# Patient Record
Sex: FEMALE | Race: WHITE | NOT HISPANIC OR LATINO | Employment: OTHER | URBAN - METROPOLITAN AREA
[De-identification: names, ages, dates, MRNs, and addresses within clinical notes are randomized per-mention and may not be internally consistent; named-entity substitution may affect disease eponyms.]

---

## 2017-01-05 DIAGNOSIS — S72.92XA FEMUR FRACTURE, LEFT (H): Primary | ICD-10-CM

## 2017-01-06 ENCOUNTER — ANTICOAGULATION THERAPY VISIT (OUTPATIENT)
Dept: ANTICOAGULATION | Facility: CLINIC | Age: 82
End: 2017-01-06
Payer: COMMERCIAL

## 2017-01-06 DIAGNOSIS — I48.91 ATRIAL FIBRILLATION (H): ICD-10-CM

## 2017-01-06 DIAGNOSIS — Z79.01 LONG-TERM (CURRENT) USE OF ANTICOAGULANTS: Primary | ICD-10-CM

## 2017-01-06 LAB — INR POINT OF CARE: 2.4 (ref 0.86–1.14)

## 2017-01-06 PROCEDURE — 99207 ZZC NO CHARGE NURSE ONLY: CPT

## 2017-01-06 PROCEDURE — 36416 COLLJ CAPILLARY BLOOD SPEC: CPT

## 2017-01-06 PROCEDURE — 85610 PROTHROMBIN TIME: CPT | Mod: QW

## 2017-01-06 NOTE — PROGRESS NOTES
ANTICOAGULATION FOLLOW-UP CLINIC VISIT    Patient Name:  Pooja Swartz  Date:  1/6/2017  Contact Type:  Face to Face    SUBJECTIVE:     Patient Findings     Positives No Problem Findings (no changes, concerns or problems reported)           OBJECTIVE    INR PROTIME   Date Value Ref Range Status   01/06/2017 2.4* 0.86 - 1.14 Final       ASSESSMENT / PLAN  INR assessment THER    Recheck INR In: 3 WEEKS    INR Location Clinic      Anticoagulation Summary as of 1/6/2017     INR goal 2.0-3.0   Selected INR 2.4 (1/6/2017)   Maintenance plan 4 mg (2 mg x 2) on Mon, Wed, Fri; 3 mg (2 mg x 1.5) all other days   Full instructions 4 mg on Mon, Wed, Fri; 3 mg all other days   Weekly total 24 mg   No change documented Kaylyn Morris RN   Plan last modified Kaylyn Morris RN (12/13/2016)   Next INR check 1/27/2017   Priority INR   Target end date Indefinite    Indications   Atrial fibrillation (H) [I48.91]  Long-term (current) use of anticoagulants [Z79.01] [Z79.01]         Anticoagulation Episode Summary     INR check location     Preferred lab     Send INR reminders to CL ANTICOAG POOL    Comments * Discharged from home care 10-21-16      Anticoagulation Care Providers     Provider Role Specialty Phone number    LEE Winkler MD [facility] [phone]            See the Encounter Report to view Anticoagulation Flowsheet and Dosing Calendar (Go to Encounters tab in chart review, and find the Anticoagulation Therapy Visit)        Kaylyn Morris RN

## 2017-01-06 NOTE — MR AVS SNAPSHOT
Pooja Swartz   1/6/2017 11:15 AM   Anticoagulation Therapy Visit    Description:  89 year old female   Provider:  [facility]   Department:  [facility]           INR as of 1/6/2017     Selected INR 2.4 (1/6/2017)      Anticoagulation Summary as of 1/6/2017     INR goal 2.0-3.0   Selected INR 2.4 (1/6/2017)   Full instructions 4 mg on Mon, Wed, Fri; 3 mg all other days   Next INR check 1/27/2017    Indications   Atrial fibrillation (H) [I48.91]  Long-term (current) use of anticoagulants [Z79.01] [Z79.01]         Description     Recheck INR 3 weeks, 1/27/17  Continue warfarin 4 mg Mon, Wed, Fri, 4 mg rest of week       Contact Numbers     Please call [phone] to cancel and/or reschedule your appointment.  Please call [phone] with any problems or questions regarding your therapy          January 2017 Details    Sun Mon Tue Wed Thu Fri Sat     1               2               3               4               5               6      4 mg   See details      7      3 mg           8      3 mg         9      4 mg         10      3 mg         11      4 mg         12      3 mg         13      4 mg         14      3 mg           15      3 mg         16      4 mg         17      3 mg         18      4 mg         19      3 mg         20      4 mg         21      3 mg           22      3 mg         23      4 mg         24      3 mg         25      4 mg         26      3 mg         27            28                 29               30               31                    Date Details   01/06 This INR check       Date of next INR:  1/27/2017         How to take your warfarin dose     To take:  3 mg Take 1.5 of the 2 mg tablets.    To take:  4 mg Take 2 of the 2 mg tablets.

## 2017-01-09 ENCOUNTER — OFFICE VISIT (OUTPATIENT)
Dept: ORTHOPEDICS | Facility: CLINIC | Age: 82
End: 2017-01-09

## 2017-01-09 VITALS — BODY MASS INDEX: 27.44 KG/M2 | HEIGHT: 59 IN | WEIGHT: 136.1 LBS

## 2017-01-09 DIAGNOSIS — M97.02XD PERIPROSTHETIC FRACTURE AROUND INTERNAL PROSTHETIC LEFT HIP JOINT, SUBSEQUENT ENCOUNTER: Primary | ICD-10-CM

## 2017-01-09 NOTE — NURSING NOTE
"Reason For Visit:   Chief Complaint   Patient presents with     Surgical Followup     POP F/u Open Reduction Internal Fixation Left Femur Periprosthetic Fracture, Revision Left Total Hip DOS: 8/19/16           Ht 1.51 m (4' 11.45\")  Wt 61.735 kg (136 lb 1.6 oz)  BMI 27.08 kg/m2  LMP 07/07/1960                 Current Outpatient Prescriptions   Medication     warfarin (COUMADIN) 2 MG tablet     allopurinol (ZYLOPRIM) 100 MG tablet     ferrous sulfate (IRON) 325 (65 FE) MG tablet     Calcium-Vitamin D 600-200 MG-UNIT TABS     acetaminophen (TYLENOL) 325 MG tablet     amLODIPine (NORVASC) 2.5 MG tablet     miconazole (MICATIN; MICRO GUARD) 2 % powder     artificial saliva (BIOTENE DRY MOUTHWASH) LIQD solution     ranitidine (ZANTAC) 150 MG tablet     traMADol (ULTRAM) 50 MG tablet     Warfarin Therapy Reminder     senna-docusate (SENOKOT-S;PERICOLACE) 8.6-50 MG per tablet     Fexofenadine HCl (ALLEGRA PO)     Probiotic Product (PROBIOTIC DAILY PO)     brimonidine (ALPHAGAN-P) 0.15 % ophthalmic solution     latanoprost (XALATAN) 0.005 % ophthalmic solution     triamcinolone (KENALOG) 0.1 % ointment     ONE-A-DAY WOMENS OR TABS     COSOPT 2-0.5 % OP SOLN     No current facility-administered medications for this visit.       Allergies   Allergen Reactions     Penicillins Evens Huitron C.M.A.               "

## 2017-01-09 NOTE — Clinical Note
1/9/2017       RE: Pooja Swartz  [address]  [address] [place] [id number]     Dear Colleague,    Thank you for referring your patient, Pooja Swartz, to the [facility] at [facility]. Please see a copy of my visit note below.    Postoperative hip replacement follow-up clinic visit    Pooja Swartz is doing well after complex revision L ROBYN for [place] B2 ppx femur fracture.    Preoperative hip pain is   mostly resolved    Analgesic medication: Ultram    DX:  1. L femoral neck Fx  2. L periprosthetic fx, [place] B2  3. A fib    SURGERIES:  1. 3/2009, R ROBYN  2. 11/2007 L hip hemiarthroplasty for femoral neck fracture on 11/2007 (Dr. Ann)  3. 8/19/2016, Revision Bipolar stem to long stem ROBYN. ORIF prox femur periprosthetic fracture. (Bob) [facility]      EXAM:  Incision healing, clean and dry.  Able to stand today in clinic  Leg/ankle edema: none  Femoral, peroneal and tibial nerve function: normal  Gait: Not yet able to ambulate   Level pelvis when standing.    Intraoperative cultures: Completed Levoquin for gram variable bacillis     IMAGING:  Radiographs demonstrate the hip implant in satisfactory position without evidence of any complication. Fracture fragments with healing changes     IMPRESSION:  Excellent outcome to date after complex revision L ROBYN     PLAN:  1.     Continue range of motion exercises and stretching.  2.     Hip abductor and quadriceps strengthening exercises.  3.     Weight bearing status: 50% body weight for next 4 weeks   4. Discontinue DVT prophylaxis meds (i.e., warfarin or ASA) unless required for another indication.  5. Patient given instructions re: prophylactic antibiotic recommendations during dental work.  6. Next follow-up visit at 4 weeks or sooner as needed.       Kael Rojas M.D.  [employer] Family Professor  Oncology and Adult Reconstructive Surgery  Dept Orthopaedic Surgery, [facility]  [facility]  [phone] office  [email or web address]    Total Time = 10 min, 50% of which was spent in counseling and coordination of care as documented above.

## 2017-01-09 NOTE — PROGRESS NOTES
Postoperative hip replacement follow-up clinic visit    Pooja Swartz is doing well after complex revision L ROBYN for [place] B2 ppx femur fracture.    She ambulates at her pre fracture baseline, ie using 4 pronged cane at home and walker outside of her home.  She drives and lives independently.     Analgesic medication: Ultram    Ref MD:  MD Gabriele Wyatt MD PCP    DX:  1. L femoral neck Fx  2. L periprosthetic fx, [place] B2  3. A fib    SURGERIES:  1. 3/2009, R ROBYN  2. 11/2007 L hip hemiarthroplasty for femoral neck fracture on 11/2007 (Dr. Ann)  3. 8/19/2016, Revision Bipolar stem to long stem ROBYN. ORIF prox femur periprosthetic fracture. (Bob) [facility]      EXAM:  Incision healing, clean and dry.  Able to stand today in clinic  Leg/ankle edema: none  Femoral, peroneal and tibial nerve function: normal  Gait: Not yet able to ambulate   Level pelvis when standing.    Intraoperative cultures: Completed Levoquin for gram variable bacillis     IMAGING:  Radiographs demonstrate the hip implant in satisfactory position without evidence of any complication. Fracture fragments with healing changes     IMPRESSION:  Excellent outcome to date after complex revision L ROBYN     PLAN:  1. Excellent outcome  2. Full wt bearing  3. RTC at 1 year postop  4. Advised aquatic exercises.       Kael Rojas M.D.  [employer]resa Family Professor  Oncology and Adult Reconstructive Surgery  Dept Orthopaedic Surgery, [facility]  [phone] office  [email or web address]    Total Time = 10 min, 50% of which was spent in counseling and coordination of care as documented above.

## 2017-01-27 ENCOUNTER — ANTICOAGULATION THERAPY VISIT (OUTPATIENT)
Dept: ANTICOAGULATION | Facility: CLINIC | Age: 82
End: 2017-01-27
Payer: COMMERCIAL

## 2017-01-27 DIAGNOSIS — I48.91 ATRIAL FIBRILLATION (H): ICD-10-CM

## 2017-01-27 DIAGNOSIS — Z79.01 LONG-TERM (CURRENT) USE OF ANTICOAGULANTS: Primary | ICD-10-CM

## 2017-01-27 LAB — INR POINT OF CARE: 2.5 (ref 0.86–1.14)

## 2017-01-27 PROCEDURE — 99207 ZZC NO CHARGE NURSE ONLY: CPT

## 2017-01-27 PROCEDURE — 85610 PROTHROMBIN TIME: CPT | Mod: QW

## 2017-01-27 PROCEDURE — 36416 COLLJ CAPILLARY BLOOD SPEC: CPT

## 2017-01-27 NOTE — PROGRESS NOTES
ANTICOAGULATION FOLLOW-UP CLINIC VISIT    Patient Name:  Pooja Swartz  Date:  1/27/2017  Contact Type:  Face to Face    SUBJECTIVE:     Patient Findings     Positives Any Bruising (bruise on both hands, she is not sure what she did), No Problem Findings (no changes, concerns or problems reported)           OBJECTIVE    INR PROTIME   Date Value Ref Range Status   01/27/2017 2.5* 0.86 - 1.14 Final       ASSESSMENT / PLAN  INR assessment THER    Recheck INR In: 4 WEEKS    INR Location Clinic      Anticoagulation Summary as of 1/27/2017     INR goal 2.0-3.0   Selected INR 2.5 (1/27/2017)   Maintenance plan 4 mg (2 mg x 2) on Mon, Wed, Fri; 3 mg (2 mg x 1.5) all other days   Full instructions 4 mg on Mon, Wed, Fri; 3 mg all other days   Weekly total 24 mg   No change documented Kaylyn Morris RN   Plan last modified Kaylyn Morris RN (12/13/2016)   Next INR check 2/24/2017   Priority INR   Target end date Indefinite    Indications   Atrial fibrillation (H) [I48.91]  Long-term (current) use of anticoagulants [Z79.01] [Z79.01]         Anticoagulation Episode Summary     INR check location     Preferred lab     Send INR reminders to CL ANTICOAG POOL    Comments * Discharged from home care 10-21-16      Anticoagulation Care Providers     Provider Role Specialty Phone number    Peterson, LEE Fair MD [facility] [phone]            See the Encounter Report to view Anticoagulation Flowsheet and Dosing Calendar (Go to Encounters tab in chart review, and find the Anticoagulation Therapy Visit)        Kaylyn Morris RN

## 2017-01-27 NOTE — MR AVS SNAPSHOT
Pooja HOUSTON Maxime   1/27/2017 11:15 AM   Anticoagulation Therapy Visit    Description:  89 year old female   Provider:  [facility]   Department:  [facility]           INR as of 1/27/2017     Selected INR 2.5 (1/27/2017)      Anticoagulation Summary as of 1/27/2017     INR goal 2.0-3.0   Selected INR 2.5 (1/27/2017)   Full instructions 4 mg on Mon, Wed, Fri; 3 mg all other days   Next INR check 2/24/2017    Indications   Atrial fibrillation (H) [I48.91]  Long-term (current) use of anticoagulants [Z79.01] [Z79.01]         Description     Recheck INR 4 weeks, 2/24/17  continue warfarin 4 mg Mon, Wed, Fri, 3 mg rest of week       Contact Numbers     Please call [phone] to cancel and/or reschedule your appointment.  Please call [phone] with any problems or questions regarding your therapy          January 2017 Details    Sun Mon Tue Wed Thu Fri Sat     1               2               3               4               5               6               7                 8               9               10               11               12               13               14                 15               16               17               18               19               20               21                 22               23               24               25               26               27      4 mg   See details      28      3 mg           29      3 mg         30      4 mg         31      3 mg              Date Details   01/27 This INR check               How to take your warfarin dose     To take:  3 mg Take 1.5 of the 2 mg tablets.    To take:  4 mg Take 2 of the 2 mg tablets.           February 2017 Details    Sun Mon Tue Wed Thu Fri Sat        1      4 mg         2      3 mg         3      4 mg         4      3 mg           5      3 mg         6      4 mg         7      3 mg         8      4 mg         9      3 mg         10      4 mg         11      3 mg           12      3 mg         13      4 mg          14      3 mg         15      4 mg         16      3 mg         17      4 mg         18      3 mg           19      3 mg         20      4 mg         21      3 mg         22      4 mg         23      3 mg         24            25                 26               27               28                    Date Details   No additional details    Date of next INR:  2/24/2017         How to take your warfarin dose     To take:  3 mg Take 1.5 of the 2 mg tablets.    To take:  4 mg Take 2 of the 2 mg tablets.

## 2017-02-08 ENCOUNTER — APPOINTMENT (OUTPATIENT)
Dept: CT IMAGING | Facility: CLINIC | Age: 82
End: 2017-02-08
Attending: FAMILY MEDICINE
Payer: MEDICARE

## 2017-02-08 ENCOUNTER — HOSPITAL ENCOUNTER (EMERGENCY)
Facility: CLINIC | Age: 82
Discharge: HOME | End: 2017-02-08
Attending: FAMILY MEDICINE | Admitting: FAMILY MEDICINE
Payer: MEDICARE

## 2017-02-08 ENCOUNTER — APPOINTMENT (OUTPATIENT)
Dept: GENERAL RADIOLOGY | Facility: CLINIC | Age: 82
End: 2017-02-08
Attending: FAMILY MEDICINE
Payer: MEDICARE

## 2017-02-08 VITALS
DIASTOLIC BLOOD PRESSURE: 98 MMHG | SYSTOLIC BLOOD PRESSURE: 176 MMHG | HEART RATE: 64 BPM | RESPIRATION RATE: 20 BRPM | OXYGEN SATURATION: 98 %

## 2017-02-08 DIAGNOSIS — S09.90XA CLOSED HEAD INJURY, INITIAL ENCOUNTER: ICD-10-CM

## 2017-02-08 DIAGNOSIS — S70.02XA CONTUSION OF LEFT HIP, INITIAL ENCOUNTER: ICD-10-CM

## 2017-02-08 DIAGNOSIS — S51.012A ELBOW LACERATION, LEFT, INITIAL ENCOUNTER: ICD-10-CM

## 2017-02-08 DIAGNOSIS — S00.03XA SCALP HEMATOMA, INITIAL ENCOUNTER: ICD-10-CM
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
## 2017-02-08 LAB
ALBUMIN SERPL-MCNC: 3.7 G/DL (ref 3.4–5)
ALP SERPL-CCNC: 88 U/L (ref 40–150)
ALT SERPL W P-5'-P-CCNC: 22 U/L (ref 0–50)
ANION GAP SERPL CALCULATED.3IONS-SCNC: 8 MMOL/L (ref 3–14)
AST SERPL W P-5'-P-CCNC: 29 U/L (ref 0–45)
BASOPHILS # BLD AUTO: 0.1 10E9/L (ref 0–0.2)
BASOPHILS NFR BLD AUTO: 0.6 %
BILIRUB SERPL-MCNC: 0.7 MG/DL (ref 0.2–1.3)
BUN SERPL-MCNC: 23 MG/DL (ref 7–30)
CALCIUM SERPL-MCNC: 9.2 MG/DL (ref 8.5–10.1)
CHLORIDE SERPL-SCNC: 106 MMOL/L (ref 94–109)
CO2 SERPL-SCNC: 24 MMOL/L (ref 20–32)
CREAT SERPL-MCNC: 0.82 MG/DL (ref 0.52–1.04)
DIFFERENTIAL METHOD BLD: ABNORMAL
EOSINOPHIL # BLD AUTO: 0.1 10E9/L (ref 0–0.7)
EOSINOPHIL NFR BLD AUTO: 1 %
ERYTHROCYTE [DISTWIDTH] IN BLOOD BY AUTOMATED COUNT: 15.3 % (ref 10–15)
GFR SERPL CREATININE-BSD FRML MDRD: 65 ML/MIN/1.7M2
GLUCOSE SERPL-MCNC: 110 MG/DL (ref 70–99)
HCT VFR BLD AUTO: 40.5 % (ref 35–47)
HGB BLD-MCNC: 13.2 G/DL (ref 11.7–15.7)
IMM GRANULOCYTES # BLD: 0 10E9/L (ref 0–0.4)
IMM GRANULOCYTES NFR BLD: 0.5 %
INR PPP: 2.02 (ref 0.86–1.14)
LYMPHOCYTES # BLD AUTO: 1.8 10E9/L (ref 0.8–5.3)
LYMPHOCYTES NFR BLD AUTO: 22.3 %
MCH RBC QN AUTO: 31.9 PG (ref 26.5–33)
MCHC RBC AUTO-ENTMCNC: 32.6 G/DL (ref 31.5–36.5)
MCV RBC AUTO: 98 FL (ref 78–100)
MONOCYTES # BLD AUTO: 0.7 10E9/L (ref 0–1.3)
MONOCYTES NFR BLD AUTO: 8.8 %
NEUTROPHILS # BLD AUTO: 5.4 10E9/L (ref 1.6–8.3)
NEUTROPHILS NFR BLD AUTO: 66.8 %
PLATELET # BLD AUTO: 205 10E9/L (ref 150–450)
POTASSIUM SERPL-SCNC: 4.3 MMOL/L (ref 3.4–5.3)
PROT SERPL-MCNC: 7.9 G/DL (ref 6.8–8.8)
RBC # BLD AUTO: 4.14 10E12/L (ref 3.8–5.2)
SODIUM SERPL-SCNC: 138 MMOL/L (ref 133–144)
WBC # BLD AUTO: 8 10E9/L (ref 4–11)

## 2017-02-08 PROCEDURE — 73080 X-RAY EXAM OF ELBOW: CPT | Mod: LT

## 2017-02-08 PROCEDURE — 85025 COMPLETE CBC W/AUTO DIFF WBC: CPT | Performed by: FAMILY MEDICINE

## 2017-02-08 PROCEDURE — 70450 CT HEAD/BRAIN W/O DYE: CPT

## 2017-02-08 PROCEDURE — 85610 PROTHROMBIN TIME: CPT | Performed by: FAMILY MEDICINE

## 2017-02-08 PROCEDURE — 12002 RPR S/N/AX/GEN/TRNK2.6-7.5CM: CPT | Performed by: FAMILY MEDICINE

## 2017-02-08 PROCEDURE — 99284 EMERGENCY DEPT VISIT MOD MDM: CPT | Mod: 25 | Performed by: FAMILY MEDICINE

## 2017-02-08 PROCEDURE — 80053 COMPREHEN METABOLIC PANEL: CPT | Performed by: FAMILY MEDICINE

## 2017-02-08 PROCEDURE — 73523 X-RAY EXAM HIPS BI 5/> VIEWS: CPT

## 2017-02-08 PROCEDURE — 99285 EMERGENCY DEPT VISIT HI MDM: CPT | Mod: 25 | Performed by: FAMILY MEDICINE

## 2017-02-08 RX ORDER — LIDOCAINE HYDROCHLORIDE AND EPINEPHRINE 10; 10 MG/ML; UG/ML
INJECTION, SOLUTION INFILTRATION; PERINEURAL
Status: DISCONTINUED
Start: 2017-02-08 | End: 2017-02-08 | Stop reason: HOSPADM

## 2017-02-08 ASSESSMENT — ENCOUNTER SYMPTOMS
ENDOCRINE NEGATIVE: 1
GASTROINTESTINAL NEGATIVE: 1
MUSCULOSKELETAL NEGATIVE: 1
EYES NEGATIVE: 1
HEMATOLOGIC/LYMPHATIC NEGATIVE: 1
PSYCHIATRIC NEGATIVE: 1
NEUROLOGICAL NEGATIVE: 1
CARDIOVASCULAR NEGATIVE: 1
RESPIRATORY NEGATIVE: 1
ALLERGIC/IMMUNOLOGIC NEGATIVE: 1
CONSTITUTIONAL NEGATIVE: 1

## 2017-02-08 NOTE — ED NOTES
Patient states she fell at home this morning at 0930, she fell backwards onto head and to the left. She has a bump on back of head (closed) an injury on left elbow that is covered in a pressure dressing. She is on Coumadin for Afib. She denies LOC. She is worried about her hip as she had a hip replacement in August of 2016. She is A/O x4

## 2017-02-08 NOTE — DISCHARGE INSTRUCTIONS
Mechanical Fall  You have had a fall today. It appears that the cause is  mechanical . That means that you slipped, tripped or lost your balance. If your fall had been due to fainting or a seizure, further tests would be required.  Home Care:    Rest today and resume your normal activities when you are feeling back to normal.    If you were injured during the fall, follow the advice from your doctor regarding care of your injury.    You may use acetaminophen (Tylenol) or ibuprofen (Motrin, Advil) to control pain, unless another pain medicine was prescribed. [NOTE: If you have chronic liver or kidney disease or ever had a stomach ulcer or GI bleeding, talk with your doctor before using these medicines.]  Fall Prevention:    Was there anything that caused your fall that can be fixed, removed, or replaced?    Make your home safe by keeping walkways clear of objects you may trip over.    Use non-slip pads under rugs.    Do not walk in poorly lit areas.    Do not stand on chairs or wobbly ladders.    Use caution when reaching overhead or looking upward. This position can cause a loss of balance.    Be sure your shoes fit properly, have non-slip bottoms and are in good condition.    Be cautious when going up and down curbs, and walking on uneven sidewalks.    If your balance is poor, consider using a cane or walker.    Stay as active as you can. Balance, flexibility, strength, and endurance all come from exercise. They all play a role in preventing falls.  Follow Up  with your doctor or as advised by our staff.  Get Prompt Medical Attention  if any of the following occur:    Repeated mechanical falls, or unexplained falls    Dizziness, fainting or seizure    Severe headache    Chest pain or shortness of breath    Palpitations (very rapid or very slow or irregular heartbeat)    Blood in vomit, stools (black or red color)    Weakness of an arm or leg or one side of the face    Difficulty with speech or vision    [id number]  The [employer]. [address] [place] [address], [place] [address]. All rights reserved. This information is not intended as a substitute for professional medical care. Always follow your healthcare professional's instructions.          Bruises (Contusions)    A contusion is a bruise. A bruise happens when a blow to your body doesn't break the skin but does break blood vessels beneath the skin. Blood leaking from the broken vessels causes redness and swelling. As it heals, your bruise is likely to turn colors like purple, green, and yellow. This is normal. The bruise should fade in 2 or 3 weeks.  Factors that make you more likely to bruise  Almost everyone bruises now and then. Certain people do bruise more easily than others. You're more prone to bruising as you get older. That's because blood vessels become more fragile with age. You're also more likely to bruise if you have a clotting disorder such as hemophilia or take medications that reduce clotting, including aspirin.  When to go to the emergency room (ER)  Bruises almost always heal on their own without special treatment. But for some people, a bad bruise can be serious. Seek medical care if you:    Have a clotting disorder such as hemophilia.    Have cirrhosis or other serious liver disease.    Take blood-thinning medications such as warfarin (Coumadin).  What to expect in the ER  A doctor will examine your bruise and ask about any health conditions you have. In some cases, you may have a test to check how well your blood clots. Other treatment will depend on your needs.  Follow-up care  Sometimes a bruise gets worse instead of better. It may become larger and more swollen. This can occur when your body walls off a small pool of blood under the skin (hematoma). In very rare cases, your doctor may need to drain excess blood from the area.  Tip:  Apply an ice pack or bag of frozen peas to a bruise (keep a thin cloth between the cold source and your skin).  This can help reduce redness and swelling.     8086-0147 [employer]. [address] [place] [address], [place] [address]. All rights reserved. This information is not intended as a substitute for professional medical care. Always follow your healthcare professional's instructions.      Ice affected areas for comfort 20 minutes out of each hour while awake over the next 2 days.  Ibuprofen if needed for pain.  Removal of sutures with her primary care provider in 14-21 days.

## 2017-02-08 NOTE — ED NOTES
While walking with walker this am, released walker with only 3 steps to chair, lost balance, and fell. Hit left elbow then head hit floor. No LOC. Takes coumadin. Hematoma to left parietal.  Lac to left elbow with golf ball hematoma. Dressing saturated with blood, reapplied  Gauze and coban applied. Trauma eval. Dr Ramirez notified

## 2017-02-08 NOTE — ED AVS SNAPSHOT
[facility] Emergency Department    [address] [address]    Phone:  [phone]    Fax:  [phone]                                       Pooja Swartz   MRN: [id number]    Department:  [facility] Emergency Department   Date of Visit:  2/8/2017           After Visit Summary Signature Page     I have received my discharge instructions, and my questions have been answered. I have discussed any challenges I see with this plan with the nurse or doctor.    ..........................................................................................................................................  Patient/Patient Representative Signature      ..........................................................................................................................................  Patient Representative Print Name and Relationship to Patient    ..................................................               ................................................  Date                                            Time    ..........................................................................................................................................  Reviewed by Signature/Title    ...................................................              ..............................................  Date                                                            Time

## 2017-02-08 NOTE — ED PROVIDER NOTES
History     Chief Complaint   Patient presents with     Fall     HPI  Pooja Swartz is a 89 year old female, past medical history significant for hypertension, GI bleed, generalized osteoarthritis, stage III kidney disease, gout, atrial fibrillation, long-term use of anticoagulants, presents the emergency department with concerns of a fall at her nursing home just prior to arrival.  History is obtained from the patient and her friend who accompanies her.  She states that she was walking with her walker and briefly took her hands off the support rails and lost her balance falling backwards striking her head in the left parietal occipital area as well as her left elbow and left hip/buttock area.  She required assistance but was able to get back up and has ambulated since the fall.  There was no loss of consciousness.  She has a mild headache in the location of the trauma left parietal occipital area.  No blurred vision or double vision no nausea or vomiting.  She denies any injury to her chest back or abdomen.  There was bleeding from the left elbow.  Pressure was applied she came to the emergency department.  The patient denies any concerning prodromal features such as headache nausea vomiting or lightheadedness chest pain shortness of air etc.     Active Ambulatory Problems     Diagnosis Date Noted     Hypertension goal BP (blood pressure) < 140/90 07/07/2005     Other specified malignant neoplasm of skin of lower limb, including hip 07/07/2005     Hemorrhage of gastrointestinal tract 07/07/2005     Generalized osteoarthrosis, unspecified site 07/07/2005     HEPATITIS B in past 02/06/2007     Impaired fasting glucose 12/05/2007     Disorder of bone and cartilage 01/13/2008     Chronic kidney disease, stage III (moderate) 04/21/2008     Chronic tophaceous gout 02/02/2009     CARDIOVASCULAR SCREENING; LDL GOAL LESS THAN 100 10/31/2010     Sensorineural hearing loss, bilateral 07/26/2012     Advanced directives,  counseling/discussion 11/09/2012     Sinus node dysfunction (H)      Atrial fibrillation (H) 01/15/2015     Chest pain 03/14/2015     Diarrhea 03/15/2015     Health Care Home 12/01/2015     Femur fracture, left (H) 08/17/2016     Acute posthemorrhagic anemia 08/30/2016     Long-term (current) use of anticoagulants [Z79.01] 09/29/2016     Periprosthetic fracture around internal prosthetic left hip joint, subsequent encounter 01/09/2017     Resolved Ambulatory Problems     Diagnosis Date Noted     Hip fracture (H) 04/05/2011     Heart block AV third degree (H) 08/07/2014     Renal insufficiency      Past Medical History   Diagnosis Date     Unspecified essential hypertension      Other specified glaucoma      Hemorrhage of gastrointestinal tract, unspecified      Disorders of bursae and tendons in shoulder region, unspecified      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, including hip      Chronic kidney disease      Past Surgical History   Procedure Laterality Date     Surgical history of -   11/1992     skin cancer, nose     Surgical history of -   1978     right, vein stripping     Surgical history of -   1968     breast biopsy     Surgical history of -   1993     laparoscopic cholecystectomy     Surgical history of -        tonsillectomy and adenoidectomy     Surgical history of -   04/2004     left cataract     Surgical history of -   08/09/2004     right total shoulder arthroplasty     Open reduction internal fixation femur proximal Left 8/19/2016     Procedure: OPEN REDUCTION INTERNAL FIXATION FEMUR PROXIMAL;  Surgeon: Kael Rojas MD;  Location: [facility] OR     Arthroplasty revision hip Left 8/19/2016     Procedure: ARTHROPLASTY REVISION HIP;  Surgeon: Kael Rojas MD;  Location: [facility] OR     C pelvis/hip joint surgery unlisted       C shoulder surg proc unlisted       C stomach surgery procedure unlisted       Hc vascular surgery procedure unlist       Social History     Social History     Marital  Status:      Spouse Name: N/A     Number of Children: N/A     Years of Education: N/A     Occupational History     Not on file.     Social History Main Topics     Smoking status: Never Smoker      Smokeless tobacco: Never Used     Alcohol Use: Yes      Comment: rare     Drug Use: No     Sexual Activity: Not Currently     Birth Control/ Protection: None     Other Topics Concern     Parent/Sibling W/ Cabg, Mi Or Angioplasty Before 65f 55m? Yes     Social History Narrative     Family History   Problem Relation Age of Onset     HEART DISEASE Mother      CHF     HEART DISEASE Son      MI     Hypertension Mother      Hypertension Father      Hypertension Maternal Grandmother      Hypertension Maternal Grandfather      Hypertension Son      Current Facility-Administered Medications   Medication     lidocaine 1% with EPINEPHrine 1:100,000 1 %-1:[id number] injection     Current Outpatient Prescriptions   Medication     AMLODIPINE BESYLATE PO     warfarin (COUMADIN) 2 MG tablet     allopurinol (ZYLOPRIM) 100 MG tablet     Calcium-Vitamin D 600-200 MG-UNIT TABS     senna-docusate (SENOKOT-S;PERICOLACE) 8.6-50 MG per tablet     Fexofenadine HCl (ALLEGRA PO)     Probiotic Product (PROBIOTIC DAILY PO)     brimonidine (ALPHAGAN-P) 0.15 % ophthalmic solution     latanoprost (XALATAN) 0.005 % ophthalmic solution     triamcinolone (KENALOG) 0.1 % ointment     ONE-A-DAY WOMENS OR TABS     COSOPT 2-0.5 % OP SOLN     miconazole (MICATIN; MICRO GUARD) 2 % powder     traMADol (ULTRAM) 50 MG tablet     Warfarin Therapy Reminder        Allergies   Allergen Reactions     Penicillins Hives       I have reviewed the Medications, Allergies, Past Medical and Surgical History, and Social History in the Epic system.    Review of Systems   Constitutional: Negative.    HENT:        Left occipital area headache.   Eyes: Negative.    Respiratory: Negative.    Cardiovascular: Negative.    Gastrointestinal: Negative.    Endocrine: Negative.     Genitourinary: Negative.    Musculoskeletal: Negative.         Left elbow pain, left hip pain.   Allergic/Immunologic: Negative.    Neurological: Negative.    Hematological: Negative.    Psychiatric/Behavioral: Negative.        Physical Exam   BP: 190/88 mmHg  Pulse: 64  Resp: 20  SpO2: 99 %  Physical Exam   Constitutional: She is oriented to person, place, and time. She appears well-developed and well-nourished.   HENT:   Right Ear: External ear normal.   Left Ear: External ear normal.   Nose: Nose normal.   Mouth/Throat: Oropharynx is clear and moist.   Palpable 5 cm diameter hematoma left parietal occipital area.  No crepitance.  No laceration or abrasion.  Tender.   Eyes: Conjunctivae and EOM are normal. Pupils are equal, round, and reactive to light.   Neck: Normal range of motion. Neck supple.   Cardiovascular: Normal rate, regular rhythm, normal heart sounds and intact distal pulses.    Pulmonary/Chest: Effort normal and breath sounds normal.   Abdominal: Soft. Bowel sounds are normal.   Musculoskeletal:   Exam of left elbow reveals a 5 centimeter laceration/skin tear with slow ooze of blood, there is a area of skin avulsion/tear with there is no tissue.  Dressing applied.  No gross deformity to the elbow.  Tenderness to palpation of the left greater trochanter.  No pain with internal and external rotation.  No limb shortening, neurovascular intact.   Neurological: She is alert and oriented to person, place, and time.   Skin: Skin is warm and dry.   Psychiatric: She has a normal mood and affect. Her behavior is normal.   Nursing note and vitals reviewed.      ED Course   Procedures           Results for orders placed or performed during the hospital encounter of 02/08/17   CT Head w/o Contrast    Narrative    CT SCAN OF THE HEAD WITHOUT CONTRAST   2/8/2017 11:27 AM     HISTORY: Fall, head injury    TECHNIQUE:  Axial images of the head and coronal reformations without  IV contrast material. Radiation dose  for this scan was reduced using  automated exposure control, adjustment of the mA and/or kV according  to patient size, or iterative reconstruction technique.    COMPARISON: 8/16/2016    FINDINGS:  There is generalized atrophy of the brain.  There is low  attenuation in the white matter of the cerebral hemispheres consistent  with sequelae of small vessel ischemic disease. There is no evidence  of intracranial hemorrhage, mass, acute infarct or anomaly.     The visualized portions of the sinuses and mastoids appear normal.  There is no evidence of trauma.      Impression    IMPRESSION:   1. No evidence of acute trauma.  2.  Atrophy of the brain.  White matter changes consistent with  sequelae of small vessel ischemic disease.  3. No change.      SARAH HASKINS MD   XR Elbow Left G/E 3 Views    Narrative    XR ELBOW LT G/E 3 VW  2/8/2017 11:44 AM    HISTORY:  Pain    COMPARISON:  None.      Impression    IMPRESSION:  A dressing overlies the posterior elbow. There may be a  soft tissue laceration. Bones appear normal. No acute osseous  abnormality identified.     ANIKA DELUNA MD   XR Pelvis and Hip Bilateral 2 Views    Narrative    PELVIS AND HIP BILATERAL TWO VIEWS 2/8/2017 11:43 AM     HISTORY: Fall, pain.    COMPARISON: 1/19/2017      Impression    IMPRESSION: Osteopenia. 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  Longstem femoral component in the left femur. There is heterotopic  ossification about the proximal left femur. Cortical irregularity in  the left femoral neck is unchanged compared with the prior x-ray. No  definite acute fracture is seen.    AMBER KAISER MD     1:01 PM  Procedure note:  With verbal consent for the patient I injected the 5 cm laceration with some skin avulsion with 8 cc of 1% lidocaine with epinephrine subcutaneously.  I then prepped the wound with Hibiclens and saline.  Simple interrupted closure was performed with a total of 8 3-0 nylon sutures.  Good wound margin  approximation.  Good hemostasis.      Critical Care time:  none               Labs Ordered and Resulted from Time of ED Arrival Up to the Time of Departure from the ED   CBC WITH PLATELETS DIFFERENTIAL - Abnormal; Notable for the following:     RDW 15.3 (*)     All other components within normal limits   COMPREHENSIVE METABOLIC PANEL - Abnormal; Notable for the following:     Glucose 110 (*)     All other components within normal limits   INR - Abnormal; Notable for the following:     INR 2.02 (*)     All other components within normal limits       Assessments & Plan (with Medical Decision Making)   89-year-old female past medical history reviewed above, presentation with a fall after she lost her balance inadvertently while using her walker.  She traumatized her head left elbow and hip areas.  Lacerations left elbow.  Vital stable and comfortable in the emergency department and declined pain medication.  Head CT obtained in light of the fact that the patient is on Coumadin with a moderate-sized scalp hematoma.  Negative head CT.  X-ray left elbow and AP pelvis and bilateral hips is negative for acute pathology.  The patient was comfortable, alert and had no change in mental status during the time in the emergency department.  The left elbow laceration was repaired primarily as described the procedure note above.  All questions were answered.  Plan for disposition to home.  Continue current medications, removal of sutures in 14-21 days.      Disclaimer: This note consists of symbols derived from keyboarding, dictation and/or voice recognition software. As a result, there may be errors in the script that have gone undetected. Please consider this when interpreting information found in this chart.      I have reviewed the nursing notes.    I have reviewed the findings, diagnosis, plan and need for follow up with the patient.    New Prescriptions    No medications on file       Final diagnoses:   Fall, initial  encounter   Closed head injury, initial encounter   Scalp hematoma, initial encounter   Elbow laceration, left, initial encounter   Contusion of left hip, initial encounter       2/8/2017   [facility] EMERGENCY DEPARTMENT      Kehinde Ramirez MD  02/08/17 [id number]

## 2017-02-13 ENCOUNTER — OFFICE VISIT (OUTPATIENT)
Dept: FAMILY MEDICINE | Facility: CLINIC | Age: 82
End: 2017-02-13
Payer: COMMERCIAL

## 2017-02-13 VITALS
BODY MASS INDEX: 27.45 KG/M2 | DIASTOLIC BLOOD PRESSURE: 70 MMHG | HEART RATE: 64 BPM | WEIGHT: 138 LBS | SYSTOLIC BLOOD PRESSURE: 140 MMHG

## 2017-02-13 DIAGNOSIS — T07.XXXA CONTUSION OF MULTIPLE SITES: ICD-10-CM

## 2017-02-13 DIAGNOSIS — S51.012A LACERATION OF ELBOW, LEFT, INITIAL ENCOUNTER: Primary | ICD-10-CM

## 2017-02-13 PROCEDURE — 99213 OFFICE O/P EST LOW 20 MIN: CPT | Performed by: FAMILY MEDICINE

## 2017-02-13 NOTE — MR AVS SNAPSHOT
"              After Visit Summary   2/13/2017    Pooja Swartz    MRN: [id number]           Patient Information     Date Of Birth          9/1/1925        Visit Information        Provider Department      2/13/2017 11:20 AM LEE Winkler MD [facility]        Care Instructions    Come back for an appointment on 2/21 for recheck and suture removal        Follow-ups after your visit        Your next 10 appointments already scheduled     Feb 24, 2017 11:00 AM CST   Anticoagulation Visit with CL ANTI COAG   [facility]    [address]  [facility] [id number] [phone]            Mar 07, 2017  3:00 PM CST   Return Visit with Adan Franks MD   [facility] PHYSICIAN [facility]ART AT [facility] ([facility])    [address] [address] [phone]              Who to contact     If you have questions or need follow up information about today's clinic visit or your schedule please contact [facility] directly at [phone].  Normal or non-critical lab and imaging results will be communicated to you by [employer]art, letter or phone within 4 business days after the clinic has received the results. If you do not hear from us within 7 days, please contact the clinic through Sif[employer]Nethart or phone. If you have a critical or abnormal lab result, we will notify you by phone as soon as possible.  Submit refill requests through [employer] or call your pharmacy and they will forward the refill request to us. Please allow 3 business days for your refill to be completed.          Additional Information About Your Visit        MyChart Information     [employer] lets you send messages to your doctor, view your test results, renew your prescriptions, schedule appointments and more. To sign up, go to [email or web address] . Click on \"Log in\" on the left side of the screen, which will take you to the Welcome page. " "Then click on \"Sign up Now\" on the right side of the page.     You will be asked to enter the access code listed below, as well as some personal information. Please follow the directions to create your username and password.     Your access code is: [email or web address]  Expires: 3/7/2017  6:31 AM     Your access code will  in 90 days. If you need help or a new code, please call your [facility] clinic or [phone].        Care EveryWhere ID     This is your Care EveryWhere ID. This could be used by other organizations to access your [facility] medical records  [id number]        Your Vitals Were     Pulse Last Period BMI (Body Mass Index)             64 1960 27.45 kg/m2          Blood Pressure from Last 3 Encounters:   17 140/70   17 (!) 176/98   16 138/78    Weight from Last 3 Encounters:   17 138 lb (62.6 kg)   17 136 lb 1.6 oz (61.7 kg)   16 135 lb (61.2 kg)              Today, you had the following     No orders found for display         Today's Medication Changes          These changes are accurate as of: 17 11:53 AM.  If you have any questions, ask your nurse or doctor.               These medicines have changed or have updated prescriptions.        Dose/Directions    triamcinolone 0.1 % ointment   Commonly known as:  KENALOG   This may have changed:    - when to take this  - reasons to take this  - additional instructions   Used for:  Nummular eczema        Apply  topically 3 times daily. Apply sparingly to affected area.   Quantity:  30 g   Refills:  0                Primary Care Provider Office Phone # Fax #    R Marv Winkler [phone]       [address] [address]        Thank you!     Thank you for choosing [facility]  for your care. Our goal is always to provide you with excellent care. Hearing back from our patients is one way we can continue to improve our services. Please take a " few minutes to complete the written survey that you may receive in the mail after your visit with us. Thank you!             Your Updated Medication List - Protect others around you: Learn how to safely use, store and throw away your medicines at www.disposemymeds.org.          This list is accurate as of: 2/13/17 11:53 AM.  Always use your most recent med list.                   Brand Name Dispense Instructions for use    ALLEGRA PO      Take 180 mg by mouth daily       allopurinol 100 MG tablet    ZYLOPRIM    90 tablet    Take 1 tablet (100 mg) by mouth daily       AMLODIPINE BESYLATE PO      Take 7.5 mg by mouth daily       brimonidine 0.15 % ophthalmic solution    ALPHAGAN-P     Place 1 drop Into the left eye 2 times daily       Calcium-Vitamin D 600-200 MG-UNIT Tabs      Take 1 tablet by mouth 2 times daily       COSOPT 2-0.5 % ophthalmic solution   Generic drug:  dorzolamide-timolol      1 DROP INTO Left Eye twice daily       latanoprost 0.005 % ophthalmic solution    XALATAN     Place 1 drop into both eyes At Bedtime       miconazole 2 % powder    MICATIN; MICRO GUARD     Apply topically 2 times daily Reported on 2/13/2017       ONE-A-DAY WITHIN Tabs      1 TABLET ORALLY DAILY       PROBIOTIC DAILY PO      Take 1 capsule by mouth daily       senna-docusate 8.6-50 MG per tablet    SENOKOT-S;PERICOLACE    100 tablet    Take 1-2 tablets by mouth 2 times daily       traMADol 50 MG tablet    ULTRAM    50 tablet    Take 1 tablet (50 mg) by mouth every 6 hours as needed       triamcinolone 0.1 % ointment    KENALOG    30 g    Apply  topically 3 times daily. Apply sparingly to affected area.       * Warfarin Therapy Reminder      1 each continuous prn       * warfarin 2 MG tablet    COUMADIN    150 tablet    Take 4 mg on Mon, Wed, Fri; 3 mg all other days or as directed by the Anticoagulation Clinic       * Notice:  This list has 2 medication(s) that are the same as other medications prescribed for you. Read the  directions carefully, and ask your doctor or other care provider to review them with you.

## 2017-02-13 NOTE — PROGRESS NOTES
SUBJECTIVE:                                                    Pooja Swartz is a 89 year old female who presents to clinic today for the following health issues:      ED/UC Followup:    Facility:  [facility]  Date of visit: 2/8/2017  Reason for visit: fall -  injury to left elbow  Current Status: Improved            Problem list and histories reviewed & adjusted, as indicated.  Additional history: She stumbled when she had stepped away from her walker for a short time and fell injuring her left elbow, left hip, and the left side of the head. She was evaluated in emergency room and had x-rays of the elbow and hip were negative for fracture, CT scan of the head which was unremarkable for acute injury. There was a laceration and skin tear of the left elbow which was repaired. The ER doc recommended a follow-up visit today to not remove the sutures for at least 10 days. She states that she does not have any symptoms to suggest a concussion such as confusion or amnesia. There is soreness of her left hip but she is able to ambulate without difficulty. She had developed significant swelling of her left arm distal to the dressing so she rewrapped it with a more loose dressing. It is starting to feel better            OBJECTIVE:                                                    /70 (BP Location: Right arm, Patient Position: Chair, Cuff Size: Adult Regular)  Pulse 64  Wt 138 lb (62.6 kg)  LMP 07/07/1960  BMI 27.45 kg/m2    GENERAL: healthy, alert and no distress  EYES: Eyes grossly normal to inspection, extraocular movements - intact, and PERRL  NECK: no tenderness, no adenopathy, no asymmetry, no masses, no stiffness; thyroid- normal to palpation  MS: extremities- no gross deformities noted, no edema  SKIN: Resolving ecchymosis of the elbow and forearm; the wound is healing nicely there is a 1 x 3 cm area where the skin sloughed off where there was mostly a skin tear; no sign of infection         ASSESSMENT/PLAN:                                                     ASSESSMENT:  1. Laceration of elbow, left, initial encounter    2. Contusion of multiple sites        PLAN:  The wound was cleansed with Hibiclens and then redressed with a sterile dressing. She was advised to change the dressing every 1-2 days as needed reassured that all the other contusions will continue to resolve spontaneously      Patient Instructions   Come back for an appointment on 2/21 for recheck and suture removal      [facility]

## 2017-02-24 ENCOUNTER — ANTICOAGULATION THERAPY VISIT (OUTPATIENT)
Dept: ANTICOAGULATION | Facility: CLINIC | Age: 82
End: 2017-02-24
Payer: COMMERCIAL

## 2017-02-24 ENCOUNTER — OFFICE VISIT (OUTPATIENT)
Dept: FAMILY MEDICINE | Facility: CLINIC | Age: 82
End: 2017-02-24
Payer: COMMERCIAL

## 2017-02-24 VITALS
BODY MASS INDEX: 27.45 KG/M2 | HEART RATE: 60 BPM | WEIGHT: 138 LBS | DIASTOLIC BLOOD PRESSURE: 62 MMHG | SYSTOLIC BLOOD PRESSURE: 136 MMHG

## 2017-02-24 DIAGNOSIS — S51.012D: Primary | ICD-10-CM

## 2017-02-24 DIAGNOSIS — L08.9 WOUND INFECTION: ICD-10-CM

## 2017-02-24 DIAGNOSIS — T14.8XXA WOUND INFECTION: ICD-10-CM

## 2017-02-24 DIAGNOSIS — I48.91 ATRIAL FIBRILLATION (H): ICD-10-CM

## 2017-02-24 DIAGNOSIS — Z79.01 LONG-TERM (CURRENT) USE OF ANTICOAGULANTS: ICD-10-CM

## 2017-02-24 LAB — INR POINT OF CARE: 2.6 (ref 0.86–1.14)

## 2017-02-24 PROCEDURE — 36416 COLLJ CAPILLARY BLOOD SPEC: CPT

## 2017-02-24 PROCEDURE — 99207 ZZC NO CHARGE NURSE ONLY: CPT

## 2017-02-24 PROCEDURE — 85610 PROTHROMBIN TIME: CPT | Mod: QW

## 2017-02-24 PROCEDURE — 99213 OFFICE O/P EST LOW 20 MIN: CPT | Performed by: FAMILY MEDICINE

## 2017-02-24 RX ORDER — CEPHALEXIN 500 MG/1
500 CAPSULE ORAL 2 TIMES DAILY
Qty: 14 CAPSULE | Refills: 0 | Status: SHIPPED | OUTPATIENT
Start: 2017-02-24 | End: 2017-03-07

## 2017-02-24 NOTE — MR AVS SNAPSHOT
Pooja HOUSTON Maxime   2/24/2017 11:00 AM   Anticoagulation Therapy Visit    Description:  89 year old female   Provider:  CL ANTI COAG   Department:  Cl Anticoag           INR as of 2/24/2017     Today's INR 2.6      Anticoagulation Summary as of 2/24/2017     INR goal 2.0-3.0   Today's INR 2.6   Full instructions 4 mg on Mon, Wed, Fri; 3 mg all other days   Next INR check 3/24/2017    Indications   Atrial fibrillation (H) [I48.91]  Long-term (current) use of anticoagulants [Z79.01] [Z79.01]         Description     Recheck INR 4 weeks, 3/24/17  Continue warfarin 4 mg Mon, Wed,Fri, 3 mg rest of week         Your next Anticoagulation Clinic appointment(s)     Feb 24, 2017 11:00 AM CST   Anticoagulation Visit with CL ANTI COAG   [facility] ([facility]    [address] [address]   [phone]            Mar 24, 2017 10:45 AM CDT   Anticoagulation Visit with CL ANTI COAG   [facility] ([facility])    [address] [address]   [phone]              Contact Numbers     Please call [phone] to cancel and/or reschedule your appointment.  Please call [phone] with any problems or questions regarding your therapy          February 2017 Details    Sun Mon Tue Wed Thu Fri Sat        1               2               3               4                 5               6               7               8               9               10               11                 12               13               14               15               16               17               18                 19               20               21               22               23               24      4 mg   See details      25      3 mg           26      3 mg         27      4 mg         28      3 mg              Date Details   02/24 This INR check               How to take your warfarin dose     To take:  3 mg Take 1.5 of the 2  mg tablets.    To take:  4 mg Take 2 of the 2 mg tablets.           March 2017 Details    Sun Mon Tue Wed Thu Fri Sat        1      4 mg         2      3 mg         3      4 mg         4      3 mg           5      3 mg         6      4 mg         7      3 mg         8      4 mg         9      3 mg         10      4 mg         11      3 mg           12      3 mg         13      4 mg         14      3 mg         15      4 mg         16      3 mg         17      4 mg         18      3 mg           19      3 mg         20      4 mg         21      3 mg         22      4 mg         23      3 mg         24            25                 26               27               28               29               30               31                 Date Details   No additional details    Date of next INR:  3/24/2017         How to take your warfarin dose     To take:  3 mg Take 1.5 of the 2 mg tablets.    To take:  4 mg Take 2 of the 2 mg tablets.

## 2017-02-24 NOTE — MR AVS SNAPSHOT
"              After Visit Summary   2/24/2017    Pooja Swartz    MRN: [id number]           Patient Information     Date Of Birth          9/1/1925        Visit Information        Provider Department      2/24/2017 10:00 AM LEE Winkler MD [facility]        Today's Diagnoses     Wound infection    -  1      Care Instructions    Continue to dress the wound until the drainage stops. Take the keflex until gone        Follow-ups after your visit        Your next 10 appointments already scheduled     Feb 24, 2017 11:00 AM CST   Anticoagulation Visit with CL ANTI COAG   [facility] ([facility]    [address]  [facility] [id number] [phone]            Mar 07, 2017  3:00 PM CST   Return Visit with Adan Franks MD   [facility] PHYSICIAN HEART AT [facility] ([facility])    [address] [address] [phone]              Who to contact     If you have questions or need follow up information about today's clinic visit or your schedule please contact [facility] directly at [phone].  Normal or non-critical lab and imaging results will be communicated to you by MyChart, letter or phone within 4 business days after the clinic has received the results. If you do not hear from us within 7 days, please contact the clinic through [employer] or phone. If you have a critical or abnormal lab result, we will notify you by phone as soon as possible.  Submit refill requests through [employer] or call your pharmacy and they will forward the refill request to us. Please allow 3 business days for your refill to be completed.          Additional Information About Your Visit        MyChart Information     [employer] lets you send messages to your doctor, view your test results, renew your prescriptions, schedule appointments and more. To sign up, go to [email or web address] . Click on \"Log in\" on the left " "side of the screen, which will take you to the Welcome page. Then click on \"Sign up Now\" on the right side of the page.     You will be asked to enter the access code listed below, as well as some personal information. Please follow the directions to create your username and password.     Your access code is: [email or web address]  Expires: 3/7/2017  6:31 AM     Your access code will  in 90 days. If you need help or a new code, please call your [place] clinic or [phone].        Care EveryWhere ID     This is your Care EveryWhere ID. This could be used by other organizations to access your [place] medical records  [id number]        Your Vitals Were     Pulse Last Period BMI (Body Mass Index)             60 1960 27.45 kg/m2          Blood Pressure from Last 3 Encounters:   17 136/62   17 140/70   17 (!) 176/98    Weight from Last 3 Encounters:   17 138 lb (62.6 kg)   17 138 lb (62.6 kg)   17 136 lb 1.6 oz (61.7 kg)              Today, you had the following     No orders found for display         Today's Medication Changes          These changes are accurate as of: 17 10:19 AM.  If you have any questions, ask your nurse or doctor.               Start taking these medicines.        Dose/Directions    cephALEXin 500 MG capsule   Commonly known as:  KEFLEX   Used for:  Wound infection   Started by:  LEE Winkler MD        Dose:  500 mg   Take 1 capsule (500 mg) by mouth 2 times daily   Quantity:  14 capsule   Refills:  0         These medicines have changed or have updated prescriptions.        Dose/Directions    triamcinolone 0.1 % ointment   Commonly known as:  KENALOG   This may have changed:    - when to take this  - reasons to take this  - additional instructions   Used for:  Nummular eczema        Apply  topically 3 times daily. Apply sparingly to affected area.   Quantity:  30 g   Refills:  0            Where to get your medicines      These medications were sent " to [facility] - [address]  [address], [place] [address]    Hours:  [facility] [employer] Phone:  [phone]     cephALEXin 500 MG capsule                Primary Care Provider Office Phone # Fax #    R Marv Winkler [phone]       [address] [address]        Thank you!     Thank you for choosing [facility]  for your care. Our goal is always to provide you with excellent care. Hearing back from our patients is one way we can continue to improve our services. Please take a few minutes to complete the written survey that you may receive in the mail after your visit with us. Thank you!             Your Updated Medication List - Protect others around you: Learn how to safely use, store and throw away your medicines at www.disposemymeds.org.          This list is accurate as of: 2/24/17 10:19 AM.  Always use your most recent med list.                   Brand Name Dispense Instructions for use    ALLEGRA PO      Take 180 mg by mouth daily       allopurinol 100 MG tablet    ZYLOPRIM    90 tablet    Take 1 tablet (100 mg) by mouth daily       AMLODIPINE BESYLATE PO      Take 7.5 mg by mouth daily       brimonidine 0.15 % ophthalmic solution    ALPHAGAN-P     Place 1 drop Into the left eye 2 times daily       Calcium-Vitamin D 600-200 MG-UNIT Tabs      Take 1 tablet by mouth 2 times daily       cephALEXin 500 MG capsule    KEFLEX    14 capsule    Take 1 capsule (500 mg) by mouth 2 times daily       COSOPT 2-0.5 % ophthalmic solution   Generic drug:  dorzolamide-timolol      1 DROP INTO Left Eye twice daily       latanoprost 0.005 % ophthalmic solution    XALATAN     Place 1 drop into both eyes At Bedtime       miconazole 2 % powder    MICATIN; MICRO GUARD     Apply topically 2 times daily Reported on 2/13/2017       ONE-A-DAY WITHIN Tabs      1 TABLET ORALLY DAILY       PROBIOTIC DAILY  PO      Take 1 capsule by mouth daily       senna-docusate 8.6-50 MG per tablet    SENOKOT-S;PERICOLACE    100 tablet    Take 1-2 tablets by mouth 2 times daily       traMADol 50 MG tablet    ULTRAM    50 tablet    Take 1 tablet (50 mg) by mouth every 6 hours as needed       triamcinolone 0.1 % ointment    KENALOG    30 g    Apply  topically 3 times daily. Apply sparingly to affected area.       * Warfarin Therapy Reminder      1 each continuous prn       * warfarin 2 MG tablet    COUMADIN    150 tablet    Take 4 mg on Mon, Wed, Fri; 3 mg all other days or as directed by the Anticoagulation Clinic       * Notice:  This list has 2 medication(s) that are the same as other medications prescribed for you. Read the directions carefully, and ask your doctor or other care provider to review them with you.

## 2017-02-24 NOTE — PROGRESS NOTES
Subjective:  Pooja Swartz is a 89 year old female   Chief Complaint   Patient presents with     Suture Removal     follow up and suture removal for left elbow     16 days ago she took a fall and suffered a laceration of her left elbow. She was advised to leave the sutures in for a good 14-18 days, so comes in now for suture removal. She continues to have some drainage from the wound (a portion of the wound was a superficial skin tear which has sloughed off and is the area where it is draining.) There is a moderate amount of redness around the wound also and she is wondering if there could be infection. Otherwise her other injuries have all resolved and she is getting along nicely        Encounter Diagnoses   Name Primary?     Laceration of elbow with complication, left, subsequent encounter Yes     Wound infection            Medical, surgical, social, and family histories, medications and allergies reviewed and updated.    Objective:  Exam:    GENERAL APPEARANCE ADULT: Alert, no acute distress  EYES: PERRL, EOM normal, conjunctiva and lids normal  MS: Left elbow laceration is healing and there is some reaction around the sutures, but also erythema extending in a 4 cm diameter area. In the center of the wound is an area where the skin tear has sloughed and is draining serosanguineous material      ASSESSMENT:  1. Laceration of elbow with complication, left, subsequent encounter    2. Wound infection        PLAN:    The sutures were removed without difficulty. The wound was cleansed with Hibiclens and then covered with antibiotic ointment and a sterile Band-Aid;  We'll put her on antibiotics because her does appear to be a modest wound infection      Orders Placed This Encounter     POST OP F/U N/C ED W/WRVU     cephALEXin (KEFLEX) 500 MG capsule       Patient Instructions   Continue to dress the wound until the drainage stops. Take the keflex until gone

## 2017-02-24 NOTE — PROGRESS NOTES
ANTICOAGULATION FOLLOW-UP CLINIC VISIT    Patient Name:  Pooja Swartz  Date:  2/24/2017  Contact Type:  Face to Face    SUBJECTIVE:     Patient Findings     Positives Antibiotic use or infection (pt will start on Keflex today for a cellulitis in her left elbow where she has a laceration from a recent fall.  PCP removed the sutures today)           OBJECTIVE    INR Protime   Date Value Ref Range Status   02/24/2017 2.6 (A) 0.86 - 1.14 Final       ASSESSMENT / PLAN  INR assessment THER    Recheck INR In: 4 WEEKS    INR Location Clinic      Anticoagulation Summary as of 2/24/2017     INR goal 2.0-3.0   Today's INR 2.6   Maintenance plan 4 mg (2 mg x 2) on Mon, Wed, Fri; 3 mg (2 mg x 1.5) all other days   Full instructions 4 mg on Mon, Wed, Fri; 3 mg all other days   Weekly total 24 mg   No change documented Kaylyn Morris RN   Plan last modified Kaylyn Morris RN (12/13/2016)   Next INR check 3/24/2017   Priority INR   Target end date Indefinite    Indications   Atrial fibrillation (H) [I48.91]  Long-term (current) use of anticoagulants [Z79.01] [Z79.01]         Anticoagulation Episode Summary     INR check location     Preferred lab     Send INR reminders to CL ANTICOAG POOL    Comments * Discharged from home care 10-21-16      Anticoagulation Care Providers     Provider Role Specialty Phone number    Peterson, LEE Fair MD [facility] [phone]            See the Encounter Report to view Anticoagulation Flowsheet and Dosing Calendar (Go to Encounters tab in chart review, and find the Anticoagulation Therapy Visit)        Kaylyn Morris RN

## 2017-03-07 ENCOUNTER — OFFICE VISIT (OUTPATIENT)
Dept: CARDIOLOGY | Facility: CLINIC | Age: 82
End: 2017-03-07
Attending: INTERNAL MEDICINE
Payer: COMMERCIAL

## 2017-03-07 ENCOUNTER — HOSPITAL ENCOUNTER (OUTPATIENT)
Dept: CARDIOLOGY | Facility: CLINIC | Age: 82
Discharge: HOME | End: 2017-03-07
Attending: INTERNAL MEDICINE | Admitting: INTERNAL MEDICINE
Payer: MEDICARE

## 2017-03-07 VITALS
DIASTOLIC BLOOD PRESSURE: 66 MMHG | BODY MASS INDEX: 27.45 KG/M2 | WEIGHT: 138 LBS | SYSTOLIC BLOOD PRESSURE: 148 MMHG | HEART RATE: 60 BPM | OXYGEN SATURATION: 100 %

## 2017-03-07 DIAGNOSIS — I48.0 PAROXYSMAL ATRIAL FIBRILLATION (H): ICD-10-CM

## 2017-03-07 PROCEDURE — 0296T ZIO PATCH HOLTER: CPT

## 2017-03-07 PROCEDURE — 99214 OFFICE O/P EST MOD 30 MIN: CPT | Performed by: INTERNAL MEDICINE

## 2017-03-07 PROCEDURE — 0298T ZZC EXT ECG > 48HR TO 21 DAY REVIEW AND INTERPRETATN: CPT | Performed by: INTERNAL MEDICINE

## 2017-03-07 NOTE — LETTER
"3/7/2017    LEE Winkler MD  [facility]   [address] [facility] [address]    RE: Pooja Swartz       Dear Colleague,    I had the pleasure of seeing Pooja Swartz in the [facility].    Thank you for allowing me to participate in the care of your delightful patient.  As you know, Pooja is a 89-year-old female who has been in an excellent state of health, living independently at home and continues to drive but does have history of paroxysmal atrial fibrillation and at one point known to have severe bradycardia with a junctional escape.  This has resolved after we stopped her high dose of atenolol.  She has been on Eliquis given her CHADS-VASc score of at least 3, namely from being a female, her age and hypertension.  She did well for the most part until this past August when she fell because her left leg gave out.  She did suffer a compound fracture in her hip requiring a redo of her previous hip replacement.  Fortunately, she has recovered from it quite nicely.  She did have a head CT at that time because she did lacerate her forehead, but fortunately she did not have any intracranial bleed.  At that time, Eliquis was switched to warfarin.  A few months later she had the same sensation as she was going to fall again when she was walking.  She denies feeling lightheadedness or dizziness with the sensation.  Her recent EKG demonstrated sinus rhythm with normal conductions.      I am quite a bit concerned about why she has these \"imbalance issues.\"  Given her history of bradycardia in the past, even though at that time it was likely caused by high-dose of beta blocker, I would like to have her wear 2 weeks of Zio Patch monitor to assess for any potential arrhythmias that may be the cause of her symptoms.      The other issue is that she had 2 separate episodes of imbalance issues and having her on anticoagulation will put her at more risk for intracranial bleed " should she fall again.  Because of this possibility, I did discuss the option of a left atrial appendage occluder, Watchman.  The patient is interested and would like to talk to her son-in-law, who is an Internal Medicine doctor, and will get back to us.  Even though despite her age, she is, as mentioned, quietly living independently, continues to drive and overall is quite functional, and I think that she may be a candidate for this device should she desire it.  I do emphasize the fact that she would need to be on warfarin for about 6 weeks after and followup MANDY as well.  She did have an abdominal CT done a few years ago, and there was in hint of left atrial appendage at that time and it appeared suitable for Watchman.      Again, thank you for allowing me to participate in the care of your patient.      Sincerely,    Adan Franks MD     [facility]

## 2017-03-07 NOTE — MR AVS SNAPSHOT
After Visit Summary   3/7/2017    Pooja Swartz    MRN: [id number]           Patient Information     Date Of Birth          9/1/1925        Visit Information        Provider Department      3/7/2017 3:00 PM Adan Mendoza MD [facility] PHYSICIAN HEART AT [facility]        Today's Diagnoses     Paroxysmal atrial fibrillation (H)           Follow-ups after your visit        Additional Services     Follow-Up with Cardiac Advanced Practice Provider                 Your next 10 appointments already scheduled     Mar 07, 2017  3:30 PM CST   ZIOPATCH MONITOR with [place] [facility]   [facility] ([facility])    [address] [address]   [phone]            Mar 24, 2017 10:45 AM CDT   Anticoagulation Visit with CL ANTI COAG   [facility]    [address]  [facility] [address]   [phone]              Future tests that were ordered for you today     Open Future Orders        Priority Expected Expires Ordered    Follow-Up with Cardiac Advanced Practice Provider Routine 3/7/2018 7/20/2018 3/7/2017    Zio Patch Monitor Routine 3/14/2017 3/7/2018 3/7/2017            Who to contact     If you have questions or need follow up information about today's clinic visit or your schedule please contact [facility] PHYSICIAN HEART AT [facility] directly at [phone].  Normal or non-critical lab and imaging results will be communicated to you by MyChart, letter or phone within 4 business days after the clinic has received the results. If you do not hear from us within 7 days, please contact the clinic through MyChart or phone. If you have a critical or abnormal lab result, we will notify you by phone as soon as possible.  Submit refill requests through [employer] or call your pharmacy and they will forward the refill request to us. Please allow 3 business days for your  "refill to be completed.          Additional Information About Your Visit        [employer] Information     [employer] lets you send messages to your doctor, view your test results, renew your prescriptions, schedule appointments and more. To sign up, go to [email or web address] . Click on \"Log in\" on the left side of the screen, which will take you to the Welcome page. Then click on \"Sign up Now\" on the right side of the page.     You will be asked to enter the access code listed below, as well as some personal information. Please follow the directions to create your username and password.     Your access code is: [email or web address]  Expires: 2017  3:16 PM     Your access code will  in 90 days. If you need help or a new code, please call your [place] clinic or [phone].        Care EveryWhere ID     This is your Care EveryWhere ID. This could be used by other organizations to access your [place] medical records  [id number]        Your Vitals Were     Pulse Last Period Pulse Oximetry BMI (Body Mass Index)          60 1960 100% 27.45 kg/m2         Blood Pressure from Last 3 Encounters:   17 148/66   17 136/62   17 140/70    Weight from Last 3 Encounters:   17 62.6 kg (138 lb)   17 62.6 kg (138 lb)   17 62.6 kg (138 lb)              We Performed the Following     Follow-Up with Cardiac Advanced Practice Provider          Today's Medication Changes          These changes are accurate as of: 3/7/17  3:16 PM.  If you have any questions, ask your nurse or doctor.               Stop taking these medicines if you haven't already. Please contact your care team if you have questions.     cephALEXin 500 MG capsule   Commonly known as:  KEFLEX   Stopped by:  Adan Mendoza MD                    Primary Care Provider Office Phone # Fax #    R Marv Winkler [phone]       [place] [address]        Thank you!     " Thank you for choosing [facility] PHYSICIAN HEART AT [facility]  for your care. Our goal is always to provide you with excellent care. Hearing back from our patients is one way we can continue to improve our services. Please take a few minutes to complete the written survey that you may receive in the mail after your visit with us. Thank you!             Your Updated Medication List - Protect others around you: Learn how to safely use, store and throw away your medicines at www.disposemymeds.org.          This list is accurate as of: 3/7/17  3:16 PM.  Always use your most recent med list.                   Brand Name Dispense Instructions for use    ALLEGRA PO      Take 180 mg by mouth daily       allopurinol 100 MG tablet    ZYLOPRIM    90 tablet    Take 1 tablet (100 mg) by mouth daily       AMLODIPINE BESYLATE PO      Take 7.5 mg by mouth daily       brimonidine 0.15 % ophthalmic solution    ALPHAGAN-P     Place 1 drop Into the left eye 2 times daily       Calcium-Vitamin D 600-200 MG-UNIT Tabs      Take 1 tablet by mouth 2 times daily       COSOPT 2-0.5 % ophthalmic solution   Generic drug:  dorzolamide-timolol      1 DROP INTO Left Eye twice daily       latanoprost 0.005 % ophthalmic solution    XALATAN     Place 1 drop into both eyes At Bedtime       miconazole 2 % powder    MICATIN; MICRO GUARD     Apply topically 2 times daily Reported on 2/13/2017       ONE-A-DAY WITHIN Tabs      1 TABLET ORALLY DAILY       PROBIOTIC DAILY PO      Take 1 capsule by mouth daily       senna-docusate 8.6-50 MG per tablet    SENOKOT-S;PERICOLACE    100 tablet    Take 1-2 tablets by mouth 2 times daily       traMADol 50 MG tablet    ULTRAM    50 tablet    Take 1 tablet (50 mg) by mouth every 6 hours as needed       triamcinolone 0.1 % ointment    KENALOG    30 g    Apply  topically 3 times daily. Apply sparingly to affected area.       * Warfarin Therapy Reminder      1 each continuous prn       * warfarin 2 MG  tablet    COUMADIN    150 tablet    Take 4 mg on Mon, Wed, Fri; 3 mg all other days or as directed by the Anticoagulation Clinic       * Notice:  This list has 2 medication(s) that are the same as other medications prescribed for you. Read the directions carefully, and ask your doctor or other care provider to review them with you.

## 2017-03-08 NOTE — PROGRESS NOTES
"HISTORY OF PRESENT ILLNESS:  Thank you for allowing me to participate in the care of your delightful patient.  As you know, Pooja is a 89-year-old female who has been in an excellent state of health, living independently at home and continues to drive but does have history of paroxysmal atrial fibrillation and at one point known to have severe bradycardia with a junctional escape.  This has resolved after we stopped her high dose of atenolol.  She has been on Eliquis given her CHADS-VASc score of at least 3, namely from being a female, her age and hypertension.  She did well for the most part until this past August when she fell because her left leg gave out.  She did suffer a compound fracture in her hip requiring a redo of her previous hip replacement.  Fortunately, she has recovered from it quite nicely.  She did have a head CT at that time because she did lacerate her forehead, but fortunately she did not have any intracranial bleed.  At that time, Eliquis was switched to warfarin.  A few months later she had the same sensation as she was going to fall again when she was walking.  She denies feeling lightheadedness or dizziness with the sensation.  Her recent EKG demonstrated sinus rhythm with normal conductions.      I am quite a bit concerned about why she has these \"imbalance issues.\"  Given her history of bradycardia in the past, even though at that time it was likely caused by high-dose of beta blocker, I would like to have her wear 2 weeks of Zio Patch monitor to assess for any potential arrhythmias that may be the cause of her symptoms.      The other issue is that she had 2 separate episodes of imbalance issues and having her on anticoagulation will put her at more risk for intracranial bleed should she fall again.  Because of this possibility, I did discuss the option of a left atrial appendage occluder, Watchman.  The patient is interested and would like to talk to her son-in-law, who is an Internal " Medicine doctor, and will get back to us.  Even though despite her age, she is, as mentioned, quietly living independently, continues to drive and overall is quite functional, and I think that she may be a candidate for this device should she desire it.  I do emphasize the fact that she would need to be on warfarin for about 6 weeks after and followup MANDY as well.  She did have an abdominal CT done a few years ago, and there was in hint of left atrial appendage at that time and it appeared suitable for Watchman.      cc:   CANDELARIO Winkler MD    [address]  [address]         GYPSY MIRELES MD             D: 2017 15:13   T: 2017 02:49   MT: MIKEL      Name:     ISRAEL RANGEL   MRN:      [id number]        Account:      [id number]   :      1925           Service Date: 2017      Document: [id number]

## 2017-03-24 ENCOUNTER — ANTICOAGULATION THERAPY VISIT (OUTPATIENT)
Dept: ANTICOAGULATION | Facility: CLINIC | Age: 82
End: 2017-03-24
Payer: COMMERCIAL

## 2017-03-24 DIAGNOSIS — I48.91 ATRIAL FIBRILLATION (H): ICD-10-CM

## 2017-03-24 DIAGNOSIS — Z79.01 LONG-TERM (CURRENT) USE OF ANTICOAGULANTS: ICD-10-CM

## 2017-03-24 LAB — INR POINT OF CARE: 2.2 (ref 0.86–1.14)

## 2017-03-24 PROCEDURE — 36416 COLLJ CAPILLARY BLOOD SPEC: CPT

## 2017-03-24 PROCEDURE — 99207 ZZC NO CHARGE NURSE ONLY: CPT

## 2017-03-24 PROCEDURE — 85610 PROTHROMBIN TIME: CPT | Mod: QW

## 2017-03-24 NOTE — PROGRESS NOTES
ANTICOAGULATION FOLLOW-UP CLINIC VISIT    Patient Name:  Pooja Swartz  Date:  3/24/2017  Contact Type:  Face to Face    SUBJECTIVE:     Patient Findings     Positives No Problem Findings (no changes, concerns or problems reported)           OBJECTIVE    INR Protime   Date Value Ref Range Status   03/24/2017 2.2 (A) 0.86 - 1.14 Final       ASSESSMENT / PLAN  INR assessment THER    Recheck INR In: 4 WEEKS    INR Location Clinic      Anticoagulation Summary as of 3/24/2017     INR goal 2.0-3.0   Today's INR 2.2   Maintenance plan 4 mg (2 mg x 2) on Mon, Wed, Fri; 3 mg (2 mg x 1.5) all other days   Full instructions 4 mg on Mon, Wed, Fri; 3 mg all other days   Weekly total 24 mg   No change documented Kaylyn Morris RN   Plan last modified Kaylyn Morris RN (12/13/2016)   Next INR check 4/21/2017   Priority INR   Target end date Indefinite    Indications   Atrial fibrillation (H) [I48.91]  Long-term (current) use of anticoagulants [Z79.01] [Z79.01]         Anticoagulation Episode Summary     INR check location     Preferred lab     Send INR reminders to CL ANTICOAG POOL    Comments * Discharged from home care 10-21-16      Anticoagulation Care Providers     Provider Role Specialty Phone number    Peterson, LEE Fair MD [facility] [phone]            See the Encounter Report to view Anticoagulation Flowsheet and Dosing Calendar (Go to Encounters tab in chart review, and find the Anticoagulation Therapy Visit)        Kaylyn Morris RN

## 2017-03-24 NOTE — MR AVS SNAPSHOT
Pooja HOUSTON Maxime   3/24/2017 10:45 AM   Anticoagulation Therapy Visit    Description:  89 year old female   Provider:  COLLIN ANTI COAG   Department:  Cl Anticoag           INR as of 3/24/2017     Today's INR 2.2      Anticoagulation Summary as of 3/24/2017     INR goal 2.0-3.0   Today's INR 2.2   Full instructions 4 mg on Mon, Wed, Fri; 3 mg all other days   Next INR check 4/21/2017    Indications   Atrial fibrillation (H) [I48.91]  Long-term (current) use of anticoagulants [Z79.01] [Z79.01]         Description     Recheck INR 4 weeks, 4/21/17  Continue warfarin 4 mg Mon, Wed, Fri, 3 mg rest of week       Your next Anticoagulation Clinic appointment(s)     Apr 21, 2017 10:45 AM CDT   [facility] with [facility]   [facility]    [address] [facility] [id number] [phone]              Contact Numbers     Please call [phone] to cancel and/or reschedule your appointment.  Please call [phone] with any problems or questions regarding your therapy          March 2017 Details    Sun Mon Tue Wed Thu Fri Sat        1               2               3               4                 5               6               7               8               9               10               11                 12               13               14               15               16               17               18                 19               20               21               22               23               24      4 mg   See details      25      3 mg           26      3 mg         27      4 mg         28      3 mg         29      4 mg         30      3 mg         31      4 mg           Date Details   03/24 This INR check               How to take your warfarin dose     To take:  3 mg Take 1.5 of the 2 mg tablets.    To take:  4 mg Take 2 of the 2 mg tablets.           April 2017 Details    Sun Mon Tue Wed Thu Fri Sat           1      3 mg            2      3 mg         3      4 mg         4      3 mg         5      4 mg         6      3 mg         7      4 mg         8      3 mg           9      3 mg         10      4 mg         11      3 mg         12      4 mg         13      3 mg         14      4 mg         15      3 mg           16      3 mg         17      4 mg         18      3 mg         19      4 mg         20      3 mg         21            22                 23               24               25               26               27               28               29                 30                      Date Details   No additional details    Date of next INR:  4/21/2017         How to take your warfarin dose     To take:  3 mg Take 1.5 of the 2 mg tablets.    To take:  4 mg Take 2 of the 2 mg tablets.

## 2017-03-28 ENCOUNTER — TELEPHONE (OUTPATIENT)
Dept: CARDIOLOGY | Facility: CLINIC | Age: 82
End: 2017-03-28

## 2017-03-28 NOTE — TELEPHONE ENCOUNTER
Call to pt to discuss Watchman procedure and if she has decided to go forward with procedure. Per Pt who does remember Dr Mendoza discussing the procedure, states that she at this time would not like to proceed. Pt was wondering about the ZioPatch that she wore and discussed that no A fib was noted.  Did explain that this procedure does not do anything for the A fib, but can hopefully get pt off the anticoagulant. Pt states understanding and will decline at this time, but was made aware to call if she would like to change. Yulia

## 2017-04-18 DIAGNOSIS — M1A.9XX1 CHRONIC TOPHACEOUS GOUT: ICD-10-CM

## 2017-04-18 DIAGNOSIS — I48.91 ATRIAL FIBRILLATION (H): ICD-10-CM

## 2017-04-18 DIAGNOSIS — I48.19 PERSISTENT ATRIAL FIBRILLATION (H): ICD-10-CM

## 2017-04-18 DIAGNOSIS — Z79.01 LONG-TERM (CURRENT) USE OF ANTICOAGULANTS: ICD-10-CM

## 2017-04-18 RX ORDER — WARFARIN SODIUM 2 MG/1
TABLET ORAL
Qty: 150 TABLET | Refills: 0 | Status: SHIPPED | OUTPATIENT
Start: 2017-04-18 | End: 2017-07-31

## 2017-04-21 ENCOUNTER — ANTICOAGULATION THERAPY VISIT (OUTPATIENT)
Dept: ANTICOAGULATION | Facility: CLINIC | Age: 82
End: 2017-04-21
Payer: COMMERCIAL

## 2017-04-21 DIAGNOSIS — Z79.01 LONG-TERM (CURRENT) USE OF ANTICOAGULANTS: ICD-10-CM

## 2017-04-21 DIAGNOSIS — I48.91 ATRIAL FIBRILLATION (H): ICD-10-CM

## 2017-04-21 LAB — INR POINT OF CARE: 2.2 (ref 0.86–1.14)

## 2017-04-21 PROCEDURE — 36416 COLLJ CAPILLARY BLOOD SPEC: CPT

## 2017-04-21 PROCEDURE — 99207 ZZC NO CHARGE NURSE ONLY: CPT

## 2017-04-21 PROCEDURE — 85610 PROTHROMBIN TIME: CPT | Mod: QW

## 2017-04-21 NOTE — PROGRESS NOTES
ANTICOAGULATION FOLLOW-UP CLINIC VISIT    Patient Name:  Pooja Swartz  Date:  4/21/2017  Contact Type:  Face to Face    SUBJECTIVE:     Patient Findings     Positives No Problem Findings (no changes, concerns or problems reported)           OBJECTIVE    INR Protime   Date Value Ref Range Status   04/21/2017 2.2 (A) 0.86 - 1.14 Final       ASSESSMENT / PLAN  INR assessment THER    Recheck INR In: 4 WEEKS    INR Location Clinic      Anticoagulation Summary as of 4/21/2017     INR goal 2.0-3.0   Today's INR 2.2   Maintenance plan 4 mg (2 mg x 2) on Mon, Wed, Fri; 3 mg (2 mg x 1.5) all other days   Full instructions 4 mg on Mon, Wed, Fri; 3 mg all other days   Weekly total 24 mg   No change documented Kaylyn Morris RN   Plan last modified Kaylyn Morris RN (12/13/2016)   Next INR check 5/19/2017   Priority INR   Target end date Indefinite    Indications   Atrial fibrillation (H) [I48.91]  Long-term (current) use of anticoagulants [Z79.01] [Z79.01]         Anticoagulation Episode Summary     INR check location     Preferred lab     Send INR reminders to CL ANTICOAG POOL    Comments * Discharged from home care 10-21-16      Anticoagulation Care Providers     Provider Role Specialty Phone number    Post, LEE Fair MD [facility] [phone]            See the Encounter Report to view Anticoagulation Flowsheet and Dosing Calendar (Go to Encounters tab in chart review, and find the Anticoagulation Therapy Visit)        Kaylyn Morris RN

## 2017-04-21 NOTE — MR AVS SNAPSHOT
Pojoa HOUSTON Maxime   4/21/2017 10:45 AM   Anticoagulation Therapy Visit    Description:  89 year old female   Provider:  CL ANTI COAG   Department:  Cl Anticoag           INR as of 4/21/2017     Today's INR 2.2      Anticoagulation Summary as of 4/21/2017     INR goal 2.0-3.0   Today's INR 2.2   Full instructions 4 mg on Mon, Wed, Fri; 3 mg all other days   Next INR check 5/19/2017    Indications   Atrial fibrillation (H) [I48.91]  Long-term (current) use of anticoagulants [Z79.01] [Z79.01]         Description     Recheck INR 4 weeks, 5/19/17  Continue warfarin 4 mg Mon, Wed, Fri, 3 mg rest of week       Your next Anticoagulation Clinic appointment(s)     Apr 21, 2017 10:45 AM CDT   [facility]    [address] [facility] [address]   [phone]            May 19, 2017 10:30 AM CDT   [facility]agulation [facility] CL [facility]AG   [facility]    [address] [facility] [address]   [phone]              Contact Numbers     Please call [phone] to cancel and/or reschedule your appointment.  Please call [phone] with any problems or questions regarding your therapy          April 2017 Details    Sun Mon Tue Wed Thu Fri Sat           1                 2               3               4               5               6               7               8                 9               10               11               12               13               14               15                 16               17               18               19               20               21      4 mg   See details      22      3 mg           23      3 mg         24      4 mg         25      3 mg         26      4 mg         27      3 mg         28      4 mg         29      3 mg           30      3 mg                Date Details   04/21 This INR check               How to  take your warfarin dose     To take:  3 mg Take 1.5 of the 2 mg tablets.    To take:  4 mg Take 2 of the 2 mg tablets.           May 2017 Details    Sun Mon Tue Wed Thu Fri Sat      1      4 mg         2      3 mg         3      4 mg         4      3 mg         5      4 mg         6      3 mg           7      3 mg         8      4 mg         9      3 mg         10      4 mg         11      3 mg         12      4 mg         13      3 mg           14      3 mg         15      4 mg         16      3 mg         17      4 mg         18      3 mg         19            20                 21               22               23               24               25               26               27                 28               29               30               31                   Date Details   No additional details    Date of next INR:  5/19/2017         How to take your warfarin dose     To take:  3 mg Take 1.5 of the 2 mg tablets.    To take:  4 mg Take 2 of the 2 mg tablets.

## 2017-05-19 ENCOUNTER — ANTICOAGULATION THERAPY VISIT (OUTPATIENT)
Dept: ANTICOAGULATION | Facility: CLINIC | Age: 82
End: 2017-05-19
Payer: COMMERCIAL

## 2017-05-19 DIAGNOSIS — Z79.01 LONG-TERM (CURRENT) USE OF ANTICOAGULANTS: ICD-10-CM

## 2017-05-19 DIAGNOSIS — I48.91 ATRIAL FIBRILLATION (H): ICD-10-CM

## 2017-05-19 LAB — INR POINT OF CARE: 2 (ref 0.86–1.14)

## 2017-05-19 PROCEDURE — 36416 COLLJ CAPILLARY BLOOD SPEC: CPT

## 2017-05-19 PROCEDURE — 99207 ZZC NO CHARGE NURSE ONLY: CPT

## 2017-05-19 PROCEDURE — 85610 PROTHROMBIN TIME: CPT | Mod: QW

## 2017-05-19 NOTE — MR AVS SNAPSHOT
Pooja HOUSTON Maxime   5/19/2017 10:30 AM   Anticoagulation Therapy Visit    Description:  89 year old female   Provider:  COLLIN ANTI COAG   Department:  Cl Anticoag           INR as of 5/19/2017     Today's INR 2.0      Anticoagulation Summary as of 5/19/2017     INR goal 2.0-3.0   Today's INR 2.0   Full instructions 4 mg on Mon, Wed, Fri; 3 mg all other days   Next INR check 6/16/2017    Indications   Atrial fibrillation (H) [I48.91]  Long-term (current) use of anticoagulants [Z79.01] [Z79.01]         Description     Warfarin dose: 4mg MWF and 3mg the rest of the days of the week.        Your next Anticoagulation Clinic appointment(s)     Jun 16, 2017 10:45 AM CDT   Anticoagulation Visit with COLLIN ANTI COAG   [facility]    [address] [facility] [address] [phone]              Contact Numbers     Please call [phone] to cancel and/or reschedule your appointment.  Please call [phone] with any problems or questions regarding your therapy          May 2017 Details    Sun Mon Tue Wed Thu Fri Sat      1               2               3               4               5               6                 7               8               9               10               11               12               13                 14               15               16               17               18               19      4 mg   See details      20      3 mg           21      3 mg         22      4 mg         23      3 mg         24      4 mg         25      3 mg         26      4 mg         27      3 mg           28      3 mg         29      4 mg         30      3 mg         31      4 mg             Date Details   05/19 This INR check               How to take your warfarin dose     To take:  3 mg Take 1.5 of the 2 mg tablets.    To take:  4 mg Take 2 of the 2 mg tablets.           June 2017 Details    Sun Mon Tue Wed Thu Fri Sat         1      3 mg         2       4 mg         3      3 mg           4      3 mg         5      4 mg         6      3 mg         7      4 mg         8      3 mg         9      4 mg         10      3 mg           11      3 mg         12      4 mg         13      3 mg         14      4 mg         15      3 mg         16            17                 18               19               20               21               22               23               24                 25               26               27               28               29               30                 Date Details   No additional details    Date of next INR:  6/16/2017         How to take your warfarin dose     To take:  3 mg Take 1.5 of the 2 mg tablets.    To take:  4 mg Take 2 of the 2 mg tablets.

## 2017-05-19 NOTE — PROGRESS NOTES
ANTICOAGULATION FOLLOW-UP CLINIC VISIT    Patient Name:  Pooja Swartz  Date:  5/19/2017  Contact Type:  Face to Face    SUBJECTIVE:     Patient Findings     Comments Pt reports she had been eating more greens this week           OBJECTIVE    INR Protime   Date Value Ref Range Status   05/19/2017 2.0 (A) 0.86 - 1.14 Final       ASSESSMENT / PLAN  INR assessment THER    Recheck INR In: 4 WEEKS    INR Location Clinic      Anticoagulation Summary as of 5/19/2017     INR goal 2.0-3.0   Today's INR 2.0   Maintenance plan 4 mg (2 mg x 2) on Mon, Wed, Fri; 3 mg (2 mg x 1.5) all other days   Full instructions 4 mg on Mon, Wed, Fri; 3 mg all other days   Weekly total 24 mg   Plan last modified Kaylyn Morris RN (12/13/2016)   Next INR check 6/16/2017   Target end date Indefinite    Indications   Atrial fibrillation (H) [I48.91]  Long-term (current) use of anticoagulants [Z79.01] [Z79.01]         Anticoagulation Episode Summary     INR check location     Preferred lab     Send INR reminders to CL ANTICOAG POOL    Comments * Discharged from home care 10-21-16      Anticoagulation Care Providers     Provider Role Specialty Phone number    LEE Winkler MD [facility] [phone]            See the Encounter Report to view Anticoagulation Flowsheet and Dosing Calendar (Go to Encounters tab in chart review, and find the Anticoagulation Therapy Visit)        Trudy Alex RN

## 2017-06-16 ENCOUNTER — ANTICOAGULATION THERAPY VISIT (OUTPATIENT)
Dept: ANTICOAGULATION | Facility: CLINIC | Age: 82
End: 2017-06-16
Payer: COMMERCIAL

## 2017-06-16 DIAGNOSIS — I48.91 ATRIAL FIBRILLATION (H): ICD-10-CM

## 2017-06-16 DIAGNOSIS — Z79.01 LONG-TERM (CURRENT) USE OF ANTICOAGULANTS: ICD-10-CM

## 2017-06-16 LAB — INR POINT OF CARE: 2.3 (ref 0.86–1.14)

## 2017-06-16 PROCEDURE — 99207 ZZC NO CHARGE NURSE ONLY: CPT

## 2017-06-16 PROCEDURE — 85610 PROTHROMBIN TIME: CPT | Mod: QW

## 2017-06-16 PROCEDURE — 36416 COLLJ CAPILLARY BLOOD SPEC: CPT

## 2017-06-16 NOTE — PROGRESS NOTES
ANTICOAGULATION FOLLOW-UP CLINIC VISIT    Patient Name:  Pooja Swartz  Date:  6/16/2017  Contact Type:  Face to Face    SUBJECTIVE:     Patient Findings     Positives No Problem Findings           OBJECTIVE    INR Protime   Date Value Ref Range Status   06/16/2017 2.3 (A) 0.86 - 1.14 Final       ASSESSMENT / PLAN  INR assessment THER    Recheck INR In: 5 WEEKS    INR Location Clinic      Anticoagulation Summary as of 6/16/2017     INR goal 2.0-3.0   Today's INR 2.3   Maintenance plan 4 mg (2 mg x 2) on Mon, Wed, Fri; 3 mg (2 mg x 1.5) all other days   Full instructions 4 mg on Mon, Wed, Fri; 3 mg all other days   Weekly total 24 mg   No change documented Trudy Alex RN   Plan last modified Kaylyn Morris RN (12/13/2016)   Next INR check 7/21/2017   Target end date Indefinite    Indications   Atrial fibrillation (H) [I48.91]  Long-term (current) use of anticoagulants [Z79.01] [Z79.01]         Anticoagulation Episode Summary     INR check location     Preferred lab     Send INR reminders to CL ANTICOAG POOL    Comments * Discharged from home care 10-21-16      Anticoagulation Care Providers     Provider Role Specialty Phone number    Peterson, LEE Fair MD [facility] [phone]            See the Encounter Report to view Anticoagulation Flowsheet and Dosing Calendar (Go to Encounters tab in chart review, and find the Anticoagulation Therapy Visit)        Trudy Alex RN

## 2017-06-16 NOTE — MR AVS SNAPSHOT
Pooja HOUSTON Maxime   6/16/2017 10:45 AM   Anticoagulation Therapy Visit    Description:  89 year old female   Provider:  COLLIN ANTI COAG   Department:  Cl Anticoag           INR as of 6/16/2017     Today's INR 2.3      Anticoagulation Summary as of 6/16/2017     INR goal 2.0-3.0   Today's INR 2.3   Full instructions 4 mg on Mon, Wed, Fri; 3 mg all other days   Next INR check 7/21/2017    Indications   Atrial fibrillation (H) [I48.91]  Long-term (current) use of anticoagulants [Z79.01] [Z79.01]         Description     Warfarin dose: 4mg MWF and 3mg the rest of the days of the week.        Your next Anticoagulation Clinic appointment(s)     Jul 21, 2017 10:30 AM CDT   Anticoagulation Visit with COLLIN ANTI COAG   [facility]    [address] [address] [facility] [address] [phone]              Contact Numbers     Please call [phone] to cancel and/or reschedule your appointment.  Please call [phone] with any problems or questions regarding your therapy          June 2017 Details    Sun Mon Tue Wed Thu Fri Sat         1               2               3                 4               5               6               7               8               9               10                 11               12               13               14               15               16      4 mg   See details      17      3 mg           18      3 mg         19      4 mg         20      3 mg         21      4 mg         22      3 mg         23      4 mg         24      3 mg           25      3 mg         26      4 mg         27      3 mg         28      4 mg         29      3 mg         30      4 mg           Date Details   06/16 This INR check               How to take your warfarin dose     To take:  3 mg Take 1.5 of the 2 mg tablets.    To take:  4 mg Take 2 of the 2 mg tablets.           July 2017 Details    Sun Mon Tue Wed Thu Fri Sat           1      3 mg           2      3  mg         3      4 mg         4      3 mg         5      4 mg         6      3 mg         7      4 mg         8      3 mg           9      3 mg         10      4 mg         11      3 mg         12      4 mg         13      3 mg         14      4 mg         15      3 mg           16      3 mg         17      4 mg         18      3 mg         19      4 mg         20      3 mg         21            22                 23               24               25               26               27               28               29                 30               31                     Date Details   No additional details    Date of next INR:  7/21/2017         How to take your warfarin dose     To take:  3 mg Take 1.5 of the 2 mg tablets.    To take:  4 mg Take 2 of the 2 mg tablets.

## 2017-07-21 ENCOUNTER — ANTICOAGULATION THERAPY VISIT (OUTPATIENT)
Dept: ANTICOAGULATION | Facility: CLINIC | Age: 82
End: 2017-07-21
Payer: COMMERCIAL

## 2017-07-21 DIAGNOSIS — I48.91 ATRIAL FIBRILLATION (H): ICD-10-CM

## 2017-07-21 DIAGNOSIS — Z79.01 LONG-TERM (CURRENT) USE OF ANTICOAGULANTS: ICD-10-CM

## 2017-07-21 LAB — INR POINT OF CARE: 2 (ref 0.86–1.14)

## 2017-07-21 PROCEDURE — 36416 COLLJ CAPILLARY BLOOD SPEC: CPT

## 2017-07-21 PROCEDURE — 99207 ZZC NO CHARGE NURSE ONLY: CPT

## 2017-07-21 PROCEDURE — 85610 PROTHROMBIN TIME: CPT | Mod: QW

## 2017-07-21 NOTE — PROGRESS NOTES
ANTICOAGULATION FOLLOW-UP CLINIC VISIT    Patient Name:  Pooja Swartz  Date:  7/21/2017  Contact Type:  Face to Face    SUBJECTIVE:     Patient Findings     Positives No Problem Findings           OBJECTIVE    INR Protime   Date Value Ref Range Status   07/21/2017 2.0 (A) 0.86 - 1.14 Final       ASSESSMENT / PLAN  No question data found.  Anticoagulation Summary as of 7/21/2017     INR goal 2.0-3.0   Today's INR 2.0   Maintenance plan 4 mg (2 mg x 2) on Mon, Wed, Fri; 3 mg (2 mg x 1.5) all other days   Full instructions 4 mg on Mon, Wed, Fri; 3 mg all other days   Weekly total 24 mg   Plan last modified Kaylyn Morris RN (12/13/2016)   Next INR check 8/25/2017   Target end date Indefinite    Indications   Atrial fibrillation (H) [I48.91]  Long-term (current) use of anticoagulants [Z79.01] [Z79.01]         Anticoagulation Episode Summary     INR check location     Preferred lab     Send INR reminders to CL ANTICOAG POOL    Comments * Discharged from home care 10-21-16      Anticoagulation Care Providers     Provider Role Specialty Phone number    LEE Winkler MD [facility] [phone]            See the Encounter Report to view Anticoagulation Flowsheet and Dosing Calendar (Go to Encounters tab in chart review, and find the Anticoagulation Therapy Visit)        Trudy Alex RN

## 2017-07-21 NOTE — MR AVS SNAPSHOT
Pooja HOUSTON Maxime   7/21/2017 10:30 AM   Anticoagulation Therapy Visit    Description:  89 year old female   Provider:  COLLIN ANTI COAG   Department:  Cl Anticoag           INR as of 7/21/2017     Today's INR 2.0      Anticoagulation Summary as of 7/21/2017     INR goal 2.0-3.0   Today's INR 2.0   Full instructions 4 mg on Mon, Wed, Fri; 3 mg all other days   Next INR check 8/25/2017    Indications   Atrial fibrillation (H) [I48.91]  Long-term (current) use of anticoagulants [Z79.01] [Z79.01]         Description     Warfarin dose 4mg MWF and 3mg the rest of the days of the week.        Your next Anticoagulation Clinic appointment(s)     Aug 18, 2017  9:00 AM CDT   Anticoagulation Visit with COLLIN ANTI COAG   [facility] ([facility]    [address] [address] [facility] [address] [phone]              Contact Numbers     Please call [phone] to cancel and/or reschedule your appointment.  Please call [phone] with any problems or questions regarding your therapy          July 2017 Details    Sun Mon Tue Wed Thu Fri Sat           1                 2               3               4               5               6               7               8                 9               10               11               12               13               14               15                 16               17               18               19               20               21      4 mg   See details      22      3 mg           23      3 mg         24      4 mg         25      3 mg         26      4 mg         27      3 mg         28      4 mg         29      3 mg           30      3 mg         31      4 mg               Date Details   07/21 This INR check               How to take your warfarin dose     To take:  3 mg Take 1.5 of the 2 mg tablets.    To take:  4 mg Take 2 of the 2 mg tablets.           August 2017 Details    Sun Mon Tue Wed Thu Fri Sat       1      3 mg         2       4 mg         3      3 mg         4      4 mg         5      3 mg           6      3 mg         7      4 mg         8      3 mg         9      4 mg         10      3 mg         11      4 mg         12      3 mg           13      3 mg         14      4 mg         15      3 mg         16      4 mg         17      3 mg         18      4 mg         19      3 mg           20      3 mg         21      4 mg         22      3 mg         23      4 mg         24      3 mg         25            26                 27               28               29               30               31                  Date Details   No additional details    Date of next INR:  8/25/2017         How to take your warfarin dose     To take:  3 mg Take 1.5 of the 2 mg tablets.    To take:  4 mg Take 2 of the 2 mg tablets.

## 2017-07-31 DIAGNOSIS — I48.91 ATRIAL FIBRILLATION (H): ICD-10-CM

## 2017-07-31 DIAGNOSIS — I48.19 PERSISTENT ATRIAL FIBRILLATION (H): ICD-10-CM

## 2017-07-31 DIAGNOSIS — Z79.01 LONG-TERM (CURRENT) USE OF ANTICOAGULANTS: ICD-10-CM

## 2017-07-31 DIAGNOSIS — M1A.9XX1 CHRONIC TOPHACEOUS GOUT: ICD-10-CM

## 2017-07-31 NOTE — TELEPHONE ENCOUNTER
Deepak    Last Written Prescription Date: 04/18/2017  Last Fill Qty: 150, # refills: 0  Last Office Visit with G, P or [facility] prescribing provider: 02/24/2017       Date and Result of Last PT/INR:   Lab Results   Component Value Date    INR 2.0 07/21/2017    INR 2.3 06/16/2017    INR 2.02 02/08/2017    INR 1.9 10/21/2016        Dex OWENS (R)

## 2017-08-01 RX ORDER — WARFARIN SODIUM 2 MG/1
TABLET ORAL
Qty: 150 TABLET | Refills: 0 | Status: SHIPPED | OUTPATIENT
Start: 2017-08-01 | End: 2017-10-12

## 2017-08-07 ENCOUNTER — OFFICE VISIT (OUTPATIENT)
Dept: DERMATOLOGY | Facility: CLINIC | Age: 82
End: 2017-08-07
Payer: COMMERCIAL

## 2017-08-07 ENCOUNTER — TELEPHONE (OUTPATIENT)
Dept: DERMATOLOGY | Facility: CLINIC | Age: 82
End: 2017-08-07

## 2017-08-07 VITALS — SYSTOLIC BLOOD PRESSURE: 156 MMHG | HEART RATE: 84 BPM | DIASTOLIC BLOOD PRESSURE: 74 MMHG | OXYGEN SATURATION: 97 %

## 2017-08-07 DIAGNOSIS — C44.311: ICD-10-CM

## 2017-08-07 DIAGNOSIS — L81.4 LENTIGO: ICD-10-CM

## 2017-08-07 DIAGNOSIS — Z85.828 HISTORY OF SKIN CANCER: Primary | ICD-10-CM

## 2017-08-07 DIAGNOSIS — C44.319 BASAL CELL CARCINOMA OF RIGHT CHEEK: ICD-10-CM

## 2017-08-07 DIAGNOSIS — L82.1 SK (SEBORRHEIC KERATOSIS): ICD-10-CM

## 2017-08-07 DIAGNOSIS — C44.319 BASAL CELL CARCINOMA OF RIGHT TEMPLE REGION: ICD-10-CM

## 2017-08-07 PROCEDURE — 99203 OFFICE O/P NEW LOW 30 MIN: CPT | Mod: 25 | Performed by: DERMATOLOGY

## 2017-08-07 PROCEDURE — 17311 MOHS 1 STAGE H/N/HF/G: CPT | Performed by: DERMATOLOGY

## 2017-08-07 PROCEDURE — 11101 CL FROZEN SECTION FIRST SPEC: CPT | Performed by: DERMATOLOGY

## 2017-08-07 PROCEDURE — 11100 HC BIOPSY SKIN/SUBQ/MUC MEM, EACH ADDTL LESION: CPT | Mod: 59 | Performed by: DERMATOLOGY

## 2017-08-07 PROCEDURE — 88331 PATH CONSLTJ SURG 1 BLK 1SPC: CPT | Mod: 59 | Performed by: DERMATOLOGY

## 2017-08-07 NOTE — TELEPHONE ENCOUNTER
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

## 2017-08-07 NOTE — PROGRESS NOTES
Pooja Swartz is a 89 year old year old female patient here today for growth onnose and cheek and temple.  She has a hx of non-melanoma skin cancer.  She notes multiple surgeries on nose.   .  Patient states this has been present for months.  Patient reports the following symptoms:  Not healing.  Patient reports the following previous treatments none.  Patient reports the following modifying factors none.  Associated symptoms: none.  Patient has no other skin complaints today.  Remainder of the HPI, Meds, PMH, Allergies, FH, and SH was reviewed in chart.    Pertinent Hx:   Non-melanoma skin cancer   Past Medical History:   Diagnosis Date     Atrial fibrillation (H)      Chronic kidney disease      Disorders of bursae and tendons in shoulder region, unspecified     right shoulder     Hemorrhage of gastrointestinal tract, unspecified      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, including hip      Other specified glaucoma      Renal insufficiency      Sinus node dysfunction (H)      Unspecified essential hypertension        Past Surgical History:   Procedure Laterality Date     ARTHROPLASTY REVISION HIP Left 8/19/2016    Procedure: ARTHROPLASTY REVISION HIP;  Surgeon: Kael Rojas MD;  Location: [facility]     C PELVIS/HIP JOINT SURGERY UNLISTED       C SHOULDER SURG PROC UNLISTED       C STOMACH SURGERY PROCEDURE UNLISTED       HC VASCULAR SURGERY PROCEDURE UNLIST       OPEN REDUCTION INTERNAL FIXATION FEMUR PROXIMAL Left 8/19/2016    Procedure: OPEN REDUCTION INTERNAL FIXATION FEMUR PROXIMAL;  Surgeon: Kael Rojas MD;  Location: [facility] OR     SURGICAL HISTORY OF -   11/1992    skin cancer, nose     SURGICAL HISTORY OF -   1978    right, vein stripping     SURGICAL HISTORY OF -   1968    breast biopsy     SURGICAL HISTORY OF -   1993    laparoscopic cholecystectomy     SURGICAL HISTORY OF -       tonsillectomy and adenoidectomy     SURGICAL HISTORY OF -   04/2004    left cataract     SURGICAL HISTORY OF -    08/09/2004    right total shoulder arthroplasty        Family History   Problem Relation Age of Onset     HEART DISEASE Mother      CHF     Hypertension Mother      Hypertension Father      Hypertension Maternal Grandmother      Hypertension Maternal Grandfather      Hypertension Son      HEART DISEASE Son      MI       Social History     Social History     Marital status:      Spouse name: N/A     Number of children: N/A     Years of education: N/A     Occupational History     Not on file.     Social History Main Topics     Smoking status: Never Smoker     Smokeless tobacco: Never Used     Alcohol use Yes      Comment: rare     Drug use: No     Sexual activity: Not Currently     Birth control/ protection: None     Other Topics Concern     Parent/Sibling W/ Cabg, Mi Or Angioplasty Before 65f 55m? Yes     Social History Narrative       Outpatient Encounter Prescriptions as of 8/7/2017   Medication Sig Dispense Refill     warfarin (COUMADIN) 2 MG tablet Take 4 mg on Mon, Wed, Fri; 3 mg all other days or as directed by the Anticoagulation Clinic 150 tablet 0     AMLODIPINE BESYLATE PO Take 7.5 mg by mouth daily       allopurinol (ZYLOPRIM) 100 MG tablet Take 1 tablet (100 mg) by mouth daily 90 tablet 3     Calcium-Vitamin D 600-200 MG-UNIT TABS Take 1 tablet by mouth 2 times daily       miconazole (MICATIN; MICRO GUARD) 2 % powder Apply topically 2 times daily Reported on 2/13/2017       Warfarin Therapy Reminder 1 each continuous prn       Fexofenadine HCl (ALLEGRA PO) Take 180 mg by mouth daily        Probiotic Product (PROBIOTIC DAILY PO) Take 1 capsule by mouth daily        brimonidine (ALPHAGAN-P) 0.15 % ophthalmic solution Place 1 drop Into the left eye 2 times daily        latanoprost (XALATAN) 0.005 % ophthalmic solution Place 1 drop into both eyes At Bedtime        ONE-A-DAY WOMENS OR TABS 1 TABLET ORALLY DAILY       COSOPT 2-0.5 % OP SOLN 1 DROP INTO Left Eye twice daily       traMADol (ULTRAM) 50 MG  tablet Take 1 tablet (50 mg) by mouth every 6 hours as needed (Patient not taking: Reported on 3/7/2017) 50 tablet 0     senna-docusate (SENOKOT-S;PERICOLACE) 8.6-50 MG per tablet Take 1-2 tablets by mouth 2 times daily (Patient not taking: Reported on 3/7/2017) 100 tablet      triamcinolone (KENALOG) 0.1 % ointment Apply  topically 3 times daily. Apply sparingly to affected area. (Patient not taking: Reported on 3/7/2017) 30 g 0     No facility-administered encounter medications on file as of 8/7/2017.              Review Of Systems  Skin: As above  Eyes: negative  Ears/Nose/Throat: negative  Respiratory: No shortness of breath, dyspnea on exertion, cough, or hemoptysis  Cardiovascular: negative  Gastrointestinal: negative  Genitourinary: negative  Musculoskeletal: negative  Neurologic: negative  Psychiatric: negative  Hematologic/Lymphatic/Immunologic: negative  Endocrine: negative      O:   NAD, WDWN, Alert & Oriented, Mood & Affect wnl, Vitals stable   Here today alone   /74  Pulse 84  LMP 07/07/1960  SpO2 97%   General appearance normal   Vitals stable   Alert, oriented and in no acute distress      Following lymph nodes palpated: Occipital, Cervical, Supraclavicular no lad   Stuck on papules and brown macules on trunk and ext    R temple 6mm pink pearly papule   R alar crease 2.2cm pink pearly papule   L nasal bridge 7mm pink scaly papule in white scar         The remainder of expanded problem focused exam was unremarkable; the following areas were examined:  scalp/hair, conjunctiva/lids, face, neck, lips, chest, digits/nails, RUE, LUE.      Eyes: Conjunctivae/lids:Normal     ENT: Lips, buccal mucosa, tongue: normal    MSK:Normal    Cardiovascular: peripheral edema none    Pulm: Breathing Normal    Lymph Nodes: No Head and Neck Lymphadenopathy     Neuro/Psych: Orientation:Normal; Mood/Affect:Normal      MICRO:     R temple:Orthokeratosis of epidermis with a proliferation of nests of basaloid cells,  with peripheral palisading and a haphazard arrangement in the center extending into the dermis, forming nodules.  The tumor cells have hyperchromatic nuclei. Poor cytoplasm and intercellular bridging.    R alar crease:Orthokeratosis of epidermis with a proliferation of nests of basaloid cells, with peripheral palisading and a haphazard arrangement in the center extending into the dermis, forming nodules.  The tumor cells have hyperchromatic nuclei. Poor cytoplasm and intercellular bridging.    l nasal bridge :Orthokeratosis of epidermis with a proliferation of nests of basaloid cells, with peripheral palisading and a haphazard arrangement in the center extending into the dermis, forming nodules.  The tumor cells have hyperchromatic nuclei. Poor cytoplasm and intercellular bridging.    A/P:  1. Seborrheic keratosis, lentigo, hx of non-melanoma skin cancer   2. R/o basal cell carcinoma   TANGENTIAL BIOPSY IN HOUSE:  After consent, anesthesia with LEC and prep, tangential excision performed and dx above confirmed with frozen section histology.  No complications and routine wound care.  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  I explained that treatment of these lesions would be purely cosmetic and not medically neccessary.  I discussed with patient different removal options including excision, cautery and /or laser.      Nature and genetics of benign skin lesions dicussed with patient.  Signs and Symptoms of skin cancer discussed with patient.  Patient encouraged to perform monthly skin exams.  UV precautions reviewed with patient.  Skin care regimen reviewed with patient: Eliminate harsh soaps, i.e. Dial, zest, irsih spring; Mild soaps such as Cetaphil or Dove  sensitive skin, avoid hot or cold showers, aggressive use of emollients including vanicream, cetaphil or cerave discussed with patient.    Risks of non-melanoma skin cancer discussed with patient   Return to clinic next appt      PROCEDURE NOTE  R temple basal cell carcinoma   MOHS:   Location    After PGACAC discussed with patient, decision for Mohs surgery was made. Indication for Mohs was Location. Patient confirmed the site with Dr. Sheppard.  After anesthesia with LEC, the tumor was excised using standard Mohs technique in 1 stages(s).  CLEAR MARGINS OBTAINED and Final defect size was 1.1 cm.       REPAIR SECOND INTENT: We discussed the options for wound management in full with the patient including risks/benefits/possible outcomes. Decision made to allow the wound to heal by second intention. EBL minimal; complications none; wound care routine.  The patient was discharged in good condition and will return in one month or prn for wound evaluation.

## 2017-08-07 NOTE — LETTER
[facility]  [address] [facility] [address]  Phone: [phone]    August 8, 2017    Pooja Swartz                                                                                                                       [address]  /  [facility] [address]        Dear     You are scheduled for Mohs Surgery on:    Monday September 11 th at 8:00 am.  Monday September 18 th at 8:00 am.    Please check in at Dermatology Clinic.   (2nd Floor, last  Clinic on right up staircase or elevator -past OB/GYN clinic)    You don't need to arrive more than 5-10 minutes prior to your appointment time.     Be sure to eat a good breakfast and bathe and wash your hair prior to Surgery.    If you are taking any anti-coagulants that are prescribed by your Doctor (such as Coumadin/warfarin, Plavix, Aspirin, Ibuprofen), please continue taking them.     However, If you are taking anti-coagulants over the counter without  a Doctor's order for a Medical condition, please discontinue them 10 days prior to Surgery.      Please wear loose comfortable clothing as it could possibly be 4-6 hours until your surgery is completed depending upon how many layers of tissue need to be removed.     Wi-fi access is available.     Sincerely,     Myron Sheppard MD/Monique Henley RN

## 2017-08-07 NOTE — NURSING NOTE
Chief Complaint   Patient presents with     Derm Problem     spots on face       Vitals:    08/07/17 1306   BP: 156/74   Pulse: 84   SpO2: 97%     Wt Readings from Last 1 Encounters:   03/07/17 62.6 kg (138 lb)       Ema Coughlin LPN.................8/7/2017

## 2017-08-07 NOTE — MR AVS SNAPSHOT
After Visit Summary   8/7/2017    Pooja Swartz    MRN: [id number]           Patient Information     Date Of Birth          9/1/1925        Visit Information        Provider Department      8/7/2017 1:45 PM Myron Sheppard MD [facility]        Care Instructions          Wound Care Instructions     FOR SUPERFICIAL WOUNDS     [facility] [phone]    [facility] [phone]  Right temple, Right side of nose and Left side of nose                       AFTER 24 HOURS YOU SHOULD REMOVE THE BANDAGE AND BEGIN DAILY DRESSING CHANGES AS FOLLOWS:     1) Remove Dressing.     2) Clean and dry the area with tap water using a Q-tip or sterile gauze pad.     3) Apply Vaseline, Aquaphor, Polysporin ointment or Bacitracin ointment over entire wound.  Do NOT use Neosporin ointment.     4) Cover the wound with a band-aid, or a sterile non-stick gauze pad and micropore paper tape      REPEAT THESE INSTRUCTIONS AT LEAST ONCE A DAY UNTIL THE WOUND HAS COMPLETELY HEALED.    It is an old wives tale that a wound heals better when it is exposed to air and allowed to dry out. The wound will heal faster with a better cosmetic result if it is kept moist with ointment and covered with a bandage.    **Do not let the wound dry out.**      Supplies Needed:      *Cotton tipped applicators (Q-tips)    *Polysporin Ointment or Bacitracin Ointment (NOT NEOSPORIN)    *Band-aids or non-stick gauze pads and micropore paper tape.      PATIENT INFORMATION:    During the healing process you will notice a number of changes. All wounds develop a small halo of redness surrounding the wound.  This means healing is occurring. Severe itching with extensive redness usually indicates sensitivity to the ointment or bandage tape used to dress the wound.  You should call our office if this develops.      Swelling  and/or discoloration around your surgical site is common, particularly when performed around  the eye.    All wounds normally drain.  The larger the wound the more drainage there will be.  After 7-10 days, you will notice the wound beginning to shrink and new skin will begin to grow.  The wound is healed when you can see skin has formed over the entire area.  A healed wound has a healthy, shiny look to the surface and is red to dark pink in color to normalize.  Wounds may take approximately 4-6 weeks to heal.  Larger wounds may take 6-8 weeks.  After the wound is healed you may discontinue dressing changes.    You may experience a sensation of tightness as your wound heals. This is normal and will gradually subside.    Your healed wound may be sensitive to temperature changes. This sensitivity improves with time, but if you re having a lot of discomfort, try to avoid temperature extremes.    Patients frequently experience itching after their wound appears to have healed because of the continue healing under the skin.  Plain Vaseline will help relieve the itching.        POSSIBLE COMPLICATIONS    BLEEDIN. Leave the bandage in place.  2. Use tightly rolled up gauze or a cloth to apply direct pressure over the bandage for 30  minutes.  3. Reapply pressure for an additional 30 minutes if necessary  4. Use additional gauze and tape to maintain pressure once the bleeding has stopped.            Follow-ups after your visit        Your next 10 appointments already scheduled     Aug 07, 2017  1:45 PM CDT   New Visit with Myron Sheppard MD   [facility]    [address] [facility] [address]   [phone]            Aug 18, 2017  9:00 AM CDT   Anticoagulation Visit with CL ANTI COAG   [facility]    [address]  [facility] [address] [phone]              Who to contact     If you have questions or need follow up information about today's clinic visit or your schedule please contact  "[facility] directly at [phone].  Normal or non-critical lab and imaging results will be communicated to you by MyChart, letter or phone within 4 business days after the clinic has received the results. If you do not hear from us within 7 days, please contact the clinic through MyChart or phone. If you have a critical or abnormal lab result, we will notify you by phone as soon as possible.  Submit refill requests through [employer] or call your pharmacy and they will forward the refill request to us. Please allow 3 business days for your refill to be completed.          Additional Information About Your Visit        [employer] Information     [employer] lets you send messages to your doctor, view your test results, renew your prescriptions, schedule appointments and more. To sign up, go to [email or web address] . Click on \"Log in\" on the left side of the screen, which will take you to the Welcome page. Then click on \"Sign up Now\" on the right side of the page.     You will be asked to enter the access code listed below, as well as some personal information. Please follow the directions to create your username and password.     Your access code is: [email or web address]  Expires: 2017  1:34 PM     Your access code will  in 90 days. If you need help or a new code, please call your [facility] clinic or [phone].        Care EveryWhere ID     This is your Care EveryWhere ID. This could be used by other organizations to access your [facility] medical records  [id number]        Your Vitals Were     Pulse Last Period Pulse Oximetry             84 1960 97%          Blood Pressure from Last 3 Encounters:   17 156/74   17 148/66   17 136/62    Weight from Last 3 Encounters:   17 62.6 kg (138 lb)   17 62.6 kg (138 lb)   17 62.6 kg (138 lb)              Today, you had the following     No orders found for display       Primary Care Provider Office Phone # Fax #    LEE Fair " MD Peterson [phone]       [facility] [address] [facility] [address]        Equal Access to Services     ZOEY BRAY : Hadii aad ku hadgris Vora, meka venancioalcira, keith kagracyda sarah, moiz barrientosbarrie emily. Sudha [facility] [phone].    ATENCIÓN: Si habla español, tiene a sanchez disposición servicios gratuitos de asistencia lingüística. Llame al [phone].    We comply with applicable federal civil rights laws and [place] laws. We do not discriminate on the basis of race, color, national origin, age, disability sex, sexual orientation or gender identity.            Thank you!     Thank you for choosing [facility]  for your care. Our goal is always to provide you with excellent care. Hearing back from our patients is one way we can continue to improve our services. Please take a few minutes to complete the written survey that you may receive in the mail after your visit with us. Thank you!             Your Updated Medication List - Protect others around you: Learn how to safely use, store and throw away your medicines at www.disposemymeds.org.          This list is accurate as of: 8/7/17  1:34 PM.  Always use your most recent med list.                   Brand Name Dispense Instructions for use Diagnosis    ALLEGRA PO      Take 180 mg by mouth daily        allopurinol 100 MG tablet    ZYLOPRIM    90 tablet    Take 1 tablet (100 mg) by mouth daily    Chronic tophaceous gout       AMLODIPINE BESYLATE PO      Take 7.5 mg by mouth daily        brimonidine 0.15 % ophthalmic solution    ALPHAGAN-P     Place 1 drop Into the left eye 2 times daily        Calcium-Vitamin D 600-200 MG-UNIT Tabs      Take 1 tablet by mouth 2 times daily        COSOPT 2-0.5 % ophthalmic solution   Generic drug:  dorzolamide-timolol      1 DROP INTO Left Eye twice daily        latanoprost 0.005 % ophthalmic solution    XALATAN     Place 1 drop into both eyes At Bedtime         miconazole 2 % powder    MICATIN; MICRO GUARD     Apply topically 2 times daily Reported on 2/13/2017        ONE-A-DAY WITHIN Tabs      1 TABLET ORALLY DAILY        PROBIOTIC DAILY PO      Take 1 capsule by mouth daily        senna-docusate 8.6-50 MG per tablet    SENOKOT-S;PERICOLACE    100 tablet    Take 1-2 tablets by mouth 2 times daily    Closed displaced comminuted fracture of shaft of left femur, initial encounter (H)       traMADol 50 MG tablet    ULTRAM    50 tablet    Take 1 tablet (50 mg) by mouth every 6 hours as needed    Closed displaced comminuted fracture of shaft of left femur, initial encounter (H)       triamcinolone 0.1 % ointment    KENALOG    30 g    Apply  topically 3 times daily. Apply sparingly to affected area.    Nummular eczema       * Warfarin Therapy Reminder      1 each continuous prn    Closed displaced comminuted fracture of shaft of left femur, initial encounter (H)       * warfarin 2 MG tablet    COUMADIN    150 tablet    Take 4 mg on Mon, Wed, Fri; 3 mg all other days or as directed by the Anticoagulation Clinic    Persistent atrial fibrillation (H), Long-term (current) use of anticoagulants, Atrial fibrillation (H), Chronic tophaceous gout       * Notice:  This list has 2 medication(s) that are the same as other medications prescribed for you. Read the directions carefully, and ask your doctor or other care provider to review them with you.

## 2017-08-07 NOTE — PATIENT INSTRUCTIONS
Wound Care Instructions     FOR SUPERFICIAL WOUNDS     [facility] [phone]    [facility] [phone]  Right temple, Right side of nose and Left side of nose                       AFTER 24 HOURS YOU SHOULD REMOVE THE BANDAGE AND BEGIN DAILY DRESSING CHANGES AS FOLLOWS:     1) Remove Dressing.     2) Clean and dry the area with tap water using a Q-tip or sterile gauze pad.     3) Apply Vaseline, Aquaphor, Polysporin ointment or Bacitracin ointment over entire wound.  Do NOT use Neosporin ointment.     4) Cover the wound with a band-aid, or a sterile non-stick gauze pad and micropore paper tape      REPEAT THESE INSTRUCTIONS AT LEAST ONCE A DAY UNTIL THE WOUND HAS COMPLETELY HEALED.    It is an old wives tale that a wound heals better when it is exposed to air and allowed to dry out. The wound will heal faster with a better cosmetic result if it is kept moist with ointment and covered with a bandage.    **Do not let the wound dry out.**      Supplies Needed:      *Cotton tipped applicators (Q-tips)    *Polysporin Ointment or Bacitracin Ointment (NOT NEOSPORIN)    *Band-aids or non-stick gauze pads and micropore paper tape.      PATIENT INFORMATION:    During the healing process you will notice a number of changes. All wounds develop a small halo of redness surrounding the wound.  This means healing is occurring. Severe itching with extensive redness usually indicates sensitivity to the ointment or bandage tape used to dress the wound.  You should call our office if this develops.      Swelling  and/or discoloration around your surgical site is common, particularly when performed around the eye.    All wounds normally drain.  The larger the wound the more drainage there will be.  After 7-10 days, you will notice the wound beginning to shrink and new skin will begin to grow.  The wound is healed when you can see skin has formed over the entire area.  A healed wound has a healthy,  shiny look to the surface and is red to dark pink in color to normalize.  Wounds may take approximately 4-6 weeks to heal.  Larger wounds may take 6-8 weeks.  After the wound is healed you may discontinue dressing changes.    You may experience a sensation of tightness as your wound heals. This is normal and will gradually subside.    Your healed wound may be sensitive to temperature changes. This sensitivity improves with time, but if you re having a lot of discomfort, try to avoid temperature extremes.    Patients frequently experience itching after their wound appears to have healed because of the continue healing under the skin.  Plain Vaseline will help relieve the itching.        POSSIBLE COMPLICATIONS    BLEEDIN. Leave the bandage in place.  2. Use tightly rolled up gauze or a cloth to apply direct pressure over the bandage for 30  minutes.  3. Reapply pressure for an additional 30 minutes if necessary  4. Use additional gauze and tape to maintain pressure once the bleeding has stopped.

## 2017-08-08 NOTE — TELEPHONE ENCOUNTER
Message left to return call. (I also sent Pre op letter/Mohs brochure as she is scheduled for 2 mohs surgeries already) Monique Henley RN

## 2017-08-08 NOTE — TELEPHONE ENCOUNTER
Patient notified. Patient verbalized understanding. She is scheduled for Mohs surgery. Monique Henley RN

## 2017-08-18 ENCOUNTER — ANTICOAGULATION THERAPY VISIT (OUTPATIENT)
Dept: ANTICOAGULATION | Facility: CLINIC | Age: 82
End: 2017-08-18
Payer: COMMERCIAL

## 2017-08-18 DIAGNOSIS — I48.91 ATRIAL FIBRILLATION (H): ICD-10-CM

## 2017-08-18 DIAGNOSIS — Z79.01 LONG-TERM (CURRENT) USE OF ANTICOAGULANTS: ICD-10-CM

## 2017-08-18 LAB — INR POINT OF CARE: 2.4 (ref 0.86–1.14)

## 2017-08-18 PROCEDURE — 36416 COLLJ CAPILLARY BLOOD SPEC: CPT

## 2017-08-18 PROCEDURE — 99207 ZZC NO CHARGE NURSE ONLY: CPT

## 2017-08-18 PROCEDURE — 85610 PROTHROMBIN TIME: CPT | Mod: QW

## 2017-08-18 NOTE — MR AVS SNAPSHOT
Pooja HOUSTON Maxime   8/18/2017 9:00 AM   Anticoagulation Therapy Visit    Description:  89 year old female   Provider:  CL ANTI COAG   Department:  Cl Anticoag           INR as of 8/18/2017     Today's INR 2.4      Anticoagulation Summary as of 8/18/2017     INR goal 2.0-3.0   Today's INR 2.4   Full instructions 4 mg on Mon, Wed, Fri; 3 mg all other days   Next INR check 9/22/2017    Indications   Atrial fibrillation (H) [I48.91]  Long-term (current) use of anticoagulants [Z79.01] [Z79.01]         Description     Recheck INR 5 weeks, 9/22/17  Continue warfarin 4 mg Mon, Wed, Fri, 3 mg rest of week       Your next Anticoagulation Clinic appointment(s)     Aug 18, 2017  9:00 AM CDT   Anticoagulation Visit with [facility]TI [facility]AG   [facility]    [address] [address]   [phone]            Sep 22, 2017  9:00 AM CDT   Anticoagulation Visit with CL ANTI COAG   [facility] ([facility]    [address] [address]   [phone]              Contact Numbers     Please call [phone] to cancel and/or reschedule your appointment.  Please call [phone] with any problems or questions regarding your therapy          August 2017 Details    Sun Mon Tue Wed Thu Fri Sat       1               2               3               4               5                 6               7               8               9               10               11               12                 13               14               15               16               17               18      4 mg   See details      19      3 mg           20      3 mg         21      4 mg         22      3 mg         23      4 mg         24      3 mg         25      4 mg         26      3 mg           27      3 mg         28      4 mg         29      3 mg         30      4 mg         31      3 mg            Date Details   08/18 This INR check                How to take your warfarin dose     To take:  3 mg Take 1.5 of the 2 mg tablets.    To take:  4 mg Take 2 of the 2 mg tablets.           September 2017 Details    Sun Mon Tue Wed Thu Fri Sat          1      4 mg         2      3 mg           3      3 mg         4      4 mg         5      3 mg         6      4 mg         7      3 mg         8      4 mg         9      3 mg           10      3 mg         11      4 mg         12      3 mg         13      4 mg         14      3 mg         15      4 mg         16      3 mg           17      3 mg         18      4 mg         19      3 mg         20      4 mg         21      3 mg         22            23                 24               25               26               27               28               29               30                Date Details   No additional details    Date of next INR:  9/22/2017         How to take your warfarin dose     To take:  3 mg Take 1.5 of the 2 mg tablets.    To take:  4 mg Take 2 of the 2 mg tablets.

## 2017-08-18 NOTE — PROGRESS NOTES
ANTICOAGULATION FOLLOW-UP CLINIC VISIT    Patient Name:  Pooja Swartz  Date:  8/18/2017  Contact Type:  Face to Face    SUBJECTIVE:     Patient Findings     Positives No Problem Findings (no changes, concerns or problems reported)    Comments Pt will have a MOHS procedure on 8/18/17, there will not be any change in her warfarin            OBJECTIVE    INR Protime   Date Value Ref Range Status   08/18/2017 2.4 (A) 0.86 - 1.14 Final       ASSESSMENT / PLAN  INR assessment THER    Recheck INR In: 5 WEEKS    INR Location Clinic      Anticoagulation Summary as of 8/18/2017     INR goal 2.0-3.0   Today's INR 2.4   Maintenance plan 4 mg (2 mg x 2) on Mon, Wed, Fri; 3 mg (2 mg x 1.5) all other days   Full instructions 4 mg on Mon, Wed, Fri; 3 mg all other days   Weekly total 24 mg   No change documented Kaylyn Morris RN   Plan last modified Kaylyn Morris RN (12/13/2016)   Next INR check 9/22/2017   Priority INR   Target end date Indefinite    Indications   Atrial fibrillation (H) [I48.91]  Long-term (current) use of anticoagulants [Z79.01] [Z79.01]         Anticoagulation Episode Summary     INR check location     Preferred lab     Send INR reminders to CL ANTICOAG POOL    Comments * Discharged from home care 10-21-16      Anticoagulation Care Providers     Provider Role Specialty Phone number    Post, LEE Fair MD [facility] [phone]            See the Encounter Report to view Anticoagulation Flowsheet and Dosing Calendar (Go to Encounters tab in chart review, and find the Anticoagulation Therapy Visit)        Kaylyn Morris RN

## 2017-08-23 DIAGNOSIS — I10 HYPERTENSION GOAL BP (BLOOD PRESSURE) < 140/90: Primary | ICD-10-CM

## 2017-08-23 NOTE — TELEPHONE ENCOUNTER
Amlodipine      Last Written Prescription Date:  historical  Last Fill Quantity: 0,   # refills: 0  Last Office Visit with [facility] or [facility] prescribing provider: 02/24/2017  Future Office visit:       Routing refill request to provider for review/approval because:  Medication is reported/historical      Dex OWENS (R)

## 2017-08-31 RX ORDER — AMLODIPINE BESYLATE 5 MG/1
7.5 TABLET ORAL DAILY
Qty: 135 TABLET | Refills: 1 | Status: SHIPPED | OUTPATIENT
Start: 2017-08-31 | End: 2018-01-09

## 2017-08-31 NOTE — TELEPHONE ENCOUNTER
Need new order.  Last seen 2/24/17.  Last noted as Historical.  Last B/P was 156/74 on 8/7/17.  Advise.Luciano

## 2017-09-11 ENCOUNTER — OFFICE VISIT (OUTPATIENT)
Dept: DERMATOLOGY | Facility: CLINIC | Age: 82
End: 2017-09-11
Payer: COMMERCIAL

## 2017-09-11 VITALS — SYSTOLIC BLOOD PRESSURE: 144 MMHG | DIASTOLIC BLOOD PRESSURE: 64 MMHG | HEART RATE: 58 BPM | HEIGHT: 60 IN

## 2017-09-11 DIAGNOSIS — C44.319 BASAL CELL CARCINOMA OF CHEEK: Primary | ICD-10-CM

## 2017-09-11 PROCEDURE — 13132 CMPLX RPR F/C/C/M/N/AX/G/H/F: CPT | Performed by: DERMATOLOGY

## 2017-09-11 PROCEDURE — 17312 MOHS ADDL STAGE: CPT | Performed by: DERMATOLOGY

## 2017-09-11 PROCEDURE — 17311 MOHS 1 STAGE H/N/HF/G: CPT | Performed by: DERMATOLOGY

## 2017-09-11 NOTE — PROGRESS NOTES
Pooja Swartz is a 89 year old year old female patient here today for evaluation and managment of basal cell carcinoma on right medial cheek/nlf.  Patient reports the following modifying factors none.  Associated symptoms: none.  Patient has no other skin complaints today.  Remainder of the HPI, Meds, PMH, Allergies, FH, and SH was reviewed in chart.      Past Medical History:   Diagnosis Date     Atrial fibrillation (H)      Chronic kidney disease      Disorders of bursae and tendons in shoulder region, unspecified     right shoulder     Hemorrhage of gastrointestinal tract, unspecified      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, including hip      Other specified glaucoma      Renal insufficiency      Sinus node dysfunction (H)      Unspecified essential hypertension        Past Surgical History:   Procedure Laterality Date     ARTHROPLASTY REVISION HIP Left 8/19/2016    Procedure: ARTHROPLASTY REVISION HIP;  Surgeon: Kael Rojas MD;  Location: [facility] OR     C PELVIS/HIP JOINT SURGERY UNLISTED       C SHOULDER SURG PROC UNLISTED       C STOMACH SURGERY PROCEDURE UNLISTED       HC VASCULAR SURGERY PROCEDURE UNLIST       OPEN REDUCTION INTERNAL FIXATION FEMUR PROXIMAL Left 8/19/2016    Procedure: OPEN REDUCTION INTERNAL FIXATION FEMUR PROXIMAL;  Surgeon: Kael Rojas MD;  Location: [facility] OR     SURGICAL HISTORY OF -   11/1992    skin cancer, nose     SURGICAL HISTORY OF -   1978    right, vein stripping     SURGICAL HISTORY OF -   1968    breast biopsy     SURGICAL HISTORY OF -   1993    laparoscopic cholecystectomy     SURGICAL HISTORY OF -       tonsillectomy and adenoidectomy     SURGICAL HISTORY OF -   04/2004    left cataract     SURGICAL HISTORY OF -   08/09/2004    right total shoulder arthroplasty        Family History   Problem Relation Age of Onset     HEART DISEASE Mother      CHF     Hypertension Mother      Hypertension Father      Hypertension Maternal Grandmother      Hypertension Maternal  Grandfather      Hypertension Son      HEART DISEASE Son      MI       Social History     Social History     Marital status:      Spouse name: N/A     Number of children: N/A     Years of education: N/A     Occupational History     Not on file.     Social History Main Topics     Smoking status: Never Smoker     Smokeless tobacco: Never Used     Alcohol use Yes      Comment: rare     Drug use: No     Sexual activity: Not Currently     Birth control/ protection: None     Other Topics Concern     Parent/Sibling W/ Cabg, Mi Or Angioplasty Before 65f 55m? Yes     Social History Narrative       Outpatient Encounter Prescriptions as of 9/11/2017   Medication Sig Dispense Refill     amLODIPine (NORVASC) 5 MG tablet Take 1.5 tablets (7.5 mg) by mouth daily 135 tablet 1     warfarin (COUMADIN) 2 MG tablet Take 4 mg on Mon, Wed, Fri; 3 mg all other days or as directed by the Anticoagulation Clinic 150 tablet 0     allopurinol (ZYLOPRIM) 100 MG tablet Take 1 tablet (100 mg) by mouth daily 90 tablet 3     Calcium-Vitamin D 600-200 MG-UNIT TABS Take 1 tablet by mouth 2 times daily       miconazole (MICATIN; MICRO GUARD) 2 % powder Apply topically 2 times daily Reported on 2/13/2017       traMADol (ULTRAM) 50 MG tablet Take 1 tablet (50 mg) by mouth every 6 hours as needed (Patient not taking: Reported on 3/7/2017) 50 tablet 0     Warfarin Therapy Reminder 1 each continuous prn       senna-docusate (SENOKOT-S;PERICOLACE) 8.6-50 MG per tablet Take 1-2 tablets by mouth 2 times daily (Patient not taking: Reported on 3/7/2017) 100 tablet      Fexofenadine HCl (ALLEGRA PO) Take 180 mg by mouth daily        Probiotic Product (PROBIOTIC DAILY PO) Take 1 capsule by mouth daily        brimonidine (ALPHAGAN-P) 0.15 % ophthalmic solution Place 1 drop Into the left eye 2 times daily        latanoprost (XALATAN) 0.005 % ophthalmic solution Place 1 drop into both eyes At Bedtime        triamcinolone (KENALOG) 0.1 % ointment Apply   topically 3 times daily. Apply sparingly to affected area. (Patient not taking: Reported on 3/7/2017) 30 g 0     ONE-A-DAY WOMENS OR TABS 1 TABLET ORALLY DAILY       COSOPT 2-0.5 % OP SOLN 1 DROP INTO Left Eye twice daily       No facility-administered encounter medications on file as of 9/11/2017.              Review Of Systems  Skin: As above  Eyes: negative  Ears/Nose/Throat: negative  Respiratory: No shortness of breath, dyspnea on exertion, cough, or hemoptysis  Cardiovascular: negative  Gastrointestinal: negative  Genitourinary: negative  Musculoskeletal: negative  Neurologic: negative  Psychiatric: negative  Hematologic/Lymphatic/Immunologic: negative  Endocrine: negative      O:   NAD, WDWN, Alert & Oriented, Mood & Affect wnl, Vitals stable   Here today alone   /64  Pulse 58  Ht 1.524 m (5')  LMP 07/07/1960   General appearance normal   Vitals stable   Alert, oriented and in no acute distress     R NLF 2.2cm red pearly plaque       Eyes: Conjunctivae/lids:Normal     ENT: Lips, buccal mucosa, tongue: normal    MSK:Normal    Cardiovascular: peripheral edema none    Pulm: Breathing Normal    Neuro/Psych: Orientation:Normal; Mood/Affect:Normal      A/P:  1. R medial cheek basal cell carcinoma   MOHS:   Location    After PGACAC discussed with patient, decision for Mohs surgery was made. Indication for Mohs was Location. Patient confirmed the site with Dr. Sheppard.  After anesthesia with LEC, the tumor was excised using standard Mohs technique in 4 stages(s).  CLEAR MARGINS OBTAINED and Final defect size was 3.3cm.   REPAIR COMPLEX: Because of the tightness of the surrounding skin and Because of the size and full thickness nature of the defect, a complex closure was planned. After LEC anesthesia and prep, Burow's triangles were excised in the relaxed skin tension lines. The wound edges were widely undermined by dissection in the subcutaneous plane until adequate tissue mobility was obtained. Hemostasis  was obtained. The wound edges were closed in a layered fashion using Vicryl and Fast Absorbing Plain Gut sutures. Postoperative length was 6.3 cm.   EBL minimal; complications none; wound care routine.  The patient was discharged in good condition and will return in one week for wound evaluation.      BENIGN LESIONS DISCUSSED WITH PATIENT:  I discussed the specifics of tumor, prognosis, and genetics of benign lesions.  I explained that treatment of these lesions would be purely cosmetic and not medically neccessary.  I discussed with patient different removal options including excision, cautery and /or laser.      Nature and genetics of benign skin lesions dicussed with patient.  Signs and Symptoms of skin cancer discussed with patient.  Patient encouraged to perform monthly skin exams.  UV precautions reviewed with patient.  Skin care regimen reviewed with patient: Eliminate harsh soaps, i.e. Dial, zest, irsih spring; Mild soaps such as Cetaphil or Dove sensitive skin, avoid hot or cold showers, aggressive use of emollients including vanicream, cetaphil or cerave discussed with patient.    Risks of non-melanoma skin cancer discussed with patient   Return to clinic next appt

## 2017-09-11 NOTE — PATIENT INSTRUCTIONS
Sutured Wound Care     [facility]: [phone]    [facility]: [phone]          ? No strenuous activity for 48 hours. Resume moderate activity in 48 hours. No heavy exercising until you are seen for follow up in one week.     ? Take Tylenol as needed for discomfort.                         ? Do not drink alcoholic beverages for 48 hours.     ? Keep the pressure bandage in place for 24 hours. If the bandage becomes blood tinged or loose, reinforce it with gauze and tape.        (Refer to the reverse side of this page for management of bleeding).    ? Remove pressure bandage in 24 hours     ? Leave the flat bandage in place until your follow up appointment.    ? Keep the bandage dry. Wash around it carefully.    ? If the tape becomes soiled or starts to come off, reinforce it with additional paper tape.    ? Do not smoke for 3 weeks; smoking is detrimental to wound healing.    ? It is normal to have swelling and bruising around the surgical site. The bruising will fade in approximately 10-14 days. Elevate the area to reduce swelling.    ? Numbness, itchiness and sensitivity to temperature changes can occur after surgery and may take up to 18 months to normalize.      POSSIBLE COMPLICATIONS    BLEEDIN. Leave the bandage in place.  2. Use tightly rolled up gauze or a cloth to apply direct pressure over the bandage for 20   minutes.  3. Reapply pressure for an additional 20 minutes if necessary  4. Call the office or go to the nearest emergency room if pressure fails to stop the bleeding.  5. Use additional gauze and tape to maintain pressure once the bleeding has stopped.        PAIN:    1. Post operative pain should slowly get better, never worse.  2. A severe increase in pain may indicate a problem. Call the office if this occurs.    In case of emergency phone:Dr Sheppard [phone]

## 2017-09-11 NOTE — NURSING NOTE
Surgical Office Location :   [facility]  [address] [place] [address]

## 2017-09-11 NOTE — NURSING NOTE
"Initial /64  Pulse 58  Ht 1.524 m (5')  LMP 07/07/1960 Estimated body mass index is 27.45 kg/(m^2) as calculated from the following:    Height as of 1/9/17: 1.51 m (4' 11.45\").    Weight as of 3/7/17: 62.6 kg (138 lb). .      "

## 2017-09-11 NOTE — MR AVS SNAPSHOT
After Visit Summary   2017    Pooja Swartz    MRN: [id number]           Patient Information     Date Of Birth          1925        Visit Information        Provider Department      2017 8:00 AM Myron Sheppard MD [facility]        Today's Diagnoses     Basal cell carcinoma of cheek    -  1      Care Instructions      Sutured Wound Care     [facility]: [phone]    [facility]: [phone]          ? No strenuous activity for 48 hours. Resume moderate activity in 48 hours. No heavy exercising until you are seen for follow up in one week.     ? Take Tylenol as needed for discomfort.                         ? Do not drink alcoholic beverages for 48 hours.     ? Keep the pressure bandage in place for 24 hours. If the bandage becomes blood tinged or loose, reinforce it with gauze and tape.        (Refer to the reverse side of this page for management of bleeding).    ? Remove pressure bandage in 24 hours     ? Leave the flat bandage in place until your follow up appointment.    ? Keep the bandage dry. Wash around it carefully.    ? If the tape becomes soiled or starts to come off, reinforce it with additional paper tape.    ? Do not smoke for 3 weeks; smoking is detrimental to wound healing.    ? It is normal to have swelling and bruising around the surgical site. The bruising will fade in approximately 10-14 days. Elevate the area to reduce swelling.    ? Numbness, itchiness and sensitivity to temperature changes can occur after surgery and may take up to 18 months to normalize.      POSSIBLE COMPLICATIONS    BLEEDIN. Leave the bandage in place.  2. Use tightly rolled up gauze or a cloth to apply direct pressure over the bandage for 20   minutes.  3. Reapply pressure for an additional 20 minutes if necessary  4. Call the office or go to the nearest emergency room if pressure fails to stop the bleeding.  5. Use additional gauze and tape to  "maintain pressure once the bleeding has stopped.        PAIN:    1. Post operative pain should slowly get better, never worse.  2. A severe increase in pain may indicate a problem. Call the office if this occurs.    In case of emergency phone:Dr Sheppard [phone]              Follow-ups after your visit        Your next 10 appointments already scheduled     Sep 18, 2017  8:00 AM CDT   MOHS with Myron Sheppard MD   [facility]    [address] [address] [phone]            Sep 22, 2017  9:00 AM CDT   Anticoagulation Visit with CL ANTI COAG   [facility]    [address]  [facility] [address] [phone]              Who to contact     If you have questions or need follow up information about today's clinic visit or your schedule please contact [facility] directly at [phone].  Normal or non-critical lab and imaging results will be communicated to you by Brndstrhart, letter or phone within 4 business days after the clinic has received the results. If you do not hear from us within 7 days, please contact the clinic through Brndstrhart or phone. If you have a critical or abnormal lab result, we will notify you by phone as soon as possible.  Submit refill requests through [employer] or call your pharmacy and they will forward the refill request to us. Please allow 3 business days for your refill to be completed.          Additional Information About Your Visit        [employer] Information     [employer] lets you send messages to your doctor, view your test results, renew your prescriptions, schedule appointments and more. To sign up, go to [email or web address] . Click on \"Log in\" on the left side of the screen, which will take you to the Welcome page. Then click on \"Sign up Now\" on the right side of the page.     You will be asked to enter the access code listed below, as " well as some personal information. Please follow the directions to create your username and password.     Your access code is: [email or web address]  Expires: 2017  1:34 PM     Your access code will  in 90 days. If you need help or a new code, please call your [place] clinic or [phone].        Care EveryWhere ID     This is your Care EveryWhere ID. This could be used by other organizations to access your [place] medical records  [id number]        Your Vitals Were     Pulse Height Last Period             58 1.524 m (5') 1960          Blood Pressure from Last 3 Encounters:   17 144/64   17 156/74   17 148/66    Weight from Last 3 Encounters:   17 62.6 kg (138 lb)   17 62.6 kg (138 lb)   17 62.6 kg (138 lb)              Today, you had the following     No orders found for display       Primary Care Provider Office Phone # Fax #    R Marv Winkler [phone] [address] [facility] [address]        Equal Access to Services     [facility] AH: Hadii aad ku hadasho Soomaali, waaxda luqadaha, qaybta kaalmada adeegyada, moiz mcrae . So [facility] [phone].    ATENCIÓN: Si habla español, tiene a sanchez disposición servicios gratuitos de asistencia lingüística. Llame al [phone].    We comply with applicable federal civil rights laws and [place] laws. We do not discriminate on the basis of race, color, national origin, age, disability sex, sexual orientation or gender identity.            Thank you!     Thank you for choosing [facility]  for your care. Our goal is always to provide you with excellent care. Hearing back from our patients is one way we can continue to improve our services. Please take a few minutes to complete the written survey that you may receive in the mail after your visit with us. Thank you!             Your Updated Medication List - Protect others around you: Learn how to safely  use, store and throw away your medicines at www.disposemymeds.org.          This list is accurate as of: 9/11/17 12:09 PM.  Always use your most recent med list.                   Brand Name Dispense Instructions for use Diagnosis    ALLEGRA PO      Take 180 mg by mouth daily        allopurinol 100 MG tablet    ZYLOPRIM    90 tablet    Take 1 tablet (100 mg) by mouth daily    Chronic tophaceous gout       amLODIPine 5 MG tablet    NORVASC    135 tablet    Take 1.5 tablets (7.5 mg) by mouth daily    Hypertension goal BP (blood pressure) < 140/90       brimonidine 0.15 % ophthalmic solution    ALPHAGAN-P     Place 1 drop Into the left eye 2 times daily        Calcium-Vitamin D 600-200 MG-UNIT Tabs      Take 1 tablet by mouth 2 times daily        COSOPT 2-0.5 % ophthalmic solution   Generic drug:  dorzolamide-timolol      1 DROP INTO Left Eye twice daily        latanoprost 0.005 % ophthalmic solution    XALATAN     Place 1 drop into both eyes At Bedtime        miconazole 2 % powder    MICATIN; MICRO GUARD     Apply topically 2 times daily Reported on 2/13/2017        ONE-A-DAY WITHIN Tabs      1 TABLET ORALLY DAILY        PROBIOTIC DAILY PO      Take 1 capsule by mouth daily        senna-docusate 8.6-50 MG per tablet    SENOKOT-S;PERICOLACE    100 tablet    Take 1-2 tablets by mouth 2 times daily    Closed displaced comminuted fracture of shaft of left femur, initial encounter (H)       traMADol 50 MG tablet    ULTRAM    50 tablet    Take 1 tablet (50 mg) by mouth every 6 hours as needed    Closed displaced comminuted fracture of shaft of left femur, initial encounter (H)       triamcinolone 0.1 % ointment    KENALOG    30 g    Apply  topically 3 times daily. Apply sparingly to affected area.    Nummular eczema       * Warfarin Therapy Reminder      1 each continuous prn    Closed displaced comminuted fracture of shaft of left femur, initial encounter (H)       * warfarin 2 MG tablet    COUMADIN    150 tablet    Take 4  mg on Mon, Wed, Fri; 3 mg all other days or as directed by the Anticoagulation Clinic    Persistent atrial fibrillation (H), Long-term (current) use of anticoagulants, Atrial fibrillation (H), Chronic tophaceous gout       * Notice:  This list has 2 medication(s) that are the same as other medications prescribed for you. Read the directions carefully, and ask your doctor or other care provider to review them with you.

## 2017-09-18 ENCOUNTER — ALLIED HEALTH/NURSE VISIT (OUTPATIENT)
Dept: DERMATOLOGY | Facility: CLINIC | Age: 82
End: 2017-09-18
Payer: COMMERCIAL

## 2017-09-18 ENCOUNTER — OFFICE VISIT (OUTPATIENT)
Dept: DERMATOLOGY | Facility: CLINIC | Age: 82
End: 2017-09-18
Payer: COMMERCIAL

## 2017-09-18 VITALS — DIASTOLIC BLOOD PRESSURE: 71 MMHG | SYSTOLIC BLOOD PRESSURE: 149 MMHG | HEART RATE: 70 BPM | OXYGEN SATURATION: 99 %

## 2017-09-18 DIAGNOSIS — C44.91 RECURRENT BASAL CELL CARCINOMA: Primary | ICD-10-CM

## 2017-09-18 DIAGNOSIS — C44.319 BASAL CELL CARCINOMA OF CHEEK: Primary | ICD-10-CM

## 2017-09-18 PROCEDURE — 99207 ZZC NO CHARGE NURSE ONLY: CPT

## 2017-09-18 PROCEDURE — 14061 TIS TRNFR E/N/E/L10.1-30SQCM: CPT | Mod: 79 | Performed by: DERMATOLOGY

## 2017-09-18 PROCEDURE — 17312 MOHS ADDL STAGE: CPT | Performed by: DERMATOLOGY

## 2017-09-18 PROCEDURE — 17311 MOHS 1 STAGE H/N/HF/G: CPT | Mod: 79 | Performed by: DERMATOLOGY

## 2017-09-18 NOTE — MR AVS SNAPSHOT
After Visit Summary   9/18/2017    Pooja Swartz    MRN: [id number]           Patient Information     Date Of Birth          9/1/1925        Visit Information        Provider Department      9/18/2017 1:00 PM NurseFozia [facility]        Care Instructions    WOUND CARE INSTRUCTIONS  for  ONE WEEK AFTER SURGERY          1) Leave flat bandage on your skin for one week after today s bandage change.  2) In one week when you remove the bandage, you may resume your regular skin care routine, including washing with mild soap and water, applying moisturizer, make-up and sunscreen.    3) If there are any open or bleeding areas at the incision/graft site you should begin to cover the area with a bandage daily as follows:    1) Clean and dry the area with plain tap water using a Q-tip or sterile gauze pad.  2) Apply Polysporin or Bacitracin ointment to the open area.  3) Cover the wound with a band-aid or a sterile non-stick gauze pad and micropore paper tape.             *Once the bandages are removed, the scar will be red and firm (especially in the lip/chin area). This is normal and will fade in time. It might take 6-12 months for this to happen.     *Massaging the area will help the scar soften and fade quicker. Begin to massage the area one month after the bandages have been removed. To massage apply pressure directly and firmly over the scar with the fingertips and move in a circular motion. Massage the area for a few minutes several times a day. Continue to massage the site for several months.    *Approximately 6-8 weeks after surgery it is not uncommon to see the formation of  tender pimple-like  bump along the scar. This is normal. As the scar continues to mature and the stitches underneath the skin begin to dissolve, this might occur. Do not pick or squeeze, this will resolve on it s own. Should one break open producing a small amount of drainage, apply Polysporin or Bacitracin  ointment a few times a day until the wound is completely healed.    *Numbness in the surgical area is expected. It might take 12-18 months for the feeling to return to normal. During this time sensations of itchiness, tingling and occasional sharp pains might be noted. These feelings are normal and will subside once the nerves have completely healed.         IN CASE OF EMERGENCY: Dr Sheppard [phone]     If you were seen in [place] call: [phone]    If you were seen in [place] call: [phone]            Follow-ups after your visit        Your next 10 appointments already scheduled     Sep 18, 2017  1:00 PM CDT   Nurse Only with Wy Derm Nurse   [facility]    [address] [facility] [address] [phone]            Sep 22, 2017  9:00 AM CDT   Anticoagulation Visit with CL ANTI COAG   [facility]    [address]  [facility] [address] [phone]              Who to contact     If you have questions or need follow up information about today's clinic visit or your schedule please contact [facility] directly at [phone].  Normal or non-critical lab and imaging results will be communicated to you by Picomizehart, letter or phone within 4 business days after the clinic has received the results. If you do not hear from us within 7 days, please contact the clinic through Picomizehart or phone. If you have a critical or abnormal lab result, we will notify you by phone as soon as possible.  Submit refill requests through [employer] links[employer] or call your pharmacy and they will forward the refill request to us. Please allow 3 business days for your refill to be completed.          Additional Information About Your Visit        TRUE linkswear Information     [employer] links[employer] lets you send messages to your doctor, view your test results, renew your prescriptions, schedule appointments and more. To sign up, go to  [email or web address] . Click on \"Log in\" on the left side of the screen, which will take you to the Welcome page. Then click on \"Sign up Now\" on the right side of the page.     You will be asked to enter the access code listed below, as well as some personal information. Please follow the directions to create your username and password.     Your access code is: [email or web address]  Expires: 2017  1:34 PM     Your access code will  in 90 days. If you need help or a new code, please call your [place] clinic or [phone].        Care EveryWhere ID     This is your Care EveryWhere ID. This could be used by other organizations to access your [place] medical records  [id number]        Your Vitals Were     Last Period                   1960            Blood Pressure from Last 3 Encounters:   17 149/71   17 144/64   17 156/74    Weight from Last 3 Encounters:   17 62.6 kg (138 lb)   17 62.6 kg (138 lb)   17 62.6 kg (138 lb)              Today, you had the following     No orders found for display       Primary Care Provider Office Phone # Fax #    R Marv Winkler [phone] [address] [address]        Equal Access to Services     [facility]: Hadii roverto franceso Socheyenne, waaxda luqadaha, qaybta kaalmada sarah, moiz mcrae . So [facility] [phone].    ATENCIÓN: Si habla español, tiene a sanchez disposición servicios gratuitos de asistencia lingüística. Jeff al [phone].    We comply with applicable federal civil rights laws and [place] laws. We do not discriminate on the basis of race, color, national origin, age, disability sex, sexual orientation or gender identity.            Thank you!     Thank you for choosing [facility]  for your care. Our goal is always to provide you with excellent care. Hearing back from our patients is one way we can continue to improve our " services. Please take a few minutes to complete the written survey that you may receive in the mail after your visit with us. Thank you!             Your Updated Medication List - Protect others around you: Learn how to safely use, store and throw away your medicines at www.disposemymeds.org.          This list is accurate as of: 9/18/17  8:38 AM.  Always use your most recent med list.                   Brand Name Dispense Instructions for use Diagnosis    ALLEGRA PO      Take 180 mg by mouth daily        allopurinol 100 MG tablet    ZYLOPRIM    90 tablet    Take 1 tablet (100 mg) by mouth daily    Chronic tophaceous gout       amLODIPine 5 MG tablet    NORVASC    135 tablet    Take 1.5 tablets (7.5 mg) by mouth daily    Hypertension goal BP (blood pressure) < 140/90       brimonidine 0.15 % ophthalmic solution    ALPHAGAN-P     Place 1 drop Into the left eye 2 times daily        Calcium-Vitamin D 600-200 MG-UNIT Tabs      Take 1 tablet by mouth 2 times daily        COSOPT 2-0.5 % ophthalmic solution   Generic drug:  dorzolamide-timolol      1 DROP INTO Left Eye twice daily        latanoprost 0.005 % ophthalmic solution    XALATAN     Place 1 drop into both eyes At Bedtime        miconazole 2 % powder    MICATIN; MICRO GUARD     Apply topically 2 times daily Reported on 2/13/2017        ONE-A-DAY WITHIN Tabs      1 TABLET ORALLY DAILY        PROBIOTIC DAILY PO      Take 1 capsule by mouth daily        senna-docusate 8.6-50 MG per tablet    SENOKOT-S;PERICOLACE    100 tablet    Take 1-2 tablets by mouth 2 times daily    Closed displaced comminuted fracture of shaft of left femur, initial encounter (H)       traMADol 50 MG tablet    ULTRAM    50 tablet    Take 1 tablet (50 mg) by mouth every 6 hours as needed    Closed displaced comminuted fracture of shaft of left femur, initial encounter (H)       triamcinolone 0.1 % ointment    KENALOG    30 g    Apply  topically 3 times daily. Apply sparingly to affected area.     Nummular eczema       * Warfarin Therapy Reminder      1 each continuous prn    Closed displaced comminuted fracture of shaft of left femur, initial encounter (H)       * warfarin 2 MG tablet    COUMADIN    150 tablet    Take 4 mg on Mon, Wed, Fri; 3 mg all other days or as directed by the Anticoagulation Clinic    Persistent atrial fibrillation (H), Long-term (current) use of anticoagulants, Atrial fibrillation (H), Chronic tophaceous gout       * Notice:  This list has 2 medication(s) that are the same as other medications prescribed for you. Read the directions carefully, and ask your doctor or other care provider to review them with you.

## 2017-09-18 NOTE — MR AVS SNAPSHOT
After Visit Summary   2017    Pooja Swartz    MRN: [id number]           Patient Information     Date Of Birth          1925        Visit Information        Provider Department      2017 8:00 AM Myron Sheppard MD [facility]        Today's Diagnoses     Recurrent basal cell carcinoma    -  1      Care Instructions      Sutured Wound Care     [facility]: [phone]    [facility]: [phone]  Left side of nose          ? No strenuous activity for 48 hours. Resume moderate activity in 48 hours. No heavy exercising until you are seen for follow up in one week.     ? Take Tylenol as needed for discomfort.                         ? Do not drink alcoholic beverages for 48 hours.     ? Keep the pressure bandage in place for 24 hours. If the bandage becomes blood tinged or loose, reinforce it with gauze and tape.        (Refer to the reverse side of this page for management of bleeding).    ? Remove pressure bandage in 24 hours     ? Leave the flat bandage in place until your follow up appointment.    ? Keep the bandage dry. Wash around it carefully.    ? If the tape becomes soiled or starts to come off, reinforce it with additional paper tape.    ? Do not smoke for 3 weeks; smoking is detrimental to wound healing.    ? It is normal to have swelling and bruising around the surgical site. The bruising will fade in approximately 10-14 days. Elevate the area to reduce swelling.    ? Numbness, itchiness and sensitivity to temperature changes can occur after surgery and may take up to 18 months to normalize.      POSSIBLE COMPLICATIONS    BLEEDIN. Leave the bandage in place.  2. Use tightly rolled up gauze or a cloth to apply direct pressure over the bandage for 20   minutes.  3. Reapply pressure for an additional 20 minutes if necessary  4. Call the office or go to the nearest emergency room if pressure fails to stop the bleeding.  5. Use  additional gauze and tape to maintain pressure once the bleeding has stopped.        PAIN:    1. Post operative pain should slowly get better, never worse.  2. A severe increase in pain may indicate a problem. Call the office if this occurs.    In case of emergency phone:Dr Sheppard [phone]    WOUND CARE INSTRUCTIONS  for  ONE WEEK AFTER SURGERY  Right side of nose          1) Leave flat bandage on your skin for one week after today s bandage change.  2) In one week when you remove the bandage, you may resume your regular skin care routine, including washing with mild soap and water, applying moisturizer, make-up and sunscreen.    3) If there are any open or bleeding areas at the incision/graft site you should begin to cover the area with a bandage daily as follows:    1) Clean and dry the area with plain tap water using a Q-tip or sterile gauze pad.  2) Apply Polysporin or Bacitracin ointment to the open area.  3) Cover the wound with a band-aid or a sterile non-stick gauze pad and micropore paper tape.             *Once the bandages are removed, the scar will be red and firm (especially in the lip/chin area). This is normal and will fade in time. It might take 6-12 months for this to happen.     *Massaging the area will help the scar soften and fade quicker. Begin to massage the area one month after the bandages have been removed. To massage apply pressure directly and firmly over the scar with the fingertips and move in a circular motion. Massage the area for a few minutes several times a day. Continue to massage the site for several months.    *Approximately 6-8 weeks after surgery it is not uncommon to see the formation of  tender pimple-like  bump along the scar. This is normal. As the scar continues to mature and the stitches underneath the skin begin to dissolve, this might occur. Do not pick or squeeze, this will resolve on it s own. Should one break open producing a small amount of drainage, apply  Polysporin or Bacitracin ointment a few times a day until the wound is completely healed.    *Numbness in the surgical area is expected. It might take 12-18 months for the feeling to return to normal. During this time sensations of itchiness, tingling and occasional sharp pains might be noted. These feelings are normal and will subside once the nerves have completely healed.         IN CASE OF EMERGENCY: Dr Sheppard [phone]     If you were seen in [place] call: [phone]    If you were seen in [place] call: [phone]              Follow-ups after your visit        Your next 10 appointments already scheduled     Sep 18, 2017  1:00 PM CDT   Nurse Only with Wy Derm Nurse   [facility]    [address] [facility] [address] [phone]            Sep 22, 2017  9:00 AM CDT   Anticoagulation Visit with CL ANTI COAG   [facility] ([facility]    [address]  [facility] [address] [phone]              Who to contact     If you have questions or need follow up information about today's clinic visit or your schedule please contact [facility] directly at [phone].  Normal or non-critical lab and imaging results will be communicated to you by [employer] Peripheralshart, letter or phone within 4 business days after the clinic has received the results. If you do not hear from us within 7 days, please contact the clinic through MyChart or phone. If you have a critical or abnormal lab result, we will notify you by phone as soon as possible.  Submit refill requests through "[employer] or call your pharmacy and they will forward the refill request to us. Please allow 3 business days for your refill to be completed.          Additional Information About Your Visit        "[employer] Information     "[employer] lets you send messages to your doctor, view your test results, renew your prescriptions, schedule appointments and  "more. To sign up, go to [email or web address] . Click on \"Log in\" on the left side of the screen, which will take you to the Welcome page. Then click on \"Sign up Now\" on the right side of the page.     You will be asked to enter the access code listed below, as well as some personal information. Please follow the directions to create your username and password.     Your access code is: [email or web address]  Expires: 2017  1:34 PM     Your access code will  in 90 days. If you need help or a new code, please call your [place] clinic or [phone].        Care EveryWhere ID     This is your Care EveryWhere ID. This could be used by other organizations to access your [place] medical records  [id number]        Your Vitals Were     Pulse Last Period Pulse Oximetry             70 1960 99%          Blood Pressure from Last 3 Encounters:   17 149/71   17 144/64   17 156/74    Weight from Last 3 Encounters:   17 62.6 kg (138 lb)   17 62.6 kg (138 lb)   17 62.6 kg (138 lb)              We Performed the Following     ADJ TISSUE XFER LID/NOS/EAR/LIP 10.1-30 CM     MOHS HEAD/NCK/HND/FT/GEN 1ST STAGE UP T0 5 BLOCKS     MOHS HEAD/NCK/HND/FT/GEN EA ADDTL STAGE UP T0 5 BLOCKS        Primary Care Provider Office Phone # Fax #    R Marv Winkler [phone] [address] [facility] [address]        Equal Access to Services     ZOEY BRAY : Hadraine Vora, meka plascencia, qamoiz cruz. So [facility] [phone].    ATENCIÓN: Si habla español, tiene a sanchez disposición servicios gratuitos de asistencia lingüística. Llame al [phone].    We comply with applicable federal civil rights laws and [place] laws. We do not discriminate on the basis of race, color, national origin, age, disability sex, sexual orientation or gender identity.            Thank you!     Thank you for choosing " [facility]  for your care. Our goal is always to provide you with excellent care. Hearing back from our patients is one way we can continue to improve our services. Please take a few minutes to complete the written survey that you may receive in the mail after your visit with us. Thank you!             Your Updated Medication List - Protect others around you: Learn how to safely use, store and throw away your medicines at www.disposemymeds.org.          This list is accurate as of: 9/18/17 11:14 AM.  Always use your most recent med list.                   Brand Name Dispense Instructions for use Diagnosis    ALLEGRA PO      Take 180 mg by mouth daily        allopurinol 100 MG tablet    ZYLOPRIM    90 tablet    Take 1 tablet (100 mg) by mouth daily    Chronic tophaceous gout       amLODIPine 5 MG tablet    NORVASC    135 tablet    Take 1.5 tablets (7.5 mg) by mouth daily    Hypertension goal BP (blood pressure) < 140/90       brimonidine 0.15 % ophthalmic solution    ALPHAGAN-P     Place 1 drop Into the left eye 2 times daily        Calcium-Vitamin D 600-200 MG-UNIT Tabs      Take 1 tablet by mouth 2 times daily        COSOPT 2-0.5 % ophthalmic solution   Generic drug:  dorzolamide-timolol      1 DROP INTO Left Eye twice daily        latanoprost 0.005 % ophthalmic solution    XALATAN     Place 1 drop into both eyes At Bedtime        miconazole 2 % powder    MICATIN; MICRO GUARD     Apply topically 2 times daily Reported on 2/13/2017        ONE-A-DAY WITHIN Tabs      1 TABLET ORALLY DAILY        PROBIOTIC DAILY PO      Take 1 capsule by mouth daily        senna-docusate 8.6-50 MG per tablet    SENOKOT-S;PERICOLACE    100 tablet    Take 1-2 tablets by mouth 2 times daily    Closed displaced comminuted fracture of shaft of left femur, initial encounter (H)       traMADol 50 MG tablet    ULTRAM    50 tablet    Take 1 tablet (50 mg) by mouth every 6 hours as needed    Closed displaced comminuted fracture of  shaft of left femur, initial encounter (H)       triamcinolone 0.1 % ointment    KENALOG    30 g    Apply  topically 3 times daily. Apply sparingly to affected area.    Nummular eczema       * Warfarin Therapy Reminder      1 each continuous prn    Closed displaced comminuted fracture of shaft of left femur, initial encounter (H)       * warfarin 2 MG tablet    COUMADIN    150 tablet    Take 4 mg on Mon, Wed, Fri; 3 mg all other days or as directed by the Anticoagulation Clinic    Persistent atrial fibrillation (H), Long-term (current) use of anticoagulants, Atrial fibrillation (H), Chronic tophaceous gout       * Notice:  This list has 2 medication(s) that are the same as other medications prescribed for you. Read the directions carefully, and ask your doctor or other care provider to review them with you.

## 2017-09-18 NOTE — PATIENT INSTRUCTIONS
WOUND CARE INSTRUCTIONS  for  ONE WEEK AFTER SURGERY          1) Leave flat bandage on your skin for one week after today s bandage change.  2) In one week when you remove the bandage, you may resume your regular skin care routine, including washing with mild soap and water, applying moisturizer, make-up and sunscreen.    3) If there are any open or bleeding areas at the incision/graft site you should begin to cover the area with a bandage daily as follows:    1) Clean and dry the area with plain tap water using a Q-tip or sterile gauze pad.  2) Apply Polysporin or Bacitracin ointment to the open area.  3) Cover the wound with a band-aid or a sterile non-stick gauze pad and micropore paper tape.             *Once the bandages are removed, the scar will be red and firm (especially in the lip/chin area). This is normal and will fade in time. It might take 6-12 months for this to happen.     *Massaging the area will help the scar soften and fade quicker. Begin to massage the area one month after the bandages have been removed. To massage apply pressure directly and firmly over the scar with the fingertips and move in a circular motion. Massage the area for a few minutes several times a day. Continue to massage the site for several months.    *Approximately 6-8 weeks after surgery it is not uncommon to see the formation of  tender pimple-like  bump along the scar. This is normal. As the scar continues to mature and the stitches underneath the skin begin to dissolve, this might occur. Do not pick or squeeze, this will resolve on it s own. Should one break open producing a small amount of drainage, apply Polysporin or Bacitracin ointment a few times a day until the wound is completely healed.    *Numbness in the surgical area is expected. It might take 12-18 months for the feeling to return to normal. During this time sensations of itchiness, tingling and occasional sharp pains might be noted. These feelings are normal  and will subside once the nerves have completely healed.         IN CASE OF EMERGENCY: Dr Sheppard [phone]     If you were seen in [place] call: [phone]    If you were seen in [place] call: [phone]

## 2017-09-18 NOTE — NURSING NOTE
"Chief Complaint   Patient presents with     Derm Problem     MOHS       Initial /71 (BP Location: Left arm, Patient Position: Chair, Cuff Size: Adult Regular)  Pulse 70  LMP 07/07/1960  SpO2 99% Estimated body mass index is 27.45 kg/(m^2) as calculated from the following:    Height as of 1/9/17: 1.51 m (4' 11.45\").    Weight as of 3/7/17: 62.6 kg (138 lb).  Medication Reconciliation: complete     Anisa Powell CMA      "

## 2017-09-18 NOTE — PROGRESS NOTES
Pooja Swartz is a 89 year old year old female patient here today for recurrent basal cell carcinoma on left nose. Patient reports the following modifying factors none.  Associated symptoms: none.  Patient has no other skin complaints today.  Remainder of the HPI, Meds, PMH, Allergies, FH, and SH was reviewed in chart.      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, including hip      Other specified glaucoma      Renal insufficiency      Sinus node dysfunction (H)      Unspecified essential hypertension        Past Surgical History:   Procedure Laterality Date     ARTHROPLASTY REVISION HIP Left 8/19/2016    Procedure: ARTHROPLASTY REVISION HIP;  Surgeon: Kael Rojas MD;  Location: [facility]     C PELVIS/HIP JOINT SURGERY UNLISTED       C SHOULDER SURG PROC UNLISTED       C STOMACH SURGERY PROCEDURE UNLISTED       HC VASCULAR SURGERY PROCEDURE UNLIST       OPEN REDUCTION INTERNAL FIXATION FEMUR PROXIMAL Left 8/19/2016    Procedure: OPEN REDUCTION INTERNAL FIXATION FEMUR PROXIMAL;  Surgeon: Kael Rojas MD;  Location: [facility]     SURGICAL HISTORY OF -   11/1992    skin cancer, nose     SURGICAL HISTORY OF -   1978    right, vein stripping     SURGICAL HISTORY OF -   1968    breast biopsy     SURGICAL HISTORY OF -   1993    laparoscopic cholecystectomy     SURGICAL HISTORY OF -       tonsillectomy and adenoidectomy     SURGICAL HISTORY OF -   04/2004    left cataract     SURGICAL HISTORY OF -   08/09/2004    right total shoulder arthroplasty        Family History   Problem Relation Age of Onset     HEART DISEASE Mother      CHF     Hypertension Mother      Hypertension Father      Hypertension Maternal Grandmother      Hypertension Maternal  Grandfather      Hypertension Son      HEART DISEASE Son      MI       Social History     Social History     Marital status:      Spouse name: N/A     Number of children: N/A     Years of education: N/A     Occupational History     Not on file.     Social History Main Topics     Smoking status: Never Smoker     Smokeless tobacco: Never Used     Alcohol use Yes      Comment: rare     Drug use: No     Sexual activity: Not Currently     Birth control/ protection: None     Other Topics Concern     Parent/Sibling W/ Cabg, Mi Or Angioplasty Before 65f 55m? Yes     Social History Narrative       Outpatient Encounter Prescriptions as of 9/18/2017   Medication Sig Dispense Refill     amLODIPine (NORVASC) 5 MG tablet Take 1.5 tablets (7.5 mg) by mouth daily 135 tablet 1     warfarin (COUMADIN) 2 MG tablet Take 4 mg on Mon, Wed, Fri; 3 mg all other days or as directed by the Anticoagulation Clinic 150 tablet 0     allopurinol (ZYLOPRIM) 100 MG tablet Take 1 tablet (100 mg) by mouth daily 90 tablet 3     Calcium-Vitamin D 600-200 MG-UNIT TABS Take 1 tablet by mouth 2 times daily       miconazole (MICATIN; MICRO GUARD) 2 % powder Apply topically 2 times daily Reported on 2/13/2017       Warfarin Therapy Reminder 1 each continuous prn       Fexofenadine HCl (ALLEGRA PO) Take 180 mg by mouth daily        Probiotic Product (PROBIOTIC DAILY PO) Take 1 capsule by mouth daily        brimonidine (ALPHAGAN-P) 0.15 % ophthalmic solution Place 1 drop Into the left eye 2 times daily        latanoprost (XALATAN) 0.005 % ophthalmic solution Place 1 drop into both eyes At Bedtime        ONE-A-DAY WOMENS OR TABS 1 TABLET ORALLY DAILY       COSOPT 2-0.5 % OP SOLN 1 DROP INTO Left Eye twice daily       traMADol (ULTRAM) 50 MG tablet Take 1 tablet (50 mg) by mouth every 6 hours as needed (Patient not taking: Reported on 3/7/2017) 50 tablet 0     senna-docusate (SENOKOT-S;PERICOLACE) 8.6-50 MG per tablet Take 1-2 tablets by mouth 2 times  daily (Patient not taking: Reported on 3/7/2017) 100 tablet      triamcinolone (KENALOG) 0.1 % ointment Apply  topically 3 times daily. Apply sparingly to affected area. (Patient not taking: Reported on 3/7/2017) 30 g 0     No facility-administered encounter medications on file as of 9/18/2017.              Review Of Systems  Skin: As above  Eyes: negative  Ears/Nose/Throat: negative  Respiratory: No shortness of breath, dyspnea on exertion, cough, or hemoptysis  Cardiovascular: negative  Gastrointestinal: negative  Genitourinary: negative  Musculoskeletal: negative  Neurologic: negative  Psychiatric: negative  Hematologic/Lymphatic/Immunologic: negative  Endocrine: negative      O:   NAD, WDWN, Alert & Oriented, Mood & Affect wnl, Vitals stable   Here today alone   /71 (BP Location: Left arm, Patient Position: Chair, Cuff Size: Adult Regular)  Pulse 70  LMP 07/07/1960  SpO2 99%   General appearance normal   Vitals stable   Alert, oriented and in no acute distress     Cheek healing well   L Nasal bridge 7mm red scaly papule       Eyes: Conjunctivae/lids:Normal     ENT: Lips, buccal mucosa, tongue: normal    MSK:Normal    Cardiovascular: peripheral edema none    Pulm: Breathing Normal    Neuro/Psych: Orientation:Normal; Mood/Affect:Normal      A/P:  1. Recurrent basal cell carcinoma nose  MOHS:   Recurrent    After PGACAC discussed with patient, decision for Mohs surgery was made. Indication for Mohs was Recurrent. Patient confirmed the site with Dr. Sheppard.  After anesthesia with LEC, the tumor was excised using standard Mohs technique in 2 stages(s).  CLEAR MARGINS OBTAINED and Final defect size was 1.5      REPAIR SLAF ON NOSE:  Because of size and full thickness nature of the defect, and to maintain form and function of the nose, and the proximity to the nasal tip and ala, a bilateral advancement flap was carefully planned. After anesthesia and prep, Burow's triangles were excised above and below the  defect and two laterally based flaps were created by undermining in the subcutaneous plane and skeletonizing the nasal skin.  After hemostasis, the flaps were advanced into the defect with a single tension reduction stitch at the level of the distal nasal bone. The flap edges were then sutured into place in a layered fashion using Vicryl and Fast Absorbing Plain Gut sutures. Postoperative size was 4.2 x 3.3 cm.  EBL minimal; complications none; wound care routine.  The patient was discharged in good condition and will return in one week for wound evaluation.    BENIGN LESIONS DISCUSSED WITH PATIENT:  I discussed the specifics of tumor, prognosis, and genetics of benign lesions.  I explained that treatment of these lesions would be purely cosmetic and not medically neccessary.  I discussed with patient different removal options including excision, cautery and /or laser.      Nature and genetics of benign skin lesions dicussed with patient.  Signs and Symptoms of skin cancer discussed with patient.  ABCDEs of melanoma reviewed with patient.  Patient encouraged to perform monthly skin exams.  UV precautions reviewed with patient.  Skin care regimen reviewed with patient: Eliminate harsh soaps, i.e. Dial, zest, irsih spring; Mild soaps such as Cetaphil or Dove sensitive skin, avoid hot or cold showers, aggressive use of emollients including vanicream, cetaphil or cerave discussed with patient.    Risks of non-melanoma skin cancer discussed with patient   Return to clinic 6 months

## 2017-09-18 NOTE — PATIENT INSTRUCTIONS
Sutured Wound Care     [facility]: [phone]    [facility]: [phone]  Left side of nose          ? No strenuous activity for 48 hours. Resume moderate activity in 48 hours. No heavy exercising until you are seen for follow up in one week.     ? Take Tylenol as needed for discomfort.                         ? Do not drink alcoholic beverages for 48 hours.     ? Keep the pressure bandage in place for 24 hours. If the bandage becomes blood tinged or loose, reinforce it with gauze and tape.        (Refer to the reverse side of this page for management of bleeding).    ? Remove pressure bandage in 24 hours     ? Leave the flat bandage in place until your follow up appointment.    ? Keep the bandage dry. Wash around it carefully.    ? If the tape becomes soiled or starts to come off, reinforce it with additional paper tape.    ? Do not smoke for 3 weeks; smoking is detrimental to wound healing.    ? It is normal to have swelling and bruising around the surgical site. The bruising will fade in approximately 10-14 days. Elevate the area to reduce swelling.    ? Numbness, itchiness and sensitivity to temperature changes can occur after surgery and may take up to 18 months to normalize.      POSSIBLE COMPLICATIONS    BLEEDIN. Leave the bandage in place.  2. Use tightly rolled up gauze or a cloth to apply direct pressure over the bandage for 20   minutes.  3. Reapply pressure for an additional 20 minutes if necessary  4. Call the office or go to the nearest emergency room if pressure fails to stop the bleeding.  5. Use additional gauze and tape to maintain pressure once the bleeding has stopped.        PAIN:    1. Post operative pain should slowly get better, never worse.  2. A severe increase in pain may indicate a problem. Call the office if this occurs.    In case of emergency phone:Dr Sheppard [phone]    WOUND CARE INSTRUCTIONS  for  ONE WEEK AFTER SURGERY  Right side of  nose          1) Leave flat bandage on your skin for one week after today s bandage change.  2) In one week when you remove the bandage, you may resume your regular skin care routine, including washing with mild soap and water, applying moisturizer, make-up and sunscreen.    3) If there are any open or bleeding areas at the incision/graft site you should begin to cover the area with a bandage daily as follows:    1) Clean and dry the area with plain tap water using a Q-tip or sterile gauze pad.  2) Apply Polysporin or Bacitracin ointment to the open area.  3) Cover the wound with a band-aid or a sterile non-stick gauze pad and micropore paper tape.             *Once the bandages are removed, the scar will be red and firm (especially in the lip/chin area). This is normal and will fade in time. It might take 6-12 months for this to happen.     *Massaging the area will help the scar soften and fade quicker. Begin to massage the area one month after the bandages have been removed. To massage apply pressure directly and firmly over the scar with the fingertips and move in a circular motion. Massage the area for a few minutes several times a day. Continue to massage the site for several months.    *Approximately 6-8 weeks after surgery it is not uncommon to see the formation of  tender pimple-like  bump along the scar. This is normal. As the scar continues to mature and the stitches underneath the skin begin to dissolve, this might occur. Do not pick or squeeze, this will resolve on it s own. Should one break open producing a small amount of drainage, apply Polysporin or Bacitracin ointment a few times a day until the wound is completely healed.    *Numbness in the surgical area is expected. It might take 12-18 months for the feeling to return to normal. During this time sensations of itchiness, tingling and occasional sharp pains might be noted. These feelings are normal and will subside once the nerves have completely  healed.         IN CASE OF EMERGENCY: Dr Sheppard [phone]     If you were seen in [place] call: [phone]    If you were seen in [place] call: [phone]

## 2017-09-20 ENCOUNTER — HOSPITAL ENCOUNTER (EMERGENCY)
Facility: CLINIC | Age: 82
Discharge: HOME | End: 2017-09-20
Attending: STUDENT IN AN ORGANIZED HEALTH CARE EDUCATION/TRAINING PROGRAM | Admitting: STUDENT IN AN ORGANIZED HEALTH CARE EDUCATION/TRAINING PROGRAM
Payer: MEDICARE

## 2017-09-20 ENCOUNTER — TELEPHONE (OUTPATIENT)
Dept: DERMATOLOGY | Facility: CLINIC | Age: 82
End: 2017-09-20

## 2017-09-20 ENCOUNTER — ALLIED HEALTH/NURSE VISIT (OUTPATIENT)
Dept: DERMATOLOGY | Facility: CLINIC | Age: 82
End: 2017-09-20
Payer: COMMERCIAL

## 2017-09-20 VITALS
OXYGEN SATURATION: 97 % | DIASTOLIC BLOOD PRESSURE: 70 MMHG | RESPIRATION RATE: 18 BRPM | TEMPERATURE: 98.4 F | WEIGHT: 135 LBS | SYSTOLIC BLOOD PRESSURE: 156 MMHG | BODY MASS INDEX: 26.37 KG/M2

## 2017-09-20 DIAGNOSIS — L76.21 POSTOPERATIVE HEMORRHAGE OF SKIN FOLLOWING DERMATOLOGIC PROCEDURE: ICD-10-CM

## 2017-09-20 DIAGNOSIS — Z48.01 ENCOUNTER FOR CHANGE OR REMOVAL OF SURGICAL WOUND DRESSING: Primary | ICD-10-CM

## 2017-09-20 LAB — INR PPP: 1.49 (ref 0.86–1.14)

## 2017-09-20 PROCEDURE — 99283 EMERGENCY DEPT VISIT LOW MDM: CPT | Performed by: STUDENT IN AN ORGANIZED HEALTH CARE EDUCATION/TRAINING PROGRAM

## 2017-09-20 PROCEDURE — 85610 PROTHROMBIN TIME: CPT | Performed by: STUDENT IN AN ORGANIZED HEALTH CARE EDUCATION/TRAINING PROGRAM

## 2017-09-20 PROCEDURE — 99207 ZZC NO CHARGE NURSE ONLY: CPT

## 2017-09-20 PROCEDURE — 99282 EMERGENCY DEPT VISIT SF MDM: CPT

## 2017-09-20 PROCEDURE — 25000125 ZZHC RX 250: Performed by: STUDENT IN AN ORGANIZED HEALTH CARE EDUCATION/TRAINING PROGRAM

## 2017-09-20 RX ORDER — TRANEXAMIC ACID 100 MG/ML
500 INJECTION, SOLUTION INTRAVENOUS ONCE
Status: COMPLETED | OUTPATIENT
Start: 2017-09-20 | End: 2017-09-20

## 2017-09-20 RX ADMIN — TRANEXAMIC ACID 500 MG: 100 INJECTION, SOLUTION INTRAVENOUS at 01:19

## 2017-09-20 NOTE — ED PROVIDER NOTES
History     Chief Complaint   Patient presents with     Post-op Problem     bleeding     HPI  Pooja Swartz is a 89 year old female with past medical history which includes hypertension, osteoarthritis, atrial fibrillation, anticoagulation via warfarin therapy, and recent Mohs procedure for removal of basal cell carcinoma from nasal bridge who presents with bleeding from nasal bandaging. Patient explains that the procedure was performed nearly 36 hours ago and tolerated well without complication. However around 8 PM this evening she noticed some bleeding from the left side of her bandage. She had applied pressure but bleeding has not stopped. She denies recent injury or infectious symptoms.    I have reviewed the Medications, Allergies, Past Medical and Surgical History, and Social History in the Epic system.    Patient Active Problem List   Diagnosis     Hypertension goal BP (blood pressure) < 140/90     Other specified malignant neoplasm of skin of lower limb, including hip     Hemorrhage of gastrointestinal tract     Generalized osteoarthrosis, unspecified site     HEPATITIS B in past     Impaired fasting glucose     Disorder of bone and cartilage     Chronic kidney disease, stage III (moderate)     Chronic tophaceous gout     CARDIOVASCULAR SCREENING; LDL GOAL LESS THAN 100     Sensorineural hearing loss, bilateral     Advanced directives, counseling/discussion     Sinus node dysfunction (H)     Atrial fibrillation (H)     Chest pain     Diarrhea     Health Care Home     Femur fracture, left (H)     Acute posthemorrhagic anemia     Long-term (current) use of anticoagulants [Z79.01]     Periprosthetic fracture around internal prosthetic left hip joint, subsequent encounter       Past Surgical History:   Procedure Laterality Date     ARTHROPLASTY REVISION HIP Left 8/19/2016    Procedure: ARTHROPLASTY REVISION HIP;  Surgeon: Kael Rojas MD;  Location:  OR     C PELVIS/HIP JOINT SURGERY UNLISTED       C  SHOULDER SURG PROC UNLISTED       C STOMACH SURGERY PROCEDURE UNLISTED       HC VASCULAR SURGERY PROCEDURE UNLIST       OPEN REDUCTION INTERNAL FIXATION FEMUR PROXIMAL Left 8/19/2016    Procedure: OPEN REDUCTION INTERNAL FIXATION FEMUR PROXIMAL;  Surgeon: Kael Rojas MD;  Location: [facility] OR     SURGICAL HISTORY OF -   11/1992    skin cancer, nose     SURGICAL HISTORY OF -   1978    right, vein stripping     SURGICAL HISTORY OF -   1968    breast biopsy     SURGICAL HISTORY OF -   1993    laparoscopic cholecystectomy     SURGICAL HISTORY OF -       tonsillectomy and adenoidectomy     SURGICAL HISTORY OF -   04/2004    left cataract     SURGICAL HISTORY OF -   08/09/2004    right total shoulder arthroplasty       Social History     Social History     Marital status:      Spouse name: N/A     Number of children: N/A     Years of education: N/A     Occupational History     Not on file.     Social History Main Topics     Smoking status: Never Smoker     Smokeless tobacco: Never Used     Alcohol use Yes      Comment: rare     Drug use: No     Sexual activity: Not Currently     Birth control/ protection: None     Other Topics Concern     Parent/Sibling W/ Cabg, Mi Or Angioplasty Before 65f 55m? Yes     Social History Narrative       Family History   Problem Relation Age of Onset     HEART DISEASE Mother      CHF     Hypertension Mother      Hypertension Father      Hypertension Maternal Grandmother      Hypertension Maternal Grandfather      Hypertension Son      HEART DISEASE Son      MI       Most Recent Immunizations   Administered Date(s) Administered     Influenza (High Dose) 3 valent vaccine 11/01/2016     Influenza (IIV3) 09/10/2010     Pneumococcal (PCV 13) 12/01/2015     Pneumococcal 23 valent 07/16/2008     TD (ADULT, 7+) 07/16/2008     Zoster vaccine, live 12/01/2015         Review of Systems  Constitutional: Negative for fever or recent illness.  HENT: Positive for bleeding from bandaging of the  nose.  Respiratory: Negative for shortness of breath.  Cardiovascular: Negative for chest pain.  Gastrointestinal: Negative for GI bleeding.  Musculoskeletal: Negative for recent injuries.  Neurological: Negative for headache or dizziness.    All others reviewed and are negative.      Physical Exam   BP: 172/84  Heart Rate: 96  Temp: 98.4  F (36.9  C)  Resp: 18  Weight: 61.2 kg (135 lb)  SpO2: 96 %  Physical Exam  Constitutional: Well developed, well nourished. Appears nontoxic and in no acute distress.   Head: Normocephalic and atraumatic. Symmetrical in appearance.  Eyes: Conjunctivae are normal.  Nose: Vertical post procedure incision site is well approximated but mild steady blood flow along inferior aspect. No intranasal bleeding or septal hematoma.  Neck: Neck supple.  Cardiovascular: No cyanosis.   Respiratory/Chest: Effort normal, no respiratory distress.   Musculoskeletal: Moves all extremities spontaneously and without complaint.  Neuro: Patient is alert.  Skin: Skin is warm and dry, not diaphoretic.  Psych: Appears to have a normal mood and affect.      ED Course     ED Course     Procedures             Critical Care time:  none               Results for orders placed or performed during the hospital encounter of 09/20/17 (from the past 24 hour(s))   INR   Result Value Ref Range    INR 1.49 (H) 0.86 - 1.14         Assessments & Plan (with Medical Decision Making)   Pooja Swatrz is a 89 year old female who presents to the department for evaluation of bleeding from recent site of Mohs procedure performed Monday. She removed the pressure bandage yesterday as directed and seemed to do well until this evening when slow bleeding began. She has applied pressure without successful cessation of bleeding. In the department per bandage was removed and slow but steady bleeding was noted along the inferior aspect of her procedural site. Azul examined acid solution was applied topically and bleeding readily stopped.  She was monitored in the department afterwards without reoccurrence.     Prior to discharge, I made it clear that illness can unexpectedly develop/progress so she has been instructed to return to the emergency department for reevaluation of resumption of bleeding, signs of infection, or other concerns. She seems comfortable with the discharge plan we discussed including follow up with dermatology clinic as scheduled.    Disclaimer: This note consists of symbols derived from keyboarding, dictation, and/or voice recognition software. As a result, there may be errors in the script that have gone undetected.  Please consider this when interpreting information found in the chart.        I have reviewed the nursing notes.    I have reviewed the findings, diagnosis, plan and need for follow up with the patient.       New Prescriptions    No medications on file       Final diagnoses:   Postoperative hemorrhage of skin following dermatologic procedure       9/20/2017   [facility] EMERGENCY DEPARTMENT     Babak Lizama,   09/25/17 [id number]

## 2017-09-20 NOTE — ED NOTES
PT presents to the ED VIA wheel chair with C/O post op bleeding. PT placed in room 12, on the gurney and on the monitor. PT states Monday Morning (2 days ago) she had a procedure to remove skin cancer from her nose. States she was instructed to remove the pressure dressing after 24 hours. PT states she did and then this past evening the nose began bleeding from the operative site. PT states she has been placing direct pressure. PT is noted to have a bulky dressing in place and the dressing is saturated. Dressing removed and new dressing applied and PT is holding direct pressure at this time. Awaiting MD bartlett and orders.

## 2017-09-20 NOTE — PROGRESS NOTES
Pt returned to clinic for post surgery 1 week follow up bandage change. Pt has no complaints, denies pain. Bandage removed area cleansed with normal saline. Site is healing and wound edges approximating well. Reapplied new steri strips and paper tape.    Advised to watch for signs/sx of infection; spreading redness, drainage, odor, fever. Call or report promptly to clinic. Pt given written instructions and informed to rtc as needed. Patient verbalized understanding.

## 2017-09-20 NOTE — ED AVS SNAPSHOT
[facility] Emergency Department    [address] [facility] [address]    Phone:  [phone]    Fax:  [phone]                                       Pooja Swartz   MRN: [id number]    Department:  [facility] Emergency Department   Date of Visit:  9/20/2017           Patient Information     Date Of Birth          9/1/1925        Your diagnoses for this visit were:     Postoperative hemorrhage of skin following dermatologic procedure        You were seen by Babak Lizama DO.      Follow-up Information     Go to [facility] Emergency Department.    Specialty:  EMERGENCY MEDICINE    Why:  Return if bleeding returns.    Contact information:    [address] [facility] [address] [phone]    Additional information:    The medical center is located at   [address] [place] [address] (between [address] in [place], [place] miles north   of [place]).        Go to [facility].    Specialty:  Dermatology    Why:  As scheduled    Contact information:    [facility] [address] [phone]    Additional information:    The medical center is located at   [address] [place] [address] (between [address]   [address] [facility], four miles north   of [place]).      Discharge References/Attachments     MOHS SURGERY, UNDERSTANDING (ENGLISH)    POST OP WOUND CHECK, BLEEDING (ENGLISH)      Future Appointments        Provider Department Dept Phone Center    9/22/2017 9:00 AM [facility] Anticoagulation provider, [facility] [phone] [facility]    9/25/2017 1:30 PM Dermatology Nurse [facility] [phone] [facility]      24 Hour Appointment Hotline       To make an appointment at any [facility], call [phone] ([phone]). If you don't have a family doctor or clinic, we will help you find one. [facility] are conveniently located to serve the needs of you and your family.             Review of your  medicines      Our records show that you are taking the medicines listed below. If these are incorrect, please call your family doctor or clinic.        Dose / Directions Last dose taken    ALLEGRA PO   Dose:  180 mg        Take 180 mg by mouth daily   Refills:  0        allopurinol 100 MG tablet   Commonly known as:  ZYLOPRIM   Dose:  100 mg   Quantity:  90 tablet        Take 1 tablet (100 mg) by mouth daily   Refills:  3        amLODIPine 5 MG tablet   Commonly known as:  NORVASC   Dose:  7.5 mg   Quantity:  135 tablet        Take 1.5 tablets (7.5 mg) by mouth daily   Refills:  1        brimonidine 0.15 % ophthalmic solution   Commonly known as:  ALPHAGAN-P   Dose:  1 drop        Place 1 drop Into the left eye 2 times daily   Refills:  0        Calcium-Vitamin D 600-200 MG-UNIT Tabs   Dose:  1 tablet        Take 1 tablet by mouth 2 times daily   Refills:  0        COSOPT 2-0.5 % ophthalmic solution   Generic drug:  dorzolamide-timolol        1 DROP INTO Left Eye twice daily   Refills:  0        latanoprost 0.005 % ophthalmic solution   Commonly known as:  XALATAN   Dose:  1 drop        Place 1 drop into both eyes At Bedtime   Refills:  0        miconazole 2 % powder   Commonly known as:  MICATIN; MICRO GUARD        Apply topically 2 times daily Reported on 2/13/2017   Refills:  0        ONE-A-DAY WITHIN Tabs        1 TABLET ORALLY DAILY   Refills:  0        PROBIOTIC DAILY PO   Dose:  1 capsule        Take 1 capsule by mouth daily   Refills:  0        traMADol 50 MG tablet   Commonly known as:  ULTRAM   Dose:  50 mg   Quantity:  50 tablet        Take 1 tablet (50 mg) by mouth every 6 hours as needed   Refills:  0        triamcinolone 0.1 % ointment   Commonly known as:  KENALOG   Quantity:  30 g        Apply  topically 3 times daily. Apply sparingly to affected area.   Refills:  0        * Warfarin Therapy Reminder   Dose:  1 each        1 each continuous prn   Refills:  0        * warfarin 2 MG tablet   Commonly  "known as:  COUMADIN   Quantity:  150 tablet        Take 4 mg on Mon, Wed, Fri; 3 mg all other days or as directed by the Anticoagulation Clinic   Refills:  0        * Notice:  This list has 2 medication(s) that are the same as other medications prescribed for you. Read the directions carefully, and ask your doctor or other care provider to review them with you.            Procedures and tests performed during your visit     INR      Orders Needing Specimen Collection     None      Pending Results     No orders found from 9/18/2017 to 9/21/2017.            Pending Culture Results     No orders found from 9/18/2017 to 9/21/2017.            Pending Results Instructions     If you had any lab results that were not finalized at the time of your Discharge, you can call the ED Lab Result RN at [phone]. You will be contacted by this team for any positive Lab results or changes in treatment. The nurses are available 7 days a week from 10A to 6:30P.  You can leave a message 24 hours per day and they will return your call.        Test Results From Your Hospital Stay        9/20/2017  1:36 AM      Component Results     Component Value Ref Range & Units Status    INR 1.49 (H) 0.86 - 1.14 Final                Thank you for choosing [facility]       Thank you for choosing [facility] for your care. Our goal is always to provide you with excellent care. Hearing back from our patients is one way we can continue to improve our services. Please take a few minutes to complete the written survey that you may receive in the mail after you visit with us. Thank you!        [employer] Information     [employer] lets you send messages to your doctor, view your test results, renew your prescriptions, schedule appointments and more. To sign up, go to [email or web address] . Click on \"Log in\" on the left side of the screen, which will take you to the Welcome page. Then click on \"Sign up Now\" on the right side of the page.     You will be asked to " enter the access code listed below, as well as some personal information. Please follow the directions to create your username and password.     Your access code is: [email or web address]  Expires: 2017  1:34 PM     Your access code will  in 90 days. If you need help or a new code, please call your [place] clinic or [phone].        Care EveryWhere ID     This is your Care EveryWhere ID. This could be used by other organizations to access your [place] medical records  [id number]        Equal Access to Services     [facility]: Hadraine Vora, meka plascencia, keith gallardoalandres smith, moiz mcrae . So [facility] [phone].    ATENCIÓN: Si habla español, tiene a sanchez disposición servicios gratuitos de asistencia lingüística. Llame al [phone].    We comply with applicable federal civil rights laws and [place] laws. We do not discriminate on the basis of race, color, national origin, age, disability sex, sexual orientation or gender identity.            After Visit Summary       This is your record. Keep this with you and show to your community pharmacist(s) and doctor(s) at your next visit.

## 2017-09-20 NOTE — ED NOTES
All D/C home info given and all questions answered. PT verbalized understanding. PT taken to [employer] auto VIA wheelchair.

## 2017-09-20 NOTE — MR AVS SNAPSHOT
After Visit Summary   9/20/2017    Pooja Swartz    MRN: [id number]           Patient Information     Date Of Birth          9/1/1925        Visit Information        Provider Department      9/20/2017 2:15 PM NurseFozia [facility]        Care Instructions    WOUND CARE INSTRUCTIONS  for  ONE WEEK AFTER SURGERY          1) Leave flat bandage on your skin for one week after today s bandage change.  2) In one week when you remove the bandage, you may resume your regular skin care routine, including washing with mild soap and water, applying moisturizer, make-up and sunscreen.    3) If there are any open or bleeding areas at the incision/graft site you should begin to cover the area with a bandage daily as follows:    1) Clean and dry the area with plain tap water using a Q-tip or sterile gauze pad.  2) Apply Polysporin or Bacitracin ointment to the open area.  3) Cover the wound with a band-aid or a sterile non-stick gauze pad and micropore paper tape.             *Once the bandages are removed, the scar will be red and firm (especially in the lip/chin area). This is normal and will fade in time. It might take 6-12 months for this to happen.     *Massaging the area will help the scar soften and fade quicker. Begin to massage the area one month after the bandages have been removed. To massage apply pressure directly and firmly over the scar with the fingertips and move in a circular motion. Massage the area for a few minutes several times a day. Continue to massage the site for several months.    *Approximately 6-8 weeks after surgery it is not uncommon to see the formation of  tender pimple-like  bump along the scar. This is normal. As the scar continues to mature and the stitches underneath the skin begin to dissolve, this might occur. Do not pick or squeeze, this will resolve on it s own. Should one break open producing a small amount of drainage, apply Polysporin or Bacitracin  ointment a few times a day until the wound is completely healed.    *Numbness in the surgical area is expected. It might take 12-18 months for the feeling to return to normal. During this time sensations of itchiness, tingling and occasional sharp pains might be noted. These feelings are normal and will subside once the nerves have completely healed.         IN CASE OF EMERGENCY: Dr Sheppard [phone]     If you were seen in [place] call: [phone]    If you were seen in [place] call: [phone]                Follow-ups after your visit        Your next 10 appointments already scheduled     Sep 22, 2017  9:00 AM CDT   Anticoagulation Visit with CL ANTI COAG   [facility]    [address]  [facility] [address] [phone]            Sep 25, 2017  1:30 PM CDT   Nurse Only with Wy Derm Nurse   [facility]    [address] [facility] [place] [place] [address] [phone]              Who to contact     If you have questions or need follow up information about today's clinic visit or your schedule please contact [facility] directly at [phone].  Normal or non-critical lab and imaging results will be communicated to you by [employer], letter or phone within 4 business days after the clinic has received the results. If you do not hear from us within 7 days, please contact the clinic through [employer] or phone. If you have a critical or abnormal lab result, we will notify you by phone as soon as possible.  Submit refill requests through [employer] or call your pharmacy and they will forward the refill request to us. Please allow 3 business days for your refill to be completed.          Additional Information About Your Visit        [employer] Information     [employer] lets you send messages to your doctor, view your test results, renew your prescriptions, schedule appointments and more. To sign up, go to  [email or web address] . Click on \"Log in\" on the left side of the screen, which will take you to the Welcome page. Then click on \"Sign up Now\" on the right side of the page.     You will be asked to enter the access code listed below, as well as some personal information. Please follow the directions to create your username and password.     Your access code is: [email or web address]  Expires: 2017  1:34 PM     Your access code will  in 90 days. If you need help or a new code, please call your [place] clinic or [phone].        Care EveryWhere ID     This is your Care EveryWhere ID. This could be used by other organizations to access your [place] medical records  [id number]        Your Vitals Were     Last Period                   1960            Blood Pressure from Last 3 Encounters:   17 156/70   17 149/71   17 144/64    Weight from Last 3 Encounters:   17 61.2 kg (135 lb)   17 62.6 kg (138 lb)   17 62.6 kg (138 lb)              Today, you had the following     No orders found for display       Primary Care Provider Office Phone # Fax #    R Marv Winkler [phone] [address] [address]        Equal Access to Services     [facility]: Hadii rovetro franceso Socheyenne, waaxda luqadaha, qaybta kaalmada sarah, moiz mcrae . So [facility] [phone].    ATENCIÓN: Si habla español, tiene a sanchez disposición servicios gratuitos de asistencia lingüística. Kietame al [phone].    We comply with applicable federal civil rights laws and [place] laws. We do not discriminate on the basis of race, color, national origin, age, disability sex, sexual orientation or gender identity.            Thank you!     Thank you for choosing [facility]  for your care. Our goal is always to provide you with excellent care. Hearing back from our patients is one way we can continue to improve our " services. Please take a few minutes to complete the written survey that you may receive in the mail after your visit with us. Thank you!             Your Updated Medication List - Protect others around you: Learn how to safely use, store and throw away your medicines at www.disposemymeds.org.          This list is accurate as of: 9/20/17  2:51 PM.  Always use your most recent med list.                   Brand Name Dispense Instructions for use Diagnosis    ALLEGRA PO      Take 180 mg by mouth daily        allopurinol 100 MG tablet    ZYLOPRIM    90 tablet    Take 1 tablet (100 mg) by mouth daily    Chronic tophaceous gout       amLODIPine 5 MG tablet    NORVASC    135 tablet    Take 1.5 tablets (7.5 mg) by mouth daily    Hypertension goal BP (blood pressure) < 140/90       brimonidine 0.15 % ophthalmic solution    ALPHAGAN-P     Place 1 drop Into the left eye 2 times daily        Calcium-Vitamin D 600-200 MG-UNIT Tabs      Take 1 tablet by mouth 2 times daily        COSOPT 2-0.5 % ophthalmic solution   Generic drug:  dorzolamide-timolol      1 DROP INTO Left Eye twice daily        latanoprost 0.005 % ophthalmic solution    XALATAN     Place 1 drop into both eyes At Bedtime        miconazole 2 % powder    MICATIN; MICRO GUARD     Apply topically 2 times daily Reported on 2/13/2017        ONE-A-DAY WITHIN Tabs      1 TABLET ORALLY DAILY        PROBIOTIC DAILY PO      Take 1 capsule by mouth daily        traMADol 50 MG tablet    ULTRAM    50 tablet    Take 1 tablet (50 mg) by mouth every 6 hours as needed    Closed displaced comminuted fracture of shaft of left femur, initial encounter (H)       triamcinolone 0.1 % ointment    KENALOG    30 g    Apply  topically 3 times daily. Apply sparingly to affected area.    Nummular eczema       * Warfarin Therapy Reminder      1 each continuous prn    Closed displaced comminuted fracture of shaft of left femur, initial encounter (H)       * warfarin 2 MG tablet    COUMADIN     150 tablet    Take 4 mg on Mon, Wed, Fri; 3 mg all other days or as directed by the Anticoagulation Clinic    Persistent atrial fibrillation (H), Long-term (current) use of anticoagulants, Atrial fibrillation (H), Chronic tophaceous gout       * Notice:  This list has 2 medication(s) that are the same as other medications prescribed for you. Read the directions carefully, and ask your doctor or other care provider to review them with you.

## 2017-09-20 NOTE — ED AVS SNAPSHOT
[facility] Emergency Department    [address] [facility] [address]    Phone:  [phone]    Fax:  [phone]                                       Pooja Swartz   MRN: [id number]    Department:  [facility] Emergency Department   Date of Visit:  9/20/2017           After Visit Summary Signature Page     I have received my discharge instructions, and my questions have been answered. I have discussed any challenges I see with this plan with the nurse or doctor.    ..........................................................................................................................................  Patient/Patient Representative Signature      ..........................................................................................................................................  Patient Representative Print Name and Relationship to Patient    ..................................................               ................................................  Date                                            Time    ..........................................................................................................................................  Reviewed by Signature/Title    ...................................................              ..............................................  Date                                                            Time

## 2017-09-20 NOTE — TELEPHONE ENCOUNTER
Reason for Call:  Other call back    Detailed comments: pt calling stating she had MOHs done on Monday and last night she started bleeding. She went into the ER and they were able to stop the bleeding but is needing new bandages. Would like to know if she can come in today to get re bandaged     Phone Number Patient can be reached at: Home number on file [phone] (home)    Best Time: any     Can we leave a detailed message on this number? YES    Call taken on 9/20/2017 at 8:03 AM by Phylicia Merida

## 2017-09-20 NOTE — ED NOTES
PT is sitting in upright position. Gauzed removed and it appears as if bleeding has stopped. Will continue to watch PT to ensure does not restart. MD aware.   Awaiting MD dispo, I will continue to assess and monitor PT.

## 2017-09-20 NOTE — PATIENT INSTRUCTIONS
WOUND CARE INSTRUCTIONS  for  ONE WEEK AFTER SURGERY          1) Leave flat bandage on your skin for one week after today s bandage change.  2) In one week when you remove the bandage, you may resume your regular skin care routine, including washing with mild soap and water, applying moisturizer, make-up and sunscreen.    3) If there are any open or bleeding areas at the incision/graft site you should begin to cover the area with a bandage daily as follows:    1) Clean and dry the area with plain tap water using a Q-tip or sterile gauze pad.  2) Apply Polysporin or Bacitracin ointment to the open area.  3) Cover the wound with a band-aid or a sterile non-stick gauze pad and micropore paper tape.             *Once the bandages are removed, the scar will be red and firm (especially in the lip/chin area). This is normal and will fade in time. It might take 6-12 months for this to happen.     *Massaging the area will help the scar soften and fade quicker. Begin to massage the area one month after the bandages have been removed. To massage apply pressure directly and firmly over the scar with the fingertips and move in a circular motion. Massage the area for a few minutes several times a day. Continue to massage the site for several months.    *Approximately 6-8 weeks after surgery it is not uncommon to see the formation of  tender pimple-like  bump along the scar. This is normal. As the scar continues to mature and the stitches underneath the skin begin to dissolve, this might occur. Do not pick or squeeze, this will resolve on it s own. Should one break open producing a small amount of drainage, apply Polysporin or Bacitracin ointment a few times a day until the wound is completely healed.    *Numbness in the surgical area is expected. It might take 12-18 months for the feeling to return to normal. During this time sensations of itchiness, tingling and occasional sharp pains might be noted. These feelings are normal  and will subside once the nerves have completely healed.         IN CASE OF EMERGENCY: Dr Sheppard [phone]     If you were seen in [place] call: [phone]    If you were seen in [place] call: [phone]

## 2017-09-22 ENCOUNTER — ANTICOAGULATION THERAPY VISIT (OUTPATIENT)
Dept: ANTICOAGULATION | Facility: CLINIC | Age: 82
End: 2017-09-22
Payer: COMMERCIAL

## 2017-09-22 DIAGNOSIS — Z79.01 LONG-TERM (CURRENT) USE OF ANTICOAGULANTS: ICD-10-CM

## 2017-09-22 LAB — INR POINT OF CARE: 2.5 (ref 0.86–1.14)

## 2017-09-22 PROCEDURE — 85610 PROTHROMBIN TIME: CPT | Mod: QW

## 2017-09-22 PROCEDURE — 36416 COLLJ CAPILLARY BLOOD SPEC: CPT

## 2017-09-22 PROCEDURE — 99207 ZZC NO CHARGE NURSE ONLY: CPT

## 2017-09-22 NOTE — PROGRESS NOTES
ANTICOAGULATION FOLLOW-UP CLINIC VISIT    Patient Name:  Pooja Swartz  Date:  9/22/2017  Contact Type:  Face to Face    SUBJECTIVE:     Patient Findings     Positives Change in diet/appetite (had some pretty good helpings of broccoli and also spinach prior to subtherapeutic INR on Wed), Dental/Other procedures (Mohs to nose 9/18/17), Bruising (left side of chin), Bleed (ER/Hosp visit) (See in ED 9/20/17 for post op bleeding)    Comments Pt states she did not miss or hold any doses.           OBJECTIVE    INR Protime   Date Value Ref Range Status   09/22/2017 2.5 (A) 0.86 - 1.14 Final       ASSESSMENT / PLAN  INR assessment THER    Recheck INR In: 5 WEEKS    INR Location Clinic      Anticoagulation Summary as of 9/22/2017     INR goal 2.0-3.0   Today's INR 2.5   Maintenance plan 4 mg (2 mg x 2) on Mon, Wed, Fri; 3 mg (2 mg x 1.5) all other days   Full instructions 4 mg on Mon, Wed, Fri; 3 mg all other days   Weekly total 24 mg   No change documented Cheryl Gordon RN   Plan last modified Kaylyn Morris RN (12/13/2016)   Next INR check 10/27/2017   Priority INR   Target end date Indefinite    Indications   Atrial fibrillation (H) [I48.91]  Long-term (current) use of anticoagulants [Z79.01] [Z79.01]         Anticoagulation Episode Summary     INR check location     Preferred lab     Send INR reminders to  ANTICOAG POOL    Comments *      Anticoagulation Care Providers     Provider Role Specialty Phone number    LEE Winkler MD [facility] [phone]            See the Encounter Report to view Anticoagulation Flowsheet and Dosing Calendar (Go to Encounters tab in chart review, and find the Anticoagulation Therapy Visit)      Cheryl Gordon, LOKESH

## 2017-09-22 NOTE — MR AVS SNAPSHOT
Pooja HOUSTON Maxime   9/22/2017 9:00 AM   Anticoagulation Therapy Visit    Description:  89 year old female   Provider:  COLLIN ANTI COAG   Department:  Cl Anticoag           INR as of 9/22/2017     Today's INR 2.5      Anticoagulation Summary as of 9/22/2017     INR goal 2.0-3.0   Today's INR 2.5   Full instructions 4 mg on Mon, Wed, Fri; 3 mg all other days   Next INR check 10/27/2017    Indications   Atrial fibrillation (H) [I48.91]  Long-term (current) use of anticoagulants [Z79.01] [Z79.01]         Description     No change, recheck INR in 5 weeks.       Your next Anticoagulation Clinic appointment(s)     Oct 27, 2017  9:00 AM CDT   Anticoagulation Visit with COLLIN ANTI COAG   [facility] ([facility])    [address] [facility] [address] [phone]              Contact Numbers     Please call [phone] to cancel and/or reschedule your appointment.  Please call [phone] with any problems or questions regarding your therapy          September 2017 Details    Sun Mon Tue Wed Thu Fri Sat          1               2                 3               4               5               6               7               8               9                 10               11               12               13               14               15               16                 17               18               19               20               21               22      4 mg   See details      23      3 mg           24      3 mg         25      4 mg         26      3 mg         27      4 mg         28      3 mg         29      4 mg         30      3 mg          Date Details   09/22 This INR check               How to take your warfarin dose     To take:  3 mg Take 1.5 of the 2 mg tablets.    To take:  4 mg Take 2 of the 2 mg tablets.           October 2017 Details    Sun Mon Tue Wed Thu Fri Sat     1      3 mg         2      4 mg         3      3 mg         4      4 mg         5       3 mg         6      4 mg         7      3 mg           8      3 mg         9      4 mg         10      3 mg         11      4 mg         12      3 mg         13      4 mg         14      3 mg           15      3 mg         16      4 mg         17      3 mg         18      4 mg         19      3 mg         20      4 mg         21      3 mg           22      3 mg         23      4 mg         24      3 mg         25      4 mg         26      3 mg         27            28                 29               30               31                    Date Details   No additional details    Date of next INR:  10/27/2017         How to take your warfarin dose     To take:  3 mg Take 1.5 of the 2 mg tablets.    To take:  4 mg Take 2 of the 2 mg tablets.

## 2017-10-12 DIAGNOSIS — I48.19 PERSISTENT ATRIAL FIBRILLATION (H): ICD-10-CM

## 2017-10-12 DIAGNOSIS — Z79.01 LONG-TERM (CURRENT) USE OF ANTICOAGULANTS: ICD-10-CM

## 2017-10-12 DIAGNOSIS — M1A.9XX1 CHRONIC TOPHACEOUS GOUT: ICD-10-CM

## 2017-10-12 DIAGNOSIS — I48.91 ATRIAL FIBRILLATION (H): ICD-10-CM

## 2017-10-12 NOTE — TELEPHONE ENCOUNTER
Deepak    Last Written Prescription Date: 08/01/2017  Last Fill Qty: 150, # refills: 0  Last Office Visit with G, P or [facility] prescribing provider: 02/24/2017       Date and Result of Last PT/INR:   Lab Results   Component Value Date    INR 2.5 09/22/2017    INR 1.49 09/20/2017    INR 2.4 08/18/2017    INR 2.02 02/08/2017        Dex OWENS (R)

## 2017-10-16 RX ORDER — WARFARIN SODIUM 2 MG/1
TABLET ORAL
Qty: 150 TABLET | Refills: 0 | Status: SHIPPED | OUTPATIENT
Start: 2017-10-16 | End: 2017-12-18

## 2017-10-27 ENCOUNTER — ANTICOAGULATION THERAPY VISIT (OUTPATIENT)
Dept: ANTICOAGULATION | Facility: CLINIC | Age: 82
End: 2017-10-27
Payer: COMMERCIAL

## 2017-10-27 DIAGNOSIS — Z79.01 LONG-TERM (CURRENT) USE OF ANTICOAGULANTS: ICD-10-CM

## 2017-10-27 DIAGNOSIS — I48.91 ATRIAL FIBRILLATION (H): Primary | ICD-10-CM

## 2017-10-27 LAB — INR POINT OF CARE: 2.6 (ref 0.86–1.14)

## 2017-10-27 PROCEDURE — 36416 COLLJ CAPILLARY BLOOD SPEC: CPT

## 2017-10-27 PROCEDURE — 99207 ZZC NO CHARGE NURSE ONLY: CPT

## 2017-10-27 PROCEDURE — 85610 PROTHROMBIN TIME: CPT | Mod: QW

## 2017-10-27 NOTE — MR AVS SNAPSHOT
Pooja HOUSTON Maxime   10/27/2017 9:00 AM   Anticoagulation Therapy Visit    Description:  89 year old female   Provider:  CL ANTI COAG   Department:  Cl Anticoag           INR as of 10/27/2017     Today's INR 2.6      Anticoagulation Summary as of 10/27/2017     INR goal 2.0-3.0   Today's INR 2.6   Full instructions 4 mg on Mon, Wed, Fri; 3 mg all other days   Next INR check 12/1/2017    Indications   Atrial fibrillation (H) [I48.91]  Long-term (current) use of anticoagulants [Z79.01] [Z79.01]         Description     No change, recheck INR in 5 weeks.      Your next Anticoagulation Clinic appointment(s)     Oct 27, 2017  9:00 AM CDT   Anticoagulation Visit with CL ANTI COAG   [facility]    [address] [address]   [phone]            Dec 01, 2017  9:00 AM CST   Anticoagulation Visit with CL ANTI COAG   [facility] ([facility])    [address] [address]   [phone]              Contact Numbers     Please call [phone] to cancel and/or reschedule your appointment.  Please call [phone] with any problems or questions regarding your therapy          October 2017 Details    Sun Mon Tue Wed Thu Fri Sat     1               2               3               4               5               6               7                 8               9               10               11               12               13               14                 15               16               17               18               19               20               21                 22               23               24               25               26               27      4 mg   See details      28      3 mg           29      3 mg         30      4 mg         31      3 mg              Date Details   10/27 This INR check               How to take your warfarin dose     To take:  3 mg Take 1.5 of the 2 mg  tablets.    To take:  4 mg Take 2 of the 2 mg tablets.           November 2017 Details    Sun Mon Tue Wed Thu Fri Sat        1      4 mg         2      3 mg         3      4 mg         4      3 mg           5      3 mg         6      4 mg         7      3 mg         8      4 mg         9      3 mg         10      4 mg         11      3 mg           12      3 mg         13      4 mg         14      3 mg         15      4 mg         16      3 mg         17      4 mg         18      3 mg           19      3 mg         20      4 mg         21      3 mg         22      4 mg         23      3 mg         24      4 mg         25      3 mg           26      3 mg         27      4 mg         28      3 mg         29      4 mg         30      3 mg            Date Details   No additional details            How to take your warfarin dose     To take:  3 mg Take 1.5 of the 2 mg tablets.    To take:  4 mg Take 2 of the 2 mg tablets.           December 2017 Details    Sun Mon Tue Wed Thu Fri Sat          1            2                 3               4               5               6               7               8               9                 10               11               12               13               14               15               16                 17               18               19               20               21               22               23                 24               25               26               27               28               29               30                 31                      Date Details   No additional details    Date of next INR:  12/1/2017         How to take your warfarin dose     To take:  4 mg Take 2 of the 2 mg tablets.

## 2017-10-27 NOTE — PROGRESS NOTES
ANTICOAGULATION FOLLOW-UP CLINIC VISIT    Patient Name:  Pooja Swartz  Date:  10/27/2017  Contact Type:  Face to Face    SUBJECTIVE:     Patient Findings     Positives No Problem Findings           OBJECTIVE    INR Protime   Date Value Ref Range Status   10/27/2017 2.6 (A) 0.86 - 1.14 Final       ASSESSMENT / PLAN  INR assessment THER    Recheck INR In: 5 WEEKS    INR Location Clinic      Anticoagulation Summary as of 10/27/2017     INR goal 2.0-3.0   Today's INR 2.6   Maintenance plan 4 mg (2 mg x 2) on Mon, Wed, Fri; 3 mg (2 mg x 1.5) all other days   Full instructions 4 mg on Mon, Wed, Fri; 3 mg all other days   Weekly total 24 mg   No change documented Cheryl Gordon RN   Plan last modified Kaylyn Morris RN (12/13/2016)   Next INR check 12/1/2017   Priority INR   Target end date Indefinite    Indications   Atrial fibrillation (H) [I48.91]  Long-term (current) use of anticoagulants [Z79.01] [Z79.01]         Anticoagulation Episode Summary     INR check location     Preferred lab     Send INR reminders to CL ANTICOAG POOL    Comments * warfarin 2mg tabs      Anticoagulation Care Providers     Provider Role Specialty Phone number    Peterson, LEE Fair MD [facility] [phone]            See the Encounter Report to view Anticoagulation Flowsheet and Dosing Calendar (Go to Encounters tab in chart review, and find the Anticoagulation Therapy Visit)    Cheryl Gordon, LOKESH

## 2017-11-03 DIAGNOSIS — M1A.9XX1 CHRONIC TOPHACEOUS GOUT: ICD-10-CM

## 2017-11-03 NOTE — TELEPHONE ENCOUNTER
Allopurinal       Last Written Prescription Date: 11/01/2016  Last Fill Quantity: 90, # refills: 3  Last Office Visit with [facility], [facility] or [facility] prescribing provider:  02/24/2017        Uric Acid   Date Value Ref Range Status   12/01/2015 4.2 2.6 - 6.0 mg/dL Final   ]  Creatinine   Date Value Ref Range Status   02/08/2017 0.82 0.52 - 1.04 mg/dL Final   ]  Lab Results   Component Value Date    WBC 8.0 02/08/2017     Lab Results   Component Value Date    RBC 4.14 02/08/2017     Lab Results   Component Value Date    HGB 13.2 02/08/2017     Lab Results   Component Value Date    HCT 40.5 02/08/2017     No components found for: MCT  Lab Results   Component Value Date    MCV 98 02/08/2017     Lab Results   Component Value Date    MCH 31.9 02/08/2017     Lab Results   Component Value Date    MCHC 32.6 02/08/2017     Lab Results   Component Value Date    RDW 15.3 02/08/2017     Lab Results   Component Value Date     02/08/2017     Lab Results   Component Value Date    AST 29 02/08/2017     Lab Results   Component Value Date    ALT 22 02/08/2017       Dex OWENS (R)

## 2017-11-06 RX ORDER — ALLOPURINOL 100 MG/1
100 TABLET ORAL DAILY
Qty: 90 TABLET | Refills: 0 | Status: SHIPPED | OUTPATIENT
Start: 2017-11-06 | End: 2018-01-09

## 2017-12-01 ENCOUNTER — ANTICOAGULATION THERAPY VISIT (OUTPATIENT)
Dept: ANTICOAGULATION | Facility: CLINIC | Age: 82
End: 2017-12-01
Payer: COMMERCIAL

## 2017-12-01 DIAGNOSIS — Z79.01 LONG-TERM (CURRENT) USE OF ANTICOAGULANTS: ICD-10-CM

## 2017-12-01 LAB — INR POINT OF CARE: 2.8 (ref 0.86–1.14)

## 2017-12-01 PROCEDURE — 99207 ZZC NO CHARGE NURSE ONLY: CPT

## 2017-12-01 PROCEDURE — 36416 COLLJ CAPILLARY BLOOD SPEC: CPT

## 2017-12-01 PROCEDURE — 85610 PROTHROMBIN TIME: CPT | Mod: QW

## 2017-12-01 NOTE — PROGRESS NOTES
ANTICOAGULATION FOLLOW-UP CLINIC VISIT    Patient Name:  Pooja Swartz  Date:  12/1/2017  Contact Type:  Face to Face    SUBJECTIVE:     Patient Findings     Positives No Problem Findings           OBJECTIVE    INR Protime   Date Value Ref Range Status   12/01/2017 2.8 (A) 0.86 - 1.14 Final       ASSESSMENT / PLAN  INR assessment THER    Recheck INR In: 5 WEEKS    INR Location Clinic      Anticoagulation Summary as of 12/1/2017     INR goal 2.0-3.0   Today's INR 2.8   Maintenance plan 4 mg (2 mg x 2) on Mon, Wed, Fri; 3 mg (2 mg x 1.5) all other days   Full instructions 4 mg on Mon, Wed, Fri; 3 mg all other days   Weekly total 24 mg   No change documented Cheryl Gorodn RN   Plan last modified Kaylyn Morris RN (12/13/2016)   Next INR check 1/5/2018   Priority INR   Target end date Indefinite    Indications   Atrial fibrillation (H) [I48.91]  Long-term (current) use of anticoagulants [Z79.01] [Z79.01]         Anticoagulation Episode Summary     INR check location     Preferred lab     Send INR reminders to CL ANTICOAG POOL    Comments * warfarin 2mg tabs      Anticoagulation Care Providers     Provider Role Specialty Phone number    Peterson, LEE Fair MD [facility] [phone]            See the Encounter Report to view Anticoagulation Flowsheet and Dosing Calendar (Go to Encounters tab in chart review, and find the Anticoagulation Therapy Visit)      Cheryl Gordon, LOKESH

## 2017-12-01 NOTE — MR AVS SNAPSHOT
Pooja HOUSTON Maxime   12/1/2017 9:00 AM   Anticoagulation Therapy Visit    Description:  89 year old female   Provider:  COLLIN ANTI COAG   Department:  Cl Anticoag           INR as of 12/1/2017     Today's INR 2.8      Anticoagulation Summary as of 12/1/2017     INR goal 2.0-3.0   Today's INR 2.8   Full instructions 4 mg on Mon, Wed, Fri; 3 mg all other days   Next INR check 1/5/2018    Indications   Atrial fibrillation (H) [I48.91]  Long-term (current) use of anticoagulants [Z79.01] [Z79.01]         Description     No change, recheck INR in 5 weeks.      Your next Anticoagulation Clinic appointment(s)     Jan 05, 2018  9:00 AM CST   Anticoagulation Visit with COLLIN ANTI COAG   [facility]    [address] [address] [phone]              Contact Numbers     Please call [phone] to cancel and/or reschedule your appointment.  Please call [phone] with any problems or questions regarding your therapy          December 2017 Details    Sun Mon Tue Wed Thu Fri Sat          1      4 mg   See details      2      3 mg           3      3 mg         4      4 mg         5      3 mg         6      4 mg         7      3 mg         8      4 mg         9      3 mg           10      3 mg         11      4 mg         12      3 mg         13      4 mg         14      3 mg         15      4 mg         16      3 mg           17      3 mg         18      4 mg         19      3 mg         20      4 mg         21      3 mg         22      4 mg         23      3 mg           24      3 mg         25      4 mg         26      3 mg         27      4 mg         28      3 mg         29      4 mg         30      3 mg           31      3 mg                Date Details   12/01 This INR check               How to take your warfarin dose     To take:  3 mg Take 1.5 of the 2 mg tablets.    To take:  4 mg Take 2 of the 2 mg tablets.           January 2018 Details    [facility]  Tue Wed Thu Fri Sat      1      4 mg         2      3 mg         3      4 mg         4      3 mg         5            6                 7               8               9               10               11               12               13                 14               15               16               17               18               19               20                 21               22               23               24               25               26               27                 28               29               30               31                   Date Details   No additional details    Date of next INR:  1/5/2018         How to take your warfarin dose     To take:  3 mg Take 1.5 of the 2 mg tablets.    To take:  4 mg Take 2 of the 2 mg tablets.

## 2017-12-18 DIAGNOSIS — I48.19 PERSISTENT ATRIAL FIBRILLATION (H): ICD-10-CM

## 2017-12-18 DIAGNOSIS — I48.91 ATRIAL FIBRILLATION (H): ICD-10-CM

## 2017-12-18 DIAGNOSIS — Z79.01 LONG-TERM (CURRENT) USE OF ANTICOAGULANTS: ICD-10-CM

## 2017-12-18 DIAGNOSIS — M1A.9XX1 CHRONIC TOPHACEOUS GOUT: ICD-10-CM

## 2017-12-18 NOTE — TELEPHONE ENCOUNTER
Requested Prescriptions   Pending Prescriptions Disp Refills     warfarin (COUMADIN) 2 MG tablet 150 tablet 0     Sig: Take 4 mg on Mon, Wed, Fri; 3 mg all other days or as directed by the Anticoagulation Clinic    There is no refill protocol information for this order        Jantoven    Last Written Prescription Date: 10/16/2017  Last Fill Qty: 150, # refills: 0  Last Office Visit with [facility], [facility] or [facility] prescribing provider: 02/24/2017       Date and Result of Last PT/INR:   Lab Results   Component Value Date    INR 2.8 12/01/2017    INR 2.6 10/27/2017    INR 1.49 09/20/2017    INR 2.02 02/08/2017          Dex OWENS (R)

## 2017-12-20 RX ORDER — WARFARIN SODIUM 2 MG/1
TABLET ORAL
Qty: 150 TABLET | Refills: 0 | Status: SHIPPED | OUTPATIENT
Start: 2017-12-20 | End: 2018-02-28

## 2018-01-05 ENCOUNTER — ANTICOAGULATION THERAPY VISIT (OUTPATIENT)
Dept: ANTICOAGULATION | Facility: CLINIC | Age: 83
End: 2018-01-05
Payer: COMMERCIAL

## 2018-01-05 DIAGNOSIS — Z79.01 LONG-TERM (CURRENT) USE OF ANTICOAGULANTS: ICD-10-CM

## 2018-01-05 LAB — INR POINT OF CARE: 3.9 (ref 0.86–1.14)

## 2018-01-05 PROCEDURE — 99207 ZZC NO CHARGE NURSE ONLY: CPT

## 2018-01-05 PROCEDURE — 36416 COLLJ CAPILLARY BLOOD SPEC: CPT

## 2018-01-05 PROCEDURE — 85610 PROTHROMBIN TIME: CPT | Mod: QW

## 2018-01-05 NOTE — PROGRESS NOTES
ANTICOAGULATION FOLLOW-UP CLINIC VISIT    Patient Name:  Pooja Swartz  Date:  1/5/2018  Contact Type:  Face to Face    SUBJECTIVE:     Patient Findings     Positives Change in diet/appetite (hasn't had any broccoli for awhile), OTC meds (taking Tylenol 500mg, a total of 6-8 tablets daily), Activity level change (has been doing as little activity as possible since the fall)    Comments Pt fell at home on 12/12/17 in the living room while trying to put her coat on.  Has appt with Dr. Winkler 1/9/18.           OBJECTIVE    INR Protime   Date Value Ref Range Status   01/05/2018 3.9 (A) 0.86 - 1.14 Final       ASSESSMENT / PLAN  INR assessment SUPRA hold x1 dose then resume long term maintenance dose   Recheck INR In: 5 WEEKS    INR Location Clinic      Anticoagulation Summary as of 1/5/2018     INR goal 2.0-3.0   Today's INR 3.9!   Maintenance plan 4 mg (2 mg x 2) on Mon, Wed, Fri; 3 mg (2 mg x 1.5) all other days   Full instructions 1/5: Hold; Otherwise 4 mg on Mon, Wed, Fri; 3 mg all other days   Weekly total 24 mg   Plan last modified Kaylyn Morris RN (12/13/2016)   Next INR check 2/9/2018   Priority INR   Target end date Indefinite    Indications   Atrial fibrillation (H) [I48.91]  Long-term (current) use of anticoagulants [Z79.01] [Z79.01]         Anticoagulation Episode Summary     INR check location     Preferred lab     Send INR reminders to  ANTICOAG POOL    Comments * warfarin 2mg tabs      Anticoagulation Care Providers     Provider Role Specialty Phone number    Post, LEE Fair MD [facility] [phone]            See the Encounter Report to view Anticoagulation Flowsheet and Dosing Calendar (Go to Encounters tab in chart review, and find the Anticoagulation Therapy Visit)    Cheryl Gordon, RN

## 2018-01-05 NOTE — MR AVS SNAPSHOT
Pooja HOUSTON Maxime   1/5/2018 9:00 AM   Anticoagulation Therapy Visit    Description:  89 year old female   Provider:  COLLIN ANTI COAG   Department:  Cl Anticoag           INR as of 1/5/2018     Today's INR 3.9!      Anticoagulation Summary as of 1/5/2018     INR goal 2.0-3.0   Today's INR 3.9!   Full instructions 1/5: Hold; Otherwise 4 mg on Mon, Wed, Fri; 3 mg all other days   Next INR check 2/9/2018    Indications   Atrial fibrillation (H) [I48.91]  Long-term (current) use of anticoagulants [Z79.01] [Z79.01]         Description     No warfarin Friday 1/5/18.  Then resume usual dose: 4mg every MWF and 3mg all other days.  Recheck INR in 5 weeks.      January 2018 Details    Sun Mon Tue Wed Thu Fri Sat      1               2               3               4               5      Hold   See details      6      3 mg           7      3 mg         8      4 mg         9      3 mg         10      4 mg         11      3 mg         12      4 mg         13      3 mg           14      3 mg         15      4 mg         16      3 mg         17      4 mg         18      3 mg         19      4 mg         20      3 mg           21      3 mg         22      4 mg         23      3 mg         24      4 mg         25      3 mg         26      4 mg         27      3 mg           28      3 mg         29      4 mg         30      3 mg         31      4 mg             Date Details   01/05 This INR check               How to take your warfarin dose     To take:  3 mg Take 1.5 of the 2 mg tablets.    To take:  4 mg Take 2 of the 2 mg tablets.    Hold Do not take your warfarin dose. See the Details table to the right for additional instructions.                February 2018 Details    Sun Mon Tue Wed Thu Fri Sat         1      3 mg         2      4 mg         3      3 mg           4      3 mg         5      4 mg         6      3 mg         7      4 mg         8      3 mg         9            10                 11               12                13               14               15               16               17                 18               19               20               21               22               23               24                 25               26               27               28                   Date Details   No additional details    Date of next INR:  2/9/2018         How to take your warfarin dose     To take:  3 mg Take 1.5 of the 2 mg tablets.    To take:  4 mg Take 2 of the 2 mg tablets.

## 2018-01-09 ENCOUNTER — OFFICE VISIT (OUTPATIENT)
Dept: FAMILY MEDICINE | Facility: CLINIC | Age: 83
End: 2018-01-09
Payer: COMMERCIAL

## 2018-01-09 VITALS
SYSTOLIC BLOOD PRESSURE: 158 MMHG | WEIGHT: 138.5 LBS | BODY MASS INDEX: 27.19 KG/M2 | HEART RATE: 60 BPM | HEIGHT: 60 IN | DIASTOLIC BLOOD PRESSURE: 70 MMHG

## 2018-01-09 DIAGNOSIS — I10 HYPERTENSION GOAL BP (BLOOD PRESSURE) < 140/90: ICD-10-CM

## 2018-01-09 DIAGNOSIS — S30.0XXA CONTUSION OF SACRUM, INITIAL ENCOUNTER: ICD-10-CM

## 2018-01-09 DIAGNOSIS — M1A.9XX1 CHRONIC TOPHACEOUS GOUT: ICD-10-CM

## 2018-01-09 DIAGNOSIS — D53.9 MACROCYTIC ANEMIA: ICD-10-CM

## 2018-01-09 DIAGNOSIS — Z00.00 MEDICARE ANNUAL WELLNESS VISIT, SUBSEQUENT: Primary | ICD-10-CM

## 2018-01-09 LAB
ALBUMIN SERPL-MCNC: 3.7 G/DL (ref 3.4–5)
ALP SERPL-CCNC: 111 U/L (ref 40–150)
ALT SERPL W P-5'-P-CCNC: 40 U/L (ref 0–50)
ANION GAP SERPL CALCULATED.3IONS-SCNC: 5 MMOL/L (ref 3–14)
AST SERPL W P-5'-P-CCNC: 34 U/L (ref 0–45)
BASOPHILS # BLD AUTO: 0 10E9/L (ref 0–0.2)
BASOPHILS NFR BLD AUTO: 0.5 %
BILIRUB SERPL-MCNC: 0.5 MG/DL (ref 0.2–1.3)
BUN SERPL-MCNC: 23 MG/DL (ref 7–30)
CALCIUM SERPL-MCNC: 9 MG/DL (ref 8.5–10.1)
CHLORIDE SERPL-SCNC: 106 MMOL/L (ref 94–109)
CO2 SERPL-SCNC: 28 MMOL/L (ref 20–32)
CREAT SERPL-MCNC: 0.7 MG/DL (ref 0.52–1.04)
DIFFERENTIAL METHOD BLD: ABNORMAL
EOSINOPHIL # BLD AUTO: 0.2 10E9/L (ref 0–0.7)
EOSINOPHIL NFR BLD AUTO: 2.3 %
ERYTHROCYTE [DISTWIDTH] IN BLOOD BY AUTOMATED COUNT: 15.8 % (ref 10–15)
GFR SERPL CREATININE-BSD FRML MDRD: 78 ML/MIN/1.7M2
GLUCOSE SERPL-MCNC: 104 MG/DL (ref 70–99)
HCT VFR BLD AUTO: 35.7 % (ref 35–47)
HGB BLD-MCNC: 11.3 G/DL (ref 11.7–15.7)
LYMPHOCYTES # BLD AUTO: 1.8 10E9/L (ref 0.8–5.3)
LYMPHOCYTES NFR BLD AUTO: 27.6 %
MCH RBC QN AUTO: 32.7 PG (ref 26.5–33)
MCHC RBC AUTO-ENTMCNC: 31.7 G/DL (ref 31.5–36.5)
MCV RBC AUTO: 103 FL (ref 78–100)
MONOCYTES # BLD AUTO: 0.7 10E9/L (ref 0–1.3)
MONOCYTES NFR BLD AUTO: 10.1 %
NEUTROPHILS # BLD AUTO: 3.9 10E9/L (ref 1.6–8.3)
NEUTROPHILS NFR BLD AUTO: 59.5 %
PLATELET # BLD AUTO: 172 10E9/L (ref 150–450)
POTASSIUM SERPL-SCNC: 4.1 MMOL/L (ref 3.4–5.3)
PROT SERPL-MCNC: 7.7 G/DL (ref 6.8–8.8)
RBC # BLD AUTO: 3.46 10E12/L (ref 3.8–5.2)
SODIUM SERPL-SCNC: 139 MMOL/L (ref 133–144)
WBC # BLD AUTO: 6.6 10E9/L (ref 4–11)

## 2018-01-09 PROCEDURE — 36415 COLL VENOUS BLD VENIPUNCTURE: CPT | Performed by: FAMILY MEDICINE

## 2018-01-09 PROCEDURE — 80053 COMPREHEN METABOLIC PANEL: CPT | Performed by: FAMILY MEDICINE

## 2018-01-09 PROCEDURE — 99212 OFFICE O/P EST SF 10 MIN: CPT | Mod: 25 | Performed by: FAMILY MEDICINE

## 2018-01-09 PROCEDURE — G0438 PPPS, INITIAL VISIT: HCPCS | Performed by: FAMILY MEDICINE

## 2018-01-09 PROCEDURE — 85025 COMPLETE CBC W/AUTO DIFF WBC: CPT | Performed by: FAMILY MEDICINE

## 2018-01-09 RX ORDER — ALLOPURINOL 100 MG/1
100 TABLET ORAL DAILY
Qty: 90 TABLET | Refills: 3 | Status: SHIPPED | OUTPATIENT
Start: 2018-01-09 | End: 2019-01-10

## 2018-01-09 RX ORDER — AMLODIPINE BESYLATE 5 MG/1
7.5 TABLET ORAL DAILY
Qty: 135 TABLET | Refills: 3 | Status: SHIPPED | OUTPATIENT
Start: 2018-01-09 | End: 2019-01-04

## 2018-01-09 RX ORDER — TRAMADOL HYDROCHLORIDE 50 MG/1
50 TABLET ORAL EVERY 6 HOURS PRN
Qty: 50 TABLET | Refills: 1 | Status: SHIPPED | OUTPATIENT
Start: 2018-01-09 | End: 2018-12-13

## 2018-01-09 NOTE — MR AVS SNAPSHOT
After Visit Summary   1/9/2018    Pooja Swartz    MRN: [id number]           Patient Information     Date Of Birth          9/1/1925        Visit Information        Provider Department      1/9/2018 10:00 AM Peterson, LEE Fair MD [facility]        Today's Diagnoses     Contusion of sacrum, initial encounter    -  1    Chronic tophaceous gout        Hypertension goal BP (blood pressure) < 140/90        Closed displaced comminuted fracture of shaft of left femur, initial encounter (H)        Medicare annual wellness visit, subsequent          Care Instructions      Preventive Health Recommendations    Female Ages 65 +    Yearly exam:     See your health care provider every year in order to  o Review health changes.   o Discuss preventive care.    o Review your medicines if your doctor has prescribed any.      You no longer need a yearly Pap test unless you've had an abnormal Pap test in the past 10 years. If you have vaginal symptoms, such as bleeding or discharge, be sure to talk with your provider about a Pap test.      Every 1 to 2 years, have a mammogram.  If you are over 69, talk with your health care provider about whether or not you want to continue having screening mammograms.      Every 10 years, have a colonoscopy. Or, have a yearly FIT test (stool test). These exams will check for colon cancer.       Have a cholesterol test every 5 years, or more often if your doctor advises it.       Have a diabetes test (fasting glucose) every three years. If you are at risk for diabetes, you should have this test more often.       At age 65, have a bone density scan (DEXA) to check for osteoporosis (brittle bone disease).    Shots:    Get a flu shot each year.    Get a tetanus shot every 10 years.    Talk to your doctor about your pneumonia vaccines. There are now two you should receive - Pneumovax (PPSV 23) and Prevnar (PCV 13).    Talk to your doctor about the shingles vaccine.    Talk to  your doctor about the hepatitis B vaccine.    Nutrition:     Eat at least 5 servings of fruits and vegetables each day.      Eat whole-grain bread, whole-wheat pasta and brown rice instead of white grains and rice.      Talk to your provider about Calcium and Vitamin D.     Lifestyle    Exercise at least 150 minutes a week (30 minutes a day, 5 days a week). This will help you control your weight and prevent disease.      Limit alcohol to one drink per day.      No smoking.       Wear sunscreen to prevent skin cancer.       See your dentist twice a year for an exam and cleaning.      See your eye doctor every 1 to 2 years to screen for conditions such as glaucoma, macular degeneration and cataracts.          Follow-ups after your visit        Your next 10 appointments already scheduled     Feb 09, 2018  9:00 AM CST   Anticoagulation Visit with CL ANTI COAG   [facility] ([facility]    [address] [address] [phone]              Who to contact     If you have questions or need follow up information about today's clinic visit or your schedule please contact [facility] directly at [phone].  Normal or non-critical lab and imaging results will be communicated to you by MyChart, letter or phone within 4 business days after the clinic has received the results. If you do not hear from us within 7 days, please contact the clinic through [employer] or phone. If you have a critical or abnormal lab result, we will notify you by phone as soon as possible.  Submit refill requests through [employer] or call your pharmacy and they will forward the refill request to us. Please allow 3 business days for your refill to be completed.          Additional Information About Your Visit        MyChart Information     [employer] lets you send messages to your doctor, view your test results, renew your prescriptions, schedule appointments and more. To sign up,  "go to [email or web address] . Click on \"Log in\" on the left side of the screen, which will take you to the Welcome page. Then click on \"Sign up Now\" on the right side of the page.     You will be asked to enter the access code listed below, as well as some personal information. Please follow the directions to create your username and password.     Your access code is: [email or web address]  Expires: 2018 10:46 AM     Your access code will  in 90 days. If you need help or a new code, please call your [facility] [facility] clinic or [phone].        Care EveryWhere ID     This is your Care EveryWhere ID. This could be used by other organizations to access your [facility] [place] [facility] medical records  [id number]        Your Vitals Were     Pulse Height Last Period BMI (Body Mass Index)          60 5' (1.524 m) 1960 27.05 kg/m2         Blood Pressure from Last 3 Encounters:   18 158/70   17 156/70   17 149/71    Weight from Last 3 Encounters:   18 138 lb 8 oz (62.8 kg)   17 135 lb (61.2 kg)   17 138 lb (62.6 kg)              We Performed the Following     CBC with platelets differential     Comprehensive metabolic panel          Where to get your medicines      These medications were sent to [facility] [place] - [address]  [address], [facility] [address]    Hours:  SHAHAB [facility] Phone:  [phone]     allopurinol 100 MG tablet    amLODIPine 5 MG tablet         Some of these will need a paper prescription and others can be bought over the counter.  Ask your nurse if you have questions.     Bring a paper prescription for each of these medications     traMADol 50 MG tablet          Primary Care Provider Office Phone # Fax #    R Marv Winkler [phone] [address] [facility] [address]        Equal Access to Services     ZOEY BRAY AH: meka Gann, keith smith, " moiz [address] arnold'aan ah. So [facility] [phone].    ATENCIÓN: Si becky landry, tiene a sanchez disposición servicios gratuitos de asistencia lingüística. Jeff leroy [phone].    We comply with applicable federal civil rights laws and [place] laws. We do not discriminate on the basis of race, color, national origin, age, disability, sex, sexual orientation, or gender identity.            Thank you!     Thank you for choosing [facility]  for your care. Our goal is always to provide you with excellent care. Hearing back from our patients is one way we can continue to improve our services. Please take a few minutes to complete the written survey that you may receive in the mail after your visit with us. Thank you!             Your Updated Medication List - Protect others around you: Learn how to safely use, store and throw away your medicines at www.disposemymeds.org.          This list is accurate as of: 1/9/18 10:46 AM.  Always use your most recent med list.                   Brand Name Dispense Instructions for use Diagnosis    ALLEGRA PO      Take 180 mg by mouth daily        allopurinol 100 MG tablet    ZYLOPRIM    90 tablet    Take 1 tablet (100 mg) by mouth daily    Chronic tophaceous gout, Contusion of sacrum, initial encounter       amLODIPine 5 MG tablet    NORVASC    135 tablet    Take 1.5 tablets (7.5 mg) by mouth daily    Hypertension goal BP (blood pressure) < 140/90       brimonidine 0.15 % ophthalmic solution    ALPHAGAN-P     Place 1 drop Into the left eye 2 times daily        Calcium-Vitamin D 600-200 MG-UNIT Tabs      Take 1 tablet by mouth 2 times daily        COSOPT 2-0.5 % ophthalmic solution   Generic drug:  dorzolamide-timolol      1 DROP INTO Left Eye twice daily        latanoprost 0.005 % ophthalmic solution    XALATAN     Place 1 drop into both eyes At Bedtime        miconazole 2 % powder    MICATIN; MICRO GUARD     Apply topically 2 times daily Reported on  2/13/2017        ONE-A-DAY WITHIN Tabs      1 TABLET ORALLY DAILY        PROBIOTIC DAILY PO      Take 1 capsule by mouth daily        traMADol 50 MG tablet    ULTRAM    50 tablet    Take 1 tablet (50 mg) by mouth every 6 hours as needed    Closed displaced comminuted fracture of shaft of left femur, initial encounter (H)       triamcinolone 0.1 % ointment    KENALOG    30 g    Apply  topically 3 times daily. Apply sparingly to affected area.    Nummular eczema       * Warfarin Therapy Reminder      1 each continuous prn    Closed displaced comminuted fracture of shaft of left femur, initial encounter (H)       * warfarin 2 MG tablet    COUMADIN    150 tablet    Take 4 mg on Mon, Wed, Fri; 3 mg all other days or as directed by the Anticoagulation Clinic    Persistent atrial fibrillation (H), Long-term (current) use of anticoagulants, Atrial fibrillation (H), Chronic tophaceous gout       * Notice:  This list has 2 medication(s) that are the same as other medications prescribed for you. Read the directions carefully, and ask your doctor or other care provider to review them with you.

## 2018-01-09 NOTE — PROGRESS NOTES
SUBJECTIVE:   Pooja Swartz is a 89 year old female who presents for Preventive Visit.      Are you in the first 12 months of your Medicare Part B coverage?  No    Healthy Habits:    Do you get at least three servings of calcium containing foods daily (dairy, green leafy vegetables, etc.)? yes    Amount of exercise or daily activities, outside of work: none    Problems taking medications regularly No    Medication side effects: No    Have you had an eye exam in the past two years? yes    Do you see a dentist twice per year? no    Do you have sleep apnea, excessive snoring or daytime drowsiness?no      Ability to successfully perform activities of daily living: Yes, no assistance needed    Home safety:  none identified     Hearing impairment: No    Fall risk:  Fallen 2 or more times in the past year?: No  Any fall with injury in the past year?:  About 1 week ago she lost her balance and fell backwards landing on her tailbone.  It continues to be sore and has not noticed any improvement yet          COGNITIVE SCREEN  1) Repeat 3 items (Banana, Sunrise, Chair)    2) Clock draw: NORMAL  3) 3 item recall: Recalls 3 objects  Results: 3 items recalled: COGNITIVE IMPAIRMENT LESS LIKELY    Mini-CogTM Copyright S Shayan. Licensed by the author for use in [facility]; reprinted with permission (soraf@.[facility]). All rights reserved.                    Reviewed and updated as needed this visit by clinical staffTobacco  Allergies  Med Hx  Surg Hx  Fam Hx  Soc Hx        Reviewed and updated as needed this visit by Provider        Social History   Substance Use Topics     Smoking status: Never Smoker     Smokeless tobacco: Never Used     Alcohol use Yes      Comment: rare       If you drink alcohol do you typically have >3 drinks per day or >7 drinks per week? No                        Today's PHQ-2 Score:   PHQ-2 ( 1999 Pf[employer]) 1/9/2018 2/13/2017   Q1: Little interest or pleasure in doing things 0 0   Q2: Feeling  down, depressed or hopeless 0 0   PHQ-2 Score 0 0         Do you feel safe in your environment - Yes    Do you have a Health Care Directive?: Yes: Advance Directive has been received and scanned.    Current providers sharing in care for this patient include: Patient Care Team:  LEE Winkler MD as PCP - General (Family Practice)  Kael Rojas MD as MD (Orthopedics)    The following health maintenance items are reviewed in Epic and correct as of today:  Health Maintenance   Topic Date Due     MICROALBUMIN Q1 YEAR  09/01/1926     LIPID MONITORING Q1 YEAR  04/08/2012     INFLUENZA VACCINE (SYSTEM ASSIGNED)  09/01/2017     ADVANCE DIRECTIVE PLANNING Q5 YRS  11/09/2017     FALL RISK ASSESSMENT  11/17/2017     BMP Q1 YR  02/08/2018     HEMOGLOBIN Q1 YR  02/08/2018     TETANUS IMMUNIZATION (SYSTEM ASSIGNED)  07/16/2018     PNEUMOCOCCAL  Completed             ROS:  Constitutional, HEENT, cardiovascular, pulmonary, gi and gu systems are negative, except as otherwise noted.      OBJECTIVE:   /70  Pulse 60  Ht 5' (1.524 m)  Wt 138 lb 8 oz (62.8 kg)  LMP 07/07/1960  BMI 27.05 kg/m2 Estimated body mass index is 27.05 kg/(m^2) as calculated from the following:    Height as of this encounter: 5' (1.524 m).    Weight as of this encounter: 138 lb 8 oz (62.8 kg).  EXAM:   GENERAL APPEARANCE: healthy, alert and no distress  EYES: Eyes grossly normal to inspection, PERRL and conjunctivae and sclerae normal  HENT: ear canals and TM's normal, nose and mouth without ulcers or lesions, oropharynx clear and oral mucous membranes moist  NECK: no adenopathy, no asymmetry, masses, or scars and thyroid normal to palpation  RESP: lungs clear to auscultation - no rales, rhonchi or wheezes  BREAST: normal without masses, tenderness or nipple discharge and no palpable axillary masses or adenopathy  CV: regular rate and rhythm, normal S1 S2, no S3 or S4, no murmur, click or rub, no peripheral edema and peripheral pulses  strong  ABDOMEN: soft, nontender, no hepatosplenomegaly, no masses and bowel sounds normal  MS: Tenderness over the central sacrum with no deformity or crepitation noted  SKIN: no suspicious lesions or rashes  NEURO: Normal strength and tone, sensory exam grossly normal, mentation intact and speech normal  PSYCH: mentation appears normal and affect normal/bright    ASSESSMENT / PLAN:     ASSESSMENT:  1. Medicare annual wellness visit, subsequent    2. Contusion of sacrum, initial encounter    3. Chronic tophaceous gout    4. Hypertension goal BP (blood pressure) < 140/90        PLAN:  Orders Placed This Encounter     Comprehensive metabolic panel     CBC with platelets differential     allopurinol (ZYLOPRIM) 100 MG tablet     amLODIPine (NORVASC) 5 MG tablet     traMADol (ULTRAM) 50 MG tablet     I explained to her that an injury to the tailbone can often take a long time to heal.  We could do an x-ray but it would not change our management which is to just give this time to heal.    Patient Instructions     Preventive Health Recommendations    Female Ages 65 +    Yearly exam:     See your health care provider every year in order to  o Review health changes.   o Discuss preventive care.    o Review your medicines if your doctor has prescribed any.      You no longer need a yearly Pap test unless you've had an abnormal Pap test in the past 10 years. If you have vaginal symptoms, such as bleeding or discharge, be sure to talk with your provider about a Pap test.      Every 1 to 2 years, have a mammogram.  If you are over 69, talk with your health care provider about whether or not you want to continue having screening mammograms.      Every 10 years, have a colonoscopy. Or, have a yearly FIT test (stool test). These exams will check for colon cancer.       Have a cholesterol test every 5 years, or more often if your doctor advises it.       Have a diabetes test (fasting glucose) every three years. If you are at risk  for diabetes, you should have this test more often.       At age 65, have a bone density scan (DEXA) to check for osteoporosis (brittle bone disease).    Shots:    Get a flu shot each year.    Get a tetanus shot every 10 years.    Talk to your doctor about your pneumonia vaccines. There are now two you should receive - Pneumovax (PPSV 23) and Prevnar (PCV 13).    Talk to your doctor about the shingles vaccine.    Talk to your doctor about the hepatitis B vaccine.    Nutrition:     Eat at least 5 servings of fruits and vegetables each day.      Eat whole-grain bread, whole-wheat pasta and brown rice instead of white grains and rice.      Talk to your provider about Calcium and Vitamin D.     Lifestyle    Exercise at least 150 minutes a week (30 minutes a day, 5 days a week). This will help you control your weight and prevent disease.      Limit alcohol to one drink per day.      No smoking.       Wear sunscreen to prevent skin cancer.       See your dentist twice a year for an exam and cleaning.      See your eye doctor every 1 to 2 years to screen for conditions such as glaucoma, macular degeneration and cataracts.       End of Life Planning:  Patient currently has an advanced directive: Yes.  Practitioner is supportive of decision.    COUNSELING:  Reviewed preventive health counseling, as reflected in patient instructions       Regular exercise       Healthy diet/nutrition       Vision screening       Hearing screening       Dental care        Estimated body mass index is 27.05 kg/(m^2) as calculated from the following:    Height as of this encounter: 5' (1.524 m).    Weight as of this encounter: 138 lb 8 oz (62.8 kg).       reports that she has never smoked. She has never used smokeless tobacco.        Appropriate preventive services were discussed with this patient, including applicable screening as appropriate for cardiovascular disease, diabetes, osteopenia/osteoporosis, and glaucoma.  As appropriate for  age/gender, discussed screening for colorectal cancer, prostate cancer, breast cancer, and cervical cancer. Checklist reviewing preventive services available has been given to the patient.    Reviewed patients plan of care and provided an AVS. The Basic Care Plan (routine screening as documented in Health Maintenance) for Pooja meets the Care Plan requirement. This Care Plan has been established and reviewed with the Patient.    Counseling Resources:  ATP IV Guidelines  Pooled Cohorts Equation Calculator  Breast Cancer Risk Calculator  FRAX Risk Assessment  ICSI Preventive Guidelines  Dietary Guidelines for Americans, 2010  USDA's MyPlate  ASA Prophylaxis  Lung CA Screening    LEE Winkler MD  [facility]

## 2018-01-09 NOTE — PROGRESS NOTES
Please CALL PATIENT    Your labs are all in the acceptable range except that you do have a mild anemia.  This looks like the type of anemia that is sometimes seen with a B12 or folic acid deficiency.  Therefore I think you should come back into the lab when it is convenient and have those levels drawn.  I have ordered the labs and will let you know the results after they return

## 2018-01-09 NOTE — NURSING NOTE
Chief Complaint   Patient presents with     Wellness Visit     Fall     in December concerns ongoing low back and radiates down bilateral legs      Patient Request     talk about life alert     Medication Reconciliation     pt. is using eye gtts in both eye now instead of only in left eye.      Patient Request     talk about tylenol and warfarin       Initial /70  Pulse 60  Ht 5' (1.524 m)  Wt 138 lb 8 oz (62.8 kg)  LMP 07/07/1960  BMI 27.05 kg/m2 Estimated body mass index is 27.05 kg/(m^2) as calculated from the following:    Height as of this encounter: 5' (1.524 m).    Weight as of this encounter: 138 lb 8 oz (62.8 kg).  Medication Reconciliation: complete     Shaila Silverio, CMA

## 2018-01-10 DIAGNOSIS — D53.9 MACROCYTIC ANEMIA: ICD-10-CM

## 2018-01-10 PROCEDURE — 82607 VITAMIN B-12: CPT | Performed by: FAMILY MEDICINE

## 2018-01-10 PROCEDURE — 36415 COLL VENOUS BLD VENIPUNCTURE: CPT | Performed by: FAMILY MEDICINE

## 2018-01-10 PROCEDURE — 82746 ASSAY OF FOLIC ACID SERUM: CPT | Performed by: FAMILY MEDICINE

## 2018-01-11 LAB
FOLATE SERPL-MCNC: 56.9 NG/ML
VIT B12 SERPL-MCNC: 821 PG/ML (ref 193–986)

## 2018-02-09 ENCOUNTER — ANTICOAGULATION THERAPY VISIT (OUTPATIENT)
Dept: ANTICOAGULATION | Facility: CLINIC | Age: 83
End: 2018-02-09
Payer: COMMERCIAL

## 2018-02-09 DIAGNOSIS — Z79.01 LONG-TERM (CURRENT) USE OF ANTICOAGULANTS: ICD-10-CM

## 2018-02-09 LAB — INR POINT OF CARE: 3.2 (ref 0.86–1.14)

## 2018-02-09 PROCEDURE — 85610 PROTHROMBIN TIME: CPT | Mod: QW

## 2018-02-09 PROCEDURE — 99207 ZZC NO CHARGE NURSE ONLY: CPT

## 2018-02-09 PROCEDURE — 36416 COLLJ CAPILLARY BLOOD SPEC: CPT

## 2018-02-09 NOTE — MR AVS SNAPSHOT
Pooja Swartz   2/9/2018 9:00 AM   Anticoagulation Therapy Visit    Description:  89 year old female   Provider:  COLLIN ANTI COAG   Department:  Cl Anticoag           INR as of 2/9/2018     Today's INR 3.2!      Anticoagulation Summary as of 2/9/2018     INR goal 2.0-3.0   Today's INR 3.2!   Full instructions 4 mg on Mon, Wed, Fri; 3 mg all other days   Next INR check 3/16/2018    Indications   Atrial fibrillation (H) [I48.91]  Long-term (current) use of anticoagulants [Z79.01] [Z79.01]         Description     No change, recheck INR in 5 weeks.      Your next Anticoagulation Clinic appointment(s)     Mar 16, 2018  9:00 AM CDT   Anticoagulation Visit with COLLIN ANTI MELVING   [facility] ([facility])    [address] [facility] [address] [phone]              Contact Numbers     Please call [phone] with any problems or questions regarding your therapy.    If you need to cancel and/or reschedule your appointment please call one of the following numbers:  [facility] - [phone]  [place] - [phone]  [facility] [phone]  \[facility]\"" [phone]  [place] - [phone]            February 2018 Details    Sun Mon Tue Wed Thu Fri Sat         1               2               3                 4               5               6               7               8               9      4 mg   See details      10      3 mg           11      3 mg         12      4 mg         13      3 mg         14      4 mg         15      3 mg         16      4 mg         17      3 mg           18      3 mg         19      4 mg         20      3 mg         21      4 mg         22      3 mg         23      4 mg         24      3 mg           25      3 mg         26      4 mg         27      3 mg         28      4 mg             Date Details   02/09 This INR check               How to take your warfarin dose     To take:  3 mg Take 1.5 of the 2 mg tablets.    To take:  4 mg Take 2  of the 2 mg tablets.           March 2018 Details    Sun Mon Tue Wed Thu Fri Sat         1      3 mg         2      4 mg         3      3 mg           4      3 mg         5      4 mg         6      3 mg         7      4 mg         8      3 mg         9      4 mg         10      3 mg           11      3 mg         12      4 mg         13      3 mg         14      4 mg         15      3 mg         16            17                 18               19               20               21               22               23               24                 25               26               27               28               29               30               31                Date Details   No additional details    Date of next INR:  3/16/2018         How to take your warfarin dose     To take:  3 mg Take 1.5 of the 2 mg tablets.    To take:  4 mg Take 2 of the 2 mg tablets.

## 2018-02-09 NOTE — PROGRESS NOTES
ANTICOAGULATION FOLLOW-UP CLINIC VISIT    Patient Name:  Pooja Swartz  Date:  2/9/2018  Contact Type:  Face to Face    SUBJECTIVE:     Patient Findings     Positives OTC meds (pt is using 4GM Tylenol daily )    Comments Seen in clinic 1/9/18 for Preventative Visit.            OBJECTIVE    INR Protime   Date Value Ref Range Status   02/09/2018 3.2 (A) 0.86 - 1.14 Final       ASSESSMENT / PLAN  INR assessment SUPRA no change for INR 1.8 - 3.3   Recheck INR In: 5 WEEKS    INR Location Clinic      Anticoagulation Summary as of 2/9/2018     INR goal 2.0-3.0   Today's INR 3.2!   Maintenance plan 4 mg (2 mg x 2) on Mon, Wed, Fri; 3 mg (2 mg x 1.5) all other days   Full instructions 4 mg on Mon, Wed, Fri; 3 mg all other days   Weekly total 24 mg   No change documented Cheryl Gordon RN   Plan last modified Kaylyn Morris RN (12/13/2016)   Next INR check 3/16/2018   Priority INR   Target end date Indefinite    Indications   Atrial fibrillation (H) [I48.91]  Long-term (current) use of anticoagulants [Z79.01] [Z79.01]         Anticoagulation Episode Summary     INR check location     Preferred lab     Send INR reminders to CL ANTICOAG POOL    Comments * warfarin 2mg tabs      Anticoagulation Care Providers     Provider Role Specialty Phone number    Post, LEE Fair MD [facility] [phone]            See the Encounter Report to view Anticoagulation Flowsheet and Dosing Calendar (Go to Encounters tab in chart review, and find the Anticoagulation Therapy Visit)      Cheryl Gordon RN

## 2018-02-28 DIAGNOSIS — M1A.9XX1 CHRONIC TOPHACEOUS GOUT: ICD-10-CM

## 2018-02-28 DIAGNOSIS — I48.19 PERSISTENT ATRIAL FIBRILLATION (H): ICD-10-CM

## 2018-02-28 DIAGNOSIS — I48.91 ATRIAL FIBRILLATION (H): ICD-10-CM

## 2018-02-28 DIAGNOSIS — Z79.01 LONG-TERM (CURRENT) USE OF ANTICOAGULANTS: ICD-10-CM

## 2018-02-28 RX ORDER — WARFARIN SODIUM 2 MG/1
TABLET ORAL
Qty: 150 TABLET | Refills: 0 | Status: SHIPPED | OUTPATIENT
Start: 2018-02-28 | End: 2018-05-25

## 2018-02-28 NOTE — TELEPHONE ENCOUNTER
Signed Prescriptions:                        Disp   Refills    warfarin (COUMADIN) 2 MG tablet            150 ta*0        Sig: Take 4 mg on Mon, Wed, Fri; 3 mg all other days or as           directed by the Anticoagulation Clinic  Authorizing Provider: LEE WAY  Ordering User: АЛЕКСАНДР SULLIVAN RN refilled medication per [facility] refill protocol.  Александр Sullivan RN

## 2018-02-28 NOTE — TELEPHONE ENCOUNTER
"Requested Prescriptions   Pending Prescriptions Disp Refills     warfarin (COUMADIN) 2 MG tablet  Last Written Prescription Date:  12/20/2017  Last Fill Quantity: 150,  # refills: 0   Last office visit: 1/9/2018 with prescribing provider:  post   Future Office Visit:     150 tablet 0     Sig: Take 4 mg on Mon, Wed, Fri; 3 mg all other days or as directed by the Anticoagulation Clinic    Vitamin K Antagonists Failed    2/28/2018  1:14 PM       Failed - INR is within goal in the past 6 weeks    Confirm INR is within goal in the past 6 weeks.     Recent Labs   Lab Test 02/09/18   INR  3.2*                      Passed - Recent or future visit with authorizing provider's specialty    Patient had office visit in the last year or has a visit in the next 30 days with authorizing provider.  See \"Patient Info\" tab in inbasket, or \"Choose Columns\" in Meds & Orders section of the refill encounter.            Passed - Patient is 18 years of age or older       Passed - Patient is not pregnant       Passed - No positive pregnancy on file in past 12 months        Dex Corral RT (R)    "

## 2018-03-16 ENCOUNTER — ANTICOAGULATION THERAPY VISIT (OUTPATIENT)
Dept: ANTICOAGULATION | Facility: CLINIC | Age: 83
End: 2018-03-16
Payer: COMMERCIAL

## 2018-03-16 DIAGNOSIS — Z79.01 LONG-TERM (CURRENT) USE OF ANTICOAGULANTS: ICD-10-CM

## 2018-03-16 DIAGNOSIS — I48.91 ATRIAL FIBRILLATION (H): ICD-10-CM

## 2018-03-16 LAB — INR POINT OF CARE: 2.7 (ref 0.86–1.14)

## 2018-03-16 PROCEDURE — 36416 COLLJ CAPILLARY BLOOD SPEC: CPT

## 2018-03-16 PROCEDURE — 85610 PROTHROMBIN TIME: CPT | Mod: QW

## 2018-03-16 PROCEDURE — 99207 ZZC NO CHARGE NURSE ONLY: CPT

## 2018-03-16 NOTE — MR AVS SNAPSHOT
Pooja HOUSTON Maxime   3/16/2018 9:00 AM   Anticoagulation Therapy Visit    Description:  89 year old female   Provider:  [facility]IN [facility]AG   Department:  Cl Anticoag           INR as of 3/16/2018     Today's INR       Anticoagulation Summary as of 3/16/2018     INR goal 2.0-3.0   Today's INR    Full instructions 4 mg on Mon, Wed, Fri; 3 mg all other days   Next INR check 4/20/2018    Indications   Atrial fibrillation (H) [I48.91]  Long-term (current) use of anticoagulants [Z79.01] [Z79.01]         Your next Anticoagulation Clinic appointment(s)     Apr 20, 2018  9:00 AM CDT   Anticoagulation Visit with [facility]AG   [facility]    [address] [facility] [address] [phone]              Contact Numbers     Please call [phone] with any problems or questions regarding your therapy.    If you need to cancel and/or reschedule your appointment please call one of the following numbers:  [facility] [phone]  [facility] [phone]  [facility] [phone]  [facility] [phone]  [place] - [phone]            March 2018 Details    Sun Mon Tue Wed Thu Fri Sat         1               2               3                 4               5               6               7               8               9               10                 11               12               13               14               15               16      4 mg   See details      17      3 mg           18      3 mg         19      4 mg         20      3 mg         21      4 mg         22      3 mg         23      4 mg         24      3 mg           25      3 mg         26      4 mg         27      3 mg         28      4 mg         29      3 mg         30      4 mg         31      3 mg          Date Details   03/16 This INR check               How to take your warfarin dose     To take:  3 mg Take 1.5 of the 2 mg tablets.    To take:  4 mg Take 2 of the 2 mg tablets.            April 2018 Details    Sun Mon Tue Wed Thu Fri Sat     1      3 mg         2      4 mg         3      3 mg         4      4 mg         5      3 mg         6      4 mg         7      3 mg           8      3 mg         9      4 mg         10      3 mg         11      4 mg         12      3 mg         13      4 mg         14      3 mg           15      3 mg         16      4 mg         17      3 mg         18      4 mg         19      3 mg         20            21                 22               23               24               25               26               27               28                 29               30                     Date Details   No additional details    Date of next INR:  4/20/2018         How to take your warfarin dose     To take:  3 mg Take 1.5 of the 2 mg tablets.    To take:  4 mg Take 2 of the 2 mg tablets.

## 2018-03-16 NOTE — PROGRESS NOTES
ANTICOAGULATION FOLLOW-UP CLINIC VISIT    Patient Name:  Pooja Swartz  Date:  3/16/2018  Contact Type:  Face to Face    SUBJECTIVE:     Patient Findings     Positives No Problem Findings    Comments Pt denies changes in medications, diet, activity or health, reports taking warfarin as directed.             OBJECTIVE    INR Protime   Date Value Ref Range Status   03/16/2018 2.7 (A) 0.86 - 1.14 Final       ASSESSMENT / PLAN  INR assessment THER    Recheck INR In: 5 WEEKS    INR Location Clinic      Anticoagulation Summary as of 3/16/2018     INR goal 2.0-3.0   Today's INR 2.7   Maintenance plan 4 mg (2 mg x 2) on Mon, Wed, Fri; 3 mg (2 mg x 1.5) all other days   Full instructions 4 mg on Mon, Wed, Fri; 3 mg all other days   Weekly total 24 mg   No change documented Marti Dewitt RN   Plan last modified Kaylyn Morris RN (12/13/2016)   Next INR check 4/20/2018   Priority INR   Target end date Indefinite    Indications   Atrial fibrillation (H) [I48.91]  Long-term (current) use of anticoagulants [Z79.01] [Z79.01]         Anticoagulation Episode Summary     INR check location     Preferred lab     Send INR reminders to  ANTICOAG POOL    Comments * warfarin 2mg tabs      Anticoagulation Care Providers     Provider Role Specialty Phone number    Peterson, LEE Fair MD [facility] [phone]            See the Encounter Report to view Anticoagulation Flowsheet and Dosing Calendar (Go to Encounters tab in chart review, and find the Anticoagulation Therapy Visit)        Marti Dewitt RN

## 2018-04-20 ENCOUNTER — ANTICOAGULATION THERAPY VISIT (OUTPATIENT)
Dept: ANTICOAGULATION | Facility: CLINIC | Age: 83
End: 2018-04-20
Payer: COMMERCIAL

## 2018-04-20 DIAGNOSIS — I48.91 ATRIAL FIBRILLATION (H): ICD-10-CM

## 2018-04-20 DIAGNOSIS — Z79.01 LONG-TERM (CURRENT) USE OF ANTICOAGULANTS: ICD-10-CM

## 2018-04-20 LAB — INR POINT OF CARE: 4.1 (ref 0.86–1.14)

## 2018-04-20 PROCEDURE — 99207 ZZC NO CHARGE NURSE ONLY: CPT

## 2018-04-20 PROCEDURE — 36416 COLLJ CAPILLARY BLOOD SPEC: CPT

## 2018-04-20 PROCEDURE — 85610 PROTHROMBIN TIME: CPT | Mod: QW

## 2018-04-20 NOTE — PROGRESS NOTES
"    ANTICOAGULATION FOLLOW-UP CLINIC VISIT    Patient Name:  Pooja Swartz  Date:  4/20/2018  Contact Type:  Face to Face    SUBJECTIVE:     Patient Findings     Positives Change in diet/appetite (had a \"salad luncheon\" yesterday at living facility, ate variety of salads, some with vit K rich vegetables), Inflammation (Pt has had looser stools since Monday)    Comments Pt denies changes in medications or activity, denies s/s of bleeding.  Pt states she doesn't really have diarrhea, but stools have been less formed and more frequent than usual for much of the week, denies any other s/s of illness. Writer instructed pt regarding BRAT diet, also suggested she see PCP if loose stools continue for more than a week. Pt agreed with plan.    Writer instructed pt to hold warfarin today, take decreased dose Sat 4/21 (~21% decrease), then resume maintenance dose, recheck INR in 4 days.           OBJECTIVE    INR Protime   Date Value Ref Range Status   04/20/2018 4.1 (A) 0.86 - 1.14 Final       ASSESSMENT / PLAN  INR assessment SUPRA    Recheck INR In: 4 DAYS    INR Location Clinic      Anticoagulation Summary as of 4/20/2018     INR goal 2.0-3.0   Today's INR 4.1!   Maintenance plan 4 mg (2 mg x 2) on Mon, Wed, Fri; 3 mg (2 mg x 1.5) all other days   Full instructions 4/20: Hold; 4/21: 2 mg; Otherwise 4 mg on Mon, Wed, Fri; 3 mg all other days   Weekly total 24 mg   Plan last modified Kaylyn Morris RN (12/13/2016)   Next INR check 4/24/2018   Priority INR   Target end date Indefinite    Indications   Atrial fibrillation (H) [I48.91]  Long-term (current) use of anticoagulants [Z79.01] [Z79.01]         Anticoagulation Episode Summary     INR check location     Preferred lab     Send INR reminders to CL ANTICOAG POOL    Comments * warfarin 2mg tabs      Anticoagulation Care Providers     Provider Role Specialty Phone number    Post, LEE Fair MD [facility] [phone]            See the Encounter Report to " view Anticoagulation Flowsheet and Dosing Calendar (Go to Encounters tab in chart review, and find the Anticoagulation Therapy Visit)        Marti Dewitt RN

## 2018-04-20 NOTE — MR AVS SNAPSHOT
Pooja Swartz   4/20/2018 9:00 AM   Anticoagulation Therapy Visit    Description:  89 year old female   Provider:  [facility]AG   Department:  Cl Anticoag           INR as of 4/20/2018     Today's INR 4.1!      Anticoagulation Summary as of 4/20/2018     INR goal 2.0-3.0   Today's INR 4.1!   Full instructions 4/20: Hold; 4/21: 2 mg; Otherwise 4 mg on Mon, Wed, Fri; 3 mg all other days   Next INR check 4/24/2018    Indications   Atrial fibrillation (H) [I48.91]  Long-term (current) use of anticoagulants [Z79.01] [Z79.01]         Your next Anticoagulation Clinic appointment(s)     Apr 24, 2018 11:15 AM CDT   Anticoagulation Visit with COLLIN ANTI COAG   [facility]    [address] [facility] [address] [phone]              Contact Numbers     Please call [phone] with any problems or questions regarding your therapy.    If you need to cancel and/or reschedule your appointment please call one of the following numbers:  [facility] - [phone]  [place] - [phone]  [place] - [phone]  [facility] [phone]  [place] - [phone]            April 2018 Details    Sun Mon Tue Wed Thu Fri Sat     1               2               3               4               5               6               7                 8               9               10               11               12               13               14                 15               16               17               18               19               20      Hold   See details      21      2 mg           22      3 mg         23      4 mg         24            25               26               27               28                 29               30                     Date Details   04/20 This INR check       Date of next INR:  4/24/2018         How to take your warfarin dose     To take:  2 mg Take 1 of the 2 mg tablets.    To take:  3 mg Take 1.5 of the 2 mg tablets.    To  take:  4 mg Take 2 of the 2 mg tablets.    Hold Do not take your warfarin dose. See the Details table to the right for additional instructions.

## 2018-04-24 ENCOUNTER — ANTICOAGULATION THERAPY VISIT (OUTPATIENT)
Dept: ANTICOAGULATION | Facility: CLINIC | Age: 83
End: 2018-04-24
Payer: COMMERCIAL

## 2018-04-24 DIAGNOSIS — I48.91 ATRIAL FIBRILLATION (H): ICD-10-CM

## 2018-04-24 DIAGNOSIS — Z79.01 LONG-TERM (CURRENT) USE OF ANTICOAGULANTS: ICD-10-CM

## 2018-04-24 LAB — INR POINT OF CARE: 2.4 (ref 0.86–1.14)

## 2018-04-24 PROCEDURE — 36416 COLLJ CAPILLARY BLOOD SPEC: CPT

## 2018-04-24 PROCEDURE — 99207 ZZC NO CHARGE NURSE ONLY: CPT

## 2018-04-24 PROCEDURE — 85610 PROTHROMBIN TIME: CPT | Mod: QW

## 2018-04-24 NOTE — PROGRESS NOTES
"  ANTICOAGULATION FOLLOW-UP CLINIC VISIT    Patient Name:  Pooja Swartz  Date:  4/24/2018  Contact Type:  Face to Face    SUBJECTIVE:     Patient Findings     Positives Other complaints    Comments Patient reports she stools are still loose but \"manageable\". She ate some rice and bananas. Since she is still not back to regular BM completely writer will have her take 20mg of warfarin in the next 7 days and recheck her INR 5/1/18. Patient verbalizes understanding and agrees to plan. No further questions or concerns.             OBJECTIVE    INR Protime   Date Value Ref Range Status   04/24/2018 2.4 (A) 0.86 - 1.14 Final       ASSESSMENT / PLAN  INR assessment THER    Recheck INR In: 1 WEEK    INR Location Clinic      Anticoagulation Summary as of 4/24/2018     INR goal 2.0-3.0   Today's INR 2.4   Maintenance plan 4 mg (2 mg x 2) on Mon, Wed, Fri; 3 mg (2 mg x 1.5) all other days   Full instructions 4/25: 2 mg; 4/27: 2 mg; Otherwise 4 mg on Mon, Wed, Fri; 3 mg all other days   Weekly total 24 mg   Plan last modified Kaylyn Morris RN (12/13/2016)   Next INR check 5/1/2018   Priority INR   Target end date Indefinite    Indications   Atrial fibrillation (H) [I48.91]  Long-term (current) use of anticoagulants [Z79.01] [Z79.01]         Anticoagulation Episode Summary     INR check location     Preferred lab     Send INR reminders to CL ANTICOAG POOL    Comments * warfarin 2mg tabs      Anticoagulation Care Providers     Provider Role Specialty Phone number    Post, LEE Fair MD [facility] [phone]            See the Encounter Report to view Anticoagulation Flowsheet and Dosing Calendar (Go to Encounters tab in chart review, and find the Anticoagulation Therapy Visit)        Trudy Alex RN               "

## 2018-04-24 NOTE — MR AVS SNAPSHOT
Pooja Swartz   4/24/2018 11:15 AM   Anticoagulation Therapy Visit    Description:  89 year old female   Provider:  [facility]TI [facility]AG   Department:  Cl Anticoag           INR as of 4/24/2018     Today's INR 2.4      Anticoagulation Summary as of 4/24/2018     INR goal 2.0-3.0   Today's INR 2.4   Full instructions 4/25: 2 mg; 4/27: 2 mg; Otherwise 4 mg on Mon, Wed, Fri; 3 mg all other days   Next INR check 5/1/2018    Indications   Atrial fibrillation (H) [I48.91]  Long-term (current) use of anticoagulants [Z79.01] [Z79.01]         Your next Anticoagulation Clinic appointment(s)     May 01, 2018 11:30 AM CDT   Anticoagulation Visit with CL ANTI COAG   [facility]    [address] [facility] [address] [phone]              Contact Numbers     Please call [phone] with any problems or questions regarding your therapy.    If you need to cancel and/or reschedule your appointment please call one of the following numbers:  [facility] - [phone]  [place] - [phone]  [place] - [phone]  [facility] [phone]  [place] - [phone]            April 2018 Details    Sun Mon Tue Wed Thu Fri Sat     1               2               3               4               5               6               7                 8               9               10               11               12               13               14                 15               16               17               18               19               20               21                 22               23               24      3 mg   See details      25      2 mg         26      3 mg         27      2 mg         28      3 mg           29      3 mg         30      4 mg               Date Details   04/24 This INR check               How to take your warfarin dose     To take:  2 mg Take 1 of the 2 mg tablets.    To take:  3 mg Take 1.5 of the 2 mg tablets.    To take:  4 mg Take 2  of the 2 mg tablets.           May 2018 Details    Sun Mon Tue Wed Thu Fri Sat       1            2               3               4               5                 6               7               8               9               10               11               12                 13               14               15               16               17               18               19                 20               21               22               23               24               25               26                 27               28               29               30               31                  Date Details   No additional details    Date of next INR:  5/1/2018         How to take your warfarin dose     To take:  3 mg Take 1.5 of the 2 mg tablets.

## 2018-05-01 ENCOUNTER — ANTICOAGULATION THERAPY VISIT (OUTPATIENT)
Dept: ANTICOAGULATION | Facility: CLINIC | Age: 83
End: 2018-05-01
Payer: COMMERCIAL

## 2018-05-01 LAB — INR POINT OF CARE: 2.6 (ref 0.86–1.14)

## 2018-05-01 PROCEDURE — 36416 COLLJ CAPILLARY BLOOD SPEC: CPT

## 2018-05-01 PROCEDURE — 99207 ZZC NO CHARGE NURSE ONLY: CPT

## 2018-05-01 PROCEDURE — 85610 PROTHROMBIN TIME: CPT | Mod: QW

## 2018-05-01 NOTE — PROGRESS NOTES
ANTICOAGULATION FOLLOW-UP CLINIC VISIT    Patient Name:  Pooja Swartz  Date:  5/1/2018  Contact Type:  Face to Face    SUBJECTIVE:     Patient Findings     Comments Patient will begin new 16.7%  decreased warfarin maintenance dose  2mg Wed and 3mg the rest of the days of the week. Patient verbalizes understanding and agrees to plan. No further questions or concerns.               OBJECTIVE    INR Protime   Date Value Ref Range Status   05/01/2018 2.6 (A) 0.86 - 1.14 Final       ASSESSMENT / PLAN  INR assessment THER    Recheck INR In: 3 WEEKS    INR Location Clinic      Anticoagulation Summary as of 5/1/2018     INR goal 2.0-3.0   Today's INR 2.6   Maintenance plan 2 mg (2 mg x 1) on Wed; 3 mg (2 mg x 1.5) all other days   Full instructions 2 mg on Wed; 3 mg all other days   Weekly total 20 mg   Plan last modified Trudy Alex RN (5/1/2018)   Next INR check 5/22/2018   Priority INR   Target end date Indefinite    Indications   Atrial fibrillation (H) [I48.91]  Long-term (current) use of anticoagulants [Z79.01] [Z79.01]         Anticoagulation Episode Summary     INR check location     Preferred lab     Send INR reminders to CL ANTICOAG POOL    Comments * warfarin 2mg tabs      Anticoagulation Care Providers     Provider Role Specialty Phone number    Peterson, LEE Fair MD [facility] [phone]            See the Encounter Report to view Anticoagulation Flowsheet and Dosing Calendar (Go to Encounters tab in chart review, and find the Anticoagulation Therapy Visit)        Trudy Alex RN

## 2018-05-01 NOTE — MR AVS SNAPSHOT
Pooja Swartz   5/1/2018 9:45 AM   Anticoagulation Therapy Visit    Description:  89 year old female   Provider:  [facility]TI [facility]AG   Department:  Cl Anticoag           INR as of 5/1/2018     Today's INR 2.6      Anticoagulation Summary as of 5/1/2018     INR goal 2.0-3.0   Today's INR 2.6   Full instructions 2 mg on Wed; 3 mg all other days   Next INR check 5/22/2018    Indications   Atrial fibrillation (H) [I48.91]  Long-term (current) use of anticoagulants [Z79.01] [Z79.01]         Your next Anticoagulation Clinic appointment(s)     May 22, 2018  1:00 PM CDT   Anticoagulation Visit with COLLIN ANTI COAG   [facility]    [address]  [facility] [address] [phone]              Contact Numbers     Please call [phone] with any problems or questions regarding your therapy.    If you need to cancel and/or reschedule your appointment please call one of the following numbers:  [facility] - [phone]  [facility] [phone]  [place] - [phone]  [facility] [phone]  [place] - [phone]            May 2018 Details    Sun Mon Tue Wed Thu Fri Sat       1      3 mg   See details      2      2 mg         3      3 mg         4      3 mg         5      3 mg           6      3 mg         7      3 mg         8      3 mg         9      2 mg         10      3 mg         11      3 mg         12      3 mg           13      3 mg         14      3 mg         15      3 mg         16      2 mg         17      3 mg         18      3 mg         19      3 mg           20      3 mg         21      3 mg         22            23               24               25               26                 27               28               29               30               31                  Date Details   05/01 This INR check       Date of next INR:  5/22/2018         How to take your warfarin dose     To take:  2 mg Take 1 of the 2 mg tablets.    To take:  3 mg Take  1.5 of the 2 mg tablets.

## 2018-05-08 ENCOUNTER — TELEPHONE (OUTPATIENT)
Dept: FAMILY MEDICINE | Facility: CLINIC | Age: 83
End: 2018-05-08

## 2018-05-08 NOTE — TELEPHONE ENCOUNTER
S-(situation): Patient has one episode today of feeling like she was going to pass out.    B-(background): Patient she had another like this about 2 weeks ago.     A-(assessment): Patient reports she had a spell this afternoon where she felt dizziness. Patient states she was resting after eating and she felt she was going to pass out.  Patient states she checked 152/63 with 57 pulse about an hour.  Patient states when she felt she was going to pass out she took her blood pressure it was 177/75 and the pulse 57.  Patient states she feels she is drinking water. Patient denies any urinary symptoms.  Patient denies any fevers or URI.  Patient has been taking her medication in the evening.      R-(recommendations): Advised patient to be seen. Patient does not want to schedule with another provider. Patient declined appt for 5/9/18.  Patient was advised if this happens again or any other symptoms develop to go to UC/ER or call 911.  Patient was advised to drink plenty of fluids.  Patient agrees with plan.    Blanca CALLAHAN RN

## 2018-05-08 NOTE — TELEPHONE ENCOUNTER
Reason for call:  Patient reporting a symptom    Symptom or request: Patient calling reporting dizziness spells. The last time this happened she took her BP right after and it was 177/75 P 62. 10 minutes later her BP was 152/67 P 57. Patient does have an appointment scheduled for 5/14 with Dr. Winkler. She is wondering if it is ok to wait that long to be seen?    Duration (how long have symptoms been present): ongoing    Have you been treated for this before? No    Additional comments:     Phone Number patient can be reached at:  Home number on file [phone] (home)    Best Time:  any    Can we leave a detailed message on this number:  YES    Call taken on 5/8/2018 at 3:56 PM by Ling De La Torre

## 2018-05-14 ENCOUNTER — OFFICE VISIT (OUTPATIENT)
Dept: FAMILY MEDICINE | Facility: CLINIC | Age: 83
End: 2018-05-14
Payer: COMMERCIAL

## 2018-05-14 VITALS
TEMPERATURE: 98 F | OXYGEN SATURATION: 98 % | RESPIRATION RATE: 20 BRPM | DIASTOLIC BLOOD PRESSURE: 62 MMHG | SYSTOLIC BLOOD PRESSURE: 142 MMHG | BODY MASS INDEX: 25.97 KG/M2 | WEIGHT: 133 LBS | HEART RATE: 61 BPM

## 2018-05-14 DIAGNOSIS — H81.10 BENIGN PAROXYSMAL POSITIONAL VERTIGO, UNSPECIFIED LATERALITY: Primary | ICD-10-CM

## 2018-05-14 DIAGNOSIS — I10 HYPERTENSION GOAL BP (BLOOD PRESSURE) < 140/90: ICD-10-CM

## 2018-05-14 PROCEDURE — 99214 OFFICE O/P EST MOD 30 MIN: CPT | Performed by: FAMILY MEDICINE

## 2018-05-14 ASSESSMENT — PAIN SCALES - GENERAL: PAINLEVEL: NO PAIN (0)

## 2018-05-14 NOTE — MR AVS SNAPSHOT
After Visit Summary   5/14/2018    Pooja Swartz    MRN: [id number]           Patient Information     Date Of Birth          9/1/1925        Visit Information        Provider Department      5/14/2018 9:40 AM LEE Winkler MD [facility]        Today's Diagnoses     Benign paroxysmal positional vertigo, unspecified laterality    -  1      Care Instructions    Avoid gazing upwards, make head and position changes gradually.  Recheck if this is getting worse.      Thank you for choosing [facility].  You may be receiving a survey in the mail from [facility] regarding your visit today.  Please take a few minutes to complete and return the survey to let us know how we are doing.      Our Clinic hours are:  Mondays    7:20 am - 7 pm  Tues -  Fri  7:20 am - 5 pm    Clinic Phone: [phone]    The clinic lab opens at 7:30 am Mon - Fri and appointments are required.    [facility]  Ph. [phone]  Monday-Thursday 8 am - 7pm  Tues/Wed/Fri 8 am - 5:30 pm                 Follow-ups after your visit        Your next 10 appointments already scheduled     May 22, 2018  1:00 PM CDT   Anticoagulation Visit with CL ANTI COAG   [facility] ([facility])    [address] [facility] [address] [phone]              Who to contact     If you have questions or need follow up information about today's clinic visit or your schedule please contact [facility] directly at [phone].  Normal or non-critical lab and imaging results will be communicated to you by MyChart, letter or phone within 4 business days after the clinic has received the results. If you do not hear from us within 7 days, please contact the clinic through MyChart or phone. If you have a critical or abnormal lab result, we will notify you by phone as soon as possible.  Submit refill requests through [employer] or call your pharmacy and they will  "forward the refill request to us. Please allow 3 business days for your refill to be completed.          Additional Information About Your Visit        MyChart Information     Fritter lets you send messages to your doctor, view your test results, renew your prescriptions, schedule appointments and more. To sign up, go to [email or web address] . Click on \"Log in\" on the left side of the screen, which will take you to the Welcome page. Then click on \"Sign up Now\" on the right side of the page.     You will be asked to enter the access code listed below, as well as some personal information. Please follow the directions to create your username and password.     Your access code is: [email or web address]  Expires: 2018 10:34 AM     Your access code will  in 90 days. If you need help or a new code, please call your [facility] clinic or [phone].        Care EveryWhere ID     This is your Care EveryWhere ID. This could be used by other organizations to access your [facility] medical records  [id number]        Your Vitals Were     Pulse Temperature Respirations Last Period Pulse Oximetry BMI (Body Mass Index)    61 98  F (36.7  C) (Tympanic) 20 1960 98% 25.97 kg/m2       Blood Pressure from Last 3 Encounters:   18 142/62   18 158/70   17 156/70    Weight from Last 3 Encounters:   18 133 lb (60.3 kg)   18 138 lb 8 oz (62.8 kg)   17 135 lb (61.2 kg)              Today, you had the following     No orders found for display       Primary Care Provider Office Phone # Fax #    R Marv Winkler [phone] [address] [facility] [address]        Equal Access to Services     GAUTAM BRAY : Connie Vora, wanick plascencia, qaybta kaalmoiz miranda. So [facility] [phone].    ATENCIÓN: Si habla español, tiene a sanchez disposición servicios gratuitos de asistencia lingüística. Llame al [phone].    We " comply with applicable federal civil rights laws and [place] laws. We do not discriminate on the basis of race, color, national origin, age, disability, sex, sexual orientation, or gender identity.            Thank you!     Thank you for choosing [facility]  for your care. Our goal is always to provide you with excellent care. Hearing back from our patients is one way we can continue to improve our services. Please take a few minutes to complete the written survey that you may receive in the mail after your visit with us. Thank you!             Your Updated Medication List - Protect others around you: Learn how to safely use, store and throw away your medicines at www.disposemymeds.org.          This list is accurate as of 5/14/18 10:34 AM.  Always use your most recent med list.                   Brand Name Dispense Instructions for use Diagnosis    ALLEGRA PO      Take 180 mg by mouth daily        allopurinol 100 MG tablet    ZYLOPRIM    90 tablet    Take 1 tablet (100 mg) by mouth daily    Chronic tophaceous gout, Contusion of sacrum, initial encounter       amLODIPine 5 MG tablet    NORVASC    135 tablet    Take 1.5 tablets (7.5 mg) by mouth daily    Hypertension goal BP (blood pressure) < 140/90       brimonidine 0.15 % ophthalmic solution    ALPHAGAN-P     Place 1 drop Into the left eye 2 times daily        Calcium-Vitamin D 600-200 MG-UNIT Tabs      Take 1 tablet by mouth 2 times daily        COSOPT 2-0.5 % ophthalmic solution   Generic drug:  dorzolamide-timolol      1 DROP INTO Left Eye twice daily        latanoprost 0.005 % ophthalmic solution    XALATAN     Place 1 drop into both eyes At Bedtime        miconazole 2 % powder    MICATIN; MICRO GUARD     Apply topically 2 times daily Reported on 2/13/2017        ONE-A-DAY WITHIN Tabs      1 TABLET ORALLY DAILY        PROBIOTIC DAILY PO      Take 1 capsule by mouth daily        traMADol 50 MG tablet    ULTRAM    50 tablet    Take 1 tablet  (50 mg) by mouth every 6 hours as needed    Contusion of sacrum, initial encounter       triamcinolone 0.1 % ointment    KENALOG    30 g    Apply  topically 3 times daily. Apply sparingly to affected area.    Nummular eczema       * Warfarin Therapy Reminder      1 each continuous prn    Closed displaced comminuted fracture of shaft of left femur, initial encounter (H)       * warfarin 2 MG tablet    COUMADIN    150 tablet    Take 4 mg on Mon, Wed, Fri; 3 mg all other days or as directed by the Anticoagulation Clinic    Persistent atrial fibrillation (H), Long-term (current) use of anticoagulants, Atrial fibrillation (H), Chronic tophaceous gout       * Notice:  This list has 2 medication(s) that are the same as other medications prescribed for you. Read the directions carefully, and ask your doctor or other care provider to review them with you.

## 2018-05-14 NOTE — PATIENT INSTRUCTIONS
Avoid gazing upwards, make head and position changes gradually.  Recheck if this is getting worse.      Thank you for choosing [facility].  You may be receiving a survey in the mail from El[facility] regarding your visit today.  Please take a few minutes to complete and return the survey to let us know how we are doing.      Our Clinic hours are:  Mondays    7:20 am - 7 pm  Tues -  Fri  7:20 am - 5 pm    Clinic Phone: [phone]    The clinic lab opens at 7:30 am Mon - Fri and appointments are required.    [facility]. [phone]  Monday-Thursday 8 am - 7pm  Tues/Wed/Fri 8 am - 5:30 pm

## 2018-05-14 NOTE — PROGRESS NOTES
SUBJECTIVE:   Pooja Swartz is a 89 year old female who presents to clinic today for the following health issues:      Chief Complaint   Patient presents with     Dizziness     couple of weeks ago had a couple of dizzy spells, was up and about but noticed when she sat down, had feel back in December, and had fell back wards and landed on her back and still having a little pain with that     After discussing this, he does recall that on both occasions where she felt the spinning sensation she was sitting down and gaze upward towards the ceiling, and that seemed to bring on the symptoms of vertigo.  Has not had any episodes recently and feels like her hearing is at her baseline, denies tinnitus.  She was not aware of any heart palpitations or feeling like her heart was racing when these episodes occur.  She does not recall having a upper respiratory infection or nasal congestion at that time          Problem list and histories reviewed & adjusted, as indicated.  Additional history: none        Reviewed and updated as needed this visit by clinical staff  Tobacco  Allergies  Meds  Med Hx  Surg Hx  Fam Hx  Soc Hx      Reviewed and updated as needed this visit by Provider               ROS:  Constitutional, HEENT, cardiovascular, pulmonary, gi and gu systems are negative, except as otherwise noted.        OBJECTIVE:                                                    /62  Pulse 61  Temp 98  F (36.7  C) (Tympanic)  Resp 20  Wt 133 lb (60.3 kg)  LMP 07/07/1960  SpO2 98%  BMI 25.97 kg/m2    GENERAL: healthy, alert and no distress  EYES: Eyes grossly normal to inspection, extraocular movements - intact, and PERRL; checking extraocular movements does not elicit her symptoms  HENT: ear canals- normal; TMs- normal; Nose- normal; Mouth- no ulcers, no lesions  NECK: no tenderness, no adenopathy, no asymmetry, no masses, no stiffness; thyroid- normal to palpation  RESP: lungs clear to auscultation - no rales, no  rhonchi, no wheezes  CV: regular rates and rhythm, normal S1 S2, no S3 or S4 and no murmur, no click or rub -  MS: extremities- no gross deformities noted, no edema  NEURO: strength and tone- normal, sensory exam- grossly normal, mentation- intact, speech- normal, reflexes- symmetric  Moving her head up and down or side to side does not elicit the symptoms at this time         ASSESSMENT/PLAN:                                                    ASSESSMENT:  1. Benign paroxysmal positional vertigo, unspecified laterality    2. Hypertension goal BP (blood pressure) < 140/90      Discussed pathophysiology of this condition and implications.  Questions answered.   PLAN:  Reassured, no treatment needed at this time      Patient Instructions   Avoid gazing upwards, make head and position changes gradually.  Recheck if this is getting worse.      Thank you for choosing [facility].  You may be receiving a survey in the mail from [employer] regarding your visit today.  Please take a few minutes to complete and return the survey to let us know how we are doing.      Our Clinic hours are:  Mondays    7:20 am - 7 pm  Tues -  Fri  7:20 am - 5 pm    Clinic Phone: [phone]    The clinic lab opens at 7:30 am Mon - Fri and appointments are required.    [facility] [place]  Ph. [phone]  Monday-Thursday 8 am - 7pm  Tues/Wed/Fri 8 am - 5:30 pm             LEE Winkler  [facility]

## 2018-05-25 ENCOUNTER — ANTICOAGULATION THERAPY VISIT (OUTPATIENT)
Dept: ANTICOAGULATION | Facility: CLINIC | Age: 83
End: 2018-05-25
Payer: COMMERCIAL

## 2018-05-25 DIAGNOSIS — Z79.01 LONG-TERM (CURRENT) USE OF ANTICOAGULANTS: ICD-10-CM

## 2018-05-25 DIAGNOSIS — I48.91 ATRIAL FIBRILLATION (H): ICD-10-CM

## 2018-05-25 DIAGNOSIS — M1A.9XX1 CHRONIC TOPHACEOUS GOUT: ICD-10-CM

## 2018-05-25 DIAGNOSIS — I48.19 PERSISTENT ATRIAL FIBRILLATION (H): ICD-10-CM

## 2018-05-25 LAB — INR POINT OF CARE: 2 (ref 0.86–1.14)

## 2018-05-25 PROCEDURE — 85610 PROTHROMBIN TIME: CPT | Mod: QW

## 2018-05-25 PROCEDURE — 99207 ZZC NO CHARGE NURSE ONLY: CPT

## 2018-05-25 PROCEDURE — 36416 COLLJ CAPILLARY BLOOD SPEC: CPT

## 2018-05-25 RX ORDER — WARFARIN SODIUM 2 MG/1
TABLET ORAL
Qty: 150 TABLET | Refills: 0 | COMMUNITY
Start: 2018-05-25 | End: 2018-06-25

## 2018-05-25 NOTE — PROGRESS NOTES
ANTICOAGULATION FOLLOW-UP CLINIC VISIT    Patient Name:  Pooja Swartz  Date:  5/25/2018  Contact Type:  Face to Face    SUBJECTIVE:     Patient Findings     Positives No Problem Findings    Comments Pt denies changes in medications, diet, activity or health, reports taking warfarin as directed.    Pt is on new maintenance dose, is therapeutic today, but on low end of range, will have pt check INR in 2 1/2 weeks. If pt is still on low end, may need to increase maintenance dose.           OBJECTIVE    INR Protime   Date Value Ref Range Status   05/25/2018 2.0 (A) 0.86 - 1.14 Final       ASSESSMENT / PLAN  INR assessment THER    Recheck INR In: 3 WEEKS    INR Location Clinic      Anticoagulation Summary as of 5/25/2018     INR goal 2.0-3.0   Today's INR 2.0   Warfarin maintenance plan 2 mg (2 mg x 1) on Wed; 3 mg (2 mg x 1.5) all other days   Full warfarin instructions 2 mg on Wed; 3 mg all other days   Weekly warfarin total 20 mg   No change documented Marti Dewitt RN   Plan last modified Trudy Alex RN (5/1/2018)   Next INR check 6/12/2018   Priority INR   Target end date Indefinite    Indications   Atrial fibrillation (H) [I48.91]  Long-term (current) use of anticoagulants [Z79.01] [Z79.01]         Anticoagulation Episode Summary     INR check location     Preferred lab     Send INR reminders to CL ANTICOAG POOL    Comments * warfarin 2mg tabs      Anticoagulation Care Providers     Provider Role Specialty Phone number    Peterson, LEE Fair MD [facility] [phone]            See the Encounter Report to view Anticoagulation Flowsheet and Dosing Calendar (Go to Encounters tab in chart review, and find the Anticoagulation Therapy Visit)        Marti Dewitt RN

## 2018-05-25 NOTE — MR AVS SNAPSHOT
Pooja HOUSTON Maxime   5/25/2018 3:15 PM   Anticoagulation Therapy Visit    Description:  89 year old female   Provider:  COLLIN ANTI COAG   Department:  Cl Anticoag           INR as of 5/25/2018     Today's INR 2.0      Anticoagulation Summary as of 5/25/2018     INR goal 2.0-3.0   Today's INR 2.0   Full warfarin instructions 2 mg on Wed; 3 mg all other days   Next INR check 6/12/2018    Indications   Atrial fibrillation (H) [I48.91]  Long-term (current) use of anticoagulants [Z79.01] [Z79.01]         Your next Anticoagulation Clinic appointment(s)     Jun 12, 2018  9:15 AM CDT   Anticoagulation Visit with COLLIN ANTI COAG   [facility] ([facility])    [address] [facility] [address] [phone]              Contact Numbers     Please call [phone] with any problems or questions regarding your therapy.    If you need to cancel and/or reschedule your appointment please call one of the following numbers:  [facility] - [phone]  [facility] [phone]  [place] - [phone]  [facility] [phone]  [place] - [phone]            May 2018 Details    Sun Mon Tue Wed Thu Fri Sat       1               2               3               4               5                 6               7               8               9               10               11               12                 13               14               15               16               17               18               19                 20               21               22               23               24               25      3 mg   See details      26      3 mg           27      3 mg         28      3 mg         29      3 mg         30      2 mg         31      3 mg            Date Details   05/25 This INR check               How to take your warfarin dose     To take:  2 mg Take 1 of the 2 mg tablets.    To take:  3 mg Take 1.5 of the 2 mg tablets.           June 2018 Details    Sun Mon Tue  Wed Thu Fri Sat          1      3 mg         2      3 mg           3      3 mg         4      3 mg         5      3 mg         6      2 mg         7      3 mg         8      3 mg         9      3 mg           10      3 mg         11      3 mg         12            13               14               15               16                 17               18               19               20               21               22               23                 24               25               26               27               28               29               30                Date Details   No additional details    Date of next INR:  6/12/2018         How to take your warfarin dose     To take:  2 mg Take 1 of the 2 mg tablets.    To take:  3 mg Take 1.5 of the 2 mg tablets.

## 2018-06-12 ENCOUNTER — ANTICOAGULATION THERAPY VISIT (OUTPATIENT)
Dept: ANTICOAGULATION | Facility: CLINIC | Age: 83
End: 2018-06-12
Payer: COMMERCIAL

## 2018-06-12 DIAGNOSIS — Z79.01 LONG-TERM (CURRENT) USE OF ANTICOAGULANTS: ICD-10-CM

## 2018-06-12 DIAGNOSIS — I48.91 ATRIAL FIBRILLATION (H): ICD-10-CM

## 2018-06-12 LAB — INR POINT OF CARE: 2 (ref 0.86–1.14)

## 2018-06-12 PROCEDURE — 85610 PROTHROMBIN TIME: CPT | Mod: QW

## 2018-06-12 PROCEDURE — 36416 COLLJ CAPILLARY BLOOD SPEC: CPT

## 2018-06-12 PROCEDURE — 99207 ZZC NO CHARGE NURSE ONLY: CPT

## 2018-06-12 NOTE — PROGRESS NOTES
ANTICOAGULATION FOLLOW-UP CLINIC VISIT    Patient Name:  Pooja Swartz  Date:  6/12/2018  Contact Type:  Face to Face    SUBJECTIVE:     Patient Findings     Positives No Problem Findings    Comments Patient advised to continue the same warfarin maintenance dose, INR therapeutic.   Patient verbalizes understanding and agrees to plan. No further questions or concerns.             OBJECTIVE    INR Protime   Date Value Ref Range Status   06/12/2018 2.0 (A) 0.86 - 1.14 Final       ASSESSMENT / PLAN  INR assessment THER    Recheck INR In: 4 WEEKS    INR Location Clinic      Anticoagulation Summary as of 6/12/2018     INR goal 2.0-3.0   Today's INR 2.0   Warfarin maintenance plan 2 mg (2 mg x 1) on Wed; 3 mg (2 mg x 1.5) all other days   Full warfarin instructions 2 mg on Wed; 3 mg all other days   Weekly warfarin total 20 mg   Plan last modified Trudy Alex, RN (5/1/2018)   Next INR check 7/17/2018   Priority INR   Target end date Indefinite    Indications   Atrial fibrillation (H) [I48.91]  Long-term (current) use of anticoagulants [Z79.01] [Z79.01]         Anticoagulation Episode Summary     INR check location     Preferred lab     Send INR reminders to  ANTICOAG POOL    Comments * warfarin 2mg tabs      Anticoagulation Care Providers     Provider Role Specialty Phone number    Post, LEE Fair MD [facility] [phone]            See the Encounter Report to view Anticoagulation Flowsheet and Dosing Calendar (Go to Encounters tab in chart review, and find the Anticoagulation Therapy Visit)        Trudy Alex RN

## 2018-06-12 NOTE — MR AVS SNAPSHOT
Pooja HOUSTON Maxime   6/12/2018 9:15 AM   Anticoagulation Therapy Visit    Description:  89 year old female   Provider:  COLLIN ANTI COAG   Department:  Cl Anticoag           INR as of 6/12/2018     Today's INR 2.0      Anticoagulation Summary as of 6/12/2018     INR goal 2.0-3.0   Today's INR 2.0   Full warfarin instructions 2 mg on Wed; 3 mg all other days   Next INR check 7/17/2018    Indications   Atrial fibrillation (H) [I48.91]  Long-term (current) use of anticoagulants [Z79.01] [Z79.01]         Your next Anticoagulation Clinic appointment(s)     Jul 17, 2018  9:15 AM CDT   Anticoagulation [facility] with COLLIN ANTI COAG   [facility]    [address]  [facility] [address] [phone]              Contact Numbers     Please call [phone] with any problems or questions regarding your therapy.    If you need to cancel and/or reschedule your appointment please call one of the following numbers:  [facility] - [phone]  [facility] [phone]  [place] - [phone]  [facility] [phone]  [place] - [phone]            June 2018 Details    Sun Mon Tue Wed Thu Fri Sat          1               2                 3               4               5               6               7               8               9                 10               11               12      3 mg   See details      13      2 mg         14      3 mg         15      3 mg         16      3 mg           17      3 mg         18      3 mg         19      3 mg         20      2 mg         21      3 mg         22      3 mg         23      3 mg           24      3 mg         25      3 mg         26      3 mg         27      2 mg         28      3 mg         29      3 mg         30      3 mg          Date Details   06/12 This INR check               How to take your warfarin dose     To take:  2 mg Take 1 of the 2 mg tablets.    To take:  3 mg Take 1.5 of the 2 mg tablets.            July 2018 Details    Sun Mon Tue Wed Thu Fri Sat     1      3 mg         2      3 mg         3      3 mg         4      2 mg         5      3 mg         6      3 mg         7      3 mg           8      3 mg         9      3 mg         10      3 mg         11      2 mg         12      3 mg         13      3 mg         14      3 mg           15      3 mg         16      3 mg         17            18               19               20               21                 22               23               24               25               26               27               28                 29               30               31                    Date Details   No additional details    Date of next INR:  7/17/2018         How to take your warfarin dose     To take:  2 mg Take 1 of the 2 mg tablets.    To take:  3 mg Take 1.5 of the 2 mg tablets.

## 2018-06-25 DIAGNOSIS — I48.19 PERSISTENT ATRIAL FIBRILLATION (H): ICD-10-CM

## 2018-06-25 DIAGNOSIS — Z79.01 LONG-TERM (CURRENT) USE OF ANTICOAGULANTS: ICD-10-CM

## 2018-06-25 DIAGNOSIS — M1A.9XX1 CHRONIC TOPHACEOUS GOUT: ICD-10-CM

## 2018-06-25 RX ORDER — WARFARIN SODIUM 2 MG/1
TABLET ORAL
Qty: 150 TABLET | Refills: 3 | Status: SHIPPED | OUTPATIENT
Start: 2018-06-25 | End: 2019-03-19

## 2018-06-25 NOTE — TELEPHONE ENCOUNTER
"Requested Prescriptions   Pending Prescriptions Disp Refills     warfarin (COUMADIN) 2 MG tablet  Last Written Prescription Date:  5/25/2018  Last Fill Quantity: 150,  # refills: 0   Last office visit: 5/14/2018 with prescribing provider:  Post   Future Office Visit:     150 tablet 0     Sig: Take 2 mg on  Wed; 3 mg all other days or as directed by the Anticoagulation Clinic    Vitamin K Antagonists Failed    6/25/2018 11:55 AM       Failed - INR is within goal in the past 6 weeks    Confirm INR is within goal in the past 6 weeks.     Recent Labs   Lab Test 06/12/18   INR  2.0*                      Passed - Recent (12 mo) or future (30 days) visit within the authorizing provider's specialty    Patient had office visit in the last 12 months or has a visit in the next 30 days with authorizing provider or within the authorizing provider's specialty.  See \"Patient Info\" tab in inbasket, or \"Choose Columns\" in Meds & Orders section of the refill encounter.           Passed - Patient is 18 years of age or older       Passed - Patient is not pregnant       Passed - No positive pregnancy on file in past 12 months          "

## 2018-07-16 ENCOUNTER — OFFICE VISIT (OUTPATIENT)
Dept: FAMILY MEDICINE | Facility: CLINIC | Age: 83
End: 2018-07-16
Payer: COMMERCIAL

## 2018-07-16 VITALS
SYSTOLIC BLOOD PRESSURE: 136 MMHG | HEART RATE: 60 BPM | DIASTOLIC BLOOD PRESSURE: 60 MMHG | WEIGHT: 128.5 LBS | RESPIRATION RATE: 16 BRPM | BODY MASS INDEX: 25.23 KG/M2 | OXYGEN SATURATION: 98 % | TEMPERATURE: 97.7 F | HEIGHT: 60 IN

## 2018-07-16 DIAGNOSIS — R19.7 DIARRHEA, UNSPECIFIED TYPE: Primary | ICD-10-CM

## 2018-07-16 PROCEDURE — 87506 IADNA-DNA/RNA PROBE TQ 6-11: CPT | Performed by: FAMILY MEDICINE

## 2018-07-16 PROCEDURE — 87209 SMEAR COMPLEX STAIN: CPT | Performed by: FAMILY MEDICINE

## 2018-07-16 PROCEDURE — 99214 OFFICE O/P EST MOD 30 MIN: CPT | Performed by: FAMILY MEDICINE

## 2018-07-16 PROCEDURE — 87329 GIARDIA AG IA: CPT | Performed by: FAMILY MEDICINE

## 2018-07-16 PROCEDURE — 87177 OVA AND PARASITES SMEARS: CPT | Performed by: FAMILY MEDICINE

## 2018-07-16 PROCEDURE — 87493 C DIFF AMPLIFIED PROBE: CPT | Mod: 59 | Performed by: FAMILY MEDICINE

## 2018-07-16 NOTE — MR AVS SNAPSHOT
After Visit Summary   7/16/2018    Pooja Swartz    MRN: [id number]           Patient Information     Date Of Birth          9/1/1925        Visit Information        Provider Department      7/16/2018 2:20 PM Post, LEE Fair MD [facility]        Today's Diagnoses     Diarrhea, unspecified type    -  1      Care Instructions    Go back to culturelle for your probiotic.    Collect the stool specimens and I will let you know the results.    If all this is negative, then we could try Metamucil to bulk up the stools.          Follow-ups after your visit        Your next 10 appointments already scheduled     Jul 17, 2018  9:15 AM CDT   Anticoagulation Visit with CL ANTI COAG   [facility]    [address]  [facility] [address] [phone]            Jul 30, 2018  1:30 PM CDT   Return Visit with REGIS Hugo CNP   [facility] ([facility])    [address] [facility] [address] [phone]              Future tests that were ordered for you today     Open Future Orders        Priority Expected Expires Ordered    Clostridium difficile toxin B PCR Routine  8/16/2018 7/16/2018    Giardia antigen Routine  7/16/2019 7/16/2018    Enteric Bacteria and Virus Panel by BRENDAN Stool Routine  7/16/2019 7/16/2018    Ova and Parasite Exam Routine Routine  7/16/2019 7/16/2018            Who to contact     If you have questions or need follow up information about today's clinic visit or your schedule please contact [facility] directly at [phone].  Normal or non-critical lab and imaging results will be communicated to you by MyChart, letter or phone within 4 business days after the clinic has received the results. If you do not hear from us within 7 days, please contact the clinic through MyChart or phone. If you have a critical or abnormal lab result,  "we will notify you by phone as soon as possible.  Submit refill requests through Instant[employer] or call your pharmacy and they will forward the refill request to us. Please allow 3 business days for your refill to be completed.          Additional Information About Your Visit        WebTVhart Information     Instant[employer] lets you send messages to your doctor, view your test results, renew your prescriptions, schedule appointments and more. To sign up, go to [email or web address] . Click on \"Log in\" on the left side of the screen, which will take you to the Welcome page. Then click on \"Sign up Now\" on the right side of the page.     You will be asked to enter the access code listed below, as well as some personal information. Please follow the directions to create your username and password.     Your access code is: [email or web address]  Expires: 2018 10:34 AM     Your access code will  in 90 days. If you need help or a new code, please call your [place] clinic or [phone].        Care EveryWhere ID     This is your Care EveryWhere ID. This could be used by other organizations to access your [place] medical records  [id number]        Your Vitals Were     Pulse Temperature Respirations Height Last Period Pulse Oximetry    60 97.7  F (36.5  C) (Tympanic) 16 5' (1.524 m) 1960 98%    BMI (Body Mass Index)                   25.1 kg/m2            Blood Pressure from Last 3 Encounters:   18 136/60   18 142/62   18 158/70    Weight from Last 3 Encounters:   18 128 lb 8 oz (58.3 kg)   18 133 lb (60.3 kg)   18 138 lb 8 oz (62.8 kg)               Primary Care Provider Office Phone # Fax #    R Marv Winkler [phone] [address] [facility] [address]        Equal Access to Services     [facility] ASA : Connie Vora, wakadenda venancioqderrick, qaybta paulinaalmoiz miranda. [facility] [phone].    ATENCIÓN: Si " becky landry, tiene a sanchez disposición servicios gratuitos de asistencia lingüística. Jeff leroy [phone].    We comply with applicable federal civil rights laws and [place] laws. We do not discriminate on the basis of race, color, national origin, age, disability, sex, sexual orientation, or gender identity.            Thank you!     Thank you for choosing [facility]  for your care. Our goal is always to provide you with excellent care. Hearing back from our patients is one way we can continue to improve our services. Please take a few minutes to complete the written survey that you may receive in the mail after your visit with us. Thank you!             Your Updated Medication List - Protect others around you: Learn how to safely use, store and throw away your medicines at www.disposemymeds.org.          This list is accurate as of 7/16/18  3:00 PM.  Always use your most recent med list.                   Brand Name Dispense Instructions for use Diagnosis    ALLEGRA PO      Take 180 mg by mouth daily        allopurinol 100 MG tablet    ZYLOPRIM    90 tablet    Take 1 tablet (100 mg) by mouth daily    Chronic tophaceous gout, Contusion of sacrum, initial encounter       amLODIPine 5 MG tablet    NORVASC    135 tablet    Take 1.5 tablets (7.5 mg) by mouth daily    Hypertension goal BP (blood pressure) < 140/90       brimonidine 0.15 % ophthalmic solution    ALPHAGAN-P     Place 1 drop Into the left eye 2 times daily        Calcium-Vitamin D 600-200 MG-UNIT Tabs      Take 1 tablet by mouth 2 times daily        COSOPT 2-0.5 % ophthalmic solution   Generic drug:  dorzolamide-timolol      1 DROP INTO Left Eye twice daily        latanoprost 0.005 % ophthalmic solution    XALATAN     Place 1 drop into both eyes At Bedtime        miconazole 2 % powder    MICATIN; MICRO GUARD     Apply topically 2 times daily Reported on 2/13/2017        ONE-A-DAY WITHIN Tabs      1 TABLET ORALLY DAILY        PROBIOTIC  DAILY PO      Take 1 capsule by mouth daily        traMADol 50 MG tablet    ULTRAM    50 tablet    Take 1 tablet (50 mg) by mouth every 6 hours as needed    Contusion of sacrum, initial encounter       triamcinolone 0.1 % ointment    KENALOG    30 g    Apply  topically 3 times daily. Apply sparingly to affected area.    Nummular eczema       * Warfarin Therapy Reminder      1 each continuous prn    Closed displaced comminuted fracture of shaft of left femur, initial encounter (H)       * warfarin 2 MG tablet    COUMADIN    150 tablet    Take 2 mg on  Wed; 3 mg all other days or as directed by the Anticoagulation Clinic    Persistent atrial fibrillation (H), Long-term (current) use of anticoagulants       * Notice:  This list has 2 medication(s) that are the same as other medications prescribed for you. Read the directions carefully, and ask your doctor or other care provider to review them with you.

## 2018-07-16 NOTE — PROGRESS NOTES
Subjective:  Pooja Swartz is a 89 year old female   Chief Complaint   Patient presents with     Bowel Problems     concerns with loose stools for 2 months. doesn't matter what pt. eats.      Sometimes she has urgency of bowel movements and has to be careful to avoid an accident.  This is making it so that she does not want to leave her home.  She denies any blood in the stool and does not have a severe cramping.  About 10 months ago she was checked for C. difficile because of some of the symptoms and it was negative.  Has not had any antibiotics recently but had some about a year ago when she was treated for her femur fracture.  She took Culturelle for a probiotic for a while and that seemed to help, then she switch to a less expensive brand.  That does not seem to help        Encounter Diagnosis   Name Primary?     Diarrhea, unspecified type Yes       ROS:other than noted above, general, HEENT, respiratory, cardiac, gastrointestinal systems are negative    Medical, surgical, social, and family histories, medications and allergies reviewed and updated.    Objective:  Exam:    GENERAL APPEARANCE ADULT: Alert, no acute distress  EYES: PERRL, EOM normal, conjunctiva and lids normal  RESP: lungs clear to auscultation   CV: normal rate, regular rhythm, no murmur or gallop  ABDOMEN: soft, no organomegaly, masses or tenderness  SKIN: no suspicious lesions or rashes      ASSESSMENT:  1. Diarrhea, unspecified type    Differential diagnosis would include C. difficile, parasite infestation, enteric pathogen, Giardia.  At her age of course she would be a candidate for ischemic bowel also    PLAN:        Patient Instructions   Go back to culturelle for your probiotic.    Collect the stool specimens and I will let you know the results.    If all this is negative, then we could try Metamucil to bulk up the stools.

## 2018-07-16 NOTE — PATIENT INSTRUCTIONS
Go back to culturelle for your probiotic.    Collect the stool specimens and I will let you know the results.    If all this is negative, then we could try Metamucil to bulk up the stools.

## 2018-07-17 ENCOUNTER — ANTICOAGULATION THERAPY VISIT (OUTPATIENT)
Dept: ANTICOAGULATION | Facility: CLINIC | Age: 83
End: 2018-07-17
Payer: COMMERCIAL

## 2018-07-17 DIAGNOSIS — R19.7 DIARRHEA, UNSPECIFIED TYPE: ICD-10-CM

## 2018-07-17 DIAGNOSIS — Z79.01 LONG-TERM (CURRENT) USE OF ANTICOAGULANTS: ICD-10-CM

## 2018-07-17 DIAGNOSIS — I48.91 ATRIAL FIBRILLATION (H): ICD-10-CM

## 2018-07-17 LAB
C DIFF TOX B STL QL: NEGATIVE
INR POINT OF CARE: 2.5 (ref 0.86–1.14)
SPECIMEN SOURCE: NORMAL

## 2018-07-17 PROCEDURE — 85610 PROTHROMBIN TIME: CPT | Mod: QW

## 2018-07-17 PROCEDURE — 36416 COLLJ CAPILLARY BLOOD SPEC: CPT

## 2018-07-17 PROCEDURE — 99207 ZZC NO CHARGE NURSE ONLY: CPT

## 2018-07-17 NOTE — MR AVS SNAPSHOT
Pooja HOUSTON Maxime   7/17/2018 9:15 AM   Anticoagulation Therapy Visit    Description:  89 year old female   Provider:  CL ANTI COAG   Department:  Cl Anticoag           INR as of 7/17/2018     Today's INR 2.5      Anticoagulation Summary as of 7/17/2018     INR goal 2.0-3.0   Today's INR 2.5   Full warfarin instructions 2 mg on Wed; 3 mg all other days   Next INR check 8/14/2018    Indications   Atrial fibrillation (H) [I48.91]  Long-term (current) use of anticoagulants [Z79.01] [Z79.01]         Your next Anticoagulation Clinic appointment(s)     Jul 17, 2018  9:15 AM CDT   Anticoagulation Visit with CL ANTI COAG   [facility]    [address] [facility] [address]   [phone]            Aug 14, 2018  9:00 AM CDT   Anticoagulation Visit with CL ANTI COAG   [facility]    [address] [address]   [phone]              Contact Numbers     Please call [phone] with any problems or questions regarding your therapy.    If you need to cancel and/or reschedule your appointment please call one of the following numbers:  [facility] - [phone]  [place] - [phone]  [place] - [phone]  [place] - [phone]  [place] - [phone]            July 2018 Details    Sun Mon Tue Wed Thu Fri Sat     1               2               3               4               5               6               7                 8               9               10               11               12               13               14                 15               16               17      3 mg   See details      18      2 mg         19      3 mg         20      3 mg         21      3 mg           22      3 mg         23      3 mg         24      3 mg         25      2 mg         26      3 mg         27      3 mg         28      3 mg           29      3 mg         30      3 mg         31       3 mg              Date Details   07/17 This INR check               How to take your warfarin dose     To take:  2 mg Take 1 of the 2 mg tablets.    To take:  3 mg Take 1.5 of the 2 mg tablets.           August 2018 Details    Sun Mon Tue Wed Thu Fri Sat        1      2 mg         2      3 mg         3      3 mg         4      3 mg           5      3 mg         6      3 mg         7      3 mg         8      2 mg         9      3 mg         10      3 mg         11      3 mg           12      3 mg         13      3 mg         14            15               16               17               18                 19               20               21               22               23               24               25                 26               27               28               29               30               31                 Date Details   No additional details    Date of next INR:  8/14/2018         How to take your warfarin dose     To take:  2 mg Take 1 of the 2 mg tablets.    To take:  3 mg Take 1.5 of the 2 mg tablets.

## 2018-07-17 NOTE — PROGRESS NOTES
ANTICOAGULATION FOLLOW-UP CLINIC VISIT    Patient Name:  Pooja Swartz  Date:  7/17/2018  Contact Type:  Face to Face    SUBJECTIVE:     Patient Findings     Positives No Problem Findings    Comments Patient reports she had been having diarrhea intermittently for two month. She brought a sample into lab today. Writer asked pt to call if she is placed on an antibiotic. Patient verbalizes understanding and agrees to plan. No further questions or concerns. Patient advised to continue the same warfarin maintenance dose, INR therapeutic.              OBJECTIVE    INR Protime   Date Value Ref Range Status   07/17/2018 2.5 (A) 0.86 - 1.14 Final       ASSESSMENT / PLAN  INR assessment THER    Recheck INR In: 4 WEEKS    INR Location Clinic      Anticoagulation Summary as of 7/17/2018     INR goal 2.0-3.0   Today's INR 2.5   Warfarin maintenance plan 2 mg (2 mg x 1) on Wed; 3 mg (2 mg x 1.5) all other days   Full warfarin instructions 2 mg on Wed; 3 mg all other days   Weekly warfarin total 20 mg   Plan last modified Trudy Alex RN (5/1/2018)   Next INR check 8/14/2018   Priority INR   Target end date Indefinite    Indications   Atrial fibrillation (H) [I48.91]  Long-term (current) use of anticoagulants [Z79.01] [Z79.01]         Anticoagulation Episode Summary     INR check location     Preferred lab     Send INR reminders to CL ANTICOAG POOL    Comments * warfarin 2mg tabs      Anticoagulation Care Providers     Provider Role Specialty Phone number    Peterson, LEE Fair MD [facility] [phone]            See the Encounter Report to view Anticoagulation Flowsheet and Dosing Calendar (Go to Encounters tab in chart review, and find the Anticoagulation Therapy Visit)        Trudy Alex RN

## 2018-07-18 LAB
C COLI+JEJUNI+LARI FUSA STL QL NAA+PROBE: NOT DETECTED
EC STX1 GENE STL QL NAA+PROBE: NOT DETECTED
EC STX2 GENE STL QL NAA+PROBE: NOT DETECTED
ENTERIC PATHOGEN COMMENT: NORMAL
G LAMBLIA AG STL QL IA: NORMAL
NOROV GI+II ORF1-ORF2 JNC STL QL NAA+PR: NOT DETECTED
O+P STL MICRO: NORMAL
O+P STL MICRO: NORMAL
RVA NSP5 STL QL NAA+PROBE: NOT DETECTED
SALMONELLA SP RPOD STL QL NAA+PROBE: NOT DETECTED
SHIGELLA SP+EIEC IPAH STL QL NAA+PROBE: NOT DETECTED
SPECIMEN SOURCE: NORMAL
SPECIMEN SOURCE: NORMAL
V CHOL+PARA RFBL+TRKH+TNAA STL QL NAA+PR: NOT DETECTED
Y ENTERO RECN STL QL NAA+PROBE: NOT DETECTED

## 2018-07-19 NOTE — PROGRESS NOTES
Please CALL PATIENT    Notify patient of NORMAL results on all the stool specimens. I would start on one tablespoon of metamucil in fluids daily. Let me know if this does not help.

## 2018-07-30 ENCOUNTER — OFFICE VISIT (OUTPATIENT)
Dept: CARDIOLOGY | Facility: CLINIC | Age: 83
End: 2018-07-30
Payer: COMMERCIAL

## 2018-07-30 VITALS
BODY MASS INDEX: 24.96 KG/M2 | HEART RATE: 72 BPM | SYSTOLIC BLOOD PRESSURE: 149 MMHG | DIASTOLIC BLOOD PRESSURE: 66 MMHG | WEIGHT: 127.8 LBS | OXYGEN SATURATION: 99 %

## 2018-07-30 DIAGNOSIS — I10 BENIGN ESSENTIAL HYPERTENSION: ICD-10-CM

## 2018-07-30 DIAGNOSIS — I48.0 PAROXYSMAL ATRIAL FIBRILLATION (H): Primary | ICD-10-CM

## 2018-07-30 PROCEDURE — 99214 OFFICE O/P EST MOD 30 MIN: CPT | Performed by: NURSE PRACTITIONER

## 2018-07-30 NOTE — PROGRESS NOTES
Cardiology Clinic Progress Note  Pooja Swartz MRN# [id number]   YOB: 1925 Age: 89 year old     Reason For Visit:  Annual visit   Primary Cardiologist:   Dr. Mendoza          History of Presenting Illness:    Pooja Swartz is a pleasant 89 year old patient with a past cardiac history significant for paroxysmal atrial fibrillation (on Coumadin), previous severe bradycardia with junctional escape rhythm due to high-dose atenolol.    Pt was last seen by Dr. Mendoza on 3/7/2017.  The patient was noted to have imbalance issues with history of a fall.  She had previously been on Eliquis which was changed to Coumadin after the fall.  Zio patch was recommended to assess for any arrhythmia that may be causing her imbalance.  Because of her risk of intracranial bleed with her history of falls, watchman was discussed.  Zio patch 3/7/2017 showed no bradycardia or atrial fibrillation, she was noted to have one 13 beat run of VT and 13 runs of atrial tachycardia up to 18 seconds.  No rhythm cause for her imbalance was seen.  These results were discussed with the patient in a telephone encounter from 3/28/2018 where the patient declined proceeding with watchman.     Pt presents today for annual follow-up.  She has been doing well from a cardiac standpoint, since she was last seen.  She is currently using a walker or cane which has helped with her balance issues.  She has not had any further falls and continues on her warfarin without any problems.  Her blood pressure today in the clinic was 149/66 when I rechecked it.  She reports home blood pressure readings of 130s systolic and had an office visit on 7/16/2018 with a blood pressure reading of 126/60.  Should these be elevated at home, she will call the clinic.  She continues to live independently and drives herself.  She is also able to continue to visit her handicapped son in the group home.  Patient reports no chest pain, shortness of breath, PND, orthopnea,  presyncope, syncope, edema, heart racing, or palpitations.    Current Cardiac Medications   Amlodipine 7.5 mg daily  Coumadin                    Assessment and Plan:     Plan  1.  Continue current medications  2.  Follow-up with Dr. Mendoza in 1 year      1. Paroxysmal atrial fibrillation    Asymptomatic    [id number] score 3 (gender, age, hypertension)    Continue Coumadin for anticoagulation     Patient has declined watchman device      2. Hypertension    Controlled with home blood pressure readings of 130 systolic    Continue amlodipine    Consider increasing amlodipine, if needed         Thank you for allowing me to participate in this delightful patient's care.      This note was completed in part using Dragon voice recognition software. Although reviewed after completion, some word and grammatical errors may occur.    Trudy Rodriguez, APRN, CNP           Data:   All laboratory data reviewed

## 2018-07-30 NOTE — LETTER
7/30/2018    LEE Winkler MD  [address] [facility] [address]    RE: Pooja Swartz       Dear Colleague,    I had the pleasure of seeing Pooja Swartz in the [facility].    Cardiology Clinic Progress Note  Pooja Swartz MRN# [id number]   YOB: 1925 Age: 89 year old     Reason For Visit:  Annual visit   Primary Cardiologist:   Dr. Mendoza          History of Presenting Illness:    Pooja Swartz is a pleasant 89 year old patient with a past cardiac history significant for paroxysmal atrial fibrillation (on Coumadin), previous severe bradycardia with junctional escape rhythm due to high-dose atenolol.    Pt was last seen by Dr. Mendoza on 3/7/2017.  The patient was noted to have imbalance issues with history of a fall.  She had previously been on Eliquis which was changed to Coumadin after the fall.  Zio patch was recommended to assess for any arrhythmia that may be causing her imbalance.  Because of her risk of intracranial bleed with her history of falls, watchman was discussed.  Zio patch 3/7/2017 showed no bradycardia or atrial fibrillation, she was noted to have one 13 beat run of VT and 13 runs of atrial tachycardia up to 18 seconds.  No rhythm cause for her imbalance was seen.  These results were discussed with the patient in a telephone encounter from 3/28/2018 where the patient declined proceeding with watchman.     Pt presents today for annual follow-up.  She has been doing well from a cardiac standpoint, since she was last seen.  She is currently using a walker or cane which has helped with her balance issues.  She has not had any further falls and continues on her warfarin without any problems.  Her blood pressure today in the clinic was 149/66 when I rechecked it.  She reports home blood pressure readings of 130s systolic and had an office visit on 7/16/2018 with a blood pressure reading of 126/60.  Should these be elevated at home, she will call the  clinic.  She continues to live independently and drives herself.  She is also able to continue to visit her handicapped son in the group home.  Patient reports no chest pain, shortness of breath, PND, orthopnea, presyncope, syncope, edema, heart racing, or palpitations.    Current Cardiac Medications   Amlodipine 7.5 mg daily  Coumadin                    Assessment and Plan:     Plan  1.  Continue current medications  2.  Follow-up with Dr. Mendoza in 1 year      1. Paroxysmal atrial fibrillation    Asymptomatic    [id number] score 3 (gender, age, hypertension)    Continue Coumadin for anticoagulation     Patient has declined watchman device      2. Hypertension    Controlled with home blood pressure readings of 130 systolic    Continue amlodipine    Consider increasing amlodipine, if needed         Thank you for allowing me to participate in this delightful patient's care.      This note was completed in part using Dragon voice recognition software. Although reviewed after completion, some word and grammatical errors may occur.    Trudy Rodriguez, APRN, CNP           Data:   All laboratory data reviewed        HPI and Plan:   See dictation    Orders Placed This Encounter   Procedures     Follow-Up with Cardiologist       Orders Placed This Encounter   Medications     psyllium (METAMUCIL) 58.6 % POWD     Sig: Take by mouth daily       There are no discontinued medications.      Encounter Diagnoses   Name Primary?     Paroxysmal atrial fibrillation (H) Yes     Benign essential hypertension        CURRENT MEDICATIONS:  Current Outpatient Prescriptions   Medication Sig Dispense Refill     allopurinol (ZYLOPRIM) 100 MG tablet Take 1 tablet (100 mg) by mouth daily 90 tablet 3     amLODIPine (NORVASC) 5 MG tablet Take 1.5 tablets (7.5 mg) by mouth daily 135 tablet 3     brimonidine (ALPHAGAN-P) 0.15 % ophthalmic solution Place 1 drop Into the left eye 2 times daily        Calcium-Vitamin D 600-200 MG-UNIT  TABS Take 1 tablet by mouth 2 times daily       COSOPT 2-0.5 % OP SOLN 1 DROP INTO Left Eye twice daily       Fexofenadine HCl (ALLEGRA PO) Take 180 mg by mouth daily        latanoprost (XALATAN) 0.005 % ophthalmic solution Place 1 drop into both eyes At Bedtime        miconazole (MICATIN; MICRO GUARD) 2 % powder Apply topically 2 times daily Reported on 2/13/2017       ONE-A-DAY WOMENS OR TABS 1 TABLET ORALLY DAILY       Probiotic Product (PROBIOTIC DAILY PO) Take 1 capsule by mouth daily        psyllium (METAMUCIL) 58.6 % POWD Take by mouth daily       traMADol (ULTRAM) 50 MG tablet Take 1 tablet (50 mg) by mouth every 6 hours as needed 50 tablet 1     triamcinolone (KENALOG) 0.1 % ointment Apply  topically 3 times daily. Apply sparingly to affected area. 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, including hip      Other specified glaucoma      Renal insufficiency      Sinus node dysfunction (H)      Unspecified essential hypertension        PAST SURGICAL HISTORY:  Past Surgical History:   Procedure Laterality Date     ARTHROPLASTY REVISION HIP Left 8/19/2016    Procedure: ARTHROPLASTY REVISION HIP;  Surgeon: Kael Rojas MD;  [facility]     C PELVIS/HIP JOINT SURGERY UNLISTED       C SHOULDER SURG PROC UNLISTED       C STOMACH SURGERY PROCEDURE UNLISTED       HC VASCULAR SURGERY PROCEDURE UNLIST       OPEN REDUCTION INTERNAL FIXATION FEMUR  PROXIMAL Left 8/19/2016    Procedure: OPEN REDUCTION INTERNAL FIXATION FEMUR PROXIMAL;  Surgeon: Kael Rojas MD;  [facility]     SURGICAL HISTORY OF -   11/1992    skin cancer, nose     SURGICAL HISTORY OF -   1978    right, vein stripping     SURGICAL HISTORY OF -   1968    breast biopsy     SURGICAL HISTORY OF -   1993    laparoscopic cholecystectomy     SURGICAL HISTORY OF -       tonsillectomy and adenoidectomy     SURGICAL HISTORY OF -   04/2004    left cataract     SURGICAL HISTORY OF -   08/09/2004    right total shoulder arthroplasty       FAMILY HISTORY:  Family History   Problem Relation Age of Onset     HEART DISEASE Mother      CHF     Hypertension Mother      Hypertension Father      Hypertension Maternal Grandmother      Hypertension Maternal Grandfather      Hypertension Son      HEART DISEASE Son      MI       SOCIAL HISTORY:  Social History     Social History     Marital status:      Spouse name: N/A     Number of children: N/A     Years of education: N/A     Social History Main Topics     Smoking status: Never Smoker     Smokeless tobacco: Never Used     Alcohol use Yes      Comment: rare     Drug use: No     Sexual activity: Not Currently     Birth control/ protection: None     Other Topics Concern     Parent/Sibling W/ Cabg, Mi Or Angioplasty Before 65f 55m? Yes     Social History Narrative       Review of Systems:  Skin:  Negative       Eyes:  Positive for glasses;glaucoma    ENT:  Positive for hoarseness    Respiratory:  Negative       Cardiovascular:    Positive for;lightheadedness    Gastroenterology: Negative      Genitourinary:  Negative      Musculoskeletal:  Positive for joint pain;joint swelling;joint stiffness    Neurologic:  Positive for numbness or tingling of feet    Psychiatric:  Negative      Heme/Lymph/Imm:  Negative      Endocrine:  Negative        Physical Exam:  Vitals: /66  Pulse 72  Wt 58 kg (127 lb 12.8 oz)  LMP 07/07/1960  SpO2 99%  BMI 24.96  kg/m2    Constitutional:  cooperative, alert and oriented, well developed, well nourished, in no acute distress frail      Skin:  warm and dry to the touch          Head:  normocephalic        Eyes:  sclera white        Lymph:      ENT:  no pallor or cyanosis        Neck:  no stiffness        Respiratory:  normal breath sounds, clear to auscultation, normal A-P diameter, normal symmetry, normal respiratory excursion, no use of accessory muscles         Cardiac: regular rhythm, normal S1/S2, no S3 or S4, apical impulse not displaced, no murmurs, gallops or rubs                pulses full and equal                                        GI:  abdomen soft        Extremities and Muscular Skeletal:  no edema              Neurological:  affect appropriate;no gross motor deficits   uslng walker    Psych:  Alert and Oriented x 3        Thank you for allowing me to participate in the care of your patient.    Sincerely,     REGIS Dominguez [facility]

## 2018-07-30 NOTE — MR AVS SNAPSHOT
After Visit Summary   7/30/2018    Pooja Swartz    MRN: [id number]           Patient Information     Date Of Birth          9/1/1925        Visit Information        Provider Department      7/30/2018 1:30 PM Trudy Pelaez APRN CNP [facility]        Care Instructions    Thank you for your [facility] visit today. Your provider has recommended the following:  Medication Changes:  No changes   Recommendations:  Call the clinic if your blood pressure is consistently greater than 140/90.     Follow-up:  See Dr. Mendoza for cardiology follow up in 1 year.  Call [phone] two months prior to request date to schedule any future appointments.  Reminder:  1. Please bring in all current medications, over the counter supplements and vitamin bottles to your next appointment.               [facility]~[address]~[place], [place]~[address]  Questions about your visit today?   Call your Cardiology Clinic RN's-Irma Clements and/or Dianne Ash at [phone].            Follow-ups after your visit        Your next 10 appointments already scheduled     Aug 14, 2018  9:00 AM CDT   Anticoagulation Visit with CL ANTI COAG   [facility] ([facility])    [address] [facility] [address] [phone]              Who to contact     If you have questions or need follow up information about today's clinic visit or your schedule please contact [facility] directly at [phone].  Normal or non-critical lab and imaging results will be communicated to you by MyChart, letter or phone within 4 business days after the clinic has received the results. If you do not hear from us within 7 days, please contact the clinic through MyChart or phone. If you have a critical or abnormal lab result, we will notify you by phone as  soon as possible.  Submit refill requests through [employer] or call your pharmacy and they will forward the refill request to us. Please allow 3 business days for your refill to be completed.          Additional Information About Your Visit        Care EveryWhere ID     This is your Care EveryWhere ID. This could be used by other organizations to access your [place] medical records  [id number]        Your Vitals Were     Pulse Last Period Pulse Oximetry BMI (Body Mass Index)          72 07/07/1960 99% 24.96 kg/m2         Blood Pressure from Last 3 Encounters:   07/30/18 158/70   07/16/18 136/60   05/14/18 142/62    Weight from Last 3 Encounters:   07/30/18 58 kg (127 lb 12.8 oz)   07/16/18 58.3 kg (128 lb 8 oz)   05/14/18 60.3 kg (133 lb)              Today, you had the following     No orders found for display       Primary Care Provider Office Phone # Fax #    R Marv Winkler [phone] [address] [address]        Equal Access to Services     [facility] : Hadii aad ku hadasho Soomaali, waaxda luqadaha, qaybta kaalmada adeegyada, waxstanley tracy hayashleigh mcrae . So [facility] [phone].    ATENCIÓN: Si habla español, tiene a sanchez disposición servicios gratuitos de asistencia lingüística. Llame al [phone].    We comply with applicable federal civil rights laws and [place] laws. We do not discriminate on the basis of race, color, national origin, age, disability, sex, sexual orientation, or gender identity.            Thank you!     Thank you for choosing [facility]  for your care. Our goal is always to provide you with excellent care. Hearing back from our patients is one way we can continue to improve our services. Please take a few minutes to complete the written survey that you may receive in the mail after your visit with us. Thank you!             Your Updated Medication List - Protect others around you: Learn  how to safely use, store and throw away your medicines at www.disposemymeds.org.          This list is accurate as of 7/30/18  2:12 PM.  Always use your most recent med list.                   Brand Name Dispense Instructions for use Diagnosis    ALLEGRA PO      Take 180 mg by mouth daily        allopurinol 100 MG tablet    ZYLOPRIM    90 tablet    Take 1 tablet (100 mg) by mouth daily    Chronic tophaceous gout, Contusion of sacrum, initial encounter       amLODIPine 5 MG tablet    NORVASC    135 tablet    Take 1.5 tablets (7.5 mg) by mouth daily    Hypertension goal BP (blood pressure) < 140/90       brimonidine 0.15 % ophthalmic solution    ALPHAGAN-P     Place 1 drop Into the left eye 2 times daily        Calcium-Vitamin D 600-200 MG-UNIT Tabs      Take 1 tablet by mouth 2 times daily        COSOPT 2-0.5 % ophthalmic solution   Generic drug:  dorzolamide-timolol      1 DROP INTO Left Eye twice daily        latanoprost 0.005 % ophthalmic solution    XALATAN     Place 1 drop into both eyes At Bedtime        miconazole 2 % powder    MICATIN; MICRO GUARD     Apply topically 2 times daily Reported on 2/13/2017        ONE-A-DAY WITHIN Tabs      1 TABLET ORALLY DAILY        PROBIOTIC DAILY PO      Take 1 capsule by mouth daily        psyllium 58.6 % Powd    METAMUCIL     Take by mouth daily        traMADol 50 MG tablet    ULTRAM    50 tablet    Take 1 tablet (50 mg) by mouth every 6 hours as needed    Contusion of sacrum, initial encounter       triamcinolone 0.1 % ointment    KENALOG    30 g    Apply  topically 3 times daily. Apply sparingly to affected area.    Nummular eczema       * Warfarin Therapy Reminder      1 each continuous prn    Closed displaced comminuted fracture of shaft of left femur, initial encounter (H)       * warfarin 2 MG tablet    COUMADIN    150 tablet    Take 2 mg on  Wed; 3 mg all other days or as directed by the Anticoagulation Clinic    Persistent atrial fibrillation (H), Long-term  (current) use of anticoagulants       * Notice:  This list has 2 medication(s) that are the same as other medications prescribed for you. Read the directions carefully, and ask your doctor or other care provider to review them with you.

## 2018-07-30 NOTE — PROGRESS NOTES
HPI and Plan:   See dictation    Orders Placed This Encounter   Procedures     Follow-Up with Cardiologist       Orders Placed This Encounter   Medications     psyllium (METAMUCIL) 58.6 % POWD     Sig: Take by mouth daily       There are no discontinued medications.      Encounter Diagnoses   Name Primary?     Paroxysmal atrial fibrillation (H) Yes     Benign essential hypertension        CURRENT MEDICATIONS:  Current Outpatient Prescriptions   Medication Sig Dispense Refill     allopurinol (ZYLOPRIM) 100 MG tablet Take 1 tablet (100 mg) by mouth daily 90 tablet 3     amLODIPine (NORVASC) 5 MG tablet Take 1.5 tablets (7.5 mg) by mouth daily 135 tablet 3     brimonidine (ALPHAGAN-P) 0.15 % ophthalmic solution Place 1 drop Into the left eye 2 times daily        Calcium-Vitamin D 600-200 MG-UNIT TABS Take 1 tablet by mouth 2 times daily       COSOPT 2-0.5 % OP SOLN 1 DROP INTO Left Eye twice daily       Fexofenadine HCl (ALLEGRA PO) Take 180 mg by mouth daily        latanoprost (XALATAN) 0.005 % ophthalmic solution Place 1 drop into both eyes At Bedtime        miconazole (MICATIN; MICRO GUARD) 2 % powder Apply topically 2 times daily Reported on 2/13/2017       ONE-A-DAY WOMENS OR TABS 1 TABLET ORALLY DAILY       Probiotic Product (PROBIOTIC DAILY PO) Take 1 capsule by mouth daily        psyllium (METAMUCIL) 58.6 % POWD Take by mouth daily       traMADol (ULTRAM) 50 MG tablet Take 1 tablet (50 mg) by mouth every 6 hours as needed 50 tablet 1     triamcinolone (KENALOG) 0.1 % ointment Apply  topically 3 times daily. Apply sparingly to affected area. 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, including hip      Other specified glaucoma      Renal insufficiency      Sinus node dysfunction (H)      Unspecified essential hypertension        PAST SURGICAL HISTORY:  Past Surgical History:   Procedure Laterality Date     ARTHROPLASTY REVISION HIP Left 8/19/2016    Procedure: ARTHROPLASTY REVISION HIP;  Surgeon: Kael Rojas MD;  [facility] OR     C PELVIS/HIP JOINT SURGERY UNLISTED       C SHOULDER SURG PROC UNLISTED       C STOMACH SURGERY PROCEDURE UNLISTED       HC VASCULAR SURGERY PROCEDURE UNLIST       OPEN REDUCTION INTERNAL FIXATION FEMUR PROXIMAL Left 8/19/2016    Procedure: OPEN REDUCTION INTERNAL FIXATION FEMUR PROXIMAL;  Surgeon: Kael Rojas MD;  [facility] OR     SURGICAL HISTORY OF -   11/1992    skin cancer, nose     SURGICAL HISTORY OF -   1978    right, vein stripping     SURGICAL HISTORY OF -   1968    breast biopsy     SURGICAL HISTORY OF -   1993    laparoscopic cholecystectomy     SURGICAL HISTORY OF -       tonsillectomy and adenoidectomy     SURGICAL HISTORY OF -   04/2004    left cataract     SURGICAL HISTORY OF -   08/09/2004    right total shoulder arthroplasty       FAMILY HISTORY:  Family History   Problem Relation Age of Onset     HEART DISEASE Mother      CHF     Hypertension Mother      Hypertension Father      Hypertension Maternal Grandmother      Hypertension Maternal Grandfather      Hypertension Son      HEART DISEASE Son      MI       SOCIAL HISTORY:  Social History     Social History     Marital status:      Spouse name: N/A     Number of children: N/A     Years of education: N/A     Social History Main Topics     Smoking status: Never Smoker     Smokeless tobacco: Never Used     Alcohol use Yes      Comment: rare      Drug use: No     Sexual activity: Not Currently     Birth control/ protection: None     Other Topics Concern     Parent/Sibling W/ Cabg, Mi Or Angioplasty Before 65f 55m? Yes     Social History Narrative       Review of Systems:  Skin:  Negative       Eyes:  Positive for glasses;glaucoma    ENT:  Positive for hoarseness    Respiratory:  Negative       Cardiovascular:    Positive for;lightheadedness    Gastroenterology: Negative      Genitourinary:  Negative      Musculoskeletal:  Positive for joint pain;joint swelling;joint stiffness    Neurologic:  Positive for numbness or tingling of feet    Psychiatric:  Negative      Heme/Lymph/Imm:  Negative      Endocrine:  Negative        Physical Exam:  Vitals: /66  Pulse 72  Wt 58 kg (127 lb 12.8 oz)  LMP 07/07/1960  SpO2 99%  BMI 24.96 kg/m2    Constitutional:  cooperative, alert and oriented, well developed, well nourished, in no acute distress frail      Skin:  warm and dry to the touch          Head:  normocephalic        Eyes:  sclera white        Lymph:      ENT:  no pallor or cyanosis        Neck:  no stiffness        Respiratory:  normal breath sounds, clear to auscultation, normal A-P diameter, normal symmetry, normal respiratory excursion, no use of accessory muscles         Cardiac: regular rhythm, normal S1/S2, no S3 or S4, apical impulse not displaced, no murmurs, gallops or rubs                pulses full and equal                                        GI:  abdomen soft        Extremities and Muscular Skeletal:  no edema              Neurological:  affect appropriate;no gross motor deficits   uslng walker    Psych:  Alert and Oriented x 3        CC  No referring provider defined for this encounter.

## 2018-07-30 NOTE — PATIENT INSTRUCTIONS
Thank you for your [facility] visit today. Your provider has recommended the following:  Medication Changes:  No changes   Recommendations:  Call the clinic if your blood pressure is consistently greater than 140/90.     Follow-up:  See Dr. Mendoza for cardiology follow up in 1 year.  Call [phone] two months prior to request date to schedule any future appointments.  Reminder:  1. Please bring in all current medications, over the counter supplements and vitamin bottles to your next appointment.               [facility]  [facility]~[address]~[place], [place]~[address]  Questions about your visit today?   Call your Cardiology Clinic RN's-Irma Clements and/or Dianne Ash at [phone].

## 2018-08-06 ENCOUNTER — TELEPHONE (OUTPATIENT)
Dept: FAMILY MEDICINE | Facility: CLINIC | Age: 83
End: 2018-08-06

## 2018-08-06 NOTE — TELEPHONE ENCOUNTER
"Reason for call:  Patient reporting a symptom    Symptom or request: Pt was put on Metamucil, by Dr. Winkler, 10 days ago .  It is helping to \"form her stools,\" but she still has stomach upset.      Duration (how long have symptoms been present): ongoing    Have you been treated for this before? Yes    Additional comments:     Phone Number patient can be reached at:  Home number on file [phone] (home)    Best Time:  any    Can we leave a detailed message on this number:  YES    Call taken on 8/6/2018 at 8:52 AM by Ashyl Infante    "

## 2018-08-06 NOTE — TELEPHONE ENCOUNTER
I am reluctant to just say she should never eat those meds because they are all healthy foods.  However, she probably would feel better if she avoided those and other foods that are high-fiber.  I would stay with the same dose of Metamucil

## 2018-08-06 NOTE — TELEPHONE ENCOUNTER
Update on starting Metamucil, pt takes to firm up her stools.  Her stools are better, semi-formed but is she eats lettuce,beets,tomatoes,veggies,she has explosive diarrhea.  Can only eat bland diet.  Asking if this is the right med?  Should she increase the dose? Takes 1 TBSP/day.  Advise.  Luciano

## 2018-08-14 ENCOUNTER — ANTICOAGULATION THERAPY VISIT (OUTPATIENT)
Dept: ANTICOAGULATION | Facility: CLINIC | Age: 83
End: 2018-08-14
Payer: COMMERCIAL

## 2018-08-14 DIAGNOSIS — Z79.01 LONG-TERM (CURRENT) USE OF ANTICOAGULANTS: ICD-10-CM

## 2018-08-14 DIAGNOSIS — I48.91 ATRIAL FIBRILLATION (H): ICD-10-CM

## 2018-08-14 LAB — INR POINT OF CARE: 2.9 (ref 0.86–1.14)

## 2018-08-14 PROCEDURE — 36416 COLLJ CAPILLARY BLOOD SPEC: CPT

## 2018-08-14 PROCEDURE — 99207 ZZC NO CHARGE NURSE ONLY: CPT

## 2018-08-14 PROCEDURE — 85610 PROTHROMBIN TIME: CPT | Mod: QW

## 2018-08-14 NOTE — MR AVS SNAPSHOT
Pooja Swartz   8/14/2018 9:00 AM   Anticoagulation Therapy Visit    Description:  89 year old female   Provider:  COLLIN ANTI COAG   Department:  Cl Anticoag           INR as of 8/14/2018     Today's INR 2.9      Anticoagulation Summary as of 8/14/2018     INR goal 2.0-3.0   Today's INR 2.9   Full warfarin instructions 2 mg on Wed; 3 mg all other days   Next INR check 9/18/2018    Indications   Atrial fibrillation (H) [I48.91]  Long-term (current) use of anticoagulants [Z79.01] [Z79.01]         Your next Anticoagulation Clinic appointment(s)     Sep 18, 2018  8:45 AM CDT   Anticoagulation Visit with COLLIN ANTI COAG   [facility] ([facility])    [address]  [facility] [address] [phone]              Contact Numbers     Please call [phone] with any problems or questions regarding your therapy.    If you need to cancel and/or reschedule your appointment please call one of the following numbers:  [facility] - [phone]  [facility] [phone]  [place] - [phone]  [facility] [phone]  [place] - [phone]            August 2018 Details    Sun Mon Tue Wed Thu Fri Sat        1               2               3               4                 5               6               7               8               9               10               11                 12               13               14      3 mg   See details      15      2 mg         16      3 mg         17      3 mg         18      3 mg           19      3 mg         20      3 mg         21      3 mg         22      2 mg         23      3 mg         24      3 mg         25      3 mg           26      3 mg         27      3 mg         28      3 mg         29      2 mg         30      3 mg         31      3 mg           Date Details   08/14 This INR check               How to take your warfarin dose     To take:  2 mg Take 1 of the 2 mg tablets.    To take:  3 mg Take 1.5 of the 2 mg  tablets.           September 2018 Details    Sun Mon Tue Wed Thu Fri Sat           1      3 mg           2      3 mg         3      3 mg         4      3 mg         5      2 mg         6      3 mg         7      3 mg         8      3 mg           9      3 mg         10      3 mg         11      3 mg         12      2 mg         13      3 mg         14      3 mg         15      3 mg           16      3 mg         17      3 mg         18            19               20               21               22                 23               24               25               26               27               28               29                 30                      Date Details   No additional details    Date of next INR:  9/18/2018         How to take your warfarin dose     To take:  2 mg Take 1 of the 2 mg tablets.    To take:  3 mg Take 1.5 of the 2 mg tablets.

## 2018-08-14 NOTE — PROGRESS NOTES
ANTICOAGULATION FOLLOW-UP CLINIC VISIT    Patient Name:  Pooja Swartz  Date:  8/14/2018  Contact Type:  Face to Face    SUBJECTIVE:     Patient Findings     Positives No Problem Findings    Comments Patient denies missed warfarin doses as well as changes to diet and activity. She states she has been using Metamucil daily to help form stools as Dr. Winkler advised. She believe it helps it a little. Patient advised to continue the same warfarin maintenance dose, INR therapeutic.              OBJECTIVE    INR Protime   Date Value Ref Range Status   08/14/2018 2.9 (A) 0.86 - 1.14 Final       ASSESSMENT / PLAN  INR assessment THER    Recheck INR In: 5 WEEKS    INR Location Clinic      Anticoagulation Summary as of 8/14/2018     INR goal 2.0-3.0   Today's INR 2.9   Warfarin maintenance plan 2 mg (2 mg x 1) on Wed; 3 mg (2 mg x 1.5) all other days   Full warfarin instructions 2 mg on Wed; 3 mg all other days   Weekly warfarin total 20 mg   No change documented Trudy Alex RN   Plan last modified Trudy Alex RN (5/1/2018)   Next INR check 9/18/2018   Priority INR   Target end date Indefinite    Indications   Atrial fibrillation (H) [I48.91]  Long-term (current) use of anticoagulants [Z79.01] [Z79.01]         Anticoagulation Episode Summary     INR check location     Preferred lab     Send INR reminders to  ANTICOAG POOL    Comments * warfarin 2mg tabs      Anticoagulation Care Providers     Provider Role Specialty Phone number    Peterson, LEE Fair MD [facility] [phone]            See the Encounter Report to view Anticoagulation Flowsheet and Dosing Calendar (Go to Encounters tab in chart review, and find the Anticoagulation Therapy Visit)        Trudy Alex RN

## 2018-09-18 ENCOUNTER — ANTICOAGULATION THERAPY VISIT (OUTPATIENT)
Dept: ANTICOAGULATION | Facility: CLINIC | Age: 83
End: 2018-09-18
Payer: COMMERCIAL

## 2018-09-18 DIAGNOSIS — I48.91 ATRIAL FIBRILLATION (H): ICD-10-CM

## 2018-09-18 DIAGNOSIS — Z79.01 LONG-TERM (CURRENT) USE OF ANTICOAGULANTS: ICD-10-CM

## 2018-09-18 LAB — INR POINT OF CARE: 2 (ref 0.86–1.14)

## 2018-09-18 PROCEDURE — 36416 COLLJ CAPILLARY BLOOD SPEC: CPT

## 2018-09-18 PROCEDURE — 99207 ZZC NO CHARGE NURSE ONLY: CPT

## 2018-09-18 PROCEDURE — 85610 PROTHROMBIN TIME: CPT | Mod: QW

## 2018-09-18 NOTE — MR AVS SNAPSHOT
Pooja Swartz   9/18/2018 8:45 AM   Anticoagulation Therapy Visit    Description:  89 year old female   Provider:  COLLIN ANTI COAG   Department:  Cl Anticoag           INR as of 9/18/2018     Today's INR 2.0      Anticoagulation Summary as of 9/18/2018     INR goal 2.0-3.0   Today's INR 2.0   Full warfarin instructions 2 mg on Wed; 3 mg all other days   Next INR check 10/23/2018    Indications   Atrial fibrillation (H) [I48.91]  Long-term (current) use of anticoagulants [Z79.01] [Z79.01]         Your next Anticoagulation Clinic appointment(s)     Oct 23, 2018  8:45 AM CDT   Anticoagulation Visit with COLLIN ANTI COAROSLYN   [facility]    [address] [facility] [address] [phone]              Contact Numbers     Please call [phone] with any problems or questions regarding your therapy.    If you need to cancel and/or reschedule your appointment please call one of the following numbers:  [facility] - [phone]  [facility] [phone]  [place] - [phone]  [place] - [phone]  [place] - [phone]            September 2018 Details    Sun Mon Tue Wed Thu Fri Sat           1                 2               3               4               5               6               7               8                 9               10               11               12               13               14               15                 16               17               18      3 mg   See details      19      2 mg         20      3 mg         21      3 mg         22      3 mg           23      3 mg         24      3 mg         25      3 mg         26      2 mg         27      3 mg         28      3 mg         29      3 mg           30      3 mg                Date Details   09/18 This INR check               How to take your warfarin dose     To take:  2 mg Take 1 of the 2 mg tablets.    To take:  3 mg Take 1.5 of the 2 mg tablets.           October  2018 Details    Sun Mon Tue Wed Thu Fri Sat      1      3 mg         2      3 mg         3      2 mg         4      3 mg         5      3 mg         6      3 mg           7      3 mg         8      3 mg         9      3 mg         10      2 mg         11      3 mg         12      3 mg         13      3 mg           14      3 mg         15      3 mg         16      3 mg         17      2 mg         18      3 mg         19      3 mg         20      3 mg           21      3 mg         22      3 mg         23            24               25               26               27                 28               29               30               31                   Date Details   No additional details    Date of next INR:  10/23/2018         How to take your warfarin dose     To take:  2 mg Take 1 of the 2 mg tablets.    To take:  3 mg Take 1.5 of the 2 mg tablets.

## 2018-09-18 NOTE — PROGRESS NOTES
ANTICOAGULATION FOLLOW-UP CLINIC VISIT    Patient Name:  Pooja Swartz  Date:  9/18/2018  Contact Type:  Face to Face    SUBJECTIVE:     Patient Findings     Positives No Problem Findings    Comments Patient denies missed warfarin doses changes to diet, activity or medications, states took warfarin as instructed. No signs or symptoms of increased bruising or bleeding reported. Patient advised to continue the same warfarin maintenance dose, INR therapeutic.                OBJECTIVE    INR Protime   Date Value Ref Range Status   09/18/2018 2.0 (A) 0.86 - 1.14 Final       ASSESSMENT / PLAN  INR assessment THER    Recheck INR In: 5 WEEKS    INR Location Clinic      Anticoagulation Summary as of 9/18/2018     INR goal 2.0-3.0   Today's INR 2.0   Warfarin maintenance plan 2 mg (2 mg x 1) on Wed; 3 mg (2 mg x 1.5) all other days   Full warfarin instructions 2 mg on Wed; 3 mg all other days   Weekly warfarin total 20 mg   Plan last modified Trudy Alex RN (5/1/2018)   Next INR check 10/23/2018   Priority INR   Target end date Indefinite    Indications   Atrial fibrillation (H) [I48.91]  Long-term (current) use of anticoagulants [Z79.01] [Z79.01]         Anticoagulation Episode Summary     INR check location     Preferred lab     Send INR reminders to  ANTICOAG POOL    Comments * warfarin 2mg tabs      Anticoagulation Care Providers     Provider Role Specialty Phone number    Peterson, LEE Fair MD [facility] [phone]            See the Encounter Report to view Anticoagulation Flowsheet and Dosing Calendar (Go to Encounters tab in chart review, and find the Anticoagulation Therapy Visit)        Trudy Alex RN

## 2018-10-23 ENCOUNTER — OFFICE VISIT (OUTPATIENT)
Dept: FAMILY MEDICINE | Facility: CLINIC | Age: 83
End: 2018-10-23
Payer: COMMERCIAL

## 2018-10-23 ENCOUNTER — ANTICOAGULATION THERAPY VISIT (OUTPATIENT)
Dept: ANTICOAGULATION | Facility: CLINIC | Age: 83
End: 2018-10-23
Payer: COMMERCIAL

## 2018-10-23 VITALS
DIASTOLIC BLOOD PRESSURE: 60 MMHG | OXYGEN SATURATION: 98 % | SYSTOLIC BLOOD PRESSURE: 140 MMHG | RESPIRATION RATE: 16 BRPM | WEIGHT: 118.5 LBS | HEART RATE: 60 BPM | BODY MASS INDEX: 23.26 KG/M2 | HEIGHT: 60 IN | TEMPERATURE: 97.1 F

## 2018-10-23 DIAGNOSIS — A04.72 C. DIFFICILE COLITIS: Primary | ICD-10-CM

## 2018-10-23 DIAGNOSIS — I48.91 ATRIAL FIBRILLATION (H): ICD-10-CM

## 2018-10-23 DIAGNOSIS — R19.7 DIARRHEA OF PRESUMED INFECTIOUS ORIGIN: ICD-10-CM

## 2018-10-23 LAB — INR POINT OF CARE: 2.7 (ref 0.86–1.14)

## 2018-10-23 PROCEDURE — 99207 ZZC NO CHARGE NURSE ONLY: CPT

## 2018-10-23 PROCEDURE — 36416 COLLJ CAPILLARY BLOOD SPEC: CPT

## 2018-10-23 PROCEDURE — 99214 OFFICE O/P EST MOD 30 MIN: CPT | Performed by: FAMILY MEDICINE

## 2018-10-23 PROCEDURE — 85610 PROTHROMBIN TIME: CPT | Mod: QW

## 2018-10-23 RX ORDER — METRONIDAZOLE 500 MG/1
500 TABLET ORAL 3 TIMES DAILY
Qty: 21 TABLET | Refills: 0 | Status: SHIPPED | OUTPATIENT
Start: 2018-10-23 | End: 2018-12-13

## 2018-10-23 NOTE — PROGRESS NOTES
Subjective:  Pooja Swartz is a 89 year old female   Chief Complaint   Patient presents with     Patient Request     pt. is here today for a follow up on diarrhea. pt. was seen in clinic with Dr. Winkler on 7/16/2018 and put on metamucil. pt. states she is still having problems.      She has had this persistent diarrhea which did develop a course of antibiotics after hip fracture.  When I saw her in July we tested for C. difficile and other enteric pathogens and all came back negative.  Therefore I recommended the Metamucil which seemed to work initially to form up her stools.  Now it is not working as well and she still has episodes of significant diarrhea where it makes it difficult to leave the home.  Sometimes is quite urgent and she has difficulty getting to the bathroom on time        Encounter Diagnoses   Name Primary?     C. difficile colitis Yes     Diarrhea of presumed infectious origin        ROS:other than noted above, general, HEENT, respiratory, cardiac, gastrointestinal systems are negative    Medical, surgical, social, and family histories, medications and allergies reviewed and updated.    Objective:  Exam:    GENERAL APPEARANCE ADULT: Alert, no acute distress  EYES: PERRL, EOM normal, conjunctiva and lids normal  HENT: Ears and TMs normal, oral mucosa and posterior oropharynx normal  RESP: lungs clear to auscultation   CV: normal rate, regular rhythm, no murmur or gallop  ABDOMEN: soft, no organomegaly, masses or tenderness  MS: extremities normal, no peripheral edema      ASSESSMENT:  1. C. difficile colitis    2. Diarrhea of presumed infectious origin      Even though the last stool test was negative for C. difficile, the history is consistent with this in the stool tests are not perfect and will miss the infection occasionally.  Because of the persistence of the symptoms will treat her empirically with a course of metronidazole and see if that helps    PLAN:  Orders Placed This Encounter      metroNIDAZOLE (FLAGYL) 500 MG tablet       Patient Instructions   Take the metronidazole 3 times daily for 7 days.    Continue the probiotic and the metamucil as you have been

## 2018-10-23 NOTE — MR AVS SNAPSHOT
Pooja Swartz   10/23/2018 8:45 AM   Anticoagulation Therapy Visit    Description:  89 year old female   Provider:  COLLIN ANTI COAG   Department:  Cl Anticoag           INR as of 10/23/2018     Today's INR 2.7      Anticoagulation Summary as of 10/23/2018     INR goal 2.0-3.0   Today's INR 2.7   Full warfarin instructions 2 mg on Wed; 3 mg all other days   Next INR check 12/4/2018    Indications   Atrial fibrillation (H) [I48.91]  Long-term (current) use of anticoagulants [Z79.01] [Z79.01]         Your next Anticoagulation Clinic appointment(s)     Dec 04, 2018  8:45 AM CST   [facility] COLLIN ANTI COAG   [facility]    [address] [facility] [address] [phone]              Contact Numbers     Please call [phone] with any problems or questions regarding your therapy.    If you need to cancel and/or reschedule your appointment please call one of the following numbers:  [facility] [phone]  [facility] [phone]  [place] - [phone]  [facility] [phone]  [place] - [phone]            October 2018 Details    Sun Mon Tue Wed Thu Fri Sat      1               2               3               4               5               6                 7               8               9               10               11               12               13                 14               15               16               17               18               19               20                 21               22               23      3 mg   See details      24      2 mg         25      3 mg         26      3 mg         27      3 mg           28      3 mg         29      3 mg         30      3 mg         31      2 mg             Date Details   10/23 This INR check               How to take your warfarin dose     To take:  2 mg Take 1 of the 2 mg tablets.    To take:  3 mg Take 1.5 of the 2 mg tablets.           November 2018  Details    Sun Mon Tue Wed Thu Fri Sat         1      3 mg         2      3 mg         3      3 mg           4      3 mg         5      3 mg         6      3 mg         7      2 mg         8      3 mg         9      3 mg         10      3 mg           11      3 mg         12      3 mg         13      3 mg         14      2 mg         15      3 mg         16      3 mg         17      3 mg           18      3 mg         19      3 mg         20      3 mg         21      2 mg         22      3 mg         23      3 mg         24      3 mg           25      3 mg         26      3 mg         27      3 mg         28      2 mg         29      3 mg         30      3 mg           Date Details   No additional details            How to take your warfarin dose     To take:  2 mg Take 1 of the 2 mg tablets.    To take:  3 mg Take 1.5 of the 2 mg tablets.           December 2018 Details    Sun Mon Tue Wed Thu Fri Sat           1      3 mg           2      3 mg         3      3 mg         4            5               6               7               8                 9               10               11               12               13               14               15                 16               17               18               19               20               21               22                 23               24               25               26               27               28               29                 30               31                     Date Details   No additional details    Date of next INR:  12/4/2018         How to take your warfarin dose     To take:  3 mg Take 1.5 of the 2 mg tablets.

## 2018-10-23 NOTE — PROGRESS NOTES
ANTICOAGULATION FOLLOW-UP CLINIC VISIT    Patient Name:  Pooja Swartz  Date:  10/23/2018  Contact Type:  Face to Face    SUBJECTIVE:     Patient Findings     Positives Inflammation (Diarrhea episode yesterday. Pt has an appt today with PCP.)    Comments No changes in medications, activity, or diet noted. No bleeding or increased bruising noted. Took warfarin as prescribed.  Patient is to continue maintenance warfarin plan, and check INR in 6 weeks.  Patient verbalizes understanding and agrees to plan. No further questions or concerns.           OBJECTIVE    INR Protime   Date Value Ref Range Status   10/23/2018 2.7 (A) 0.86 - 1.14 Final       ASSESSMENT / PLAN  INR assessment THER    Recheck INR In: 6 WEEKS    INR Location Clinic      Anticoagulation Summary as of 10/23/2018     INR goal 2.0-3.0   Today's INR 2.7   Warfarin maintenance plan 2 mg (2 mg x 1) on Wed; 3 mg (2 mg x 1.5) all other days   Full warfarin instructions 2 mg on Wed; 3 mg all other days   Weekly warfarin total 20 mg   No change documented Monica Mg RN   Plan last modified Trudy Alex RN (5/1/2018)   Next INR check 12/4/2018   Priority INR   Target end date Indefinite    Indications   Atrial fibrillation (H) [I48.91]  Long-term (current) use of anticoagulants [Z79.01] [Z79.01]         Anticoagulation Episode Summary     INR check location     Preferred lab     Send INR reminders to CL ANTICOAG POOL    Comments * warfarin 2mg tabs      Anticoagulation Care Providers     Provider Role Specialty Phone number    Peetrson, LEE Fair MD [facility] [phone]            See the Encounter Report to view Anticoagulation Flowsheet and Dosing Calendar (Go to Encounters tab in chart review, and find the Anticoagulation Therapy Visit)        Monica Mg RN

## 2018-10-23 NOTE — MR AVS SNAPSHOT
After Visit Summary   10/23/2018    Pooja Swartz    MRN: [id number]           Patient Information     Date Of Birth          9/1/1925        Visit Information        Provider Department      10/23/2018 2:20 PM Post, LEE Fair MD [facility]        Today's Diagnoses     C. difficile colitis    -  1      Care Instructions    Take the metronidazole 3 times daily for 7 days.    Continue the probiotic and the metamucil as you have been          Follow-ups after your visit        Your next 10 appointments already scheduled     Dec 04, 2018  8:45 AM CST   [facility]agulation [facility] with CL ANTI COAG   [facility]    [address]  [facility] [address] [phone]              Who to contact     If you have questions or need follow up information about today's clinic visit or your schedule please contact [facility] directly at [phone].  Normal or non-critical lab and imaging results will be communicated to you by MyChart, letter or phone within 4 business days after the clinic has received the results. If you do not hear from us within 7 days, please contact the clinic through MyChart or phone. If you have a critical or abnormal lab result, we will notify you by phone as soon as possible.  Submit refill requests through [employer] or call your pharmacy and they will forward the refill request to us. Please allow 3 business days for your refill to be completed.          Additional Information About Your Visit        Care EveryWhere ID     This is your Care EveryWhere ID. This could be used by other organizations to access your [place] medical records  [id number]        Your Vitals Were     Pulse Temperature Respirations Height Last Period Pulse Oximetry    60 97.1  F (36.2  C) (Tympanic) 16 5' (1.524 m) 07/07/1960 98%    BMI (Body Mass Index)                   23.14 kg/m2            Blood Pressure from Last 3  Encounters:   10/23/18 140/60   07/30/18 149/66   07/16/18 136/60    Weight from Last 3 Encounters:   10/23/18 118 lb 8 oz (53.8 kg)   07/30/18 127 lb 12.8 oz (58 kg)   07/16/18 128 lb 8 oz (58.3 kg)              Today, you had the following     No orders found for display         Today's Medication Changes          These changes are accurate as of 10/23/18  2:47 PM.  If you have any questions, ask your nurse or doctor.               Start taking these medicines.        Dose/Directions    metroNIDAZOLE 500 MG tablet   Commonly known as:  FLAGYL   Used for:  C. difficile colitis   Started by:  LEE Winkler MD        Dose:  500 mg   Take 1 tablet (500 mg) by mouth 3 times daily   Quantity:  21 tablet   Refills:  0            Where to get your medicines      These medications were sent to [facility] - - [place] - [address] [address]    Hours:  SHAHAB [place] [facility] Phone:  [phone]     metroNIDAZOLE 500 MG tablet                Primary Care Provider Office Phone # Fax #    LEE Winkler [phone] [address] [facility] [address]        Equal Access to Services     ZOEY BRAY AH: Hadii roverto ku hadasho Soomaali, waaxda luqadaha, qaybta kaalmada adeegyada, waxay idiin haynorberton vargas diaz. So [facility] [phone].    ATENCIÓN: Si habla español, tiene a sanchez disposición servicios gratuitos de asistencia lingüística. Llame al [phone].    We comply with applicable federal civil rights laws and [place] laws. We do not discriminate on the basis of race, color, national origin, age, disability, sex, sexual orientation, or gender identity.            Thank you!     Thank you for choosing [facility]  for your care. Our goal is always to provide you with excellent care. Hearing back from our patients is one way we can continue to improve our services. Please take a few minutes to  complete the written survey that you may receive in the mail after your visit with us. Thank you!             Your Updated Medication List - Protect others around you: Learn how to safely use, store and throw away your medicines at www.disposemymeds.org.          This list is accurate as of 10/23/18  2:47 PM.  Always use your most recent med list.                   Brand Name Dispense Instructions for use Diagnosis    ALLEGRA PO      Take 180 mg by mouth daily        allopurinol 100 MG tablet    ZYLOPRIM    90 tablet    Take 1 tablet (100 mg) by mouth daily    Chronic tophaceous gout, Contusion of sacrum, initial encounter       amLODIPine 5 MG tablet    NORVASC    135 tablet    Take 1.5 tablets (7.5 mg) by mouth daily    Hypertension goal BP (blood pressure) < 140/90       brimonidine 0.15 % ophthalmic solution    ALPHAGAN-P     Place 1 drop Into the left eye 2 times daily        Calcium-Vitamin D 600-200 MG-UNIT Tabs      Take 1 tablet by mouth 2 times daily        COSOPT 2-0.5 % ophthalmic solution   Generic drug:  dorzolamide-timolol      1 DROP INTO Left Eye twice daily        latanoprost 0.005 % ophthalmic solution    XALATAN     Place 1 drop into both eyes At Bedtime        metroNIDAZOLE 500 MG tablet    FLAGYL    21 tablet    Take 1 tablet (500 mg) by mouth 3 times daily    C. difficile colitis       miconazole 2 % powder    MICATIN; MICRO GUARD     Apply topically 2 times daily Reported on 2/13/2017        ONE-A-DAY WITHIN Tabs      1 TABLET ORALLY DAILY        PROBIOTIC DAILY PO      Take 1 capsule by mouth daily        psyllium 58.6 % Powd    METAMUCIL     Take by mouth daily        traMADol 50 MG tablet    ULTRAM    50 tablet    Take 1 tablet (50 mg) by mouth every 6 hours as needed    Contusion of sacrum, initial encounter       triamcinolone 0.1 % ointment    KENALOG    30 g    Apply  topically 3 times daily. Apply sparingly to affected area.    Nummular eczema       * Warfarin Therapy Reminder      1  each continuous prn    Closed displaced comminuted fracture of shaft of left femur, initial encounter (H)       * warfarin 2 MG tablet    COUMADIN    150 tablet    Take 2 mg on  Wed; 3 mg all other days or as directed by the Anticoagulation Clinic    Persistent atrial fibrillation (H), Long-term (current) use of anticoagulants       * Notice:  This list has 2 medication(s) that are the same as other medications prescribed for you. Read the directions carefully, and ask your doctor or other care provider to review them with you.

## 2018-11-27 ENCOUNTER — OFFICE VISIT (OUTPATIENT)
Dept: FAMILY MEDICINE | Facility: CLINIC | Age: 83
End: 2018-11-27
Payer: COMMERCIAL

## 2018-11-27 VITALS
SYSTOLIC BLOOD PRESSURE: 130 MMHG | RESPIRATION RATE: 18 BRPM | WEIGHT: 115 LBS | HEART RATE: 63 BPM | OXYGEN SATURATION: 96 % | BODY MASS INDEX: 22.58 KG/M2 | HEIGHT: 60 IN | TEMPERATURE: 97.8 F | DIASTOLIC BLOOD PRESSURE: 60 MMHG

## 2018-11-27 DIAGNOSIS — R19.7 DIARRHEA, UNSPECIFIED TYPE: Primary | ICD-10-CM

## 2018-11-27 PROCEDURE — 99213 OFFICE O/P EST LOW 20 MIN: CPT | Performed by: FAMILY MEDICINE

## 2018-11-27 NOTE — MR AVS SNAPSHOT
After Visit Summary   11/27/2018    Pooja Swartz    MRN: [id number]           Patient Information     Date Of Birth          9/1/1925        Visit Information        Provider Department      11/27/2018 10:00 AM Raymundo Robins MD [facility]        Today's Diagnoses     Diarrhea, unspecified type    -  1      Care Instructions          Thank you for choosing [facility].  You may be receiving a survey in the mail from [employer] regarding your visit today.  Please take a few minutes to complete and return the survey to let us know how we are doing.      Our Clinic hours are:  Mondays    7:20 am - 7 pm  Tues - Fri  7:20 am - 5 pm    Clinic Phone: [phone]    The clinic lab opens at 7:30 am Mon - Fri and appointments are required.    [facility] [place]  Ph. [phone]  Monday  8 am - 7pm  Tues - Fri 8 am - 5:30 pm                 Follow-ups after your visit        Additional Services     GASTROENTEROLOGY ADULT REF CONSULT ONLY       Preferred Location: [facility] [place], [place]: [phone]      Please be aware that coverage of these services is subject to the terms and limitations of your health insurance plan.  Call member services at your health plan with any benefit or coverage questions.  Any procedures must be performed at a [place] facility OR coordinated by your clinic's referral office.    Please bring the following with you to your appointment:    (1) Any X-Rays, CTs or MRIs which have been performed.  Contact the facility where they were done to arrange for  prior to your scheduled appointment.    (2) List of current medications   (3) This referral request   (4) Any documents/labs given to you for this referral                  Your next 10 appointments already scheduled     Dec 04, 2018  8:45 AM CST   Anticoagulation Visit with CL ANTI COAG   [facility] ([facility])    [address]  [place]  [place] [address]   [phone]            Dec 13, 2018 10:40 AM CST   SHORT with Amanda Renee MD   [facility] ([facility])    [address] [facility] [id number] [phone]              Who to contact     If you have questions or need follow up information about today's clinic visit or your schedule please contact [facility] directly at [phone].  Normal or non-critical lab and imaging results will be communicated to you by MyChart, letter or phone within 4 business days after the clinic has received the results. If you do not hear from us within 7 days, please contact the clinic through MyChart or phone. If you have a critical or abnormal lab result, we will notify you by phone as soon as possible.  Submit refill requests through [employer] or call your pharmacy and they will forward the refill request to us. Please allow 3 business days for your refill to be completed.          Additional Information About Your Visit        Care EveryWhere ID     This is your Care EveryWhere ID. This could be used by other organizations to access your [facility] medical records  [id number]        Your Vitals Were     Pulse Temperature Respirations Height Last Period Pulse Oximetry    63 97.8  F (36.6  C) 18 5' (1.524 m) 07/07/1960 96%    BMI (Body Mass Index)                   22.46 kg/m2            Blood Pressure from Last 3 Encounters:   11/27/18 130/60   10/23/18 140/60   07/30/18 149/66    Weight from Last 3 Encounters:   11/27/18 115 lb (52.2 kg)   10/23/18 118 lb 8 oz (53.8 kg)   07/30/18 127 lb 12.8 oz (58 kg)              We Performed the Following     GASTROENTEROLOGY ADULT REF CONSULT ONLY        Primary Care Provider Office Phone # Fax #    R Marv Winkler [phone] [address] [facility] [address]        Equal Access to Services     ZOEY BRAY : Hadii roverto Vora, waaxda thais, qaybta tommie  moiz smithshane horowitzaan ah. So [facility] [phone].    ATENCIÓN: Si becky landry, tiene a sanchez disposición servicios gratuitos de asistencia lingüística. Jeff al [phone].    We comply with applicable federal civil rights laws and [place] laws. We do not discriminate on the basis of race, color, national origin, age, disability, sex, sexual orientation, or gender identity.            Thank you!     Thank you for choosing [facility]  for your care. Our goal is always to provide you with excellent care. Hearing back from our patients is one way we can continue to improve our services. Please take a few minutes to complete the written survey that you may receive in the mail after your visit with us. Thank you!             Your Updated Medication List - Protect others around you: Learn how to safely use, store and throw away your medicines at www.disposemymeds.org.          This list is accurate as of 11/27/18 10:47 AM.  Always use your most recent med list.                   Brand Name Dispense Instructions for use Diagnosis    ALLEGRA PO      Take 180 mg by mouth daily        allopurinol 100 MG tablet    ZYLOPRIM    90 tablet    Take 1 tablet (100 mg) by mouth daily    Chronic tophaceous gout, Contusion of sacrum, initial encounter       amLODIPine 5 MG tablet    NORVASC    135 tablet    Take 1.5 tablets (7.5 mg) by mouth daily    Hypertension goal BP (blood pressure) < 140/90       brimonidine 0.15 % ophthalmic solution    ALPHAGAN-P     Place 1 drop Into the left eye 2 times daily        Calcium-Vitamin D 600-200 MG-UNIT Tabs      Take 1 tablet by mouth 2 times daily        COSOPT 2-0.5 % ophthalmic solution   Generic drug:  dorzolamide-timolol      1 DROP INTO Left Eye twice daily        latanoprost 0.005 % ophthalmic solution    XALATAN     Place 1 drop into both eyes At Bedtime        metroNIDAZOLE 500 MG tablet    FLAGYL    21 tablet    Take 1 tablet (500 mg) by  mouth 3 times daily    C. difficile colitis       miconazole 2 % powder    MICATIN; MICRO GUARD     Apply topically 2 times daily Reported on 2/13/2017        ONE-A-DAY WITHIN Tabs      1 TABLET ORALLY DAILY        PROBIOTIC DAILY PO      Take 1 capsule by mouth daily        psyllium 58.6 % Powd    METAMUCIL     Take by mouth daily        traMADol 50 MG tablet    ULTRAM    50 tablet    Take 1 tablet (50 mg) by mouth every 6 hours as needed    Contusion of sacrum, initial encounter       triamcinolone 0.1 % ointment    KENALOG    30 g    Apply  topically 3 times daily. Apply sparingly to affected area.    Nummular eczema       * Warfarin Therapy Reminder      1 each continuous prn    Closed displaced comminuted fracture of shaft of left femur, initial encounter (H)       * warfarin 2 MG tablet    COUMADIN    150 tablet    Take 2 mg on  Wed; 3 mg all other days or as directed by the Anticoagulation Clinic    Persistent atrial fibrillation (H), Long-term (current) use of anticoagulants       * Notice:  This list has 2 medication(s) that are the same as other medications prescribed for you. Read the directions carefully, and ask your doctor or other care provider to review them with you.

## 2018-11-27 NOTE — PROGRESS NOTES
SUBJECTIVE:   Pooja Swartz is a 89 year old female who presents to clinic today for the following health issues:      Diarrhea  Onset: 9 mo     Description:   Consistency of stool: watery and loose  Blood in stool: no   Number of loose stools in past 24 hours: 3    Progression of Symptoms:  worsening    Accompanying Signs & Symptoms:  Fever: no   Nausea or vomiting; no   Abdominal pain: no   Episodes of constipation: no   Weight loss: YES    History:   Ill contacts: no   Recent use of antibiotics: no    Recent travels: no          Recent medication-new or changes(Rx or OTC): none    Precipitating factors:   none    Alleviating factors:   Medication for C-Diff helped     Therapies Tried and outcome:  metronidazole 3 times daily for 7 days; Outcome: helped while she was taking it         Problem list and histories reviewed & adjusted, as indicated.  Additional history:         Reviewed and updated as needed this visit by clinical staff  Tobacco  Allergies  Meds  Med Hx  Surg Hx  Fam Hx  Soc Hx      Reviewed and updated as needed this visit by Provider      Further history obtained, clarified or corrected by physician:  She has persistent diarrhea despite numerous interventions including recent trial of Flagyl.  Stool examinations have been negative.  She has had this for months and it appears as though she had it at least intermittently over the last several years.  She has not had a colonoscopy though given her age and health condition that may be appropriate to avoid that.    OBJECTIVE:  /60 (BP Location: Right arm, Patient Position: Chair, Cuff Size: Adult Large)  Pulse 63  Temp 97.8  F (36.6  C)  Resp 18  Ht 5' (1.524 m)  Wt 115 lb (52.2 kg)  LMP 07/07/1960  SpO2 96%  BMI 22.46 kg/m2  LUNGS: clear to auscultation, normal breath sounds  CV: RRR without murmur  ABD: BS+, soft, nontender, no masses, no hepatosplenomegaly  EXTREMITIES: without joint tenderness, swelling or erythema.  No muscle  tenderness or abnormality.  SKIN: No rashes or abnormalities  NEURO:non focal exam    ASSESSMENT:  Diarrhea, unspecified type    PLAN:  Given the persistence without improvement with attempted treatment will have her see the gastroenterologist.  Likely a decision will need to be made whether to go ahead with a colonoscopy.    Orders Placed This Encounter     GASTROENTEROLOGY ADULT REF CONSULT ONLY

## 2018-11-27 NOTE — PATIENT INSTRUCTIONS
Thank you for choosing [facility].  You may be receiving a survey in the mail from [facility] regarding your visit today.  Please take a few minutes to complete and return the survey to let us know how we are doing.      Our Clinic hours are:  Mondays    7:20 am - 7 pm  Tues - Fri  7:20 am - 5 pm    Clinic Phone: [phone]    The clinic lab opens at 7:30 am Mon - Fri and appointments are required.    [facility]. [phone]  Monday  8 am - 7pm  Tues - Fri 8 am - 5:30 pm

## 2018-12-04 ENCOUNTER — ANTICOAGULATION THERAPY VISIT (OUTPATIENT)
Dept: ANTICOAGULATION | Facility: CLINIC | Age: 83
End: 2018-12-04
Payer: COMMERCIAL

## 2018-12-04 DIAGNOSIS — I48.91 ATRIAL FIBRILLATION (H): ICD-10-CM

## 2018-12-04 LAB — INR POINT OF CARE: 2.2 (ref 0.86–1.14)

## 2018-12-04 PROCEDURE — 85610 PROTHROMBIN TIME: CPT | Mod: QW

## 2018-12-04 PROCEDURE — 36416 COLLJ CAPILLARY BLOOD SPEC: CPT

## 2018-12-04 PROCEDURE — 99207 ZZC NO CHARGE NURSE ONLY: CPT

## 2018-12-04 NOTE — PROGRESS NOTES
ANTICOAGULATION FOLLOW-UP CLINIC VISIT    Patient Name:  Pooja Swartz  Date:  12/4/2018  Contact Type:  Face to Face    SUBJECTIVE:     Patient Findings     Positives No Problem Findings    Comments Pt continues to have diarrhea - she did see a PCP and plans to follow-up with a GI provider as well.  No other changes in medications, activity, or diet noted. No bleeding or increased bruising noted. Took warfarin as prescribed.  Patient is to continue maintenance warfarin plan, and check INR in 6 weeks.  Patient verbalizes understanding and agrees to plan. No further questions or concerns.           OBJECTIVE    INR Protime   Date Value Ref Range Status   12/04/2018 2.2 (A) 0.86 - 1.14 Final       ASSESSMENT / PLAN  INR assessment THER    Recheck INR In: 6 WEEKS    INR Location Clinic      Anticoagulation Summary as of 12/4/2018     INR goal 2.0-3.0   Today's INR 2.2   Warfarin maintenance plan 2 mg (2 mg x 1) on Wed; 3 mg (2 mg x 1.5) all other days   Full warfarin instructions 2 mg on Wed; 3 mg all other days   Weekly warfarin total 20 mg   No change documented Monica Mg RN   Plan last modified Trudy Alex RN (5/1/2018)   Next INR check 1/15/2019   Priority INR   Target end date Indefinite    Indications   Atrial fibrillation (H) [I48.91]  Long-term (current) use of anticoagulants [Z79.01] [Z79.01]         Anticoagulation Episode Summary     INR check location     Preferred lab     Send INR reminders to  ANTICOAG POOL    Comments * warfarin 2mg tabs      Anticoagulation Care Providers     Provider Role Specialty Phone number    Post, LEE Fair MD [facility] [phone]            See the Encounter Report to view Anticoagulation Flowsheet and Dosing Calendar (Go to Encounters tab in chart review, and find the Anticoagulation Therapy Visit)        Monica Mg RN

## 2018-12-04 NOTE — MR AVS SNAPSHOT
Pooja HOUSTON Maxime   12/4/2018 8:45 AM   Anticoagulation Therapy Visit    Description:  89 year old female   Provider:  COLLIN ANTI COAG   Department:  Cl Anticoag           INR as of 12/4/2018     Today's INR 2.2      Anticoagulation Summary as of 12/4/2018     INR goal 2.0-3.0   Today's INR 2.2   Full warfarin instructions 2 mg on Wed; 3 mg all other days   Next INR check 1/15/2019    Indications   Atrial fibrillation (H) [I48.91]  Long-term (current) use of anticoagulants [Z79.01] [Z79.01]         Your next Anticoagulation Clinic appointment(s)     Diego 15, 2019  8:45 AM CST   Anticoagulation Visit with COLLIN ANTI COAG   [facility] ([facility])    [address]  [facility] [address] [phone]              Contact Numbers     Please call [phone] with any problems or questions regarding your therapy.    If you need to cancel and/or reschedule your appointment please call one of the following numbers:  [facility] [phone]  [facility] [phone]  [facility] [phone]  [facility] [phone]  [place] - [phone]            December 2018 Details    Sun Mon Tue Wed Thu Fri Sat           1                 2               3               4      3 mg   See details      5      2 mg         6      3 mg         7      3 mg         8      3 mg           9      3 mg         10      3 mg         11      3 mg         12      2 mg         13      3 mg         14      3 mg         15      3 mg           16      3 mg         17      3 mg         18      3 mg         19      2 mg         20      3 mg         21      3 mg         22      3 mg           23      3 mg         24      3 mg         25      3 mg         26      2 mg         27      3 mg         28      3 mg         29      3 mg           30      3 mg         31      3 mg               Date Details   12/04 This INR check               How to take your warfarin dose     To take:  2 mg Take 1 of the 2 mg  tablets.    To take:  3 mg Take 1.5 of the 2 mg tablets.           January 2019 Details    Sun Mon Tue Wed Thu Fri Sat       1      3 mg         2      2 mg         3      3 mg         4      3 mg         5      3 mg           6      3 mg         7      3 mg         8      3 mg         9      2 mg         10      3 mg         11      3 mg         12      3 mg           13      3 mg         14      3 mg         15            16               17               18               19                 20               21               22               23               24               25               26                 27               28               29               30               31                  Date Details   No additional details    Date of next INR:  1/15/2019         How to take your warfarin dose     To take:  2 mg Take 1 of the 2 mg tablets.    To take:  3 mg Take 1.5 of the 2 mg tablets.

## 2018-12-06 ENCOUNTER — TELEPHONE (OUTPATIENT)
Dept: GASTROENTEROLOGY | Facility: CLINIC | Age: 83
End: 2018-12-06

## 2018-12-06 NOTE — TELEPHONE ENCOUNTER
PREVISIT INFORMATION                                                    Pooja HOUSTON Maxime scheduled for future visit at [facility] specialty clinics.    Patient is scheduled to see Brandy Gomez on 12/11/18  Reason for visit: diarrhea  Referring provider   Has patient seen previous specialist? No  Medical Records:  Available in chart.  Patient was previously seen at a [place] or [facility] facility.     REVIEW                                                      New patient packet mailed to patient:   Medication reconciliation complete:       Current Outpatient Prescriptions   Medication Sig Dispense Refill     allopurinol (ZYLOPRIM) 100 MG tablet Take 1 tablet (100 mg) by mouth daily 90 tablet 3     amLODIPine (NORVASC) 5 MG tablet Take 1.5 tablets (7.5 mg) by mouth daily 135 tablet 3     brimonidine (ALPHAGAN-P) 0.15 % ophthalmic solution Place 1 drop Into the left eye 2 times daily        Calcium-Vitamin D 600-200 MG-UNIT TABS Take 1 tablet by mouth 2 times daily       COSOPT 2-0.5 % OP SOLN 1 DROP INTO Left Eye twice daily       Fexofenadine HCl (ALLEGRA PO) Take 180 mg by mouth daily        latanoprost (XALATAN) 0.005 % ophthalmic solution Place 1 drop into both eyes At Bedtime        metroNIDAZOLE (FLAGYL) 500 MG tablet Take 1 tablet (500 mg) by mouth 3 times daily 21 tablet 0     miconazole (MICATIN; MICRO GUARD) 2 % powder Apply topically 2 times daily Reported on 2/13/2017       ONE-A-DAY WOMENS OR TABS 1 TABLET ORALLY DAILY       Probiotic Product (PROBIOTIC DAILY PO) Take 1 capsule by mouth daily        psyllium (METAMUCIL) 58.6 % POWD Take by mouth daily       traMADol (ULTRAM) 50 MG tablet Take 1 tablet (50 mg) by mouth every 6 hours as needed (Patient not taking: Reported on 10/23/2018) 50 tablet 1     triamcinolone (KENALOG) 0.1 % ointment Apply  topically 3 times daily. Apply sparingly to affected area. (Patient not taking: Reported on 10/23/2018) 30 g 0     warfarin  (COUMADIN) 2 MG tablet Take 2 mg on  Wed; 3 mg all other days or as directed by the Anticoagulation Clinic 150 tablet 3     Warfarin Therapy Reminder 1 each continuous prn         Allergies: Penicillins        PLAN/FOLLOW-UP NEEDED                                                      Pt cancelled appt.    Patient Reminders Given:  Please, make sure you bring an updated list of your medications.   If you are having a procedure, please, present 15 minutes early.  If you need to cancel or reschedule,please call [phone].    [place]

## 2018-12-10 ENCOUNTER — TRANSFERRED RECORDS (OUTPATIENT)
Dept: HEALTH INFORMATION MANAGEMENT | Facility: CLINIC | Age: 83
End: 2018-12-10

## 2018-12-13 ENCOUNTER — HOSPITAL ENCOUNTER (OUTPATIENT)
Dept: CT IMAGING | Facility: CLINIC | Age: 83
Discharge: HOME | End: 2018-12-13
Attending: FAMILY MEDICINE | Admitting: FAMILY MEDICINE
Payer: MEDICARE

## 2018-12-13 ENCOUNTER — OFFICE VISIT (OUTPATIENT)
Dept: FAMILY MEDICINE | Facility: CLINIC | Age: 83
End: 2018-12-13
Payer: COMMERCIAL

## 2018-12-13 VITALS
SYSTOLIC BLOOD PRESSURE: 136 MMHG | RESPIRATION RATE: 18 BRPM | TEMPERATURE: 97.4 F | OXYGEN SATURATION: 100 % | WEIGHT: 111 LBS | HEIGHT: 60 IN | BODY MASS INDEX: 21.79 KG/M2 | DIASTOLIC BLOOD PRESSURE: 64 MMHG | HEART RATE: 66 BPM

## 2018-12-13 DIAGNOSIS — I10 HYPERTENSION GOAL BP (BLOOD PRESSURE) < 140/90: Primary | ICD-10-CM

## 2018-12-13 DIAGNOSIS — K52.9 CHRONIC DIARRHEA: ICD-10-CM

## 2018-12-13 DIAGNOSIS — N18.30 CHRONIC KIDNEY DISEASE, STAGE III (MODERATE) (H): ICD-10-CM

## 2018-12-13 DIAGNOSIS — R63.4 UNINTENTIONAL WEIGHT LOSS: ICD-10-CM

## 2018-12-13 LAB
ANION GAP SERPL CALCULATED.3IONS-SCNC: 5 MMOL/L (ref 3–14)
BUN SERPL-MCNC: 18 MG/DL (ref 7–30)
CALCIUM SERPL-MCNC: 9.9 MG/DL (ref 8.5–10.1)
CHLORIDE SERPL-SCNC: 103 MMOL/L (ref 94–109)
CO2 SERPL-SCNC: 29 MMOL/L (ref 20–32)
CREAT BLD-MCNC: 0.9 MG/DL (ref 0.52–1.04)
CREAT SERPL-MCNC: 0.85 MG/DL (ref 0.52–1.04)
CREAT UR-MCNC: 27 MG/DL
GFR SERPL CREATININE-BSD FRML MDRD: 58 ML/MIN/1.7M2
GFR SERPL CREATININE-BSD FRML MDRD: 62 ML/MIN/1.7M2
GLUCOSE SERPL-MCNC: 105 MG/DL (ref 70–99)
HGB BLD-MCNC: 11.1 G/DL (ref 11.7–15.7)
MICROALBUMIN UR-MCNC: 16 MG/L
MICROALBUMIN/CREAT UR: 57.35 MG/G CR (ref 0–25)
POTASSIUM SERPL-SCNC: 3.9 MMOL/L (ref 3.4–5.3)
SODIUM SERPL-SCNC: 137 MMOL/L (ref 133–144)
VIT B12 SERPL-MCNC: 848 PG/ML (ref 193–986)

## 2018-12-13 PROCEDURE — 36415 COLL VENOUS BLD VENIPUNCTURE: CPT | Performed by: FAMILY MEDICINE

## 2018-12-13 PROCEDURE — 85018 HEMOGLOBIN: CPT | Performed by: FAMILY MEDICINE

## 2018-12-13 PROCEDURE — 99214 OFFICE O/P EST MOD 30 MIN: CPT | Performed by: FAMILY MEDICINE

## 2018-12-13 PROCEDURE — 82565 ASSAY OF CREATININE: CPT

## 2018-12-13 PROCEDURE — 80048 BASIC METABOLIC PNL TOTAL CA: CPT | Performed by: FAMILY MEDICINE

## 2018-12-13 PROCEDURE — 82607 VITAMIN B-12: CPT | Performed by: FAMILY MEDICINE

## 2018-12-13 PROCEDURE — 74177 CT ABD & PELVIS W/CONTRAST: CPT

## 2018-12-13 PROCEDURE — 25000128 H RX IP 250 OP 636: Performed by: RADIOLOGY

## 2018-12-13 PROCEDURE — 25000125 ZZHC RX 250: Performed by: RADIOLOGY

## 2018-12-13 PROCEDURE — 80076 HEPATIC FUNCTION PANEL: CPT | Performed by: FAMILY MEDICINE

## 2018-12-13 PROCEDURE — 82043 UR ALBUMIN QUANTITATIVE: CPT | Performed by: FAMILY MEDICINE

## 2018-12-13 RX ORDER — IOPAMIDOL 755 MG/ML
54 INJECTION, SOLUTION INTRAVASCULAR ONCE
Status: COMPLETED | OUTPATIENT
Start: 2018-12-13 | End: 2018-12-13

## 2018-12-13 RX ADMIN — SODIUM CHLORIDE 54 ML: 9 INJECTION, SOLUTION INTRAVENOUS at 14:48

## 2018-12-13 RX ADMIN — IOPAMIDOL 54 ML: 755 INJECTION, SOLUTION INTRAVENOUS at 14:48

## 2018-12-13 ASSESSMENT — PAIN SCALES - GENERAL: PAINLEVEL: NO PAIN (0)

## 2018-12-13 ASSESSMENT — MIFFLIN-ST. JEOR: SCORE: 829.99

## 2018-12-13 NOTE — LETTER
[facility]  [address]  [facility] [address]  Phone: [phone]      12/14/2018     Poojabarrie Swartz  [address]    [facility] [address]      Dear Pooja:        Thank you for allowing me to participate in your care. Your recent test results were reviewed and listed below.        CT scan shows increased fluid throughout the small bowels.  There is some dilation of biliary ducts, however her bilirubin is normal.     I would recommend she try to see a MD (not the PA) at [facility] in follow up for this issue given the chronicity and the weight loss.  They have may some additional testing they would like.         Your results are provided below for your review  Results for orders placed or performed during the hospital encounter of 12/13/18   CT Abdomen Pelvis w Contrast    Narrative    CT ABDOMEN AND PELVIS WITH CONTRAST 12/13/2018 3:24 PM    HISTORY: Unintended weight loss. Chronic diarrhea. Abdominal pain.    TECHNIQUE: Helical axial scans from dome of liver through pubic  symphysis with 54 mL Isovue 370 IV contrast. Radiation dose for this  scan was reduced using automated exposure control, adjustment of the  mA and/or kV according to patient size, or iterative reconstruction  technique.    COMPARISON: 4/20/2015.    FINDINGS: Vascular calcifications, including extensive coronary artery  calcifications. Pleural calcification at the base of the heart is also  seen and appears unchanged. Prior cholecystectomy. Marked intrahepatic  bile duct dilatation which has at least slightly increased since the  prior exam. Extrahepatic bile ducts are also dilated with the common  duct measuring up to 1.3 cm, similar to the prior study. Clinical  correlation for elevated serum bilirubin is recommended. No calcified  stones are seen in the common duct and there is no extrinsic or  intrinsic mass lesion. There is also at least mild dilatation of the  pancreatic duct in the head of the  pancreas which is more conspicuous  than on the prior study. The remainder of the liver is normal.  Innumerable calcifications scattered throughout the spleen consistent  with old granulomatous disease, without change. The remainder of the  pancreas is unremarkable. The left adrenal gland is normal. 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 There are  probably a few tiny benign cysts in the left kidney and the kidneys  are otherwise unremarkable. Most of the small bowel is fluid-filled,  but not abnormally distended. Some parts of the colon are mildly  distended with gas, fluid and stool. The bowel gas pattern overall is  nonspecific.    Scans through the pelvis show no acute abnormality or free fluid.  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 The appendix is partially seen and  appears normal. Broad-based Schmorl's node or superior endplate  invagination at the L3 and L5 levels are new since the prior exam,  although these do not appear acute.      Impression    IMPRESSION:  1. Intrahepatic and extrahepatic biliary ductal dilatation which may  be increased slightly with respect to the intrahepatic ducts since the  prior exam. This is greater than usually seen from cholecystectomy  alone and clinical correlation for elevated serum bilirubin is  recommended.  2. Vascular calcifications, including coronary artery calcifications.  Some focal pericardial calcification inferiorly is also seen and  appears unchanged.  3. Old granulomatous disease in the spleen. Benign myelolipoma right  adrenal gland.  4. Nonspecific nonobstructive bowel pattern with fluid throughout much  of the small bowel.  5. Broad-based Schmorl's nodes or superior endplate invagination at L3  and L5 are new since the prior exam, but may still be chronic.    LEONARDO SHAVER MD   Creatinine POCT   Result Value Ref Range    Creatinine 0.9 0.52 - 1.04 mg/dL     GFR Estimate 58 (L) >60 mL/min/1.7m2    GFR Estimate If Black 71 >60 mL/min/1.7m2                             Thank you for choosing [facility]. As a result, please continue with the treatment plan discussed in the office. Return as discussed or sooner if symptoms worsen or fail to improve.     If you have any further questions or concerns, please do not hesitate to contact us.      Sincerely,        Dr. Amanda Renee/[facility]

## 2018-12-13 NOTE — PATIENT INSTRUCTIONS
Thank you for choosing [facility].  You may be receiving a survey in the mail from [facility] regarding your visit today.  Please take a few minutes to complete and return the survey to let us know how we are doing.      Our Clinic hours are:  Mondays    7:20 am - 7 pm  Tues - Fri  7:20 am - 5 pm    Clinic Phone: [phone]    The clinic lab opens at 7:30 am Mon - Fri and appointments are required.    [facility]. [phone]  Monday  8 am - 7pm  Tues - Fri 8 am - 5:30 pm

## 2018-12-13 NOTE — LETTER
[facility]  [address]  [facility] [address]  Phone: [phone]      12/14/2018     Poojabarrie Swartz  [address]    [facility] [address]      Dear Pooja:        Thank you for allowing me to participate in your care. Your recent test results were reviewed and listed below.      Your results are provided below for your review  Results for orders placed or performed in visit on 12/13/18   Basic metabolic panel   Result Value Ref Range    Sodium 137 133 - 144 mmol/L    Potassium 3.9 3.4 - 5.3 mmol/L    Chloride 103 94 - 109 mmol/L    Carbon Dioxide 29 20 - 32 mmol/L    Anion Gap 5 3 - 14 mmol/L    Glucose 105 (H) 70 - 99 mg/dL    Urea Nitrogen 18 7 - 30 mg/dL    Creatinine 0.85 0.52 - 1.04 mg/dL    GFR Estimate 62 >60 mL/min/1.7m2    GFR Estimate If Black 75 >60 mL/min/1.7m2    Calcium 9.9 8.5 - 10.1 mg/dL   Albumin Random Urine Quantitative with Creat Ratio   Result Value Ref Range    Creatinine Urine 27 mg/dL    Albumin Urine mg/L 16 mg/L    Albumin Urine mg/g Cr 57.35 (H) 0 - 25 mg/g Cr   Hemoglobin   Result Value Ref Range    Hemoglobin 11.1 (L) 11.7 - 15.7 g/dL   Vitamin B12   Result Value Ref Range    Vitamin B12 848 193 - 986 pg/mL   Hepatic panel   Result Value Ref Range    Bilirubin Direct 0.2 0.0 - 0.2 mg/dL    Bilirubin Total 0.5 0.2 - 1.3 mg/dL    Albumin 4.1 3.4 - 5.0 g/dL    Protein Total 8.0 6.8 - 8.8 g/dL    Alkaline Phosphatase 99 40 - 150 U/L    ALT 28 0 - 50 U/L    AST 27 0 - 45 U/L                 Thank you for choosing [facility]. As a result, please continue with the treatment plan discussed in the office. Return as discussed or sooner if symptoms worsen or fail to improve.     If you have any further questions or concerns, please do not hesitate to contact us.      Sincerely,        Dr. Amanda Renee/[facility]

## 2018-12-13 NOTE — PROGRESS NOTES
SUBJECTIVE:   Pooja Swartz is a 89 year old female who presents to clinic today for the following health issues:      Diarrhea  Onset: spring 2018    Description:   Consistency of stool: watery  Blood in stool: no   Number of loose stools in past 24 hours: 3    Progression of Symptoms:  worsening    Accompanying Signs & Symptoms:  Fever: no   Nausea or vomiting; no   Abdominal pain: no   Episodes of constipation: no   Weight loss: YES- 45 lbs    History:   Ill contacts: no   Recent use of antibiotics: YES- flagyl  Recent travels: no          Recent medication-new or changes(Rx or OTC): no     Precipitating factors:   After eating her abdomen will cramp and she will need to use bathroom.     Alleviating factors:       Therapies Tried and outcome:  Imodium AD and Flagyl; Outcome: not of help    Saw a PA at [facility] on Monday.  Stopped probiotic, increased metamucil to twice daily and told her to take lomotil three times daily with meals and immodium PRN.   Has a return visit 1/23/2018.     Has had significant weight loss  Wt Readings from Last 5 Encounters:   12/13/18 50.3 kg (111 lb)   11/27/18 52.2 kg (115 lb)   10/23/18 53.8 kg (118 lb 8 oz)   07/30/18 58 kg (127 lb 12.8 oz)   07/16/18 58.3 kg (128 lb 8 oz)     20 lbs in the past six months.      /64   Pulse 66   Temp 97.4  F (36.3  C) (Tympanic)   Resp 18   Ht 1.524 m (5')   Wt 50.3 kg (111 lb)   LMP 07/07/1960   SpO2 100%   BMI 21.68 kg/m    EXAM: GENERAL APPEARANCE: Alert, no acute distress  RESP: lungs clear to auscultation   CV: irregular rhythm-irregularly irregular  ABDOMEN: soft, no organomegaly, masses or tenderness    ASSESSMENT/PLAN:      ICD-10-CM    1. Hypertension goal BP (blood pressure) < 140/90 I10    2. Unintentional weight loss R63.4 CT Abdomen Pelvis w Contrast     Vitamin B12   3. Chronic diarrhea K52.9 CT Abdomen Pelvis w Contrast     Vitamin B12   4. Chronic kidney disease, stage III (moderate) (H) N18.3 Basic metabolic panel      Albumin Random Urine Quantitative with Creat Ratio     Hemoglobin     GI doesn't want to do colonoscopy due to her age and increased risk.     Recommend CT next to r/o mass/lymphadenopathy.   May need CT enterography as well, depending on results.      Check renal function, electrolytes, b12 because of the diarrhea and her CKD.    Amanda Renee M.D.        Patient Instructions         Thank you for choosing [facility].  You may be receiving a survey in the mail from [employer] regarding your visit today.  Please take a few minutes to complete and return the survey to let us know how we are doing.      Our Clinic hours are:  Mondays    7:20 am - 7 pm  Tues -  Fri  7:20 am - 5 pm    Clinic Phone: [phone]    The clinic lab opens at 7:30 am Mon - Fri and appointments are required.    [facility]. [phone]  Monday  8 am - 7pm  Tues - Fri 8 am - 5:30 pm

## 2018-12-14 ENCOUNTER — TELEPHONE (OUTPATIENT)
Dept: FAMILY MEDICINE | Facility: CLINIC | Age: 83
End: 2018-12-14

## 2018-12-14 LAB
ALBUMIN SERPL-MCNC: 4.1 G/DL (ref 3.4–5)
ALP SERPL-CCNC: 99 U/L (ref 40–150)
ALT SERPL W P-5'-P-CCNC: 28 U/L (ref 0–50)
AST SERPL W P-5'-P-CCNC: 27 U/L (ref 0–45)
BILIRUB DIRECT SERPL-MCNC: 0.2 MG/DL (ref 0–0.2)
BILIRUB SERPL-MCNC: 0.5 MG/DL (ref 0.2–1.3)
PROT SERPL-MCNC: 8 G/DL (ref 6.8–8.8)

## 2018-12-14 NOTE — RESULT ENCOUNTER NOTE
Labs all relatively normal.  Kidney function good for age.  Liver function is normal.     See also the result on the CT scan.    Amanda Renee M.D.

## 2018-12-14 NOTE — RESULT ENCOUNTER NOTE
CT scan shows increased fluid throughout the small bowels.  There is some dilation of biliary ducts, however her bilirubin is normal.     I would recommend she try to see a MD (not the PA) at [facility] in follow up for this issue given the chronicity and the weight loss.  They have may some additional testing they would like.  Please also mail her a copy of the report so she may bring that with her.    Amanda Renee M.D.

## 2019-01-03 DIAGNOSIS — I10 HYPERTENSION GOAL BP (BLOOD PRESSURE) < 140/90: ICD-10-CM

## 2019-01-03 DIAGNOSIS — M1A.9XX1 CHRONIC TOPHACEOUS GOUT: ICD-10-CM

## 2019-01-03 DIAGNOSIS — S30.0XXA CONTUSION OF SACRUM, INITIAL ENCOUNTER: ICD-10-CM

## 2019-01-03 NOTE — TELEPHONE ENCOUNTER
"Requested Prescriptions   Pending Prescriptions Disp Refills     amLODIPine (NORVASC) 5 MG tablet  Last Written Prescription Date:  1/9/2018  Last Fill Quantity: 135,  # refills: 3   Last office visit: 12/13/2018 with prescribing provider:  Víctor    Future Office Visit:   Next 5 appointments (look out 90 days)    Diego 10, 2019  3:20 PM CST  SHORT with Amanda Renee MD  [facility] [address] [facility] [address]  [phone]          135 tablet 3     Sig: Take 1.5 tablets (7.5 mg) by mouth daily    Calcium Channel Blockers Protocol  Passed - 1/3/2019 12:02 PM       Passed - Blood pressure under 140/90 in past 12 months    BP Readings from Last 3 Encounters:   12/13/18 136/64   11/27/18 130/60   10/23/18 140/60                Passed - Recent (12 mo) or future (30 days) visit within the authorizing provider's specialty    Patient had office visit in the last 12 months or has a visit in the next 30 days with authorizing provider or within the authorizing provider's specialty.  See \"Patient Info\" tab in inbasket, or \"Choose Columns\" in Meds & Orders section of the refill encounter.             Passed - Patient is age 18 or older       Passed - No active pregnancy on record       Passed - Normal serum creatinine on file in past 12 months    Recent Labs   Lab Test 12/13/18  1449 12/13/18  1128   CR  --  0.85   CREAT 0.9  --             Passed - No positive pregnancy test in past 12 months        allopurinol (ZYLOPRIM) 100 MG tablet  Last Written Prescription Date:  1/9/2018  Last Fill Quantity: 90,  # refills: 3   Last office visit: 12/13/2018 with prescribing provider:  Víctor    Future Office Visit:   Next 5 appointments (look out 90 days)    Diego 10, 2019  3:20 PM CST  SHORT with Amanda Renee MD  [facility] [address] [facility] [address]  [phone]          90 tablet 3     Sig: " "Take 1 tablet (100 mg) by mouth daily    Gout Agents Protocol Failed - 1/3/2019 12:02 PM       Failed - Has Uric Acid on file in past 12 months and value is less than 6    Recent Labs   Lab Test 12/01/15  1341   URIC 4.2     If level is 6mg/dL or greater, ok to refill one time and refer to provider.          Passed - CBC on file in past 12 months    Recent Labs   Lab Test 12/13/18  1128 01/09/18  1048   WBC  --  6.6   RBC  --  3.46*   HGB 11.1* 11.3*   HCT  --  35.7   PLT  --  172                Passed - ALT on file in past 12 months    Recent Labs   Lab Test 12/13/18  1128   ALT 28            Passed - Recent (12 mo) or future (30 days) visit within the authorizing provider's specialty    Patient had office visit in the last 12 months or has a visit in the next 30 days with authorizing provider or within the authorizing provider's specialty.  See \"Patient Info\" tab in inbasket, or \"Choose Columns\" in Meds & Orders section of the refill encounter.             Passed - Patient is age 18 or older       Passed - No active pregnancy on record       Passed - Normal serum creatinine on file in the past 12 months    Recent Labs   Lab Test 12/13/18  1449 12/13/18  1128   CR  --  0.85   CREAT 0.9  --             Passed - No positive pregnancy test in past year          "

## 2019-01-04 RX ORDER — AMLODIPINE BESYLATE 5 MG/1
7.5 TABLET ORAL DAILY
Qty: 135 TABLET | Refills: 1 | Status: SHIPPED | OUTPATIENT
Start: 2019-01-04 | End: 2019-06-28

## 2019-01-04 RX ORDER — ALLOPURINOL 100 MG/1
100 TABLET ORAL DAILY
Qty: 90 TABLET | Refills: 3 | OUTPATIENT
Start: 2019-01-04

## 2019-01-04 NOTE — TELEPHONE ENCOUNTER
Amlodipine prescription approved per [facility] Refill Protocol.    She is due for uric acid lab, however, will be seeing DR. Renee 1-10-19 for appt and refill of allopurinol.  She has enough med to get to appt.    Stephanie NOE RN

## 2019-01-10 ENCOUNTER — OFFICE VISIT (OUTPATIENT)
Dept: FAMILY MEDICINE | Facility: CLINIC | Age: 84
End: 2019-01-10
Payer: COMMERCIAL

## 2019-01-10 VITALS
HEIGHT: 60 IN | RESPIRATION RATE: 18 BRPM | SYSTOLIC BLOOD PRESSURE: 182 MMHG | OXYGEN SATURATION: 98 % | HEART RATE: 72 BPM | DIASTOLIC BLOOD PRESSURE: 74 MMHG | BODY MASS INDEX: 21.6 KG/M2 | TEMPERATURE: 97.8 F | WEIGHT: 110 LBS

## 2019-01-10 DIAGNOSIS — M1A.9XX1 CHRONIC TOPHACEOUS GOUT: ICD-10-CM

## 2019-01-10 DIAGNOSIS — K52.9 CHRONIC DIARRHEA: Primary | ICD-10-CM

## 2019-01-10 DIAGNOSIS — S30.0XXA CONTUSION OF SACRUM, INITIAL ENCOUNTER: ICD-10-CM

## 2019-01-10 PROCEDURE — 99214 OFFICE O/P EST MOD 30 MIN: CPT | Performed by: FAMILY MEDICINE

## 2019-01-10 RX ORDER — DIPHENOXYLATE HCL/ATROPINE 2.5-.025MG
2 TABLET ORAL 3 TIMES DAILY PRN
COMMUNITY
End: 2019-07-11

## 2019-01-10 RX ORDER — ALLOPURINOL 100 MG/1
100 TABLET ORAL DAILY
Qty: 90 TABLET | Refills: 3 | Status: SHIPPED | OUTPATIENT
Start: 2019-01-10 | End: 2020-01-02

## 2019-01-10 RX ORDER — DIPHENOXYLATE HCL/ATROPINE 2.5-.025MG
2 TABLET ORAL 3 TIMES DAILY PRN
Status: CANCELLED | OUTPATIENT
Start: 2019-01-10

## 2019-01-10 RX ORDER — BUDESONIDE 3 MG/1
CAPSULE, COATED PELLETS ORAL
Qty: 150 CAPSULE | Refills: 0 | Status: SHIPPED | OUTPATIENT
Start: 2019-01-10 | End: 2019-02-25

## 2019-01-10 ASSESSMENT — PAIN SCALES - GENERAL: PAINLEVEL: NO PAIN (0)

## 2019-01-10 ASSESSMENT — MIFFLIN-ST. JEOR: SCORE: 825.46

## 2019-01-10 NOTE — PROGRESS NOTES
SUBJECTIVE:   Pooja Swartz is a 89 year old female who presents to clinic today for the following health issues:      Chief Complaint   Patient presents with     Refill Request     Allopurinol      Consult     Patient seen GI MD and would like to discuss the report that was given to her regarding new diet.      Woke up with runny stool this am at 6am.  Took her metamucil.   For breakfast had 1/2 cup cornflakes with lactose free milk, some bananas.   Did well until 10 am and then had a loose stool.      Using Lomotil before meals.     Had ensure for lunch.  Lactose free.   That was at noon.     At 2pm had two loose stools.      Has taken the lomotil and doesn't feel it's helping.      Took ibuprofen.      Has apt with GI on 1/23/2019.  They put her on lomotil and FODMAP diet.  She has been trying that with no particular improvement.  They also had her increase her metamucil to twice daily but that didn't work and she went back to once daily - we again discussed that this is to try to bulk the stools.     /74   Pulse 72   Temp 97.8  F (36.6  C) (Tympanic)   Resp 18   Ht 1.524 m (5')   Wt 49.9 kg (110 lb)   LMP 07/07/1960   SpO2 98%   BMI 21.48 kg/m    EXAM: GENERAL APPEARANCE ADULT: Alert, no acute distress  ABDOMEN: soft, no organomegaly, masses or tenderness      Wt Readings from Last 5 Encounters:   01/10/19 49.9 kg (110 lb)   12/13/18 50.3 kg (111 lb)   11/27/18 52.2 kg (115 lb)   10/23/18 53.8 kg (118 lb 8 oz)   07/30/18 58 kg (127 lb 12.8 oz)     She continues to lose weight.    ASSESSMENT/PLAN:      ICD-10-CM    1. Chronic diarrhea K52.9 budesonide (ENTOCORT EC) 3 MG EC capsule   2. Chronic tophaceous gout M1A.9XX1 allopurinol (ZYLOPRIM) 100 MG tablet   3. Contusion of sacrum, initial encounter S30.0XXA allopurinol (ZYLOPRIM) 100 MG tablet     Will try treating this like microscopic colitis and see if she gets some relief from the diarrhea. CT scan was rather unremarkable.  Labs have been  normal.  She has GI follow up but she continues to lose weigh and I feel we need to try something else here.  She is in agreement.  Risks, benefits and alternatives discussed.    Patient Instructions   Budesonide (Entocort) 9 mg daily for one month, then 6 mg daily    This would help if there is a microscopic colitis.    Try to avoid ibuprofen.      Amanda Renee M.D.

## 2019-01-10 NOTE — PATIENT INSTRUCTIONS
Budesonide (Entocort) 9 mg daily for one month, then 6 mg daily    This would help if there is a microscopic colitis.    Try to avoid ibuprofen.      Amanda Renee M.D.

## 2019-01-15 ENCOUNTER — ANTICOAGULATION THERAPY VISIT (OUTPATIENT)
Dept: ANTICOAGULATION | Facility: CLINIC | Age: 84
End: 2019-01-15
Payer: COMMERCIAL

## 2019-01-15 ENCOUNTER — ALLIED HEALTH/NURSE VISIT (OUTPATIENT)
Dept: FAMILY MEDICINE | Facility: CLINIC | Age: 84
End: 2019-01-15
Payer: COMMERCIAL

## 2019-01-15 ENCOUNTER — TELEPHONE (OUTPATIENT)
Dept: FAMILY MEDICINE | Facility: CLINIC | Age: 84
End: 2019-01-15

## 2019-01-15 DIAGNOSIS — K52.9 CHRONIC DIARRHEA: Primary | ICD-10-CM

## 2019-01-15 DIAGNOSIS — Z79.01 LONG TERM CURRENT USE OF ANTICOAGULANT THERAPY: Primary | ICD-10-CM

## 2019-01-15 DIAGNOSIS — I48.91 ATRIAL FIBRILLATION (H): ICD-10-CM

## 2019-01-15 LAB — INR POINT OF CARE: 2.4 (ref 0.86–1.14)

## 2019-01-15 PROCEDURE — 36416 COLLJ CAPILLARY BLOOD SPEC: CPT

## 2019-01-15 PROCEDURE — 99207 ZZC NO CHARGE NURSE ONLY: CPT

## 2019-01-15 PROCEDURE — 85610 PROTHROMBIN TIME: CPT | Mod: QW

## 2019-01-15 NOTE — TELEPHONE ENCOUNTER
Patient came into clinic today with some questions. Patient reports she started the Budesonide and it has helped. Patient started this medication on 1/10/19.  Patient states she is starting to have almost formed stools.  Patient states her appetite is better and she actually wants to eat. Patient wondering if she should keep the appt 1/23/19 with GI and if she should continue with the metamucil daily?    Thank you    Blanca CALLAHAN RN

## 2019-01-15 NOTE — PROGRESS NOTES
ANTICOAGULATION FOLLOW-UP CLINIC VISIT    Patient Name:  Pooja Swartz  Date:  1/15/2019  Contact Type:  Face to Face    SUBJECTIVE:     Patient Findings     Positives:   Change in medications (Budesonide (Entocort))    Comments:   Patient started the budesonide for her diarrhea, which has actually helped. Writer requested patient return in 2 weeks to make sure her warfarin dose doesn't need to be increased since the diarrhea (inflammation) has now subsided. She also is eating more now. She agreed to return in that timeframe. Patient may extend her INRs back out if it remains therapeutic.            OBJECTIVE    INR Protime   Date Value Ref Range Status   01/15/2019 2.4 (A) 0.86 - 1.14 Final       ASSESSMENT / PLAN  INR assessment THER    Recheck INR In: 2 WEEKS    INR Location Clinic      Anticoagulation Summary  As of 1/15/2019    INR goal:   2.0-3.0   TTR:   78.1 % (2.3 y)   INR used for dosin.4 (1/15/2019)   Warfarin maintenance plan:   2 mg (2 mg x 1) every Wed; 3 mg (2 mg x 1.5) all other days   Full warfarin instructions:   2 mg every Wed; 3 mg all other days   Weekly warfarin total:   20 mg   No change documented:   Jania Torrez RN   Plan last modified:   Trudy Alex RN (2018)   Next INR check:   2019   Priority:   INR   Target end date:   Indefinite    Indications    Atrial fibrillation (H) [I48.91]  Long-term (current) use of anticoagulants [Z79.01] [Z79.01]             Anticoagulation Episode Summary     INR check location:       Preferred lab:       Send INR reminders to:   [facility] ANTICOAG POOL    Comments:   * warfarin 2mg tabs      Anticoagulation Care Providers     Provider Role Specialty Phone number    Peterson, LEE Fair MD [facility] [phone]            See the Encounter Report to view Anticoagulation Flowsheet and Dosing Calendar (Go to Encounters tab in chart review, and find the Anticoagulation Therapy Visit)        Jania Torrez RN CACP

## 2019-01-16 NOTE — TELEPHONE ENCOUNTER
Glad she's feeling better.  She can decide on the GI appointment.  It would be okay if she wanted to cancel it, I can manage the budesonide.  The metamucil will not be harmful (fiber is good either for avoiding constipation or for bulking stools) but if she doesn't feel it's helpful, she can stop that as well.    Amanda Renee M.D.

## 2019-01-16 NOTE — TELEPHONE ENCOUNTER
Patient notified and verbalizes understanding, says she would like to cancel the GI appointment, says the Budesonide is helping and feels better taking the metamucil. Patient told to call back with any further questions.    LOKESH Ochoa

## 2019-01-20 ENCOUNTER — APPOINTMENT (OUTPATIENT)
Dept: GENERAL RADIOLOGY | Facility: CLINIC | Age: 84
End: 2019-01-20
Payer: COMMERCIAL

## 2019-01-20 ENCOUNTER — HOSPITAL ENCOUNTER (EMERGENCY)
Facility: CLINIC | Age: 84
Discharge: HOME | End: 2019-01-20
Attending: EMERGENCY MEDICINE | Admitting: EMERGENCY MEDICINE
Payer: COMMERCIAL

## 2019-01-20 ENCOUNTER — APPOINTMENT (OUTPATIENT)
Dept: CT IMAGING | Facility: CLINIC | Age: 84
End: 2019-01-20
Payer: COMMERCIAL

## 2019-01-20 VITALS
HEART RATE: 88 BPM | SYSTOLIC BLOOD PRESSURE: 175 MMHG | RESPIRATION RATE: 12 BRPM | OXYGEN SATURATION: 96 % | DIASTOLIC BLOOD PRESSURE: 83 MMHG | TEMPERATURE: 98.4 F
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
## 2019-01-20 DIAGNOSIS — D72.829 LEUKOCYTOSIS, UNSPECIFIED TYPE: ICD-10-CM

## 2019-01-20 DIAGNOSIS — S63.501A WRIST SPRAIN, RIGHT, INITIAL ENCOUNTER: ICD-10-CM

## 2019-01-20 DIAGNOSIS — S20.221A BACK CONTUSION, RIGHT, INITIAL ENCOUNTER: ICD-10-CM

## 2019-01-20 DIAGNOSIS — S80.02XA CONTUSION OF LEFT KNEE, INITIAL ENCOUNTER: ICD-10-CM

## 2019-01-20 DIAGNOSIS — N39.0 ACUTE UTI: ICD-10-CM

## 2019-01-20 DIAGNOSIS — S09.90XA CLOSED HEAD INJURY, INITIAL ENCOUNTER: ICD-10-CM

## 2019-01-20 LAB
ALBUMIN UR-MCNC: NEGATIVE MG/DL
ANION GAP SERPL CALCULATED.3IONS-SCNC: 7 MMOL/L (ref 3–14)
APPEARANCE UR: CLEAR
BASOPHILS # BLD AUTO: 0.1 10E9/L (ref 0–0.2)
BASOPHILS NFR BLD AUTO: 0.3 %
BILIRUB UR QL STRIP: NEGATIVE
BUN SERPL-MCNC: 26 MG/DL (ref 7–30)
CALCIUM SERPL-MCNC: 8.4 MG/DL (ref 8.5–10.1)
CHLORIDE SERPL-SCNC: 105 MMOL/L (ref 94–109)
CO2 SERPL-SCNC: 27 MMOL/L (ref 20–32)
COLOR UR AUTO: YELLOW
CREAT SERPL-MCNC: 1.1 MG/DL (ref 0.52–1.04)
DIFFERENTIAL METHOD BLD: ABNORMAL
EOSINOPHIL # BLD AUTO: 0.2 10E9/L (ref 0–0.7)
EOSINOPHIL NFR BLD AUTO: 0.9 %
ERYTHROCYTE [DISTWIDTH] IN BLOOD BY AUTOMATED COUNT: 15.9 % (ref 10–15)
GFR SERPL CREATININE-BSD FRML MDRD: 43 ML/MIN/{1.73_M2}
GLUCOSE SERPL-MCNC: 105 MG/DL (ref 70–99)
GLUCOSE UR STRIP-MCNC: NEGATIVE MG/DL
HCT VFR BLD AUTO: 36.2 % (ref 35–47)
HGB BLD-MCNC: 12.1 G/DL (ref 11.7–15.7)
HGB UR QL STRIP: NEGATIVE
IMM GRANULOCYTES # BLD: 0.2 10E9/L (ref 0–0.4)
IMM GRANULOCYTES NFR BLD: 1 %
INR PPP: 2.36 (ref 0.86–1.14)
KETONES UR STRIP-MCNC: NEGATIVE MG/DL
LEUKOCYTE ESTERASE UR QL STRIP: ABNORMAL
LYMPHOCYTES # BLD AUTO: 1.4 10E9/L (ref 0.8–5.3)
LYMPHOCYTES NFR BLD AUTO: 7.5 %
MCH RBC QN AUTO: 33.5 PG (ref 26.5–33)
MCHC RBC AUTO-ENTMCNC: 33.4 G/DL (ref 31.5–36.5)
MCV RBC AUTO: 100 FL (ref 78–100)
MONOCYTES # BLD AUTO: 1.1 10E9/L (ref 0–1.3)
MONOCYTES NFR BLD AUTO: 6.3 %
MUCOUS THREADS #/AREA URNS LPF: PRESENT /LPF
NEUTROPHILS # BLD AUTO: 15.2 10E9/L (ref 1.6–8.3)
NEUTROPHILS NFR BLD AUTO: 84 %
NITRATE UR QL: NEGATIVE
NRBC # BLD AUTO: 0 10*3/UL
NRBC BLD AUTO-RTO: 0 /100
PH UR STRIP: 7 PH (ref 5–7)
PLATELET # BLD AUTO: 344 10E9/L (ref 150–450)
POTASSIUM SERPL-SCNC: 3.7 MMOL/L (ref 3.4–5.3)
RBC # BLD AUTO: 3.61 10E12/L (ref 3.8–5.2)
RBC #/AREA URNS AUTO: 1 /HPF (ref 0–2)
SODIUM SERPL-SCNC: 139 MMOL/L (ref 133–144)
SOURCE: ABNORMAL
SP GR UR STRIP: 1.01 (ref 1–1.03)
UROBILINOGEN UR STRIP-MCNC: 0 MG/DL (ref 0–2)
WBC # BLD AUTO: 18.1 10E9/L (ref 4–11)
WBC #/AREA URNS AUTO: 7 /HPF (ref 0–5)

## 2019-01-20 PROCEDURE — 85610 PROTHROMBIN TIME: CPT | Performed by: EMERGENCY MEDICINE

## 2019-01-20 PROCEDURE — 81001 URINALYSIS AUTO W/SCOPE: CPT | Performed by: EMERGENCY MEDICINE

## 2019-01-20 PROCEDURE — 71101 X-RAY EXAM UNILAT RIBS/CHEST: CPT | Mod: RT

## 2019-01-20 PROCEDURE — 73110 X-RAY EXAM OF WRIST: CPT | Mod: RT

## 2019-01-20 PROCEDURE — 73560 X-RAY EXAM OF KNEE 1 OR 2: CPT | Mod: LT

## 2019-01-20 PROCEDURE — 87086 URINE CULTURE/COLONY COUNT: CPT | Performed by: EMERGENCY MEDICINE

## 2019-01-20 PROCEDURE — 85025 COMPLETE CBC W/AUTO DIFF WBC: CPT | Performed by: EMERGENCY MEDICINE

## 2019-01-20 PROCEDURE — 72125 CT NECK SPINE W/O DYE: CPT

## 2019-01-20 PROCEDURE — 99284 EMERGENCY DEPT VISIT MOD MDM: CPT | Mod: Z6 | Performed by: EMERGENCY MEDICINE

## 2019-01-20 PROCEDURE — 99285 EMERGENCY DEPT VISIT HI MDM: CPT | Mod: 25 | Performed by: EMERGENCY MEDICINE

## 2019-01-20 PROCEDURE — 70450 CT HEAD/BRAIN W/O DYE: CPT

## 2019-01-20 PROCEDURE — 80048 BASIC METABOLIC PNL TOTAL CA: CPT | Performed by: EMERGENCY MEDICINE

## 2019-01-20 PROCEDURE — 72170 X-RAY EXAM OF PELVIS: CPT

## 2019-01-20 RX ORDER — CEPHALEXIN 500 MG/1
500 CAPSULE ORAL 2 TIMES DAILY
Qty: 10 CAPSULE | Refills: 0 | Status: SHIPPED | OUTPATIENT
Start: 2019-01-20 | End: 2019-02-25

## 2019-01-20 ASSESSMENT — ENCOUNTER SYMPTOMS
DIZZINESS: 0
NECK PAIN: 0
HEADACHES: 0
WOUND: 1
ABDOMINAL PAIN: 0
ARTHRALGIAS: 1

## 2019-01-20 NOTE — ED AVS SNAPSHOT
[facility]  [address] [address]  Phone:  [phone]  Fax:  [phone]                                    Pooja Swartz   MRN: [id number]    Department:  [facility]   Date of Visit:  1/20/2019           After Visit Summary Signature Page    I have received my discharge instructions, and my questions have been answered. I have discussed any challenges I see with this plan with the nurse or doctor.    ..........................................................................................................................................  Patient/Patient Representative Signature      ..........................................................................................................................................  Patient Representative Print Name and Relationship to Patient    ..................................................               ................................................  Date                                   Time    ..........................................................................................................................................  Reviewed by Signature/Title    ...................................................              ..............................................  Date                                               Time          22EPIC Rev 08/18

## 2019-01-21 NOTE — ED PROVIDER NOTES
"  History     Chief Complaint   Patient presents with     Fall     fell into refrig  then hit floor  has  injury to  right side of head  right wrist and left leg   20 ga IV     HPI  Pooja Swartz is a 89 year old female who presents to the ED for evaluation following a fall. The patient lives alone and fell to the floor after the fridge door opened \"faster than expected\" tonight. She reports an injury to her right eyebrow region, soreness of the right wrist, and soreness of the left knee. She used her right wrist to scoot across the floor to reach the phone after the fall. She denies loss of consciousness, dizziness, neck pain, chest pain, abdominal pain, headache, and hip pain. Patient takes coumadin.  She currently rates her pain a 2 out of 10 and is worsened with activity.    Allergies:  Allergies   Allergen Reactions     Penicillins Hives       Problem List:    Patient Active Problem List    Diagnosis Date Noted     Periprosthetic fracture around internal prosthetic left hip joint, subsequent encounter 01/09/2017     Priority: Medium     Long-term (current) use of anticoagulants [Z79.01] 09/29/2016     Priority: Medium     Acute posthemorrhagic anemia 08/30/2016     Priority: Medium     Femur fracture, left (H) 08/17/2016     Priority: Medium     Health Care Home 12/01/2015     Priority: Medium     Given basic care plan on the visit of 12/1/15       Diarrhea 03/15/2015     Priority: Medium     Chest pain 03/14/2015     Priority: Medium     Atrial fibrillation (H) 01/15/2015     Priority: Medium     Sinus node dysfunction (H)      Priority: Medium     Advanced directives, counseling/discussion 11/09/2012     Priority: Medium     Patient has completed an Advance/Health Care Directive (HCD), scanned into Epic Media tab, entry date of 4/7/2011.    Shaila Field  November 9, 2012         Sensorineural hearing loss, bilateral 07/26/2012     Priority: Medium     CARDIOVASCULAR SCREENING; LDL GOAL LESS THAN 100 " 10/31/2010     Priority: Medium     Chronic tophaceous gout 02/02/2009     Priority: Medium     Allopurinol  She has been left on hctz       Chronic kidney disease, stage III (moderate) (H) 04/21/2008     Priority: Medium     Mildly lowered gfr  On celebrex but no good option otherwise-so suggest checking kidney function q 6 months       Disorder of bone and cartilage 01/13/2008     Priority: Medium     Osteopenia on 08 dex  Problem list name updated by automated process. Provider to review       Impaired fasting glucose 12/05/2007     Priority: Medium     HEPATITIS B in past 02/06/2007     Priority: Medium     Serology suggests past infection but not current carrier state       Hypertension goal BP (blood pressure) < 140/90 07/07/2005     Priority: Medium     Other specified malignant neoplasm of skin of lower limb, including hip 07/07/2005     Priority: Medium     basal cell of nose  (Problem list name updated by automated process. Provider to review and confirm.)       Hemorrhage of gastrointestinal tract 07/07/2005     Priority: Medium     gastric ulcer 01-helicobacter pos  Problem list name updated by automated process. Provider to review       Generalized osteoarthrosis, unspecified site 07/07/2005     Priority: Medium     right shoulder replacement          Past Medical History:    Past Medical History:   Diagnosis Date     Atrial fibrillation (H)      Basal cell carcinoma      Chronic kidney disease      Disorders of bursae and tendons in shoulder region, unspecified      Hemorrhage of gastrointestinal tract, unspecified      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, including hip      Other specified glaucoma      Renal insufficiency      Sinus node dysfunction (H)      Unspecified essential hypertension        Past Surgical History:    Past Surgical History:   Procedure Laterality Date     ARTHROPLASTY REVISION HIP Left 8/19/2016    Procedure: ARTHROPLASTY REVISION HIP;  Surgeon: Kael Rojas MD;   Location: [facility] OR     C PELVIS/HIP JOINT SURGERY UNLISTED       C SHOULDER SURG PROC UNLISTED       C STOMACH SURGERY PROCEDURE UNLISTED       HC VASCULAR SURGERY PROCEDURE UNLIST       OPEN REDUCTION INTERNAL FIXATION FEMUR PROXIMAL Left 8/19/2016    Procedure: OPEN REDUCTION INTERNAL FIXATION FEMUR PROXIMAL;  Surgeon: Kael Rojas MD;  Location: [facility] OR     SURGICAL HISTORY OF -   11/1992    skin cancer, nose     SURGICAL HISTORY OF -   1978    right, vein stripping     SURGICAL HISTORY OF -   1968    breast biopsy     SURGICAL HISTORY OF -   1993    laparoscopic cholecystectomy     SURGICAL HISTORY OF -       tonsillectomy and adenoidectomy     SURGICAL HISTORY OF -   04/2004    left cataract     SURGICAL HISTORY OF -   08/09/2004    right total shoulder arthroplasty       Family History:    Family History   Problem Relation Age of Onset     Heart Disease Mother         CHF     Hypertension Mother      Hypertension Father      Hypertension Maternal Grandmother      Hypertension Maternal Grandfather      Hypertension Son      Heart Disease Son         MI       Social History:  Marital Status:   [5]  Social History     Tobacco Use     Smoking status: Never Smoker     Smokeless tobacco: Never Used   Substance Use Topics     Alcohol use: Yes     Comment: rare     Drug use: No        Medications:      allopurinol (ZYLOPRIM) 100 MG tablet   amLODIPine (NORVASC) 5 MG tablet   brimonidine (ALPHAGAN-P) 0.15 % ophthalmic solution   budesonide (ENTOCORT EC) 3 MG EC capsule   Calcium-Vitamin D 600-200 MG-UNIT TABS   COSOPT 2-0.5 % OP SOLN   diphenoxylate-atropine (LOMOTIL) 2.5-0.025 MG tablet   Fexofenadine HCl (ALLEGRA PO)   latanoprost (XALATAN) 0.005 % ophthalmic solution   ONE-A-DAY WOMENS OR TABS   psyllium (METAMUCIL) 58.6 % POWD   triamcinolone (KENALOG) 0.1 % ointment   warfarin (COUMADIN) 2 MG tablet         Review of Systems   Constitutional: Positive for activity change.   HENT: Positive for facial  swelling. Negative for ear pain, sore throat and trouble swallowing.    Eyes: Negative for visual disturbance.   Respiratory: Negative for chest tightness and shortness of breath.    Cardiovascular: Negative for chest pain.   Gastrointestinal: Negative for abdominal pain and nausea.   Genitourinary: Negative for dysuria and flank pain.   Musculoskeletal: Positive for arthralgias. Negative for neck pain.   Skin: Positive for wound.   Neurological: Negative for dizziness, weakness, light-headedness, numbness and headaches.   Hematological: Does not bruise/bleed easily.   Psychiatric/Behavioral: Negative for confusion.       Physical Exam          Physical Exam   Constitutional: No distress.   Frail elderly female   Psychiatric: She has a normal mood and affect.   Nursing note and vitals reviewed.     HENT: Right lateral eyebrow with ecchymosis and bruising. Small superficial laceration.  Scalp is otherwise atraumatic.  TMs are clear bilaterally.  No midface instability. Bites symmetrical. Oral mucosa moist. No lesions.  Posterior pharynx no erythema edema  Neck: Supple. No posterior neck tenderness.  Trachea midline no swelling.  Pulmonary/Chest: Lungs are clear to auscultation bilaterally.  Slightly decreased at bases bilaterally  Cardiovascular: Heart is regular rate and rhythm. 3/6 systolic murmur.   Abdomen: Soft, non-distended, non-tender.  No bruising noted.  No masses noted .  Musculoskeletal: Moving all extremities well. No peripheral edema. TTP of the dorsum of right wrist.  Mild hip tenderness. No midline back tenderness. Mild TTP left knee. No swelling noted. No calf tenderness. Pulses and sensations symmetrical.  She is moving lower extremities well.  Neurological: Alert. No focal neurologic deficit.  Cranial nerves intact.  Skin: No rash.      ED Course        Procedures               Critical Care time:  none               Results for orders placed or performed during the hospital encounter of 01/20/19    CT Head w/o Contrast    Narrative    CT SCAN OF THE HEAD WITHOUT CONTRAST   1/20/2019 7:49 PM     HISTORY: Head trauma, minor, GCS>=13, low clinical risk, initial exam.  Fall. Right lower forehead trauma.    TECHNIQUE: Axial images of the head and coronal reformations without  IV contrast material. Radiation dose for this scan was reduced using  automated exposure control, adjustment of the mA and/or kV according  to patient size, or iterative reconstruction technique.    COMPARISON: 2/8/2017    FINDINGS: Moderate volume loss is present. Patchy white matter  hypoattenuation likely represents chronic small vessel ischemic  change. No evidence of acute ischemia, hemorrhage, mass, mass effect,  or hydrocephalus. Parenchyma is otherwise unremarkable. Scattered  vascular calcifications are present most conspicuous at the vertebral  artery V4 segments and carotid siphons. The calvarium, skull base, and  paranasal sinuses are unremarkable. Right frontal contusion is present  without evidence of underlying fracture. Bilateral lens replacements  are present.      Impression    IMPRESSION: Right frontal contusion without evidence of underlying  skull fracture or intracranial hemorrhage.    SARAH GUTIERREZ MD   CT Cervical Spine w/o Contrast    Narrative    CT CERVICAL SPINE WITHOUT CONTRAST   1/20/2019 7:49 PM     HISTORY: C-spine trauma, low clinical risk (NEXUS/CCR). Fall in  elderly patient with head trauma.     TECHNIQUE: Axial images of the cervical spine were obtained without  intravenous contrast. Multiplanar reformations were performed.  Radiation dose for this scan was reduced using automated exposure  control, adjustment of the mA and/or kV according to patient size, or  iterative reconstruction technique.    COMPARISON: 8/16/2016    FINDINGS: Alignment is significant for slight straightening and loss  of the normal lordosis. Multilevel degenerative disc disease is  present most severe at C5-6 and C6-7. Multilevel  facet arthropathy is  present most severe on the left at C4-5 and on the right at C3-4. Disc  protrusion at C3-4 results in moderate spinal canal stenosis and  indentation of the anterior cervical cord. A disc extrusion is present  at C4-5 measuring approximately 11 x 5 x 12 mm (TR by AP by SI). This  results in moderate to severe spinal canal stenosis with flattening of  the cervical cord. Disc osteophyte complex at C5-6 results in moderate  to severe spinal canal stenosis and flattening of the cervical cord.  Disc protrusion at C6-7 results in moderate spinal canal stenosis and  flattening of the anterior cervical cord. There is severe  atherosclerotic calcification at the left greater than right carotid  bifurcations. There is asymmetric enlargement of the left thyroid  gland.      Impression    IMPRESSION:   1. No evidence of fracture or traumatic subluxation.  2. Multilevel severe degenerative change with multiple disc  herniations including disc extrusion at C4-5 resulting in moderate to  severe spinal canal stenosis, similar compared to 8/16/2016.    SARAH GUTIERREZ MD   XR Wrist Right G/E 3 Views    Narrative    WRIST THREE VIEWS RIGHT  1/20/2019 7:32 PM     HISTORY: Fall, right wrist pain.    COMPARISON: November 12, 2014    FINDINGS: Severe degenerative change. The scapholunate interval  appears within normal limits. There is no acute fracture. No  dislocation. There are no worrisome bony lesions.      Impression    IMPRESSION: No acute osseous abnormality demonstrated.    BRAYDEN OSEGUERA MD   XR Knee Left 1/2 Views    Narrative    KNEE ONE-TWO VIEWS LEFT  1/20/2019 7:32 PM     HISTORY: Fall, left knee pain.    COMPARISON: None.    FINDINGS: Mild-moderate medial compartment and patellofemoral  compartment degenerative change. No suprapatellar effusion. There is  no acute fracture. No dislocation. There are no worrisome bony  lesions.      Impression    IMPRESSION: No acute osseous abnormality  demonstrated.    BRAYDEN OSEGUERA MD   XR Pelvis 1/2 Views    Narrative    PELVIS AP ONE VIEW  1/20/2019 7:33 PM     HISTORY: Fall, mild hip pain.    COMPARISON: None.    FINDINGS: Unremarkable bilateral hip arthroplasties. No definite  fracture demonstrated. There is no gross fracture or dislocation  demonstrated in this single view. There are no worrisome bony lesions.      Impression    IMPRESSION: No acute osseous abnormality demonstrated.    BRAYDEN OSEGUERA MD   Ribs XR, unilat 3 views + PA chest, right    Narrative    RIBS AND CHEST RIGHT THREE VIEWS   1/20/2019 8:51 PM     HISTORY: Fall, right thoracic back injury.    COMPARISON: 8/25/2016      Impression    IMPRESSION: No acute abnormality. Lungs are well-inflated and clear.  No evidence of contusion, effusion, or pneumothorax. No displaced rib  fractures are identified. Right shoulder prosthesis is noted.  Cholecystectomy clips noted.    SARAH GUTIERREZ MD         Labs Ordered and Resulted from Time of ED Arrival Up to the Time of Departure from the ED   CBC WITH PLATELETS DIFFERENTIAL - Abnormal; Notable for the following components:       Result Value    WBC 18.1 (*)     RBC Count 3.61 (*)     MCH 33.5 (*)     RDW 15.9 (*)     Absolute Neutrophil 15.2 (*)     All other components within normal limits   INR - Abnormal; Notable for the following components:    INR 2.36 (*)     All other components within normal limits   URINE MACROSCOPIC WITH REFLEX TO MICRO - Abnormal; Notable for the following components:    Leukocyte Esterase Urine Trace (*)     WBC Urine 7 (*)     Mucous Urine Present (*)     All other components within normal limits   BASIC METABOLIC PANEL - Abnormal; Notable for the following components:    Glucose 105 (*)     Creatinine 1.10 (*)     GFR Estimate 43 (*)     GFR Estimate If Black 50 (*)     Calcium 8.4 (*)     All other components within normal limits      Medications - No data to display  6:58 PM Patient assessed.     Assessments & Plan  (with Medical Decision Making) records were reviewed.  Basic labs were obtained.  CT scan of the head and neck were obtained due to patient had trauma and being on Coumadin.  X-rays of the right wrist pelvis and left knee were obtained.  White count was significantly elevated at 18.1.  There was a left shift.  Hemoglobin was stable at 12.1.  INR was 2.36.  Basic metabolic panel significant for a creatinine slightly elevated 1.10.  Patient had been offered pain medication but did not want any.  Urine analysis with trace leukocyte esterase 7 WBCs.  Patient questioned after this stated she might have had some frequency recently.  CT scan of the head was consistent with small vessel disease but no acute abnormality other than swelling around the right eyebrow.  CT scan of the neck with no acute osseous abnormality but degenerative changes.  Wrist x-ray knee x-ray and pelvis x-ray revealed no acute abnormality.  Patient complained of some right thoracic back pain at this time and on reevaluation she had slight bruising lateral to the lower thoracic region.  There was some mild rib post terrier tenderness.  I therefore did this chest x-ray with rib protocol.  No evidence of pneumothorax rib fracture or other abnormality noted.  Findings were discussed with detail with patient.  Her white count is elevated but I think this is most likely a stress reaction.  Patient ambulated without difficulty and wants to go home.  I offered her admission but she did not want to be admitted.  I am going to treat for possible UTI as she complained of frequency.  She will be given Keflex.  She will take Tylenol for pain.  If any fevers chills other symptoms present she should return return for further evaluation and care.  Any focal numbness weakness vomiting chest pain abdominal pain shortness of breath or other symptoms she should return.  She should follow-up with her primary care to have her white count rechecked.  Patient is in agree  with this plan.     I have reviewed the nursing notes.    I have reviewed the findings, diagnosis, plan and need for follow up with the patient.          Medication List      Started    cephALEXin 500 MG capsule  Commonly known as:  KEFLEX  500 mg, Oral, 2 TIMES DAILY            Final diagnoses:   Fall, initial encounter   Closed head injury, initial encounter   Back contusion, right, initial encounter   Wrist sprain, right, initial encounter   Contusion of left knee, initial encounter   Leukocytosis, unspecified type   Acute UTI - possible     This document serves as a record of the services and decisions personally performed and made by Jeffrey Ruby MD. It was created on HIS/HER behalf by   Han Santamaria, a trained medical scribe. The creation of this document is based the provider's statements to the medical scribe.  Han Santamaria 6:58 PM 1/20/2019    Provider:   The information in this document, created by the medical scribe for me, accurately reflects the services I personally performed and the decisions made by me. I have reviewed and approved this document for accuracy prior to leaving the patient care area.  Jeffrey Ruby MD 6:58 PM 1/20/2019 1/20/2019   [facility] EMERGENCY DEPARTMENT     Jeffrey Ruby MD  01/22/19 1112

## 2019-01-21 NOTE — ED NOTES
"Up with walker- no assistance from bed to standing needed  Steady on feet   no dizziness  Spoke with MD about wrist pain and options for support without restricting her walker use- ace wrap applied pt reports \"feels good\"  "

## 2019-01-21 NOTE — ED NOTES
"Pt arrived via EMS, after a fall from home.  Pt state she got up from the table and when pulling the refrig door open, she lost her balance, and fell, hitting her head: right side, outer aspect of the eye, small lac, no bleeding, hematoma present.  Pt is on coumadin.  Pt had to 'scoot across the floor to get her cell phone\", and noted her R wrist hurt and her L knee.         EMS place 20g IV in L AC, patent and SL.  Ice pack to eye and wrist.    Neuro:  CN 2-12 grossly intact.  GCS 15  R wrist: +pain with movement, brusing and point tenderness noted.  Good cap refil, +3 radial pulse  L knee: +discomfort with bending, no bruising or abrasions noted.  Good cap refil, + DP pulse       Dried blood washed off face.  Ice packs returned to eye and wrist.  Warm blankets applied.   Pt's neighbor here at bedside.         MD in for assessment and plan:  PLAN:  XR and CT ordered.    "

## 2019-01-21 NOTE — ED NOTES
Bed: ED07  Expected date:   Expected time:   Means of arrival:   Comments:  Ambulance  fall 89 yr old

## 2019-01-21 NOTE — DISCHARGE INSTRUCTIONS
Return if symptoms worsen or new symptoms develop.  Follow-up with your primary care later this week for recheck.  Drink plenty of fluids.  Take antibiotics as directed.  If any severe headaches altered mental status focal numbness weakness any extremity chest pain shortness of breath abdominal pain back pain or other symptoms present please return for further evaluation and care.  Her white count was elevated but I feel this more likely a stress reaction.  Please have this rechecked with your primary care when you visit them this week.  Take ibuprofen or Tylenol for pain.

## 2019-01-22 LAB
BACTERIA SPEC CULT: NORMAL
Lab: NORMAL
SPECIMEN SOURCE: NORMAL

## 2019-01-22 ASSESSMENT — ENCOUNTER SYMPTOMS
CONFUSION: 0
LIGHT-HEADEDNESS: 0
CHEST TIGHTNESS: 0
WEAKNESS: 0
BRUISES/BLEEDS EASILY: 0
TROUBLE SWALLOWING: 0
FLANK PAIN: 0
DYSURIA: 0
SORE THROAT: 0
SHORTNESS OF BREATH: 0
ACTIVITY CHANGE: 1
NUMBNESS: 0
FACIAL SWELLING: 1
NAUSEA: 0

## 2019-01-22 NOTE — RESULT ENCOUNTER NOTE
Final urine culture report is NEGATIVE per [facility] ED Lab Result protocol.    If NEGATIVE result, no change in treatment, per [facility] ED Lab Result protocol.

## 2019-01-25 ENCOUNTER — TELEPHONE (OUTPATIENT)
Dept: FAMILY MEDICINE | Facility: CLINIC | Age: 84
End: 2019-01-25

## 2019-01-25 NOTE — TELEPHONE ENCOUNTER
S-(situation): Patient is concerned she continues to have urine frequency.     B-(background): patient was seen in the ER for fall and UTI on 1/20/19.    A-(assessment): Patient just finished the antibiotics yesterday. Patient has urine frequency most of the time.  Patient denies any painful urination.  Patient denies any abdominal or back pain. Patient does have back pain probably due to pain.  Patient states she is concerned about symptoms continuing to have frequency.  This has been going on for some time. Patient states the budesonide is working.    R-(recommendations): Will send to provider to review and advise. Should patient go to UC/ER or can we put her in on Monday 1/28/19?    Thank you  Blanca CALLAHAN RN      [facility].

## 2019-01-25 NOTE — TELEPHONE ENCOUNTER
Urine culture on 1/20 was negative, so I'm not positive this is a UTI, she should be seen.  Monday would be okay.    Amanda Renee M.D.

## 2019-01-25 NOTE — TELEPHONE ENCOUNTER
Reason for Call:  Many questions in regards to ER visit    Detailed comments: patient is calling stating that she was seen in the ED for a fall and was to follow up with CHAKA Renee soon. They also discovered she has a UTI, and she has finished with the medication but is still having issues. She also has more questions on various things. Please call.    Phone Number Patient can be reached at: Home number on file [phone] (home)    Best Time: any    Can we leave a detailed message on this number? YES   Bouchra Paul  [facility] [place] [facility]      Call taken on 1/25/2019 at 9:38 AM by Bouchra Paul

## 2019-01-28 ENCOUNTER — ANTICOAGULATION THERAPY VISIT (OUTPATIENT)
Dept: ANTICOAGULATION | Facility: CLINIC | Age: 84
End: 2019-01-28
Payer: COMMERCIAL

## 2019-01-28 ENCOUNTER — OFFICE VISIT (OUTPATIENT)
Dept: FAMILY MEDICINE | Facility: CLINIC | Age: 84
End: 2019-01-28
Payer: COMMERCIAL

## 2019-01-28 ENCOUNTER — ANCILLARY PROCEDURE (OUTPATIENT)
Dept: GENERAL RADIOLOGY | Facility: CLINIC | Age: 84
End: 2019-01-28
Payer: COMMERCIAL

## 2019-01-28 VITALS
WEIGHT: 104 LBS | HEART RATE: 77 BPM | SYSTOLIC BLOOD PRESSURE: 136 MMHG | HEIGHT: 60 IN | BODY MASS INDEX: 20.42 KG/M2 | RESPIRATION RATE: 18 BRPM | DIASTOLIC BLOOD PRESSURE: 64 MMHG | OXYGEN SATURATION: 100 % | TEMPERATURE: 96.7 F

## 2019-01-28 DIAGNOSIS — R94.4 DECREASED GFR: ICD-10-CM

## 2019-01-28 DIAGNOSIS — I48.91 ATRIAL FIBRILLATION, UNSPECIFIED TYPE (H): ICD-10-CM

## 2019-01-28 DIAGNOSIS — M25.531 RIGHT WRIST PAIN: ICD-10-CM

## 2019-01-28 DIAGNOSIS — D72.829 LEUKOCYTOSIS, UNSPECIFIED TYPE: Primary | ICD-10-CM

## 2019-01-28 LAB
ANION GAP SERPL CALCULATED.3IONS-SCNC: 6 MMOL/L (ref 3–14)
BASOPHILS # BLD AUTO: 0.1 10E9/L (ref 0–0.2)
BASOPHILS NFR BLD AUTO: 0.5 %
BUN SERPL-MCNC: 25 MG/DL (ref 7–30)
CALCIUM SERPL-MCNC: 9.6 MG/DL (ref 8.5–10.1)
CHLORIDE SERPL-SCNC: 101 MMOL/L (ref 94–109)
CO2 SERPL-SCNC: 28 MMOL/L (ref 20–32)
CREAT SERPL-MCNC: 0.74 MG/DL (ref 0.52–1.04)
DIFFERENTIAL METHOD BLD: ABNORMAL
EOSINOPHIL # BLD AUTO: 0.5 10E9/L (ref 0–0.7)
EOSINOPHIL NFR BLD AUTO: 3.5 %
ERYTHROCYTE [DISTWIDTH] IN BLOOD BY AUTOMATED COUNT: 15.7 % (ref 10–15)
GFR SERPL CREATININE-BSD FRML MDRD: 69 ML/MIN/{1.73_M2}
GLUCOSE SERPL-MCNC: 102 MG/DL (ref 70–99)
HCT VFR BLD AUTO: 34.6 % (ref 35–47)
HGB BLD-MCNC: 11.4 G/DL (ref 11.7–15.7)
INR PPP: 2.11 (ref 0.86–1.14)
LYMPHOCYTES # BLD AUTO: 1.3 10E9/L (ref 0.8–5.3)
LYMPHOCYTES NFR BLD AUTO: 9.6 %
MCH RBC QN AUTO: 32.5 PG (ref 26.5–33)
MCHC RBC AUTO-ENTMCNC: 32.9 G/DL (ref 31.5–36.5)
MCV RBC AUTO: 99 FL (ref 78–100)
MONOCYTES # BLD AUTO: 1.1 10E9/L (ref 0–1.3)
MONOCYTES NFR BLD AUTO: 8.1 %
NEUTROPHILS # BLD AUTO: 10.2 10E9/L (ref 1.6–8.3)
NEUTROPHILS NFR BLD AUTO: 78.3 %
PLATELET # BLD AUTO: 264 10E9/L (ref 150–450)
POTASSIUM SERPL-SCNC: 3.8 MMOL/L (ref 3.4–5.3)
RBC # BLD AUTO: 3.51 10E12/L (ref 3.8–5.2)
SODIUM SERPL-SCNC: 135 MMOL/L (ref 133–144)
WBC # BLD AUTO: 13 10E9/L (ref 4–11)

## 2019-01-28 PROCEDURE — 73110 X-RAY EXAM OF WRIST: CPT | Mod: RT

## 2019-01-28 PROCEDURE — 85025 COMPLETE CBC W/AUTO DIFF WBC: CPT | Performed by: FAMILY MEDICINE

## 2019-01-28 PROCEDURE — 85610 PROTHROMBIN TIME: CPT | Performed by: FAMILY MEDICINE

## 2019-01-28 PROCEDURE — 80048 BASIC METABOLIC PNL TOTAL CA: CPT | Performed by: FAMILY MEDICINE

## 2019-01-28 PROCEDURE — 99214 OFFICE O/P EST MOD 30 MIN: CPT | Performed by: FAMILY MEDICINE

## 2019-01-28 PROCEDURE — 99207 ZZC NO CHARGE NURSE ONLY: CPT

## 2019-01-28 PROCEDURE — 36415 COLL VENOUS BLD VENIPUNCTURE: CPT | Performed by: FAMILY MEDICINE

## 2019-01-28 ASSESSMENT — MIFFLIN-ST. JEOR: SCORE: 798.24

## 2019-01-28 ASSESSMENT — PAIN SCALES - GENERAL: PAINLEVEL: MODERATE PAIN (4)

## 2019-01-28 NOTE — PROGRESS NOTES
"  SUBJECTIVE:   Pooja Swartz is a 89 year old female who presents to clinic today for the following health issues:      ED/UC Followup:    Facility:  [facility]  Date of visit: 1/20/19  Reason for visit: HPI  Pooja Swartz is a 89 year old female who presents to the ED for evaluation following a fall. The patient lives alone and fell to the floor after the fridge door opened \"faster than expected\" tonight. She reports an injury to her right eyebrow region, soreness of the right wrist, and soreness of the left knee. She used her right wrist to scoot across the floor to reach the phone after the fall. She denies loss of consciousness, dizziness, neck pain, chest pain, abdominal pain, headache, and hip pain. Patient takes coumadin.  She currently rates her pain a 2 out of 10 and is worsened with activity.  Current Status: Right eye and right wrist is still causing pain and mid back is tender. Patient rates pain overall at a 4/10. Patient has no current UA symptoms.      Treated for a UTI with keflex, however UC was negative.    WBC was high, without other infectious signs/symptoms - ?demargination    Supposed to see INR clinic tomorrow but would prefer to have INR drawn today and avoid coming back tomorrow if possible.    Right wrist continues to hurt, poor  strength on that side.  Xray done in the ER didn't show fracture.       /64   Pulse 77   Temp 96.7  F (35.9  C) (Tympanic)   Resp 18   Ht 1.524 m (5')   Wt 47.2 kg (104 lb)   LMP 07/07/1960   SpO2 100%   BMI 20.31 kg/m    EXAM: GENERAL APPEARANCE: Alert, no acute distress  EYES: bruising around right eye, starting to dissipate.   RESP: lungs clear to auscultation   CV: normal rate, regular rhythm, II/VI DIANE LUSB  ABDOMEN: soft, no organomegaly, masses or tenderness  MS: extremities normal, no peripheral edema  Right side of spine, mid thoracic there is a small bruise and hematoma  SKIN: no suspicious lesions or rashes    Results for orders placed or " performed in visit on 01/28/19   CBC with platelets differential   Result Value Ref Range    WBC 13.0 (H) 4.0 - 11.0 10e9/L    RBC Count 3.51 (L) 3.8 - 5.2 10e12/L    Hemoglobin 11.4 (L) 11.7 - 15.7 g/dL    Hematocrit 34.6 (L) 35.0 - 47.0 %    MCV 99 78 - 100 fl    MCH 32.5 26.5 - 33.0 pg    MCHC 32.9 31.5 - 36.5 g/dL    RDW 15.7 (H) 10.0 - 15.0 %    Platelet Count 264 150 - 450 10e9/L    % Neutrophils 78.3 %    % Lymphocytes 9.6 %    % Monocytes 8.1 %    % Eosinophils 3.5 %    % Basophils 0.5 %    Absolute Neutrophil 10.2 (H) 1.6 - 8.3 10e9/L    Absolute Lymphocytes 1.3 0.8 - 5.3 10e9/L    Absolute Monocytes 1.1 0.0 - 1.3 10e9/L    Absolute Eosinophils 0.5 0.0 - 0.7 10e9/L    Absolute Basophils 0.1 0.0 - 0.2 10e9/L    Diff Method Automated Method      ASSESSMENT/PLAN:      ICD-10-CM    1. Leukocytosis, unspecified type D72.829 CBC with platelets differential   2. Decreased GFR R94.4 Basic metabolic panel   3. Atrial fibrillation, unspecified type (H) I48.91 INR   4. Right wrist pain M25.531 XR Wrist Right G/E 3 Views     Leukocytosis has improved, she has no signs/symptoms of UTI at this point, will not recheck a urine as recent culture negative.  No signs/symptoms of other infections.   Leukocytosis is reported 1-5% of the time with Budesonide even though there is little systemic absorption.  Fortunately she is feeling quite good from a GI standpoint and has had marked improvement in her symptoms.     Right wrist, healing vs old fracture.  Await radiologist read of the xray.  She has wrist splint at home she will use.     Recheck renal function.      INR checked today and will send result to the [facility].    Amanda Renee M.D.      Patient Instructions       Our Clinic hours are:  Mondays    7:20 am - 7 pm  Tues -  Fri  7:20 am - 5 pm    Clinic Phone: [phone]    The clinic lab opens at 7:30 am Mon - Fri and appointments are required.    [facility]  Ph. [phone]  Monday  8 am - 7pm  Tues  - Fri 8 am - 5:30 pm

## 2019-01-28 NOTE — RESULT ENCOUNTER NOTE
Radiologist did see the small fracture.  I would wear the wrist splint as we discussed in clinic.  Recheck on the xray in 3-4 weeks. Please help her schedule a visit with me.    Amanda Renee M.D.

## 2019-01-28 NOTE — PROGRESS NOTES
ANTICOAGULATION FOLLOW-UP CLINIC VISIT    Patient Name:  Pooja Swartz  Date:  2019  Contact Type:  Telephone    SUBJECTIVE:     Patient Findings     Positives:   No Problem Findings    Comments:   Patient saw PCP today for a follow up. An INR was drawn at that visit. She started Budesonide a few weeks ago. She is no longer having ongoing issues with diarrhea. Patient's INR has remained stable without the diarrhea occurring. Will plan to continue on the same warfarin dose and recheck her INR again in 3 weeks.            OBJECTIVE    INR   Date Value Ref Range Status   2019 2.11 (H) 0.86 - 1.14 Final       ASSESSMENT / PLAN  No question data found.  Anticoagulation Summary  As of 2019    INR goal:   2.0-3.0   TTR:   78.5 % (2.3 y)   INR used for dosin.11 (2019)   Warfarin maintenance plan:   2 mg (2 mg x 1) every Wed; 3 mg (2 mg x 1.5) all other days   Full warfarin instructions:   2 mg every Wed; 3 mg all other days   Weekly warfarin total:   20 mg   No change documented:   Jania Torrez RN   Plan last modified:   Trudy Alex RN (2018)   Next INR check:   2019   Priority:   INR   Target end date:       Indications    Atrial fibrillation (H) [I48.91]  Long-term (current) use of anticoagulants [Z79.01] [Z79.01]             Anticoagulation Episode Summary     INR check location:       Preferred lab:       Send INR reminders to:   CL ANTICOAG POOL    Comments:   * warfarin 2mg tabs      Anticoagulation Care Providers     Provider Role Specialty Phone number    Amanda Renee MD [facility] [phone]            See the Encounter Report to view Anticoagulation Flowsheet and Dosing Calendar (Go to Encounters tab in chart review, and find the Anticoagulation Therapy Visit)        Jania Torrez RN CACP

## 2019-01-28 NOTE — PATIENT INSTRUCTIONS
Our Clinic hours are:  Mondays    7:20 am - 7 pm  Tues -  Fri  7:20 am - 5 pm    Clinic Phone: [phone]    The clinic lab opens at 7:30 am Mon - Fri and appointments are required.    [facility]. [phone]  Monday  8 am - 7pm  Tues - Fri 8 am - 5:30 pm

## 2019-01-28 NOTE — ADDENDUM NOTE
Addended by: CHRIS HAYNES on: 1/28/2019 04:06 PM     Modules accepted: Level of Service, SmartSet

## 2019-02-19 ENCOUNTER — ANTICOAGULATION THERAPY VISIT (OUTPATIENT)
Dept: ANTICOAGULATION | Facility: CLINIC | Age: 84
End: 2019-02-19
Payer: COMMERCIAL

## 2019-02-19 DIAGNOSIS — I48.91 ATRIAL FIBRILLATION, UNSPECIFIED TYPE (H): ICD-10-CM

## 2019-02-19 LAB — INR POINT OF CARE: 2.8 (ref 0.86–1.14)

## 2019-02-19 PROCEDURE — 36416 COLLJ CAPILLARY BLOOD SPEC: CPT

## 2019-02-19 PROCEDURE — 99207 ZZC NO CHARGE NURSE ONLY: CPT

## 2019-02-19 PROCEDURE — 85610 PROTHROMBIN TIME: CPT | Mod: QW

## 2019-02-19 NOTE — PROGRESS NOTES
ANTICOAGULATION FOLLOW-UP CLINIC VISIT    Patient Name:  Pooja Swartz  Date:  2019  Contact Type:  Face to Face    SUBJECTIVE:     Patient Findings     Positives:   Bruising (bruising on her face from a fall)    Comments:   Patient fell on 19, she did go into the ED for evaluation.     No changes in medications, diet, or activity noted. Took warfarin as instructed, denies any missed doses.            OBJECTIVE    INR Protime   Date Value Ref Range Status   2019 2.8 (A) 0.86 - 1.14 Final       ASSESSMENT / PLAN  No question data found.  Anticoagulation Summary  As of 2019    INR goal:   2.0-3.0   TTR:   79.0 % (2.4 y)   INR used for dosin.8 (2019)   Warfarin maintenance plan:   2 mg (2 mg x 1) every Wed; 3 mg (2 mg x 1.5) all other days   Full warfarin instructions:   2 mg every Wed; 3 mg all other days   Weekly warfarin total:   20 mg   No change documented:   Jania Torrez RN   Plan last modified:   Trudy Alex RN (2018)   Next INR check:   3/19/2019   Priority:   INR   Target end date:   Indefinite    Indications    Atrial fibrillation (H) [I48.91]  Long-term (current) use of anticoagulants [Z79.01] [Z79.01]             Anticoagulation Episode Summary     INR check location:       Preferred lab:       Send INR reminders to:   CL ANTICOAG POOL    Comments:   * warfarin 2mg tabs      Anticoagulation Care Providers     Provider Role Specialty Phone number    Amanda Renee MD [facility] [phone]            See the Encounter Report to view Anticoagulation Flowsheet and Dosing Calendar (Go to Encounters tab in chart review, and find the Anticoagulation Therapy Visit)        Jania Torrez RN CACP

## 2019-02-25 ENCOUNTER — ANCILLARY PROCEDURE (OUTPATIENT)
Dept: GENERAL RADIOLOGY | Facility: CLINIC | Age: 84
End: 2019-02-25
Attending: FAMILY MEDICINE
Payer: COMMERCIAL

## 2019-02-25 ENCOUNTER — TELEPHONE (OUTPATIENT)
Dept: FAMILY MEDICINE | Facility: CLINIC | Age: 84
End: 2019-02-25

## 2019-02-25 ENCOUNTER — OFFICE VISIT (OUTPATIENT)
Dept: FAMILY MEDICINE | Facility: CLINIC | Age: 84
End: 2019-02-25
Payer: COMMERCIAL

## 2019-02-25 VITALS
BODY MASS INDEX: 21.01 KG/M2 | HEART RATE: 82 BPM | OXYGEN SATURATION: 95 % | RESPIRATION RATE: 18 BRPM | HEIGHT: 60 IN | TEMPERATURE: 97.8 F | WEIGHT: 107 LBS | SYSTOLIC BLOOD PRESSURE: 150 MMHG | DIASTOLIC BLOOD PRESSURE: 74 MMHG

## 2019-02-25 DIAGNOSIS — S52.501D CLOSED FRACTURE OF DISTAL END OF RIGHT RADIUS WITH ROUTINE HEALING, UNSPECIFIED FRACTURE MORPHOLOGY, SUBSEQUENT ENCOUNTER: Primary | ICD-10-CM

## 2019-02-25 DIAGNOSIS — K52.9 CHRONIC DIARRHEA: ICD-10-CM

## 2019-02-25 PROCEDURE — 99214 OFFICE O/P EST MOD 30 MIN: CPT | Performed by: FAMILY MEDICINE

## 2019-02-25 PROCEDURE — 73110 X-RAY EXAM OF WRIST: CPT | Mod: RT

## 2019-02-25 RX ORDER — BUDESONIDE 3 MG/1
3 CAPSULE, COATED PELLETS ORAL EVERY MORNING
Qty: 30 CAPSULE | Refills: 1 | Status: SHIPPED | OUTPATIENT
Start: 2019-03-08 | End: 2019-05-29

## 2019-02-25 RX ORDER — BUDESONIDE 3 MG/1
CAPSULE, COATED PELLETS ORAL
Qty: 180 CAPSULE | Refills: 3 | Status: SHIPPED | OUTPATIENT
Start: 2019-02-25 | End: 2019-02-25

## 2019-02-25 ASSESSMENT — PAIN SCALES - GENERAL: PAINLEVEL: MILD PAIN (3)

## 2019-02-25 ASSESSMENT — MIFFLIN-ST. JEOR: SCORE: 811.85

## 2019-02-25 NOTE — PROGRESS NOTES
SUBJECTIVE:   Pooja Swartz is a 89 year old female who presents to clinic today for the following health issues:      Chief Complaint   Patient presents with     Fracture     Patient feels like her arm is doing better. At home when she doesn't want the heavy brace on she will use a light wrap. Patient rates pain at minimally. Patient is still having pain when trying to turn keys.      SUBJECTIVE:  Pooja Swartz, a 89 year old female scheduled an appointment to discuss the following issues:     Closed fracture of distal end of right radius with routine healing, unspecified fracture morphology, subsequent encounter  Chronic diarrhea     Here to follow up on recent right wrist fracture.      Medical, social, surgical, and family histories reviewed.    ROS:  5 point ROS negative except as noted above in HPI, including Gen., Resp., CV, GI &  system review.    OBJECTIVE:  /74   Pulse 82   Temp 97.8  F (36.6  C) (Tympanic)   Resp 18   Ht 1.524 m (5')   Wt 48.5 kg (107 lb)   LMP 07/07/1960   SpO2 95%   BMI 20.90 kg/m    EXAM:  GENERAL APPEARANCE ADULT: Alert, no acute distress  MS: wrist exam normal wrist appearance, not tender to palpation, range of motion in flexion and extension limited to about 10 degrees    Xray: callous formation noted at the fracture site.  +degeneraitve changes of the wrist    ASSESSMENT/PLAN:    (S52.501D) Closed fracture of distal end of right radius with routine healing, unspecified fracture morphology, subsequent encounter  (primary encounter diagnosis)  Comment: okay to go without her splint.  Work on range of motion and stretching.   Plan: XR Wrist Right G/E 3 Views             (K52.9) Chronic diarrhea  Comment: 3/10/2019 will wean down to 3 mg on the Entocort EC.  New prescription done for the lower dose.   Plan: budesonide (ENTOCORT EC) 3 MG EC capsule,         DISCONTINUED: budesonide (ENTOCORT EC) 3 MG EC         capsule             Amanda Renee M.D.          Patient  Instructions       Our Clinic hours are:  Mondays    7:20 am - 7 pm  Tues -  Fri  7:20 am - 5 pm    Clinic Phone: [phone]    The clinic lab opens at 7:30 am Mon - Fri and appointments are required.    [facility]. [phone]  Monday  8 am - 7pm  Tues - Fri 8 am - 5:30 pm

## 2019-02-25 NOTE — TELEPHONE ENCOUNTER
Reason for Call:  Budesonide    Detailed comments: Chaka is calling from [facility] in [place] and stating he got 2 Budesonide Rx's on this patient, and would like clarification on which one he is to fill. Please advise.    Phone Number Patient can be reached at: Other phone number:  [phone]    Best Time: any    Can we leave a detailed message on this number? YES   Bouchra Paul  [facility]inic [facility] [place] [facility]      Call taken on 2/25/2019 at 2:46 PM by Bouchra Paul

## 2019-02-25 NOTE — PATIENT INSTRUCTIONS
Our Clinic hours are:  Mondays    7:20 am - 7 pm  Tues -  Fri  7:20 am - 5 pm    Clinic Phone: [phone]    The clinic lab opens at 7:30 am Mon - Fri and appointments are required.    [facility]. [phone]  Monday  8 am - 7pm  Tues - Fri 8 am - 5:30 pm

## 2019-03-19 ENCOUNTER — ANTICOAGULATION THERAPY VISIT (OUTPATIENT)
Dept: ANTICOAGULATION | Facility: CLINIC | Age: 84
End: 2019-03-19
Payer: COMMERCIAL

## 2019-03-19 DIAGNOSIS — I48.91 ATRIAL FIBRILLATION, UNSPECIFIED TYPE (H): ICD-10-CM

## 2019-03-19 DIAGNOSIS — I48.19 PERSISTENT ATRIAL FIBRILLATION (H): ICD-10-CM

## 2019-03-19 DIAGNOSIS — Z79.01 LONG TERM CURRENT USE OF ANTICOAGULANT THERAPY: ICD-10-CM

## 2019-03-19 LAB — INR POINT OF CARE: 1.9 (ref 0.86–1.14)

## 2019-03-19 PROCEDURE — 85610 PROTHROMBIN TIME: CPT | Mod: QW

## 2019-03-19 PROCEDURE — 99207 ZZC NO CHARGE NURSE ONLY: CPT

## 2019-03-19 PROCEDURE — 36416 COLLJ CAPILLARY BLOOD SPEC: CPT

## 2019-03-19 RX ORDER — WARFARIN SODIUM 2 MG/1
TABLET ORAL
Qty: 150 TABLET | Refills: 3 | COMMUNITY
Start: 2019-03-19 | End: 2019-08-22

## 2019-03-19 NOTE — PROGRESS NOTES
ANTICOAGULATION FOLLOW-UP CLINIC VISIT    Patient Name:  Pooja Swartz  Date:  3/19/2019  Contact Type:  Face to Face    SUBJECTIVE:     Patient Findings     Comments:   No changes in medications, activity, or diet noted. No bleeding or increased bruising noted. Took warfarin as prescribed.  Patient had 20 mg in the last 7 days, will adjust dose to 21 mg by next INR check in two weeks (~5% increase)  Pt states that she has not been eating many greens, but does enjoy them. Pt states that she was going to buy some greens to add in this week. Increased dose to help patient with green intake (vitamin K).  Patient verbalizes understanding and agrees to plan. No further questions or concerns.           OBJECTIVE    INR Protime   Date Value Ref Range Status   2019 1.9 (A) 0.86 - 1.14 Final       ASSESSMENT / PLAN  INR assessment THER +/- 0.1 within INR goal range   Recheck INR In: 2 WEEKS    INR Location Clinic      Anticoagulation Summary  As of 3/19/2019    INR goal:   2.0-3.0   TTR:   79.3 % (2.5 y)   INR used for dosin.9! (3/19/2019)   Warfarin maintenance plan:   3 mg (2 mg x 1.5) every day   Full warfarin instructions:   3 mg every day   Weekly warfarin total:   21 mg   Plan last modified:   Monica Mg RN (3/19/2019)   Next INR check:   2019   Priority:   INR   Target end date:   Indefinite    Indications    Atrial fibrillation (H) [I48.91]  Long-term (current) use of anticoagulants [Z79.01] [Z79.01]             Anticoagulation Episode Summary     INR check location:       Preferred lab:       Send INR reminders to:   CL ANTICOAG POOL    Comments:   * warfarin 2mg tabs      Anticoagulation Care Providers     Provider Role Specialty Phone number    Amanda Renee MD [facility] [phone]            See the Encounter Report to view Anticoagulation Flowsheet and Dosing Calendar (Go to Encounters tab in chart review, and find the Anticoagulation Therapy Visit)        Monica  LOKESH Mg

## 2019-04-02 ENCOUNTER — ANTICOAGULATION THERAPY VISIT (OUTPATIENT)
Dept: ANTICOAGULATION | Facility: CLINIC | Age: 84
End: 2019-04-02
Payer: COMMERCIAL

## 2019-04-02 DIAGNOSIS — I48.91 ATRIAL FIBRILLATION, UNSPECIFIED TYPE (H): ICD-10-CM

## 2019-04-02 LAB — INR POINT OF CARE: 1.8 (ref 0.86–1.14)

## 2019-04-02 PROCEDURE — 99207 ZZC NO CHARGE NURSE ONLY: CPT

## 2019-04-02 PROCEDURE — 85610 PROTHROMBIN TIME: CPT | Mod: QW

## 2019-04-02 PROCEDURE — 36416 COLLJ CAPILLARY BLOOD SPEC: CPT

## 2019-04-02 NOTE — PROGRESS NOTES
ANTICOAGULATION FOLLOW-UP CLINIC VISIT    Patient Name:  Pooja Swartz  Date:  2019  Contact Type:  Face to Face    SUBJECTIVE:     Patient Findings     Positives:   Change in diet/appetite (Increase in greens)    Comments:   No changes in medications, activity, or diet noted. No bleeding or increased bruising noted. Took warfarin as prescribed.  Patient had 21 mg in the last 7 days, will adjust dose to 23 mg by next INR check in one week (~9.5% increase).  Pt has been eating extra vitamin K - plans to continue as she enjoys eating them. Consistency discussed.  Patient educated on the increased risk for a clot/stroke, precautions to take, S &S of a clot/stroke, and when to seek medical attention. Denies S&S of a clot.  Patient verbalizes understanding and agrees to plan. No further questions or concerns.           OBJECTIVE    INR Protime   Date Value Ref Range Status   2019 1.8 (A) 0.86 - 1.14 Final       ASSESSMENT / PLAN  INR assessment SUB    Recheck INR In: 1 WEEK    INR Location Clinic      Anticoagulation Summary  As of 2019    INR goal:   2.0-3.0   TTR:   78.1 % (2.5 y)   INR used for dosin.8! (2019)   Warfarin maintenance plan:   4 mg (2 mg x 2) every Tue, Fri; 3 mg (2 mg x 1.5) all other days   Full warfarin instructions:   4 mg every Tue, Fri; 3 mg all other days   Weekly warfarin total:   23 mg   Plan last modified:   Monica Mg RN (2019)   Next INR check:   2019   Priority:   INR   Target end date:   Indefinite    Indications    Atrial fibrillation (H) [I48.91]  Long-term (current) use of anticoagulants [Z79.01] [Z79.01]             Anticoagulation Episode Summary     INR check location:       Preferred lab:       Send INR reminders to:   CL ANTICOAG POOL    Comments:   * warfarin 2mg tabs      Anticoagulation Care Providers     Provider Role Specialty Phone number    Amanda Renee MD [facility] [phone]            See the Encounter Report to  view Anticoagulation Flowsheet and Dosing Calendar (Go to Encounters tab in chart review, and find the Anticoagulation Therapy Visit)        Monica Mg RN

## 2019-04-09 ENCOUNTER — ANTICOAGULATION THERAPY VISIT (OUTPATIENT)
Dept: ANTICOAGULATION | Facility: CLINIC | Age: 84
End: 2019-04-09
Payer: COMMERCIAL

## 2019-04-09 ENCOUNTER — ALLIED HEALTH/NURSE VISIT (OUTPATIENT)
Dept: FAMILY MEDICINE | Facility: CLINIC | Age: 84
End: 2019-04-09
Payer: COMMERCIAL

## 2019-04-09 ENCOUNTER — TELEPHONE (OUTPATIENT)
Dept: FAMILY MEDICINE | Facility: CLINIC | Age: 84
End: 2019-04-09

## 2019-04-09 VITALS — WEIGHT: 113.5 LBS | BODY MASS INDEX: 22.17 KG/M2

## 2019-04-09 DIAGNOSIS — I48.91 ATRIAL FIBRILLATION, UNSPECIFIED TYPE (H): ICD-10-CM

## 2019-04-09 DIAGNOSIS — K52.9 CHRONIC DIARRHEA: Primary | ICD-10-CM

## 2019-04-09 LAB — INR POINT OF CARE: 2.1 (ref 0.86–1.14)

## 2019-04-09 PROCEDURE — 36416 COLLJ CAPILLARY BLOOD SPEC: CPT

## 2019-04-09 PROCEDURE — 99207 ZZC NO CHARGE NURSE ONLY: CPT

## 2019-04-09 PROCEDURE — 85610 PROTHROMBIN TIME: CPT | Mod: QW

## 2019-04-09 NOTE — PROGRESS NOTES
Pt has been on a Budesonide taper:  3 tabs/day x 1 mo.  2 tabs/day x 1 mo.  1 tab/day x 1 mo.  Pt is having normal bowel movements with no diarrhea.  Is gaining wt and is up to 113.5 lbs.  Pt asking if she should continue on 1 tab/day? Or stop this med?  Pt has this med @ home.  Advise.  Elpidio

## 2019-04-09 NOTE — PROGRESS NOTES
ANTICOAGULATION FOLLOW-UP CLINIC VISIT    Patient Name:  Pooja Swartz  Date:  2019  Contact Type:  Face to Face    SUBJECTIVE:     Patient Findings     Comments:   No changes in medications, activity, or diet noted. No bleeding or increased bruising noted. Took warfarin as prescribed.  Pt continues to consume greens (vitamin K). Consistency discussed.  Patient is to continue maintenance warfarin plan, and check INR in 3 weeks.  Patient verbalizes understanding and agrees to plan. No further questions or concerns.           OBJECTIVE    INR Protime   Date Value Ref Range Status   2019 2.1 (A) 0.86 - 1.14 Final       ASSESSMENT / PLAN  INR assessment THER    Recheck INR In: 3 WEEKS    INR Location Clinic      Anticoagulation Summary  As of 2019    INR goal:   2.0-3.0   TTR:   77.8 % (2.5 y)   INR used for dosin.1 (2019)   Warfarin maintenance plan:   4 mg (2 mg x 2) every Tue, Fri; 3 mg (2 mg x 1.5) all other days   Full warfarin instructions:   4 mg every Tue, Fri; 3 mg all other days   Weekly warfarin total:   23 mg   No change documented:   Monica Mg RN   Plan last modified:   Monica Mg RN (2019)   Next INR check:   2019   Priority:   INR   Target end date:   Indefinite    Indications    Atrial fibrillation (H) [I48.91]  Long-term (current) use of anticoagulants [Z79.01] [Z79.01]             Anticoagulation Episode Summary     INR check location:       Preferred lab:       Send INR reminders to:   CL ANTICOAG POOL    Comments:   * warfarin 2mg tabs      Anticoagulation Care Providers     Provider Role Specialty Phone number    Amanda Renee MD [facility] [phone]            See the Encounter Report to view Anticoagulation Flowsheet and Dosing Calendar (Go to Encounters tab in chart review, and find the Anticoagulation Therapy Visit)        Monica Mg RN

## 2019-04-10 NOTE — TELEPHONE ENCOUNTER
Pt informed that it is OK to stop the Budesonide and to call back if the diarrhea returns.KPavelRn

## 2019-04-10 NOTE — TELEPHONE ENCOUNTER
She can discontinue the budesonide and let me know if the diarrhea returns.     Amanda Renee M.D.

## 2019-04-13 ENCOUNTER — TRANSFERRED RECORDS (OUTPATIENT)
Dept: HEALTH INFORMATION MANAGEMENT | Facility: CLINIC | Age: 84
End: 2019-04-13

## 2019-04-30 ENCOUNTER — ANTICOAGULATION THERAPY VISIT (OUTPATIENT)
Dept: ANTICOAGULATION | Facility: CLINIC | Age: 84
End: 2019-04-30
Payer: COMMERCIAL

## 2019-04-30 DIAGNOSIS — I48.91 ATRIAL FIBRILLATION, UNSPECIFIED TYPE (H): ICD-10-CM

## 2019-04-30 LAB — INR POINT OF CARE: 2.5 (ref 0.86–1.14)

## 2019-04-30 PROCEDURE — 36416 COLLJ CAPILLARY BLOOD SPEC: CPT

## 2019-04-30 PROCEDURE — 99207 ZZC NO CHARGE NURSE ONLY: CPT

## 2019-04-30 PROCEDURE — 85610 PROTHROMBIN TIME: CPT | Mod: QW

## 2019-04-30 NOTE — PROGRESS NOTES
ANTICOAGULATION FOLLOW-UP CLINIC VISIT    Patient Name:  Pooja Swartz  Date:  2019  Contact Type:  Face to Face    SUBJECTIVE:     Patient Findings     Comments:   No changes in medications, activity, or diet noted. No bleeding or increased bruising noted. Took warfarin as prescribed.  Patient is to continue maintenance warfarin plan, and check INR in 4 weeks.  Patient verbalizes understanding and agrees to plan. No further questions or concerns.           OBJECTIVE    INR Protime   Date Value Ref Range Status   2019 2.5 (A) 0.86 - 1.14 Final       ASSESSMENT / PLAN  INR assessment THER    Recheck INR In: 4 WEEKS    INR Location Clinic      Anticoagulation Summary  As of 2019    INR goal:   2.0-3.0   TTR:   78.3 % (2.6 y)   INR used for dosin.5 (2019)   Warfarin maintenance plan:   4 mg (2 mg x 2) every Tue, Fri; 3 mg (2 mg x 1.5) all other days   Full warfarin instructions:   4 mg every Tue, Fri; 3 mg all other days   Weekly warfarin total:   23 mg   No change documented:   Monica Mg RN   Plan last modified:   Monica Mg RN (2019)   Next INR check:   2019   Priority:   INR   Target end date:   Indefinite    Indications    Atrial fibrillation (H) [I48.91]  Long-term (current) use of anticoagulants [Z79.01] [Z79.01]             Anticoagulation Episode Summary     INR check location:       Preferred lab:       Send INR reminders to:   CL ANTICOAG POOL    Comments:   * warfarin 2mg tabs      Anticoagulation Care Providers     Provider Role Specialty Phone number    Amanda Renee MD [facility] [phone]            See the Encounter Report to view Anticoagulation Flowsheet and Dosing Calendar (Go to Encounters tab in chart review, and find the Anticoagulation Therapy Visit)        oMnica Mg RN

## 2019-05-20 ENCOUNTER — TELEPHONE (OUTPATIENT)
Dept: FAMILY MEDICINE | Facility: CLINIC | Age: 84
End: 2019-05-20

## 2019-05-20 DIAGNOSIS — K52.9 CHRONIC DIARRHEA: ICD-10-CM

## 2019-05-20 NOTE — TELEPHONE ENCOUNTER
S-(situation): Patient states she has diarrhea it started this morning.    B-(background): Patient was dx with colitis in jan 2019.  Message from 4/10/19:  She can discontinue the budesonide and let me know if the diarrhea returns.      Amanda Renee M.D.         A-(assessment): Patient states the diarrhea started this morning.  Patient has had 3 episodes this morning.  Patient has been off the budesonide since 4/10/19. Patient states her stools are loose and brown. Patient denies any blood or black stools. Patient denies any fevers. Patient denies any abdominal pain or nausea. Patient reports it is exactly like she had last time. Patient does have some budesonide left.  She has about Qty . 28 of the 3 mg left.    Pharmacy. [facility].     R-(recommendations): Will send to provider to review and advise.       Thank you  Blanca CALLAHAN RN

## 2019-05-20 NOTE — TELEPHONE ENCOUNTER
Start back at 3 mg budesonide daily and let me know after a few days if the diarrhea continues, we may need to increase this to 6 mg.    I will not send a prescription until we figure out what dose is going to work for her.  If the 3 mg works, we can continue this for another few months.     Amanda Renee M.D.

## 2019-05-20 NOTE — TELEPHONE ENCOUNTER
Reason for Call: colitis issues    Detailed comments: patient is calling and stating that she finished her course of medications for her colitis flare 4/10 and has been really good until yesterday. She has diarrhea very loose and a lot. She was told by Dr. Renee, if it should flare up again, to call and get more mediation. She uses [facility] in [place]. She states that she does have some Budesonide left.    Phone Number Patient can be reached at: Home number on file [phone] (home)    Best Time: any    Can we leave a detailed message on this number? YES   Bouchra Paul  Clinic Station [place] [facility]      Call taken on 5/20/2019 at 8:02 AM by Bouchra Paul

## 2019-05-28 ENCOUNTER — ANTICOAGULATION THERAPY VISIT (OUTPATIENT)
Dept: ANTICOAGULATION | Facility: CLINIC | Age: 84
End: 2019-05-28
Payer: COMMERCIAL

## 2019-05-28 ENCOUNTER — ALLIED HEALTH/NURSE VISIT (OUTPATIENT)
Dept: FAMILY MEDICINE | Facility: CLINIC | Age: 84
End: 2019-05-28
Payer: COMMERCIAL

## 2019-05-28 DIAGNOSIS — I48.91 ATRIAL FIBRILLATION, UNSPECIFIED TYPE (H): ICD-10-CM

## 2019-05-28 DIAGNOSIS — K52.9 CHRONIC DIARRHEA: Primary | ICD-10-CM

## 2019-05-28 LAB — INR POINT OF CARE: 2.7 (ref 0.86–1.14)

## 2019-05-28 PROCEDURE — 99207 ZZC NO CHARGE NURSE ONLY: CPT

## 2019-05-28 PROCEDURE — 36416 COLLJ CAPILLARY BLOOD SPEC: CPT

## 2019-05-28 PROCEDURE — 85610 PROTHROMBIN TIME: CPT | Mod: QW

## 2019-05-28 NOTE — PROGRESS NOTES
ANTICOAGULATION FOLLOW-UP CLINIC VISIT    Patient Name:  Pooja Swartz  Date:  2019  Contact Type:  Face to Face    SUBJECTIVE:  Patient Findings     Comments:   No changes in medications, activity, or diet noted. No bleeding or increased bruising noted. Took warfarin as prescribed.  Patient is to continue maintenance warfarin plan, and check INR in 4 weeks.  Patient verbalizes understanding and agrees to plan. No further questions or concerns.        Clinical Outcomes     Negatives:   Major bleeding event, Thromboembolic event, Anticoagulation-related hospital admission, Anticoagulation-related ED visit, Anticoagulation-related fatality    Comments:   No changes in medications, activity, or diet noted. No bleeding or increased bruising noted. Took warfarin as prescribed.  Patient is to continue maintenance warfarin plan, and check INR in 4 weeks.  Patient verbalizes understanding and agrees to plan. No further questions or concerns.           OBJECTIVE    INR Protime   Date Value Ref Range Status   2019 2.7 (A) 0.86 - 1.14 Final       ASSESSMENT / PLAN  INR assessment THER    Recheck INR In: 4 WEEKS    INR Location Clinic      Anticoagulation Summary  As of 2019    INR goal:   2.0-3.0   TTR:   78.9 % (2.7 y)   INR used for dosin.7 (2019)   Warfarin maintenance plan:   4 mg (2 mg x 2) every Tue, Fri; 3 mg (2 mg x 1.5) all other days   Full warfarin instructions:   4 mg every Tue, Fri; 3 mg all other days   Weekly warfarin total:   23 mg   No change documented:   Monica Mg RN   Plan last modified:   Monica Mg RN (2019)   Next INR check:   2019   Priority:   INR   Target end date:   Indefinite    Indications    Atrial fibrillation (H) [I48.91]  Long-term (current) use of anticoagulants [Z79.01] [Z79.01]             Anticoagulation Episode Summary     INR check location:       Preferred lab:       Send INR reminders to:   CL ANTICOAG POOL    Comments:   * warfarin 2mg  tabs      Anticoagulation Care Providers     Provider Role Specialty Phone number    Amanda Renee MD [facility] [phone]            See the Encounter Report to view Anticoagulation Flowsheet and Dosing Calendar (Go to Encounters tab in chart review, and find the Anticoagulation Therapy Visit)        Monica Mg RN

## 2019-05-29 RX ORDER — BUDESONIDE 3 MG/1
3 CAPSULE, COATED PELLETS ORAL EVERY MORNING
Qty: 30 CAPSULE | Refills: 6 | Status: SHIPPED | OUTPATIENT
Start: 2019-05-29 | End: 2020-01-02

## 2019-05-29 NOTE — TELEPHONE ENCOUNTER
Pt reported Budesonide 3 mg stopped her diarrhea. Pt has stopped this med in the past and the diarrhea returned. Would like to stay on this med and will need refills.  Will route to  to advise. Elpidio

## 2019-06-25 ENCOUNTER — ANTICOAGULATION THERAPY VISIT (OUTPATIENT)
Dept: ANTICOAGULATION | Facility: CLINIC | Age: 84
End: 2019-06-25
Payer: COMMERCIAL

## 2019-06-25 DIAGNOSIS — I48.91 ATRIAL FIBRILLATION, UNSPECIFIED TYPE (H): ICD-10-CM

## 2019-06-25 DIAGNOSIS — Z79.01 LONG TERM CURRENT USE OF ANTICOAGULANT THERAPY: ICD-10-CM

## 2019-06-25 LAB — INR POINT OF CARE: 2.7 (ref 0.86–1.14)

## 2019-06-25 PROCEDURE — 99207 ZZC NO CHARGE NURSE ONLY: CPT

## 2019-06-25 PROCEDURE — 85610 PROTHROMBIN TIME: CPT | Mod: QW

## 2019-06-25 PROCEDURE — 36416 COLLJ CAPILLARY BLOOD SPEC: CPT

## 2019-06-25 NOTE — PROGRESS NOTES
ANTICOAGULATION FOLLOW-UP CLINIC VISIT    Patient Name:  Pooja Swartz  Date:  2019  Contact Type:  Face to Face    SUBJECTIVE:  Patient Findings     Comments:   No changes in medications, activity, or diet noted. No bleeding or increased bruising noted. Took warfarin as prescribed.  Patient is to continue maintenance warfarin plan, and check INR in 5 weeks.  Patient verbalizes understanding and agrees to plan. No further questions or concerns.        Clinical Outcomes     Negatives:   Major bleeding event, Thromboembolic event, Anticoagulation-related hospital admission, Anticoagulation-related ED visit, Anticoagulation-related fatality    Comments:   No changes in medications, activity, or diet noted. No bleeding or increased bruising noted. Took warfarin as prescribed.  Patient is to continue maintenance warfarin plan, and check INR in 5 weeks.  Patient verbalizes understanding and agrees to plan. No further questions or concerns.           OBJECTIVE    INR Protime   Date Value Ref Range Status   2019 2.7 (A) 0.86 - 1.14 Final       ASSESSMENT / PLAN  INR assessment THER    Recheck INR In: 5 WEEKS    INR Location Clinic      Anticoagulation Summary  As of 2019    INR goal:   2.0-3.0   TTR:   79.5 % (2.7 y)   INR used for dosin.7 (2019)   Warfarin maintenance plan:   4 mg (2 mg x 2) every Tue, Fri; 3 mg (2 mg x 1.5) all other days   Full warfarin instructions:   4 mg every Tue, Fri; 3 mg all other days   Weekly warfarin total:   23 mg   No change documented:   Monica Mg RN   Plan last modified:   Monica Mg RN (2019)   Next INR check:   2019   Priority:   INR   Target end date:   Indefinite    Indications    Atrial fibrillation (H) [I48.91]  Long-term (current) use of anticoagulants [Z79.01] [Z79.01]             Anticoagulation Episode Summary     INR check location:       Preferred lab:       Send INR reminders to:   [facility]    Comments:   * warfarin  2mg tabs      Anticoagulation Care Providers     Provider Role Specialty Phone number    Amanda Renee MD [facility] [phone]            See the Encounter Report to view Anticoagulation Flowsheet and Dosing Calendar (Go to Encounters tab in chart review, and find the Anticoagulation Therapy Visit)        Monica Mg RN

## 2019-06-28 DIAGNOSIS — I10 HYPERTENSION GOAL BP (BLOOD PRESSURE) < 140/90: ICD-10-CM

## 2019-06-28 RX ORDER — AMLODIPINE BESYLATE 5 MG/1
7.5 TABLET ORAL DAILY
Qty: 135 TABLET | Refills: 1 | Status: SHIPPED | OUTPATIENT
Start: 2019-06-28 | End: 2020-01-02

## 2019-06-28 NOTE — TELEPHONE ENCOUNTER
"Requested Prescriptions   Pending Prescriptions Disp Refills     amLODIPine (NORVASC) 5 MG tablet 135 tablet 1     Sig: Take 1.5 tablets (7.5 mg) by mouth daily  Last Written Prescription Date:  1/4/2019  Last Fill Quantity: 135,  # refills: 1   Last office visit: 2/25/2019 with prescribing provider:  Víctor    Future Office Visit:           Calcium Channel Blockers Protocol  Failed - 6/28/2019 11:28 AM        Failed - Blood pressure under 140/90 in past 12 months     BP Readings from Last 3 Encounters:   02/25/19 150/74   01/28/19 136/64   01/20/19 175/83                 Passed - Recent (12 mo) or future (30 days) visit within the authorizing provider's specialty     Patient had office visit in the last 12 months or has a visit in the next 30 days with authorizing provider or within the authorizing provider's specialty.  See \"Patient Info\" tab in inbasket, or \"Choose Columns\" in Meds & Orders section of the refill encounter.              Passed - Medication is active on med list        Passed - Patient is age 18 or older        Passed - No active pregnancy on record        Passed - Normal serum creatinine on file in past 12 months     Recent Labs   Lab Test 01/28/19  1005  12/13/18  1449   CR 0.74   < >  --    CREAT  --   --  0.9    < > = values in this interval not displayed.             Passed - No positive pregnancy test in past 12 months          "

## 2019-06-28 NOTE — TELEPHONE ENCOUNTER
Routing refill request to provider for review/approval because:  Blood pressure not in range.    Gema To RN

## 2019-07-11 ENCOUNTER — OFFICE VISIT (OUTPATIENT)
Dept: FAMILY MEDICINE | Facility: CLINIC | Age: 84
End: 2019-07-11
Payer: COMMERCIAL

## 2019-07-11 VITALS
DIASTOLIC BLOOD PRESSURE: 70 MMHG | HEART RATE: 70 BPM | WEIGHT: 113 LBS | BODY MASS INDEX: 22.19 KG/M2 | SYSTOLIC BLOOD PRESSURE: 138 MMHG | TEMPERATURE: 97 F | OXYGEN SATURATION: 98 % | HEIGHT: 60 IN | RESPIRATION RATE: 18 BRPM

## 2019-07-11 DIAGNOSIS — Z79.01 LONG TERM CURRENT USE OF ANTICOAGULANT THERAPY: ICD-10-CM

## 2019-07-11 DIAGNOSIS — G89.29 CHRONIC LEFT SHOULDER PAIN: ICD-10-CM

## 2019-07-11 DIAGNOSIS — N18.30 CHRONIC KIDNEY DISEASE, STAGE III (MODERATE) (H): ICD-10-CM

## 2019-07-11 DIAGNOSIS — M25.512 CHRONIC LEFT SHOULDER PAIN: ICD-10-CM

## 2019-07-11 DIAGNOSIS — I48.91 ATRIAL FIBRILLATION, UNSPECIFIED TYPE (H): ICD-10-CM

## 2019-07-11 DIAGNOSIS — K52.9 IBD (INFLAMMATORY BOWEL DISEASE): ICD-10-CM

## 2019-07-11 DIAGNOSIS — I10 HYPERTENSION GOAL BP (BLOOD PRESSURE) < 140/90: ICD-10-CM

## 2019-07-11 DIAGNOSIS — Z00.00 ENCOUNTER FOR MEDICARE ANNUAL WELLNESS EXAM: Primary | ICD-10-CM

## 2019-07-11 LAB
ALBUMIN SERPL-MCNC: 3.7 G/DL (ref 3.4–5)
ALP SERPL-CCNC: 52 U/L (ref 40–150)
ALT SERPL W P-5'-P-CCNC: 22 U/L (ref 0–50)
ANION GAP SERPL CALCULATED.3IONS-SCNC: 6 MMOL/L (ref 3–14)
AST SERPL W P-5'-P-CCNC: 19 U/L (ref 0–45)
BASOPHILS # BLD AUTO: 0.1 10E9/L (ref 0–0.2)
BASOPHILS NFR BLD AUTO: 0.5 %
BILIRUB SERPL-MCNC: 0.7 MG/DL (ref 0.2–1.3)
BUN SERPL-MCNC: 24 MG/DL (ref 7–30)
CALCIUM SERPL-MCNC: 9.6 MG/DL (ref 8.5–10.1)
CHLORIDE SERPL-SCNC: 104 MMOL/L (ref 94–109)
CHOLEST SERPL-MCNC: 143 MG/DL
CO2 SERPL-SCNC: 27 MMOL/L (ref 20–32)
CREAT SERPL-MCNC: 0.76 MG/DL (ref 0.52–1.04)
DIFFERENTIAL METHOD BLD: ABNORMAL
EOSINOPHIL # BLD AUTO: 0.1 10E9/L (ref 0–0.7)
EOSINOPHIL NFR BLD AUTO: 0.9 %
ERYTHROCYTE [DISTWIDTH] IN BLOOD BY AUTOMATED COUNT: 15.4 % (ref 10–15)
GFR SERPL CREATININE-BSD FRML MDRD: 67 ML/MIN/{1.73_M2}
GLUCOSE SERPL-MCNC: 104 MG/DL (ref 70–99)
HCT VFR BLD AUTO: 34.8 % (ref 35–47)
HDLC SERPL-MCNC: 78 MG/DL
HGB BLD-MCNC: 11.3 G/DL (ref 11.7–15.7)
LDLC SERPL CALC-MCNC: 49 MG/DL
LYMPHOCYTES # BLD AUTO: 1.8 10E9/L (ref 0.8–5.3)
LYMPHOCYTES NFR BLD AUTO: 18.9 %
MCH RBC QN AUTO: 32.8 PG (ref 26.5–33)
MCHC RBC AUTO-ENTMCNC: 32.5 G/DL (ref 31.5–36.5)
MCV RBC AUTO: 101 FL (ref 78–100)
MONOCYTES # BLD AUTO: 0.7 10E9/L (ref 0–1.3)
MONOCYTES NFR BLD AUTO: 7 %
NEUTROPHILS # BLD AUTO: 6.9 10E9/L (ref 1.6–8.3)
NEUTROPHILS NFR BLD AUTO: 72.7 %
NONHDLC SERPL-MCNC: 65 MG/DL
PLATELET # BLD AUTO: 217 10E9/L (ref 150–450)
POTASSIUM SERPL-SCNC: 4.2 MMOL/L (ref 3.4–5.3)
PROT SERPL-MCNC: 7.4 G/DL (ref 6.8–8.8)
RBC # BLD AUTO: 3.44 10E12/L (ref 3.8–5.2)
SODIUM SERPL-SCNC: 137 MMOL/L (ref 133–144)
TRIGL SERPL-MCNC: 78 MG/DL
WBC # BLD AUTO: 9.5 10E9/L (ref 4–11)

## 2019-07-11 PROCEDURE — 85025 COMPLETE CBC W/AUTO DIFF WBC: CPT | Performed by: FAMILY MEDICINE

## 2019-07-11 PROCEDURE — 99213 OFFICE O/P EST LOW 20 MIN: CPT | Mod: 25 | Performed by: FAMILY MEDICINE

## 2019-07-11 PROCEDURE — 36415 COLL VENOUS BLD VENIPUNCTURE: CPT | Performed by: FAMILY MEDICINE

## 2019-07-11 PROCEDURE — 99397 PER PM REEVAL EST PAT 65+ YR: CPT | Performed by: FAMILY MEDICINE

## 2019-07-11 PROCEDURE — 80061 LIPID PANEL: CPT | Performed by: FAMILY MEDICINE

## 2019-07-11 PROCEDURE — 80053 COMPREHEN METABOLIC PANEL: CPT | Performed by: FAMILY MEDICINE

## 2019-07-11 ASSESSMENT — PAIN SCALES - GENERAL: PAINLEVEL: NO PAIN (0)

## 2019-07-11 ASSESSMENT — ACTIVITIES OF DAILY LIVING (ADL): CURRENT_FUNCTION: NO ASSISTANCE NEEDED

## 2019-07-11 ASSESSMENT — MIFFLIN-ST. JEOR: SCORE: 839.06

## 2019-07-11 NOTE — PROGRESS NOTES
"SUBJECTIVE:   Pooja Swartz is a 89 year old female who presents for Preventive Visit.      Are you in the first 12 months of your Medicare coverage?  No    Healthy Habits:     In general, how would you rate your overall health?  Very good    Frequency of exercise:  None    Duration of exercise:: housework.    Do you usually eat at least 4 servings of fruit and vegetables a day, include whole grains    & fiber and avoid regularly eating high fat or \"junk\" foods?  Yes    Taking medications regularly:  Yes    Barriers to taking medications:  None    Medication side effects:  None    Ability to successfully perform activities of daily living:  No assistance needed    Home Safety:  No safety concerns identified    Hearing Impairment:  No hearing concerns    In the past 6 months, have you been bothered by leaking of urine? Yes    In general, how would you rate your overall mental or emotional health?  Very good      PHQ-2 Total Score: 0    Additional concerns today:  Yes (patient would like to discuss budesonide and taking daily has helped.)    Do you feel safe in your environment? YES  Do you have a Health Care Directive? Yes scanned in medical chart      Fall risk  Fallen 2 or more times in the past year?: No  Any fall with injury in the past year?: No    Cognitive Screening   1) Repeat 3 items (Leader, Season, Table)    2) Clock draw: NORMAL  3) 3 item recall: Recalls 3 objects  Results: 3 items recalled: COGNITIVE IMPAIRMENT LESS LIKELY    Mini-CogTM Copyright SHALA Downey. Licensed by the author for use in [facility]; reprinted with permission (radha@.[email or web address]). All rights reserved.      Do you have sleep apnea, excessive snoring or daytime drowsiness?: no    Reviewed and updated as needed this visit by clinical staff  Tobacco  Allergies  Meds  Problems  Med Hx  Surg Hx  Fam Hx  Soc Hx          Reviewed and updated as needed this visit by Provider        Social History     Tobacco Use     Smoking " status: Never Smoker     Smokeless tobacco: Never Used   Substance Use Topics     Alcohol use: Yes     Comment: rare     If you drink alcohol do you typically have >3 drinks per day or >7 drinks per week? No     No flowsheet data found.        Hypertension Follow-up      Do you check your blood pressure regularly outside of the clinic? No     Are you following a low salt diet? Yes    Are your blood pressures ever more than 140 on the top number (systolic) OR more   than 90 on the bottom number (diastolic), for example 140/90? Yes    Current providers sharing in care for this patient include:   Patient Care Team:  Amanda Renee MD as PCP - General (Family Practice)  Kael Rojas MD as MD (Orthopedics)  Amanda Renee MD as Assigned PCP    The following health maintenance items are reviewed in Epic and correct as of today:  Health Maintenance   Topic Date Due     LIPID  04/08/2012     ZOSTER IMMUNIZATION (2 of 3) 01/26/2016     ADVANCE CARE PLANNING  11/09/2017     DTAP/TDAP/TD IMMUNIZATION (2 - Td) 07/16/2018     MEDICARE ANNUAL WELLNESS VISIT  01/09/2019     INFLUENZA VACCINE (1) 09/01/2019     MICROALBUMIN  12/13/2019     FALL RISK ASSESSMENT  01/10/2020     BMP  01/28/2020     PHQ-2  Completed     IPV IMMUNIZATION  Aged Out     MENINGITIS IMMUNIZATION  Aged Out     Lab work is in process  Labs reviewed in EPIC      Review of Systems  Constitutional, HEENT, cardiovascular, pulmonary, gi and gu systems are negative, except as otherwise noted.    OBJECTIVE:   /70   Pulse 70   Temp 97  F (36.1  C) (Tympanic)   Resp 18   Ht 1.524 m (5')   Wt 51.3 kg (113 lb)   LMP 07/07/1960   SpO2 98%   BMI 22.07 kg/m   Estimated body mass index is 22.07 kg/m  as calculated from the following:    Height as of this encounter: 1.524 m (5').    Weight as of this encounter: 51.3 kg (113 lb).  Physical Exam  GENERAL: healthy, alert and no distress  NECK: no adenopathy, no asymmetry, masses, or scars and thyroid  normal to palpation  RESP: lungs clear to auscultation - no rales, rhonchi or wheezes  CV: regular rate and rhythm, normal S1 S2, no S3 or S4, no murmur, click or rub, no peripheral edema and peripheral pulses strong  ABDOMEN: soft, nontender, no hepatosplenomegaly, no masses and bowel sounds normal  MS: left AC joint is arthritic, there is a joint effusion felt anteriorly.     Diagnostic Test Results:  Labs reviewed in Epic    ASSESSMENT / PLAN:   1. Encounter for Medicare annual wellness exam       2. IBD (inflammatory bowel disease)  NOT biopsy proven, though diarrhea responds to budesonide orally and diarrhea returns if this is stopped.  Maintained on 3 mg budesonide daily.  IBD vs microscopic colitis likely.   Vastly improved quality of life.      3. Hypertension goal BP (blood pressure) < 140/90     - Lipid panel reflex to direct LDL Non-fasting    4. Chronic kidney disease, stage III (moderate) (H)     - CBC with platelets differential  - Comprehensive metabolic panel    5. Atrial fibrillation, unspecified type (H)       6. Long-term (current) use of anticoagulants [Z79.01]       7. Chronic left shoulder pain   patient not interested in surgery, but wonders if a steroid injection may help with mobility/pain.   - ORTHO  REFERRAL    End of Life Planning:  Patient currently has an advanced directive: No.  I have verified the patient's ablity to prepare an advanced directive/make health care decisions.  Literature was provided to assist patient in preparing an advanced directive.    COUNSELING:  Reviewed preventive health counseling, as reflected in patient instructions       Regular exercise       Healthy diet/nutrition    Estimated body mass index is 22.07 kg/m  as calculated from the following:    Height as of this encounter: 1.524 m (5').    Weight as of this encounter: 51.3 kg (113 lb).    Weight management plan noted, stable and monitoring     reports that she has never smoked. She has never used  smokeless tobacco.      Appropriate preventive services were discussed with this patient, including applicable screening as appropriate for cardiovascular disease, diabetes, osteopenia/osteoporosis, and glaucoma.  As appropriate for age/gender, discussed screening for colorectal cancer, prostate cancer, breast cancer, and cervical cancer. Checklist reviewing preventive services available has been given to the patient.    Reviewed patients plan of care and provided an AVS. The Basic Care Plan (routine screening as documented in Health Maintenance) for Pooja meets the Care Plan requirement. This Care Plan has been established and reviewed with the Patient.    Counseling Resources:  ATP IV Guidelines  Pooled Cohorts Equation Calculator  Breast Cancer Risk Calculator  FRAX Risk Assessment  ICSI Preventive Guidelines  Dietary Guidelines for Americans, 2010  USDA's MyPlate  ASA Prophylaxis  Lung CA Screening    Amanda Renee MD  [facility]    Identified Health Risks:

## 2019-07-11 NOTE — PATIENT INSTRUCTIONS
Our Clinic hours are:  Mondays    7:20 am - 7 pm  Tues - Fri  7:20 am - 5 pm    Clinic Phone: [phone]    The clinic lab opens at 7:30 am Mon - Fri and appointments are required.    [facility]. [phone]  Monday  8 am - 7pm  Tues - Fri 8 am - 5:30 pm         Patient Education   Personalized Prevention Plan  You are due for the preventive services outlined below.  Your care team is available to assist you in scheduling these services.  If you have already completed any of these items, please share that information with your care team to update in your medical record.  Health Maintenance Due   Topic Date Due     Cholesterol Lab  04/08/2012     Zoster (Shingles) Vaccine (2 of 3) 01/26/2016     Discuss Advance Directive Planning  11/09/2017     Diptheria Tetanus Pertussis (DTAP/TDAP/TD) Vaccine (2 - Td) 07/16/2018     Annual Wellness Visit  01/09/2019

## 2019-07-11 NOTE — LETTER
[facility] [place] [facility] [place]  [address]  [facility] [address]  Phone: [phone]      7/11/2019     Pooja Swartz  [id number] [facility]  [address] [place] [id number]      Dear Pooja:    Thank you for allowing me to participate in your care. Your recent test results were reviewed and listed below.      Your results are provided below for your review  Results for orders placed or performed in visit on 07/11/19   Lipid panel reflex to direct LDL Non-fasting   Result Value Ref Range    Cholesterol 143 <200 mg/dL    Triglycerides 78 <150 mg/dL    HDL Cholesterol 78 >49 mg/dL    LDL Cholesterol Calculated 49 <100 mg/dL    Non HDL Cholesterol 65 <130 mg/dL   CBC with platelets differential   Result Value Ref Range    WBC 9.5 4.0 - 11.0 10e9/L    RBC Count 3.44 (L) 3.8 - 5.2 10e12/L    Hemoglobin 11.3 (L) 11.7 - 15.7 g/dL    Hematocrit 34.8 (L) 35.0 - 47.0 %     (H) 78 - 100 fl    MCH 32.8 26.5 - 33.0 pg    MCHC 32.5 31.5 - 36.5 g/dL    RDW 15.4 (H) 10.0 - 15.0 %    Platelet Count 217 150 - 450 10e9/L    % Neutrophils 72.7 %    % Lymphocytes 18.9 %    % Monocytes 7.0 %    % Eosinophils 0.9 %    % Basophils 0.5 %    Absolute Neutrophil 6.9 1.6 - 8.3 10e9/L    Absolute Lymphocytes 1.8 0.8 - 5.3 10e9/L    Absolute Monocytes 0.7 0.0 - 1.3 10e9/L    Absolute Eosinophils 0.1 0.0 - 0.7 10e9/L    Absolute Basophils 0.1 0.0 - 0.2 10e9/L    Diff Method Automated Method    Comprehensive metabolic panel   Result Value Ref Range    Sodium 137 133 - 144 mmol/L    Potassium 4.2 3.4 - 5.3 mmol/L    Chloride 104 94 - 109 mmol/L    Carbon Dioxide 27 20 - 32 mmol/L    Anion Gap 6 3 - 14 mmol/L    Glucose 104 (H) 70 - 99 mg/dL    Urea Nitrogen 24 7 - 30 mg/dL    Creatinine 0.76 0.52 - 1.04 mg/dL    GFR Estimate 67 >60 mL/min/[1.73_m2]    GFR Estimate If Black 78 >60 mL/min/[1.73_m2]    Calcium 9.6 8.5 - 10.1 mg/dL    Bilirubin Total 0.7 0.2 - 1.3 mg/dL    Albumin 3.7 3.4 - 5.0 g/dL    Protein Total 7.4 6.8 - 8.8  g/dL    Alkaline Phosphatase 52 40 - 150 U/L    ALT 22 0 - 50 U/L    AST 19 0 - 45 U/L                 Thank you for choosing [facility]. As a result, please continue with the treatment plan discussed in the office. Return as discussed or sooner if symptoms worsen or fail to improve.     If you have any further questions or concerns, please do not hesitate to contact us.      Sincerely,        Dr. Amanda Renee/[facility]

## 2019-07-19 ENCOUNTER — OFFICE VISIT (OUTPATIENT)
Dept: ORTHOPEDICS | Facility: CLINIC | Age: 84
End: 2019-07-19
Payer: COMMERCIAL

## 2019-07-19 ENCOUNTER — ANCILLARY PROCEDURE (OUTPATIENT)
Dept: GENERAL RADIOLOGY | Facility: CLINIC | Age: 84
End: 2019-07-19
Attending: FAMILY MEDICINE
Payer: COMMERCIAL

## 2019-07-19 VITALS
SYSTOLIC BLOOD PRESSURE: 130 MMHG | DIASTOLIC BLOOD PRESSURE: 68 MMHG | WEIGHT: 113 LBS | BODY MASS INDEX: 22.19 KG/M2 | HEIGHT: 60 IN

## 2019-07-19 DIAGNOSIS — M19.012 ARTHRITIS OF LEFT SHOULDER REGION: Primary | ICD-10-CM

## 2019-07-19 DIAGNOSIS — M25.512 LEFT SHOULDER PAIN: ICD-10-CM

## 2019-07-19 PROCEDURE — 73030 X-RAY EXAM OF SHOULDER: CPT | Mod: LT

## 2019-07-19 PROCEDURE — 99203 OFFICE O/P NEW LOW 30 MIN: CPT | Mod: 25 | Performed by: FAMILY MEDICINE

## 2019-07-19 PROCEDURE — 20611 DRAIN/INJ JOINT/BURSA W/US: CPT | Mod: LT | Performed by: FAMILY MEDICINE

## 2019-07-19 RX ORDER — BETAMETHASONE SODIUM PHOSPHATE AND BETAMETHASONE ACETATE 3; 3 MG/ML; MG/ML
6 INJECTION, SUSPENSION INTRA-ARTICULAR; INTRALESIONAL; INTRAMUSCULAR; SOFT TISSUE
Status: DISCONTINUED | OUTPATIENT
Start: 2019-07-19 | End: 2020-02-04

## 2019-07-19 RX ORDER — ROPIVACAINE HYDROCHLORIDE 5 MG/ML
3 INJECTION, SOLUTION EPIDURAL; INFILTRATION; PERINEURAL
Status: DISCONTINUED | OUTPATIENT
Start: 2019-07-19 | End: 2020-02-04

## 2019-07-19 RX ADMIN — BETAMETHASONE SODIUM PHOSPHATE AND BETAMETHASONE ACETATE 6 MG: 3; 3 INJECTION, SUSPENSION INTRA-ARTICULAR; INTRALESIONAL; INTRAMUSCULAR; SOFT TISSUE at 09:45

## 2019-07-19 RX ADMIN — ROPIVACAINE HYDROCHLORIDE 3 ML: 5 INJECTION, SOLUTION EPIDURAL; INFILTRATION; PERINEURAL at 09:45

## 2019-07-19 ASSESSMENT — MIFFLIN-ST. JEOR: SCORE: 839.06

## 2019-07-19 NOTE — PROGRESS NOTES
ASSESSMENT & PLAN  Pooja was seen today for pain.    Diagnoses and all orders for this visit:    Arthritis of left shoulder region  -     XR Shoulder Left G/E 3 Views; Future  -     Large Joint Injection/Arthocentesis: L glenohumeral joint      Patient is a 89 year old female presenting for  evaluation of   Chief Complaint   Patient presents with     Left Shoulder - Pain      Left Shoulder Arthritis: Pain worsening over the past year limiting ability to reach overhead and move with sx affecting sleep as well.  Pt has TTP diffusely over shoulder with notable cyst/effusion in anterior portion of shoulder.  She has limited ROM 2/2 pain/stiffness with XR showing advanced shoulder OA.  Counseled patient on nature of condition and treatment options.  Given this a steroid injection completed with pt reporting sig improvement afterwards.  Aftercares given f/u as needed for drainage of ant cyst as needed  Treatment: relative rest  Physical Therapy HEP  Injection GH steroid injection as below  Medications  Limited NSAIDs/Tylenol    Concerning signs/sx that would warrant urgent evaluation were discussed.  All questions were answered, patient understands and agrees with plan.      Return if symptoms worsen or fail to improve.    -----    SUBJECTIVE  Pooja Swartz is a/an 89 year old Right handed female who is seen in consultation at the request of  Amanda Renee M.D. for evaluation of left shoulder pain. The patient is seen by themselves.    Onset: 1 years(s) ago. Reports insidious onset without acute precipitating event.  Location of Pain: left shoulder, occasionally radiating to neck   Rating of Pain at worst: 7/10  Rating of Pain Currently: 4/10  Worsened by: lying down on left side, use   Better with: rest   Treatments tried: Tylenol  Associated symptoms: no distal numbness or tingling; denies swelling or warmth  Orthopedic history: NO  Relevant surgical history: NO  Past Medical History:   Diagnosis Date     Atrial  fibrillation (H)      Basal cell carcinoma      Chronic kidney disease      Disorders of bursae and tendons in shoulder region, unspecified     right shoulder     Hemorrhage of gastrointestinal tract, unspecified      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, including hip      Other specified glaucoma      Renal insufficiency      Sinus node dysfunction (H)      Unspecified essential hypertension      Social History     Socioeconomic History     Marital status:      Spouse name: None     Number of children: None     Years of education: None     Highest education level: None   Occupational History     None   Social Needs     Financial resource strain: None     Food insecurity:     Worry: None     Inability: None     Transportation needs:     Medical: None     Non-medical: None   Tobacco Use     Smoking status: Never Smoker     Smokeless tobacco: Never Used   Substance and Sexual Activity     Alcohol use: Yes     Comment: rare     Drug use: No     Sexual activity: Not Currently     Birth control/protection: None   Lifestyle     Physical activity:     Days per week: None     Minutes per session: None     Stress: None   Relationships     Social connections:     Talks on phone: None     Gets together: None     Attends [employer] witness service: None     Active member of club or organization: None     Attends meetings of clubs or organizations: None     Relationship status: None     Intimate partner violence:     Fear of current or ex partner: None     Emotionally abused: None     Physically abused: None     Forced sexual activity: None   Other Topics Concern     Parent/sibling w/ CABG, MI or angioplasty before 65F 55M? Yes   Social History Narrative     None         Patient's past medical, surgical, social, and family histories were reviewed today and no changes are noted.    REVIEW OF SYSTEMS:  10 point ROS is negative other than symptoms noted above in HPI, Past Medical History or as stated below  Constitutional:  NEGATIVE for fever, chills, change in weight  Skin: NEGATIVE for worrisome rashes, moles or lesions  GI/: NEGATIVE for bowel or bladder changes  Neuro: NEGATIVE for weakness, dizziness or paresthesias    OBJECTIVE:  /68   Ht 1.524 m (5')   Wt 51.3 kg (113 lb)   LMP 07/07/1960   BMI 22.07 kg/m     General: healthy, alert and in no distress  HEENT: no scleral icterus or conjunctival erythema  Skin: no suspicious lesions or rash. No jaundice.  CV: regular rhythm by palpation  Resp: normal respiratory effort without conversational dyspnea   Psych: normal mood and affect  Gait: normal steady gait with appropriate coordination and balance  Neuro: normal light touch sensory exam of the bilateral upper extremities.    MSK:  LEFT SHOULDER  Inspection:    Swelling over anterior shoulder  Palpation:    Tender about the greater tuberosity and supraspinatus insertion. Remainder of bony and tendinous landmarks are nontender.  Active Range of Motion:     Abduction 900, , , IR hip pocket.       Strength:    Scapular plane abduction 5-/5, painful,  ER 5/5, IR 5/5, biceps 5-/5, painful, triceps 5/5  Special Tests:    Positive: Neer's, Petty' and supraspinatus (empty can)       CERVICAL SPINE  Inspection:    normal cervical lordosis present, rounded shoulders, forward head posture  Palpation:  nontender.  Range of Motion:     Flexion limited by tightness    Extension limited by tightness    Right side bend limited by tightness    Left side bend limited by tightness    Right rotation limited by tightness    Left rotation limited by tightness  Strength:    Full strength throughout all neck muscles  Special Tests:    Positive: None    Negative: Spurling's (bilateral)    Independent visualization of the below image:  Recent Results (from the past 24 hour(s))   XR Shoulder Left G/E 3 Views    Narrative    LEFT SHOULDER THREE OR MORE VIEWS   7/19/2019 9:00 AM     HISTORY: Left shoulder pain.      Impression     IMPRESSION: Glenohumeral joint advanced degenerative arthrosis with  full-thickness joint space narrowing and bone contacting bone. Humeral  head inferomedial osteophytic spurring is noted. Osteopenia.    VANITA QUEEN MD       Large Joint Injection/Arthocentesis: L glenohumeral joint  Date/Time: 7/19/2019 9:45 AM  Performed by: Mahin Funk MD  Authorized by: Mahin Funk MD     Indications:  Pain and osteoarthritis  Needle Size:  25 G  Guidance: ultrasound    Approach:  Posterolateral  Location:  Shoulder      Site:  L glenohumeral joint  Medications:  6 mg betamethasone acet & sod phos 6 (3-3) MG/ML; 3 mL ropivacaine 5 MG/ML  Medications comment:  Actual amount of ropivacaine used 4 mL  Outcome:  Tolerated well, no immediate complications  Procedure discussed: discussed risks, benefits, and alternatives    Timeout: timeout called immediately prior to procedure    Prep: patient was prepped and draped in usual sterile fashion     Patient reported significant improvement of pain after injection.  Aftercare instructions given to patient.  Plan to follow-up as discussed above.     MD J[facility]MINA [facility]          Patient's conditions were thoroughly discussed during today's visit with greater than 50% of the visit spent counseling the patient with total time spent face-to-face with the patient being 30 minutes.    MD JASMINA [facility]

## 2019-07-19 NOTE — PATIENT INSTRUCTIONS
Diagnosis: Left shoulder arthritis  Image Findings: severe arthritis of left shoulder  Treatment: steroid injection to shoulder, home exercises as tolerated  Medications: tylenol  Follow-up: As needed for repeat injection or drainage of cyst in shoulder    [facility] Injection Discharge Instructions      You may shower, however avoid swimming, tub baths or hot tubs for 24 hours following your procedure    You may have a mild to moderate increase in pain for several days following the injection.    It may take up to 14 days for the steroid medication to start working although you may feel the effect as early as a few days after the procedure.    You may use ice packs for 10-15 minutes, 3 to 4 times a day at the injection site for comfort    You may use anti-inflammatory medications (such as Ibuprofen or Aleve or Advil) or Tylenol for pain control if necessary    If you were fasting, you may resume your normal diet and medications after the procedure    If you have diabetes, check your blood sugar more frequently than usual as your blood sugar may be higher than normal for 10-14 days following a steroid injection. Contact your doctor who manages your diabetes if your blood sugar is higher than usual      If you experience any of the following, call [facility] @ [phone] or [phone]  -Fever over 100 degree F  -Swelling, bleeding, redness, drainage, warmth at the injection site  - New or worsening pain

## 2019-07-19 NOTE — LETTER
7/19/2019         RE: Pooja Swartz  [id number]  [address]  [facility] [id number]        Dear Colleague,    Thank you for referring your patient, Pooja Swartz, to the [facility]. Please see a copy of my visit note below.    ASSESSMENT & PLAN  Pooja was seen today for pain.    Diagnoses and all orders for this visit:    Arthritis of left shoulder region  -     XR Shoulder Left G/E 3 Views; Future  -     Large Joint Injection/Arthocentesis: L glenohumeral joint      Patient is a 89 year old female presenting for  evaluation of   Chief Complaint   Patient presents with     Left Shoulder - Pain      Left Shoulder Arthritis: Pain worsening over the past year limiting ability to reach overhead and move with sx affecting sleep as well.  Pt has TTP diffusely over shoulder with notable cyst/effusion in anterior portion of shoulder.  She has limited ROM 2/2 pain/stiffness with XR showing advanced shoulder OA.  Counseled patient on nature of condition and treatment options.  Given this a steroid injection completed with pt reporting sig improvement afterwards.  Aftercares given f/u as needed for drainage of ant cyst as needed  Treatment: relative rest  Physical Therapy HEP  Injection GH steroid injection as below  Medications  Limited NSAIDs/Tylenol    Concerning signs/sx that would warrant urgent evaluation were discussed.  All questions were answered, patient understands and agrees with plan.      Return if symptoms worsen or fail to improve.    -----    SUBJECTIVE  Pooja Swartz is a/an 89 year old Right handed female who is seen in consultation at the request of  Amanda Renee M.D. for evaluation of left shoulder pain. The patient is seen by themselves.    Onset: 1 years(s) ago. Reports insidious onset without acute precipitating event.  Location of Pain: left shoulder, occasionally radiating to neck   Rating of Pain at worst: 7/10  Rating of Pain Currently:  4/10  Worsened by: lying down on left side, use   Better with: rest   Treatments tried: Tylenol  Associated symptoms: no distal numbness or tingling; denies swelling or warmth  Orthopedic history: NO  Relevant surgical history: NO  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, including hip      Other specified glaucoma      Renal insufficiency      Sinus node dysfunction (H)      Unspecified essential hypertension      Social History     Socioeconomic History     Marital status:      Spouse name: None     Number of children: None     Years of education: None     Highest education level: None   Occupational History     None   Social Needs     Financial resource strain: None     Food insecurity:     Worry: None     Inability: None     Transportation needs:     Medical: None     Non-medical: None   Tobacco Use     Smoking status: Never Smoker     Smokeless tobacco: Never Used   Substance and Sexual Activity     Alcohol use: Yes     Comment: rare     Drug use: No     Sexual activity: Not Currently     Birth control/protection: None   Lifestyle     Physical activity:     Days per week: None     Minutes per session: None     Stress: None   Relationships     Social connections:     Talks on phone: None     Gets together: None     Attends Shinto service: None     Active member of club or organization: None     Attends meetings of clubs or organizations: None     Relationship status: None     Intimate partner violence:     Fear of current or ex partner: None     Emotionally abused: None     Physically abused: None     Forced sexual activity: None   Other Topics Concern     Parent/sibling w/ CABG, MI or angioplasty before 65F 55M? Yes   Social  History Narrative     None         Patient's past medical, surgical, social, and family histories were reviewed today and no changes are noted.    REVIEW OF SYSTEMS:  10 point ROS is negative other than symptoms noted above in HPI, Past Medical History or as stated below  Constitutional: NEGATIVE for fever, chills, change in weight  Skin: NEGATIVE for worrisome rashes, moles or lesions  GI/: NEGATIVE for bowel or bladder changes  Neuro: NEGATIVE for weakness, dizziness or paresthesias    OBJECTIVE:  /68   Ht 1.524 m (5')   Wt 51.3 kg (113 lb)   LMP 07/07/1960   BMI 22.07 kg/m      General: healthy, alert and in no distress  HEENT: no scleral icterus or conjunctival erythema  Skin: no suspicious lesions or rash. No jaundice.  CV: regular rhythm by palpation  Resp: normal respiratory effort without conversational dyspnea   Psych: normal mood and affect  Gait: normal steady gait with appropriate coordination and balance  Neuro: normal light touch sensory exam of the bilateral upper extremities.    MSK:  LEFT SHOULDER  Inspection:    Swelling over anterior shoulder  Palpation:    Tender about the greater tuberosity and supraspinatus insertion. Remainder of bony and tendinous landmarks are nontender.  Active Range of Motion:     Abduction 900, , , IR hip pocket.       Strength:    Scapular plane abduction 5-/5, painful,  ER 5/5, IR 5/5, biceps 5-/5, painful, triceps 5/5  Special Tests:    Positive: Neer's, Petty' and supraspinatus (empty can)       CERVICAL SPINE  Inspection:    normal cervical lordosis present, rounded shoulders, forward head posture  Palpation:  nontender.  Range of Motion:     Flexion limited by tightness    Extension limited by tightness    Right side bend limited by tightness    Left side bend limited by tightness    Right rotation limited by tightness    Left rotation limited by tightness  Strength:    Full strength throughout all neck muscles  Special Tests:    Positive:  None    Negative: Spurling's (bilateral)    Independent visualization of the below image:  Recent Results (from the past 24 hour(s))   XR Shoulder Left G/E 3 Views    Narrative    LEFT SHOULDER THREE OR MORE VIEWS   7/19/2019 9:00 AM     HISTORY: Left shoulder pain.      Impression    IMPRESSION: Glenohumeral joint advanced degenerative arthrosis with  full-thickness joint space narrowing and bone contacting bone. Humeral  head inferomedial osteophytic spurring is noted. Osteopenia.    VANITA QUEEN MD       Large Joint Injection/Arthocentesis: L glenohumeral joint  Date/Time: 7/19/2019 9:45 AM  Performed by: Mahin Funk MD  Authorized by: Mahin Funk MD     Indications:  Pain and osteoarthritis  Needle Size:  25 G  Guidance: ultrasound    Approach:  Posterolateral  Location:  Shoulder      Site:  L glenohumeral joint  Medications:  6 mg betamethasone acet & sod phos 6 (3-3) MG/ML; 3 mL ropivacaine 5 MG/ML  Medications comment:  Actual amount of ropivacaine used 4 mL  Outcome:  Tolerated well, no immediate complications  Procedure discussed: discussed risks, benefits, and alternatives    Timeout: timeout called immediately prior to procedure    Prep: patient was prepped and draped in usual sterile fashion     Patient reported significant improvement of pain after injection.  Aftercare instructions given to patient.  Plan to follow-up as discussed above.     MD S[facility]HIL [facility]          Patient's conditions were thoroughly discussed during today's visit with greater than 50% of the visit spent counseling the patient with total time spent face-to-face with the patient being 30 minutes.    MD [facility]      Again, thank you for allowing me to participate in the care of your patient.        Sincerely,        Mahin Funk MD

## 2019-07-30 ENCOUNTER — OFFICE VISIT (OUTPATIENT)
Dept: FAMILY MEDICINE | Facility: CLINIC | Age: 84
End: 2019-07-30
Payer: COMMERCIAL

## 2019-07-30 ENCOUNTER — NURSE TRIAGE (OUTPATIENT)
Dept: FAMILY MEDICINE | Facility: CLINIC | Age: 84
End: 2019-07-30

## 2019-07-30 ENCOUNTER — ANTICOAGULATION THERAPY VISIT (OUTPATIENT)
Dept: ANTICOAGULATION | Facility: CLINIC | Age: 84
End: 2019-07-30
Payer: COMMERCIAL

## 2019-07-30 VITALS
TEMPERATURE: 97.5 F | DIASTOLIC BLOOD PRESSURE: 68 MMHG | BODY MASS INDEX: 22.26 KG/M2 | HEART RATE: 72 BPM | WEIGHT: 114 LBS | OXYGEN SATURATION: 98 % | RESPIRATION RATE: 14 BRPM | SYSTOLIC BLOOD PRESSURE: 152 MMHG

## 2019-07-30 DIAGNOSIS — H01.004 BLEPHARITIS OF UPPER EYELIDS OF BOTH EYES, UNSPECIFIED TYPE: Primary | ICD-10-CM

## 2019-07-30 DIAGNOSIS — Z79.01 LONG TERM CURRENT USE OF ANTICOAGULANT THERAPY: ICD-10-CM

## 2019-07-30 DIAGNOSIS — H00.014 HORDEOLUM EXTERNUM OF LEFT UPPER EYELID: ICD-10-CM

## 2019-07-30 DIAGNOSIS — H01.001 BLEPHARITIS OF UPPER EYELIDS OF BOTH EYES, UNSPECIFIED TYPE: Primary | ICD-10-CM

## 2019-07-30 DIAGNOSIS — I48.91 ATRIAL FIBRILLATION, UNSPECIFIED TYPE (H): ICD-10-CM

## 2019-07-30 LAB — INR POINT OF CARE: 2 (ref 0.86–1.14)

## 2019-07-30 PROCEDURE — 85610 PROTHROMBIN TIME: CPT | Mod: QW

## 2019-07-30 PROCEDURE — 36416 COLLJ CAPILLARY BLOOD SPEC: CPT

## 2019-07-30 PROCEDURE — 99213 OFFICE O/P EST LOW 20 MIN: CPT | Performed by: INTERNAL MEDICINE

## 2019-07-30 PROCEDURE — 99207 ZZC NO CHARGE NURSE ONLY: CPT

## 2019-07-30 RX ORDER — BACITRACIN 500 [USP'U]/G
0.5 OINTMENT OPHTHALMIC AT BEDTIME
Qty: 1 G | Refills: 1 | Status: SHIPPED | OUTPATIENT
Start: 2019-07-30 | End: 2020-01-16

## 2019-07-30 NOTE — PATIENT INSTRUCTIONS
1. Use the bactiracin ointment at bedtime to both eyes x 1 week.  2. Continue the glaucoma drops  3. Continue the warm compress frequently - 4 x day if able  4. Try Artificial Tears vials instead of reusable vials. Stop the visine  5. Wash eye lids with baby shampoo once daily.          Patient Education     Sty (or Stye)  A sty is an infection of the oil gland of the eyelid. It may develop into a small pocket of pus (an abscess). This can cause pain, redness, and swelling. In early stages, a sty is treated with antibiotic cream, eye drops, or a small towel soaked in warm water (a warm compress). More severe cases may need to be opened and drained by a healthcare provider.  Home care    Eye drops or ointment are usually prescribed to treat the infection. Use these as directed.     Artificial tears may also be used to lubricate the eye and make it more comfortable. You can buy these over the counter without a prescription. Talk with your healthcare provider before using any over-the-counter treatment for a sty.    Apply a warm, damp towel to the affected eye for at least 5 minutes, 3 to 4 times a day for a week. Warm compresses open the pores and speed the healing. But if the compresses are too hot, they may burn your eyelid.    Sometimes the sty will drain with this treatment alone. If this happens, keep using the antibiotic until all the redness and swelling are gone.    Wash your hands before and after touching the infected eyelid to avoid spreading the infection.    Don t squeeze or try to break open the sty.  Follow-up care  Follow up with your healthcare provider, or as advised.   When to seek medical advice  Call your healthcare provider right away if any of these occur:    Increase in swelling or redness around the eyelid after 48 to 72 hours    Increase in eye pain or the eyelid blisters    Increase in warmth--the eyelid feels hot    Drainage of blood or thick pus from the sty    Blister on the  eyelid    Inability to open the eyelid due to swelling    Fever of 100.4 F (38 C) or above, or as directed by your provider    Vision changes    Headache or stiff neck    The sty comes back  Date Last Reviewed: 8/1/2017 2000-2018 [employer]. [address] [place] [address]. All rights reserved. This information is not intended as a substitute for professional medical care. Always follow your healthcare professional's instructions.

## 2019-07-30 NOTE — TELEPHONE ENCOUNTER
"Left eyelid is puffy, left eye feels \"itchy/scratchy\" not really painful-just sore, drainage for 1 week.    Additional Information    Negative: Severe eye pain    Negative: Complete loss of vision in one or both eyes    Negative: Eyelids are very swollen (shut or almost) and fever    Negative: Eyelid (outer) is very red and fever    Negative: Foreign body sensation ('feels like something is in there') and irrigation didn't help    Negative: Vomiting    Negative: Ulcer or sore seen on the cornea (clear center part of the eye)    Negative: Recent eye surgery and increasing eye pain    Negative: Blurred vision and new or worsening    Negative: Patient sounds very sick or weak to the triager    Eye pain present > 24 hours    Yellow or green pus occurs    Negative: Eye pain/discomfort and more than mild    Negative: Eyelids are very swollen (shut or almost) and no fever    Negative: Painful rash near eye and multiple small blisters grouped together    Negative: Pain followed bright light exposure from welding    Negative: Looking at light causes severe pain (i.e., photophobia)    Protocols used: EYE PAIN-A-OH      "

## 2019-07-30 NOTE — PROGRESS NOTES
ANTICOAGULATION FOLLOW-UP CLINIC VISIT    Patient Name:  Pooja Swartz  Date:  2019  Contact Type:  Face to Face    SUBJECTIVE:  Patient Findings     Positives:   Change in health (Redness on her left eye - possible eye infection. Pt will notify [facility] if there are changes such as an antibiotic )    Comments:   No changes in medications, activity, or diet noted. No concerns with clotting, bleeding, or increased bruising noted. Took warfarin as prescribed.  Patient is to continue maintenance warfarin plan, and check INR in 5 weeks.  Patient verbalizes understanding and agrees to plan. No further questions or concerns.        Clinical Outcomes     Negatives:   Major bleeding event, Thromboembolic event, Anticoagulation-related hospital admission, Anticoagulation-related ED visit, Anticoagulation-related fatality    Comments:   No changes in medications, activity, or diet noted. No concerns with clotting, bleeding, or increased bruising noted. Took warfarin as prescribed.  Patient is to continue maintenance warfarin plan, and check INR in 5 weeks.  Patient verbalizes understanding and agrees to plan. No further questions or concerns.           OBJECTIVE    INR Protime   Date Value Ref Range Status   2019 2.0 (A) 0.86 - 1.14 Final       ASSESSMENT / PLAN  INR assessment THER    Recheck INR In: 5 WEEKS    INR Location Clinic      Anticoagulation Summary  As of 2019    INR goal:   2.0-3.0   TTR:   80.2 % (2.8 y)   INR used for dosin.0 (2019)   Warfarin maintenance plan:   4 mg (2 mg x 2) every Tue, Fri; 3 mg (2 mg x 1.5) all other days   Full warfarin instructions:   4 mg every Tue, Fri; 3 mg all other days   Weekly warfarin total:   23 mg   Plan last modified:   Monica Mg RN (2019)   Next INR check:      Priority:   INR   Target end date:   Indefinite    Indications    Atrial fibrillation (H) [I48.91]  Long-term (current) use of anticoagulants [Z79.01] [Z79.01]              Anticoagulation Episode Summary     INR check location:       Preferred lab:       Send INR reminders to:   [facility]    Comments:   * warfarin 2mg tabs      Anticoagulation Care Providers     Provider Role Specialty Phone number    Amanda Renee MD [facility] [phone]            See the Encounter Report to view Anticoagulation Flowsheet and Dosing Calendar (Go to Encounters tab in chart review, and find the Anticoagulation Therapy Visit)        Monica Mg RN

## 2019-07-30 NOTE — PROGRESS NOTES
Subjective     Pooja Swartz is a 89 year old female who presents to clinic today for the following health issues:    HPI     Pt was using visine and in the medication says: do not use if you have glaucoma, which pt does have it.  She only started the Visine yesterday      Eye(s) Problem  Onset: a week    Description:   Location: left  Pain: YES  Redness: YES    Accompanying Signs & Symptoms:  Discharge/mattering: YES  Swelling: YES  Visual changes: no  Fever: no  Nasal Congestion: no  Bothered by bright lights: no     History:   Trauma: no   Foreign body exposure: no    Precipitating factors:   Wearing contacts: no    Alleviating factors:Improved by: na    Therapies Tried and outcome: warm packs    No ocular pain, just irritation and itching.     Does have seasonal allergies and takes allegra regularly.  Currently feels that allergies are well controlled    Tried bacitracin twice          Current Outpatient Medications   Medication Sig Dispense Refill     allopurinol (ZYLOPRIM) 100 MG tablet Take 1 tablet (100 mg) by mouth daily 90 tablet 3     amLODIPine (NORVASC) 5 MG tablet Take 1.5 tablets (7.5 mg) by mouth daily 135 tablet 1     brimonidine (ALPHAGAN-P) 0.15 % ophthalmic solution Place 1 drop Into the left eye 2 times daily        budesonide (ENTOCORT EC) 3 MG EC capsule Take 1 capsule (3 mg) by mouth every morning 30 capsule 6     Calcium-Vitamin D 600-200 MG-UNIT TABS Take 1 tablet by mouth 2 times daily       COSOPT 2-0.5 % OP SOLN 1 DROP INTO Left Eye twice daily       Fexofenadine HCl (ALLEGRA PO) Take 180 mg by mouth daily        latanoprost (XALATAN) 0.005 % ophthalmic solution Place 1 drop into both eyes At Bedtime        ONE-A-DAY WOMENS OR TABS 1 TABLET ORALLY DAILY       triamcinolone (KENALOG) 0.1 % ointment Apply  topically 3 times daily. Apply sparingly to affected area. 30 g 0     warfarin (COUMADIN) 2 MG tablet Take 4 mg every Tue, Fri; 3 mg all other days or as directed by the Anticoagulation  Clinic 150 tablet 3     Tetrahydrozoline HCl (VISINE OP)            Reviewed and updated as needed this visit by Provider         Review of Systems   ROS COMP: Constitutional, HEENT, cardiovascular, pulmonary, gi and gu systems are negative, except as otherwise noted.      Objective    BP (!) 152/68   Pulse 72   Temp 97.5  F (36.4  C) (Tympanic)   Resp 14   Wt 51.7 kg (114 lb)   LMP 07/07/1960   SpO2 98%   Breastfeeding? No   BMI 22.26 kg/m    Body mass index is 22.26 kg/m .  Physical Exam   GENERAL APPEARANCE: alert, no distress and elderly  EYES: Bilateral eyes with moderate purulent mattering/crusting.  Left upper eyelid with erythematous tender nodule          Assessment & Plan       ICD-10-CM    1. Blepharitis of upper eyelids of both eyes, unspecified type H01.001 bacitracin 500 UNIT/GM ophthalmic ointment    H01.004    2. Hordeolum externum of left upper eyelid H00.014           1. Use the bactiracin ointment at bedtime to both eyes x 1 week.  2. Continue the glaucoma drops  3. Continue the warm compress frequently - 4 x day if able  4. Try Artificial Tears vials instead of reusable vials. Stop the visine  5. Wash eye lids with baby shampoo once daily.      Return in about 1 week (around 8/6/2019) for If symptoms don't improve or if they worsen.    Dr. Rosmery Coughlin,   [facility]

## 2019-08-16 ENCOUNTER — OFFICE VISIT (OUTPATIENT)
Dept: ORTHOPEDICS | Facility: CLINIC | Age: 84
End: 2019-08-16
Payer: COMMERCIAL

## 2019-08-16 VITALS — BODY MASS INDEX: 22.26 KG/M2 | SYSTOLIC BLOOD PRESSURE: 152 MMHG | HEIGHT: 60 IN | DIASTOLIC BLOOD PRESSURE: 78 MMHG

## 2019-08-16 DIAGNOSIS — M19.012 ARTHRITIS OF LEFT SHOULDER REGION: Primary | ICD-10-CM

## 2019-08-16 PROCEDURE — 20611 DRAIN/INJ JOINT/BURSA W/US: CPT | Mod: LT | Performed by: FAMILY MEDICINE

## 2019-08-16 PROCEDURE — 99213 OFFICE O/P EST LOW 20 MIN: CPT | Mod: 25 | Performed by: FAMILY MEDICINE

## 2019-08-16 RX ADMIN — ROPIVACAINE HYDROCHLORIDE 3 ML: 5 INJECTION, SOLUTION EPIDURAL; INFILTRATION; PERINEURAL at 15:00

## 2019-08-16 RX ADMIN — BETAMETHASONE SODIUM PHOSPHATE AND BETAMETHASONE ACETATE 6 MG: 3; 3 INJECTION, SUSPENSION INTRA-ARTICULAR; INTRALESIONAL; INTRAMUSCULAR; SOFT TISSUE at 15:00

## 2019-08-16 NOTE — LETTER
8/16/2019         RE: Pooja Swartz  [id number]  [address]  [facility] [address]        Dear Colleague,    Thank you for referring your patient, Pooja Swartz, to the [facility]. Please see a copy of my visit note below.    ASSESSMENT & PLAN  Pooja was seen today for pain.    Diagnoses and all orders for this visit:    Arthritis of left shoulder region  -     Large Joint Injection/Arthocentesis: L glenohumeral joint      Patient is a 89 year old female presenting for  evaluation of   Chief Complaint   Patient presents with     Left Shoulder - Pain      # Left Shoulder Arthritis: Pain worsening over the past year limiting ability to reach overhead and move with sx affecting sleep as well.  Pt has TTP diffusely over shoulder with notable cyst/effusion in anterior portion of shoulder.  She has limited ROM 2/2 pain/stiffness with XR showing advanced shoulder OA.  Pt had mild pain relief with previous steroid injection.  Counseled patient on nature of condition and treatment options.  Pt not a good surgical candidate for total shoulder replacement.  Given this a repeat  steroid injection and aspiration was  completed with pt reporting improvement afterwards.     Treatment: relative rest  Physical Therapy HEP  Injection: Ant cyst aspiration and injection  Medications  Limited NSAIDs/Tylenol    Concerning signs/sx that would warrant urgent evaluation were discussed.  All questions were answered, patient understands and agrees with plan.      Return if symptoms worsen or fail to improve.    -----    SUBJECTIVE  Pooja Swartz is a/an 89 year old Right handed female who is seen in consultation at the request of  Amanda Renee M.D. for evaluation of left shoulder pain. The patient is seen by themselves.    Onset: 1 years(s) ago. Reports insidious onset without acute precipitating event.  Location of Pain: left shoulder, occasionally radiating to neck   Rating of Pain at  worst: 7/10  Rating of Pain Currently: 4/10  Worsened by: lying down on left side, use   Better with: rest   Treatments tried: Tylenol  Associated symptoms: no distal numbness or tingling; denies swelling or warmth  Orthopedic history: NO  Relevant surgical history: NO    Interim History - August 16, 2019  Since last visit on 7/19/2019 patient has persisting left shoulder pain.  GH injection on 7/19/19 provided no relief. Wanting to talk about aspirating shoulder. No interim injury.       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, including hip      Other specified glaucoma      Renal insufficiency      Sinus node dysfunction (H)      Unspecified essential hypertension      Social History     Socioeconomic History     Marital status:      Spouse name: None     Number of children: None     Years of education: None     Highest education level: None   Occupational History     None   Social Needs     Financial resource strain: None     Food insecurity:     Worry: None     Inability: None     Transportation needs:     Medical: None     Non-medical: None   Tobacco Use     Smoking status: Never Smoker     Smokeless tobacco: Never Used   Substance and Sexual Activity     Alcohol use: Yes     Comment: rare     Drug use: No     Sexual activity: Not Currently     Birth control/protection: None   Lifestyle     Physical activity:     Days per week: None     Minutes per session: None     Stress: None   Relationships     Social connections:     Talks on phone: None     Gets together: None     Attends Yazdanism service: None     Active member of club or organization: None     Attends meetings of clubs or organizations: None     Relationship status: None      Intimate partner violence:     Fear of current or ex partner: None     Emotionally abused: None     Physically abused: None     Forced sexual activity: None   Other Topics Concern     Parent/sibling w/ CABG, MI or angioplasty before 65F 55M? Yes   Social History Narrative     None         Patient's past medical, surgical, social, and family histories were reviewed today and no changes are noted.    REVIEW OF SYSTEMS:  10 point ROS is negative other than symptoms noted above in HPI, Past Medical History or as stated below  Constitutional: NEGATIVE for fever, chills, change in weight  Skin: NEGATIVE for worrisome rashes, moles or lesions  GI/: NEGATIVE for bowel or bladder changes  Neuro: NEGATIVE for weakness, dizziness or paresthesias    OBJECTIVE:  BP (!) 152/78   Ht 1.524 m (5')   LMP 07/07/1960   BMI 22.26 kg/m      General: healthy, alert and in no distress  HEENT: no scleral icterus or conjunctival erythema  Skin: no suspicious lesions or rash. No jaundice.  CV: regular rhythm by palpation  Resp: normal respiratory effort without conversational dyspnea   Psych: normal mood and affect  Gait: normal steady gait with appropriate coordination and balance  Neuro: normal light touch sensory exam of the bilateral upper extremities.    MSK:  LEFT SHOULDER  Inspection:    Swelling over anterior shoulder  Palpation:    Tender about the greater tuberosity and supraspinatus insertion. Remainder of bony and tendinous landmarks are nontender.  Active Range of Motion:     Abduction 900, , , IR hip pocket.       Strength:    Scapular plane abduction 5-/5, painful,  ER 5/5, IR 5/5, biceps 5-/5, painful, triceps 5/5  Special Tests:    Positive: Neer's, Petty' and supraspinatus (empty can)       CERVICAL SPINE  Inspection:    normal cervical lordosis present, rounded shoulders, forward head posture  Palpation:  nontender.  Range of Motion:     Flexion limited by tightness    Extension limited by tightness     Right side bend limited by tightness    Left side bend limited by tightness    Right rotation limited by tightness    Left rotation limited by tightness  Strength:    Full strength throughout all neck muscles  Special Tests:    Positive: None    Negative: Spurling's (bilateral)    Independent visualization of the below image:  No results found for this or any previous visit (from the past 24 hour(s)).  LEFT SHOULDER THREE OR MORE VIEWS   7/19/2019 9:00 AM      HISTORY: Left shoulder pain.                                                                    IMPRESSION: Glenohumeral joint advanced degenerative arthrosis with  full-thickness joint space narrowing and bone contacting bone. Humeral  head inferomedial osteophytic spurring is noted. Osteopenia.     VANITA QUEEN MD    Large Joint Injection/Arthocentesis: L glenohumeral joint  Date/Time: 8/16/2019 3:00 PM  Performed by: Mahin Funk MD  Authorized by: Mahin Funk MD     Indications:  Pain and osteoarthritis  Needle Size:  18 G  Guidance: ultrasound    Approach:  Anterior  Location:  Shoulder      Site:  L glenohumeral joint  Medications:  6 mg betamethasone acet & sod phos 6 (3-3) MG/ML; 3 mL ropivacaine 5 MG/ML  Medications comment:  Actual amount of ropivacaine used 4 mL  Aspirate amount (mL):  38  Aspirate:  Serous and yellow  Outcome:  Tolerated well, no immediate complications  Procedure discussed: discussed risks, benefits, and alternatives    Consent Given by:  Patient  Timeout: timeout called immediately prior to procedure    Prep: patient was prepped and draped in usual sterile fashion     Patient reported improvement of pain after injection.  Aftercare instructions given to patient.  Plan to follow-up as discussed above.     Mahin Funk MD [facility]          Patient's conditions were thoroughly discussed during today's visit with greater than 50% of the visit spent counseling the patient with  total time spent face-to-face with the patient being 30 minutes.    Mahin Funk MD [facility]      Again, thank you for allowing me to participate in the care of your patient.        Sincerely,        Mahin Funk MD

## 2019-08-16 NOTE — PATIENT INSTRUCTIONS
Pooja to follow up with Primary Care provider regarding elevated blood pressure.    [facility] Injection Discharge Instructions      You may shower, however avoid swimming, tub baths or hot tubs for 24 hours following your procedure    You may have a mild to moderate increase in pain for several days following the injection.    It may take up to 14 days for the steroid medication to start working although you may feel the effect as early as a few days after the procedure.    You may use ice packs for 10-15 minutes, 3 to 4 times a day at the injection site for comfort    You may use anti-inflammatory medications (such as Ibuprofen or Aleve or Advil) or Tylenol for pain control if necessary    If you were fasting, you may resume your normal diet and medications after the procedure    If you have diabetes, check your blood sugar more frequently than usual as your blood sugar may be higher than normal for 10-14 days following a steroid injection. Contact your doctor who manages your diabetes if your blood sugar is higher than usual      If you experience any of the following, call [facility] @ [phone] or [phone]  -Fever over 100 degree F  -Swelling, bleeding, redness, drainage, warmth at the injection site  - New or worsening pain

## 2019-08-16 NOTE — PROGRESS NOTES
ASSESSMENT & PLAN  Pooja was seen today for pain.    Diagnoses and all orders for this visit:    Arthritis of left shoulder region  -     Large Joint Injection/Arthocentesis: L glenohumeral joint      Patient is a 89 year old female presenting for  evaluation of   Chief Complaint   Patient presents with     Left Shoulder - Pain      # Left Shoulder Arthritis: Pain worsening over the past year limiting ability to reach overhead and move with sx affecting sleep as well.  Pt has TTP diffusely over shoulder with notable cyst/effusion in anterior portion of shoulder.  She has limited ROM 2/2 pain/stiffness with XR showing advanced shoulder OA.  Pt had mild pain relief with previous steroid injection.  Counseled patient on nature of condition and treatment options.  Pt not a good surgical candidate for total shoulder replacement.  Given this a repeat  steroid injection and aspiration was  completed with pt reporting improvement afterwards.     Treatment: relative rest  Physical Therapy HEP  Injection: Ant cyst aspiration and injection  Medications  Limited NSAIDs/Tylenol    Concerning signs/sx that would warrant urgent evaluation were discussed.  All questions were answered, patient understands and agrees with plan.      Return if symptoms worsen or fail to improve.    -----    SUBJECTIVE  Pooja Swartz is a/an 89 year old Right handed female who is seen in consultation at the request of  Amanda Renee M.D. for evaluation of left shoulder pain. The patient is seen by themselves.    Onset: 1 years(s) ago. Reports insidious onset without acute precipitating event.  Location of Pain: left shoulder, occasionally radiating to neck   Rating of Pain at worst: 7/10  Rating of Pain Currently: 4/10  Worsened by: lying down on left side, use   Better with: rest   Treatments tried: Tylenol  Associated symptoms: no distal numbness or tingling; denies swelling or warmth  Orthopedic history: NO  Relevant surgical history:  NO    Interim History - August 16, 2019  Since last visit on 7/19/2019 patient has persisting left shoulder pain.  GH injection on 7/19/19 provided no relief. Wanting to talk about aspirating shoulder. No interim injury.       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, including hip      Other specified glaucoma      Renal insufficiency      Sinus node dysfunction (H)      Unspecified essential hypertension      Social History     Socioeconomic History     Marital status:      Spouse name: None     Number of children: None     Years of education: None     Highest education level: None   Occupational History     None   Social Needs     Financial resource strain: None     Food insecurity:     Worry: None     Inability: None     Transportation needs:     Medical: None     Non-medical: None   Tobacco Use     Smoking status: Never Smoker     Smokeless tobacco: Never Used   Substance and Sexual Activity     Alcohol use: Yes     Comment: rare     Drug use: No     Sexual activity: Not Currently     Birth control/protection: None   Lifestyle     Physical activity:     Days per week: None     Minutes per session: None     Stress: None   Relationships     Social connections:     Talks on phone: None     Gets together: None     Attends Mormonism service: None     Active member of club or organization: None     Attends meetings of clubs or organizations: None     Relationship status: None     Intimate partner violence:     Fear of current or ex partner: None     Emotionally abused: None     Physically abused: None     Forced sexual activity: None   Other Topics Concern     Parent/sibling w/ CABG, MI or angioplasty before 65F 55M? Yes   Social History  Narrative     None         Patient's past medical, surgical, social, and family histories were reviewed today and no changes are noted.    REVIEW OF SYSTEMS:  10 point ROS is negative other than symptoms noted above in HPI, Past Medical History or as stated below  Constitutional: NEGATIVE for fever, chills, change in weight  Skin: NEGATIVE for worrisome rashes, moles or lesions  GI/: NEGATIVE for bowel or bladder changes  Neuro: NEGATIVE for weakness, dizziness or paresthesias    OBJECTIVE:  BP (!) 152/78   Ht 1.524 m (5')   LMP 07/07/1960   BMI 22.26 kg/m     General: healthy, alert and in no distress  HEENT: no scleral icterus or conjunctival erythema  Skin: no suspicious lesions or rash. No jaundice.  CV: regular rhythm by palpation  Resp: normal respiratory effort without conversational dyspnea   Psych: normal mood and affect  Gait: normal steady gait with appropriate coordination and balance  Neuro: normal light touch sensory exam of the bilateral upper extremities.    MSK:  LEFT SHOULDER  Inspection:    Swelling over anterior shoulder  Palpation:    Tender about the greater tuberosity and supraspinatus insertion. Remainder of bony and tendinous landmarks are nontender.  Active Range of Motion:     Abduction 900, , , IR hip pocket.       Strength:    Scapular plane abduction 5-/5, painful,  ER 5/5, IR 5/5, biceps 5-/5, painful, triceps 5/5  Special Tests:    Positive: Neer's, Petty' and supraspinatus (empty can)       CERVICAL SPINE  Inspection:    normal cervical lordosis present, rounded shoulders, forward head posture  Palpation:  nontender.  Range of Motion:     Flexion limited by tightness    Extension limited by tightness    Right side bend limited by tightness    Left side bend limited by tightness    Right rotation limited by tightness    Left rotation limited by tightness  Strength:    Full strength throughout all neck muscles  Special Tests:    Positive: None    Negative: Spurling's  (bilateral)    Independent visualization of the below image:  No results found for this or any previous visit (from the past 24 hour(s)).  LEFT SHOULDER THREE OR MORE VIEWS   7/19/2019 9:00 AM      HISTORY: Left shoulder pain.                                                                    IMPRESSION: Glenohumeral joint advanced degenerative arthrosis with  full-thickness joint space narrowing and bone contacting bone. Humeral  head inferomedial osteophytic spurring is noted. Osteopenia.     VANITA QUEEN MD    Large Joint Injection/Arthocentesis: L glenohumeral joint  Date/Time: 8/16/2019 3:00 PM  Performed by: Mahin Funk MD  Authorized by: Mahin Funk MD     Indications:  Pain and osteoarthritis  Needle Size:  18 G  Guidance: ultrasound    Approach:  Anterior  Location:  Shoulder      Site:  L glenohumeral joint  Medications:  6 mg betamethasone acet & sod phos 6 (3-3) MG/ML; 3 mL ropivacaine 5 MG/ML  Medications comment:  Actual amount of ropivacaine used 4 mL  Aspirate amount (mL):  38  Aspirate:  Serous and yellow  Outcome:  Tolerated well, no immediate complications  Procedure discussed: discussed risks, benefits, and alternatives    Consent Given by:  Patient  Timeout: timeout called immediately prior to procedure    Prep: patient was prepped and draped in usual sterile fashion     Patient reported improvement of pain after injection.  Aftercare instructions given to patient.  Plan to follow-up as discussed above.     MD S[facility]HIL [facility]          Patient's conditions were thoroughly discussed during today's visit with greater than 50% of the visit spent counseling the patient with total time spent face-to-face with the patient being 30 minutes.    MD SENTHIL [facility]

## 2019-08-20 RX ORDER — BETAMETHASONE SODIUM PHOSPHATE AND BETAMETHASONE ACETATE 3; 3 MG/ML; MG/ML
6 INJECTION, SUSPENSION INTRA-ARTICULAR; INTRALESIONAL; INTRAMUSCULAR; SOFT TISSUE
Status: DISCONTINUED | OUTPATIENT
Start: 2019-08-16 | End: 2020-02-04

## 2019-08-20 RX ORDER — ROPIVACAINE HYDROCHLORIDE 5 MG/ML
3 INJECTION, SOLUTION EPIDURAL; INFILTRATION; PERINEURAL
Status: DISCONTINUED | OUTPATIENT
Start: 2019-08-16 | End: 2020-02-04

## 2019-08-22 DIAGNOSIS — I48.19 PERSISTENT ATRIAL FIBRILLATION (H): ICD-10-CM

## 2019-08-22 DIAGNOSIS — Z79.01 LONG TERM CURRENT USE OF ANTICOAGULANT THERAPY: ICD-10-CM

## 2019-08-22 RX ORDER — WARFARIN SODIUM 2 MG/1
TABLET ORAL
Qty: 150 TABLET | Refills: 3 | Status: SHIPPED | OUTPATIENT
Start: 2019-08-22 | End: 2020-07-07

## 2019-08-22 NOTE — TELEPHONE ENCOUNTER
Signed Prescriptions:                        Disp   Refills    warfarin (COUMADIN) 2 MG tablet            150 ta*3        Sig: Take 4 mg every Tue, Fri; 3 mg all other days or as           directed by the Anticoagulation Clinic  Authorizing Provider: MITUL KEEN  Ordering User: АЛЕКСАНДР SULLIVAN RN refilled medication per [facility] refill protocol.  Александр Sullivan RN

## 2019-09-03 ENCOUNTER — ANTICOAGULATION THERAPY VISIT (OUTPATIENT)
Dept: ANTICOAGULATION | Facility: CLINIC | Age: 84
End: 2019-09-03
Payer: COMMERCIAL

## 2019-09-03 DIAGNOSIS — Z79.01 LONG TERM CURRENT USE OF ANTICOAGULANT THERAPY: ICD-10-CM

## 2019-09-03 DIAGNOSIS — I48.91 ATRIAL FIBRILLATION, UNSPECIFIED TYPE (H): ICD-10-CM

## 2019-09-03 LAB — INR POINT OF CARE: 2.2 (ref 0.86–1.14)

## 2019-09-03 PROCEDURE — 85610 PROTHROMBIN TIME: CPT | Mod: QW

## 2019-09-03 PROCEDURE — 99207 ZZC NO CHARGE NURSE ONLY: CPT

## 2019-09-03 PROCEDURE — 36416 COLLJ CAPILLARY BLOOD SPEC: CPT

## 2019-10-15 ENCOUNTER — ANTICOAGULATION THERAPY VISIT (OUTPATIENT)
Dept: ANTICOAGULATION | Facility: CLINIC | Age: 84
End: 2019-10-15
Payer: COMMERCIAL

## 2019-10-15 DIAGNOSIS — I48.91 ATRIAL FIBRILLATION, UNSPECIFIED TYPE (H): ICD-10-CM

## 2019-10-15 DIAGNOSIS — Z79.01 LONG TERM CURRENT USE OF ANTICOAGULANT THERAPY: ICD-10-CM

## 2019-10-15 LAB — INR POINT OF CARE: 2.3 (ref 0.86–1.14)

## 2019-10-15 PROCEDURE — 36416 COLLJ CAPILLARY BLOOD SPEC: CPT

## 2019-10-15 PROCEDURE — 85610 PROTHROMBIN TIME: CPT | Mod: QW

## 2019-10-15 PROCEDURE — 99207 ZZC NO CHARGE NURSE ONLY: CPT

## 2019-10-15 NOTE — PROGRESS NOTES
ANTICOAGULATION FOLLOW-UP CLINIC VISIT    Patient Name:  Pooja Swartz  Date:  10/15/2019  Contact Type:  Face to Face    SUBJECTIVE:  Patient Findings     Comments:   No changes in medications, activity, or diet noted. No concerns with clotting, bleeding, or increased bruising noted. Took warfarin as prescribed.  Patient is to continue maintenance warfarin plan, and check INR in 6 weeks.  Patient verbalizes understanding and agrees to plan. No further questions or concerns.        Clinical Outcomes     Negatives:   Major bleeding event, Thromboembolic event, Anticoagulation-related hospital admission, Anticoagulation-related ED visit, Anticoagulation-related fatality    Comments:   No changes in medications, activity, or diet noted. No concerns with clotting, bleeding, or increased bruising noted. Took warfarin as prescribed.  Patient is to continue maintenance warfarin plan, and check INR in 6 weeks.  Patient verbalizes understanding and agrees to plan. No further questions or concerns.           OBJECTIVE    INR Protime   Date Value Ref Range Status   10/15/2019 2.3 (A) 0.86 - 1.14 Final       ASSESSMENT / PLAN  INR assessment THER    Recheck INR In: 6 WEEKS    INR Location Clinic      Anticoagulation Summary  As of 10/15/2019    INR goal:   2.0-3.0   TTR:   81.5 % (3 y)   INR used for dosin.3 (10/15/2019)   Warfarin maintenance plan:   4 mg (2 mg x 2) every Tue, Fri; 3 mg (2 mg x 1.5) all other days   Full warfarin instructions:   4 mg every Tue, Fri; 3 mg all other days   Weekly warfarin total:   23 mg   No change documented:   Monica Mg RN   Plan last modified:   Monica Mg RN (2019)   Next INR check:   2019   Priority:   INR   Target end date:   Indefinite    Indications    Atrial fibrillation (H) [I48.91]  Long-term (current) use of anticoagulants [Z79.01] [Z79.01]             Anticoagulation Episode Summary     INR check location:       Preferred lab:       Send INR reminders to:    [facility]    Comments:   * warfarin 2mg tabs      Anticoagulation Care Providers     Provider Role Specialty Phone number    Amanda Renee MD [facility] [phone]            See the Encounter Report to view Anticoagulation Flowsheet and Dosing Calendar (Go to Encounters tab in chart review, and find the Anticoagulation Therapy Visit)        Monica Mg RN

## 2019-11-26 ENCOUNTER — ANTICOAGULATION THERAPY VISIT (OUTPATIENT)
Dept: ANTICOAGULATION | Facility: CLINIC | Age: 84
End: 2019-11-26
Payer: COMMERCIAL

## 2019-11-26 DIAGNOSIS — Z79.01 LONG TERM CURRENT USE OF ANTICOAGULANT THERAPY: ICD-10-CM

## 2019-11-26 DIAGNOSIS — I48.91 ATRIAL FIBRILLATION, UNSPECIFIED TYPE (H): ICD-10-CM

## 2019-11-26 LAB — INR POINT OF CARE: 1.9 (ref 0.86–1.14)

## 2019-11-26 PROCEDURE — 36416 COLLJ CAPILLARY BLOOD SPEC: CPT

## 2019-11-26 PROCEDURE — 85610 PROTHROMBIN TIME: CPT | Mod: QW

## 2019-11-26 PROCEDURE — 99207 ZZC NO CHARGE NURSE ONLY: CPT

## 2019-11-26 NOTE — PROGRESS NOTES
ANTICOAGULATION FOLLOW-UP CLINIC VISIT    Patient Name:  Pooja Swartz  Date:  2019  Contact Type:  Face to Face    SUBJECTIVE:  Patient Findings     Comments:   No changes in medications, activity, or diet noted. No concerns with clotting, bleeding, or increased bruising noted. Took warfarin as prescribed.  Patient is to continue maintenance warfarin plan, and check INR in 2 weeks.  Patient educated on the increased risk for a clot/stroke, precautions to take, S &S of a clot/stroke, and when to seek medical attention. Denies concerns or S&S of a clot.  Patient verbalizes understanding and agrees to plan. No further questions or concerns.        Clinical Outcomes     Negatives:   Major bleeding event, Thromboembolic event, Anticoagulation-related hospital admission, Anticoagulation-related ED visit, Anticoagulation-related fatality    Comments:   No changes in medications, activity, or diet noted. No concerns with clotting, bleeding, or increased bruising noted. Took warfarin as prescribed.  Patient is to continue maintenance warfarin plan, and check INR in 2 weeks.  Patient educated on the increased risk for a clot/stroke, precautions to take, S &S of a clot/stroke, and when to seek medical attention. Denies concerns or S&S of a clot.  Patient verbalizes understanding and agrees to plan. No further questions or concerns.           OBJECTIVE    INR Protime   Date Value Ref Range Status   2019 1.9 (A) 0.86 - 1.14 Final       ASSESSMENT / PLAN  INR assessment SUB    Recheck INR In: 2 WEEKS    INR Location Clinic      Anticoagulation Summary  As of 2019    INR goal:   2.0-3.0   TTR:   91.2 % (1 y)   INR used for dosin.9! (2019)   Warfarin maintenance plan:   4 mg (2 mg x 2) every Tue, Fri; 3 mg (2 mg x 1.5) all other days   Full warfarin instructions:   4 mg every Tue, Fri; 3 mg all other days   Weekly warfarin total:   23 mg   No change documented:   Monica Mg, LOKESH   Plan last  modified:   Monica Mg RN (4/2/2019)   Next INR check:   12/10/2019   Priority:   High   Target end date:   Indefinite    Indications    Atrial fibrillation (H) [I48.91]  Long-term (current) use of anticoagulants [Z79.01] [Z79.01]             Anticoagulation Episode Summary     INR check location:       Preferred lab:       Send INR reminders to:   [facility]    Comments:   * warfarin 2mg tabs      Anticoagulation Care Providers     Provider Role Specialty Phone number    Amanda Renee MD [facility] [phone]            See the Encounter Report to view Anticoagulation Flowsheet and Dosing Calendar (Go to Encounters tab in chart review, and find the Anticoagulation Therapy Visit)        Monica Mg RN

## 2019-12-10 ENCOUNTER — ANTICOAGULATION THERAPY VISIT (OUTPATIENT)
Dept: ANTICOAGULATION | Facility: CLINIC | Age: 84
End: 2019-12-10
Payer: COMMERCIAL

## 2019-12-10 DIAGNOSIS — I48.91 ATRIAL FIBRILLATION, UNSPECIFIED TYPE (H): ICD-10-CM

## 2019-12-10 DIAGNOSIS — Z79.01 LONG TERM CURRENT USE OF ANTICOAGULANT THERAPY: ICD-10-CM

## 2019-12-10 LAB — INR POINT OF CARE: 2.5 (ref 0.86–1.14)

## 2019-12-10 PROCEDURE — 36416 COLLJ CAPILLARY BLOOD SPEC: CPT

## 2019-12-10 PROCEDURE — 85610 PROTHROMBIN TIME: CPT | Mod: QW

## 2019-12-10 PROCEDURE — 99207 ZZC NO CHARGE NURSE ONLY: CPT

## 2019-12-10 NOTE — PROGRESS NOTES
ANTICOAGULATION FOLLOW-UP CLINIC VISIT    Patient Name:  Pooja Swartz  Date:  12/10/2019  Contact Type:  Face to Face    SUBJECTIVE:  Patient Findings     Comments:   No changes in medications, activity, or diet noted. No concerns with clotting, bleeding, or increased bruising noted. Took warfarin as prescribed.  Patient is to continue maintenance warfarin plan, and check INR in 6 weeks.  Patient verbalizes understanding and agrees to plan. No further questions or concerns.        Clinical Outcomes     Negatives:   Major bleeding event, Thromboembolic event, Anticoagulation-related hospital admission, Anticoagulation-related ED visit, Anticoagulation-related fatality    Comments:   No changes in medications, activity, or diet noted. No concerns with clotting, bleeding, or increased bruising noted. Took warfarin as prescribed.  Patient is to continue maintenance warfarin plan, and check INR in 6 weeks.  Patient verbalizes understanding and agrees to plan. No further questions or concerns.           OBJECTIVE    INR Protime   Date Value Ref Range Status   12/10/2019 2.5 (A) 0.86 - 1.14 Final       ASSESSMENT / PLAN  INR assessment THER    Recheck INR In: 6 WEEKS    INR Location Clinic      Anticoagulation Summary  As of 12/10/2019    INR goal:   2.0-3.0   TTR:   90.5 % (1 y)   INR used for dosin.5 (12/10/2019)   Warfarin maintenance plan:   4 mg (2 mg x 2) every Tue, Fri; 3 mg (2 mg x 1.5) all other days   Full warfarin instructions:   4 mg every Tue, Fri; 3 mg all other days   Weekly warfarin total:   23 mg   No change documented:   Monica Mg RN   Plan last modified:   Monica Mg RN (2019)   Next INR check:   2020   Priority:   High   Target end date:   Indefinite    Indications    Atrial fibrillation (H) [I48.91]  Long-term (current) use of anticoagulants [Z79.01] [Z79.01]             Anticoagulation Episode Summary     INR check location:       Preferred lab:       Send INR reminders to:    [facility]    Comments:   * warfarin 2mg tabs      Anticoagulation Care Providers     Provider Role Specialty Phone number    Amanda Renee MD [facility] [phone]            See the Encounter Report to view Anticoagulation Flowsheet and Dosing Calendar (Go to Encounters tab in chart review, and find the Anticoagulation Therapy Visit)        Monica Mg RN

## 2020-01-01 DIAGNOSIS — M1A.9XX1 CHRONIC TOPHACEOUS GOUT: ICD-10-CM

## 2020-01-01 DIAGNOSIS — S30.0XXA CONTUSION OF SACRUM, INITIAL ENCOUNTER: ICD-10-CM

## 2020-01-02 DIAGNOSIS — K52.9 CHRONIC DIARRHEA: ICD-10-CM

## 2020-01-02 RX ORDER — ALLOPURINOL 100 MG/1
100 TABLET ORAL DAILY
Qty: 90 TABLET | Refills: 0 | Status: SHIPPED | OUTPATIENT
Start: 2020-01-02 | End: 2020-04-15

## 2020-01-02 RX ORDER — BUDESONIDE 3 MG/1
3 CAPSULE, COATED PELLETS ORAL EVERY MORNING
Qty: 30 CAPSULE | Refills: 6 | Status: SHIPPED | OUTPATIENT
Start: 2020-01-02 | End: 2020-06-15

## 2020-01-02 NOTE — TELEPHONE ENCOUNTER
Requested Prescriptions   Pending Prescriptions Disp Refills     budesonide (ENTOCORT EC) 3 MG EC capsule [Pharmacy Med Name: BUDESONIDE EC 3 MG CAPDR - ER] 30 capsule 6     Sig: Take 1 capsule (3 mg) by mouth every morning       There is no refill protocol information for this order              Last Written Prescription Date:  5/29/2019  Last Fill Quantity: 30,   # refills: 6  Last Office Visit: 7/30/2019 with Ced   Future Office visit:       Routing refill request to provider for review/approval because:  Drug not on the [facility], [facility] or [facility] refill protocol or controlled substance

## 2020-01-02 NOTE — TELEPHONE ENCOUNTER
"Requested Prescriptions   Pending Prescriptions Disp Refills     allopurinol (ZYLOPRIM) 100 MG tablet [Pharmacy Med Name: ALLOPURINOL 100 MG TABLET] 90 tablet 3     Sig: Take 1 tablet (100 mg) by mouth daily       Gout Agents Protocol Failed - 1/1/2020  8:00 AM        Failed - Has Uric Acid on file in past 12 months and value is less than 6     Recent Labs   Lab Test 12/01/15  1341   URIC 4.2     If level is 6mg/dL or greater, ok to refill one time and refer to provider.           Passed - CBC on file in past 12 months     Recent Labs   Lab Test 07/11/19  1006   WBC 9.5   RBC 3.44*   HGB 11.3*   HCT 34.8*                    Passed - ALT on file in past 12 months     Recent Labs   Lab Test 07/11/19  1006   ALT 22             Passed - Recent (12 mo) or future (30 days) visit within the authorizing provider's specialty     Patient has had an office visit with the authorizing provider or a provider within the authorizing providers department within the previous 12 mos or has a future within next 30 days. See \"Patient Info\" tab in inbasket, or \"Choose Columns\" in Meds & Orders section of the refill encounter.              Passed - Medication is active on med list        Passed - Patient is age 18 or older        Passed - No active pregnancy on record        Passed - Normal serum creatinine on file in the past 12 months     Recent Labs   Lab Test 07/11/19  1006  12/13/18  1449   CR 0.76   < >  --    CREAT  --   --  0.9    < > = values in this interval not displayed.             Passed - No positive pregnancy test in past year        Last Written Prescription Date:  1/10/2019  Last Fill Quantity: 90,  # refills: 3   Last office visit: 7/30/2019 with prescribing provider:  Ced   Future Office Visit:      "

## 2020-01-16 ENCOUNTER — TELEPHONE (OUTPATIENT)
Dept: FAMILY MEDICINE | Facility: CLINIC | Age: 85
End: 2020-01-16

## 2020-01-16 ENCOUNTER — OFFICE VISIT (OUTPATIENT)
Dept: FAMILY MEDICINE | Facility: CLINIC | Age: 85
End: 2020-01-16
Payer: COMMERCIAL

## 2020-01-16 VITALS
SYSTOLIC BLOOD PRESSURE: 136 MMHG | WEIGHT: 119 LBS | TEMPERATURE: 97.7 F | HEIGHT: 60 IN | BODY MASS INDEX: 23.36 KG/M2 | RESPIRATION RATE: 18 BRPM | HEART RATE: 69 BPM | OXYGEN SATURATION: 98 % | DIASTOLIC BLOOD PRESSURE: 62 MMHG

## 2020-01-16 DIAGNOSIS — I10 HYPERTENSION GOAL BP (BLOOD PRESSURE) < 140/90: ICD-10-CM

## 2020-01-16 DIAGNOSIS — J01.90 ACUTE SINUSITIS WITH SYMPTOMS > 10 DAYS: ICD-10-CM

## 2020-01-16 DIAGNOSIS — I10 HYPERTENSION GOAL BP (BLOOD PRESSURE) < 140/90: Primary | ICD-10-CM

## 2020-01-16 DIAGNOSIS — N18.30 CHRONIC KIDNEY DISEASE, STAGE III (MODERATE) (H): Primary | ICD-10-CM

## 2020-01-16 DIAGNOSIS — K52.9 CHRONIC DIARRHEA: ICD-10-CM

## 2020-01-16 PROCEDURE — 99214 OFFICE O/P EST MOD 30 MIN: CPT | Performed by: FAMILY MEDICINE

## 2020-01-16 RX ORDER — AMLODIPINE BESYLATE 2.5 MG/1
2.5 TABLET ORAL DAILY
Qty: 90 TABLET | Refills: 1 | Status: SHIPPED | OUTPATIENT
Start: 2020-01-16 | End: 2020-05-18 | Stop reason: DRUGHIGH

## 2020-01-16 RX ORDER — DOXYCYCLINE HYCLATE 100 MG
100 TABLET ORAL 2 TIMES DAILY
Qty: 20 TABLET | Refills: 0 | Status: SHIPPED | OUTPATIENT
Start: 2020-01-16 | End: 2020-02-04

## 2020-01-16 RX ORDER — AMLODIPINE BESYLATE 5 MG/1
5 TABLET ORAL DAILY
Qty: 90 TABLET | Refills: 1 | Status: SHIPPED | OUTPATIENT
Start: 2020-01-16 | End: 2020-05-18 | Stop reason: DRUGHIGH

## 2020-01-16 ASSESSMENT — PAIN SCALES - GENERAL: PAINLEVEL: NO PAIN (0)

## 2020-01-16 ASSESSMENT — MIFFLIN-ST. JEOR: SCORE: 861.28

## 2020-01-16 NOTE — PATIENT INSTRUCTIONS
Doxycycline 100 mg twice daily x 10 days        Our Clinic hours are:  Mondays    7:20 am - 7 pm  Tues -  Fri  7:20 am - 5 pm    Clinic Phone: [phone]    The clinic lab opens at 7:30 am Mon - Fri and appointments are required.    [facility]. [phone]  Monday  8 am - 7pm  Tues - Fri 8 am - 5:30 pm

## 2020-01-16 NOTE — TELEPHONE ENCOUNTER
Spoke with pharmacy, prescription were approved.  Patient notified of change and verbalizes understanding.    Jes Mcginnis RN

## 2020-01-16 NOTE — Clinical Note
I'm starting Pooja on doxycyline 100 mg twice daily x 10 day.  Apparently she's scheduled next Tuesday for INR check.Thanks,Amanda Renee M.D.

## 2020-01-16 NOTE — TELEPHONE ENCOUNTER
Patient amlodipine was only filled #45 tabs from [facility] in January.  Patient got a letter from [employer] (formerly [employer] re: formulary.    Checked formulary, there is qty limits on this drug.  Do you want to order amlodipine 2.5mg in addition to amlodipine 5mg to equal 7.5mg dose or do you want to try for PA on the 5mg tabs 1.5 tabs daily?    Please advise.    Jes Mcginnis RN

## 2020-01-16 NOTE — PROGRESS NOTES
Subjective     Pooja Swartz is a 89 year old female who presents to clinic today for the following health issues:    HPI   Acute Illness   Acute illness concerns: cough, congestion   Onset: Start of Dec    Fever: no    Chills/Sweats: no    Headache (location?): no    Sinus Pressure:no    Conjunctivitis:  watery    Ear Pain: no    Rhinorrhea: YES    Congestion: YES    Sore Throat: no     Cough: YES-productive of yellow sputum    Wheeze: YES    Decreased Appetite: no     Nausea: no    Vomiting: no    Diarrhea:  no    Dysuria/Freq.: no    Fatigue/Achiness: YES- fatigue    Sick/Strep Exposure: YES- neighbor     Therapies Tried and outcome: robitussin, mucinex    Also concerned that amlodipine 5 mg is not covered at the 7.5 mg dose - there is a limit to 30 pills in 30 days.     Entocort is also expensive - $122 for a 30 day supply - [employer] has a coupon for $95 for a 90 day supply at [facility], this was given to her but she declined having me transfer the prescription.      Reviewed and updated as needed this visit by Provider         Review of Systems   ROS COMP: Constitutional, HEENT, cardiovascular, pulmonary, gi and gu systems are negative, except as otherwise noted.      Objective    /62   Pulse 69   Temp 97.7  F (36.5  C) (Oral)   Resp 18   Ht 1.524 m (5')   Wt 54 kg (119 lb)   LMP 07/07/1960   SpO2 98%   Breastfeeding No   BMI 23.24 kg/m    Body mass index is 23.24 kg/m .  Physical Exam   GENERAL: healthy, alert and no distress  HENT: ear canals and TM's normal, nose and mouth without ulcers or lesions  NECK: no adenopathy, no asymmetry, masses, or scars and thyroid normal to palpation  RESP: lungs clear to auscultation - no rales, rhonchi or wheezes  CV: regular rate and rhythm, normal S1 S2, no S3 or S4, no murmur, click or rub, no peripheral edema and peripheral pulses strong  ABDOMEN: soft, nontender, no hepatosplenomegaly, no masses and bowel sounds normal  MS: no gross musculoskeletal defects  noted, no edema            Assessment & Plan          1. Acute sinusitis with symptoms > 10 days     - doxycycline hyclate (VIBRA-TABS) 100 MG tablet; Take 1 tablet (100 mg) by mouth 2 times daily for 10 days  Dispense: 20 tablet; Refill: 0    2. Chronic diarrhea   see above - continue entocort.     3.  HYPERTENSION - patient needs amlodipine 7.5 mg daily - she is well controlled in terms of blood pressure. 5 mg would not be enough and 10 mg would likely be too much.  She is fragile at 89 years of age, would prefer she stay at the 7.5 mg daily.  BP Readings from Last 6 Encounters:   01/16/20 136/62   08/16/19 (!) 152/78   07/30/19 (!) 152/68   07/19/19 130/68   07/11/19 138/70   02/25/19 150/74       4.  Notifying [facility] of doxycycline. She has follow up with [facility] in 5 days.      No follow-ups on file.    Amanda Renee MD  [facility]

## 2020-01-21 ENCOUNTER — ANTICOAGULATION THERAPY VISIT (OUTPATIENT)
Dept: ANTICOAGULATION | Facility: CLINIC | Age: 85
End: 2020-01-21
Payer: COMMERCIAL

## 2020-01-21 DIAGNOSIS — I48.91 ATRIAL FIBRILLATION, UNSPECIFIED TYPE (H): ICD-10-CM

## 2020-01-21 DIAGNOSIS — Z79.01 LONG TERM CURRENT USE OF ANTICOAGULANT THERAPY: ICD-10-CM

## 2020-01-21 LAB — INR POINT OF CARE: 3.2 (ref 0.86–1.14)

## 2020-01-21 PROCEDURE — 99207 ZZC NO CHARGE NURSE ONLY: CPT

## 2020-01-21 PROCEDURE — 36416 COLLJ CAPILLARY BLOOD SPEC: CPT

## 2020-01-21 PROCEDURE — 85610 PROTHROMBIN TIME: CPT | Mod: QW

## 2020-01-21 NOTE — PROGRESS NOTES
ANTICOAGULATION FOLLOW-UP CLINIC VISIT    Patient Name:  Pooja Swartz  Date:  1/21/2020  Contact Type:  Face to Face    SUBJECTIVE:  Patient Findings     Positives:   Change in health ( Acute sinusitis with symptoms > 10 days), Change in medications (Doxy)    Comments:   No changes in activity or diet noted. No concerns with clotting, bleeding, or increased bruising noted. Took warfarin as prescribed.  Pt will take 2 mg today then go back to maintenance dose and recheck INR in one week.  Patient educated on the increased risk for bleeding, precautions to take, and when to seek medical attention.  Patient verbalizes understanding and agrees to plan. No further questions or concerns.        Clinical Outcomes     Negatives:   Major bleeding event, Thromboembolic event, Anticoagulation-related hospital admission, Anticoagulation-related ED visit, Anticoagulation-related fatality    Comments:   No changes in activity or diet noted. No concerns with clotting, bleeding, or increased bruising noted. Took warfarin as prescribed.  Pt will take 2 mg today then go back to maintenance dose and recheck INR in one week.  Patient educated on the increased risk for bleeding, precautions to take, and when to seek medical attention.  Patient verbalizes understanding and agrees to plan. No further questions or concerns.           OBJECTIVE    INR Protime   Date Value Ref Range Status   01/21/2020 3.2 (A) 0.86 - 1.14 Final       ASSESSMENT / PLAN  INR assessment SUPRA    Recheck INR In: 1 WEEK    INR Location Clinic      Anticoagulation Summary  As of 1/21/2020    INR goal:   2.0-3.0   TTR:   87.2 % (1 y)   INR used for dosing:   3.2! (1/21/2020)   Warfarin maintenance plan:   4 mg (2 mg x 2) every Tue, Fri; 3 mg (2 mg x 1.5) all other days   Full warfarin instructions:   1/21: 2 mg; Otherwise 4 mg every Tue, Fri; 3 mg all other days   Weekly warfarin total:   23 mg   Plan last modified:   Monica Mg RN (4/2/2019)   Next INR  check:   1/28/2020   Priority:   High   Target end date:   Indefinite    Indications    Atrial fibrillation (H) [I48.91]  Long-term (current) use of anticoagulants [Z79.01] [Z79.01]             Anticoagulation Episode Summary     INR check location:       Preferred lab:       Send INR reminders to:   [facility]    Comments:   * warfarin 2mg tabs      Anticoagulation Care Providers     Provider Role Specialty Phone number    Amanda Renee MD [facility] [phone]            See the Encounter Report to view Anticoagulation Flowsheet and Dosing Calendar (Go to Encounters tab in chart review, and find the Anticoagulation Therapy Visit)        Monica Mg RN

## 2020-01-28 ENCOUNTER — ANTICOAGULATION THERAPY VISIT (OUTPATIENT)
Dept: ANTICOAGULATION | Facility: CLINIC | Age: 85
End: 2020-01-28
Payer: COMMERCIAL

## 2020-01-28 DIAGNOSIS — I48.91 ATRIAL FIBRILLATION, UNSPECIFIED TYPE (H): ICD-10-CM

## 2020-01-28 DIAGNOSIS — Z79.01 LONG TERM CURRENT USE OF ANTICOAGULANT THERAPY: ICD-10-CM

## 2020-01-28 LAB — INR POINT OF CARE: 2.7 (ref 0.86–1.14)

## 2020-01-28 PROCEDURE — 99207 ZZC NO CHARGE NURSE ONLY: CPT

## 2020-01-28 PROCEDURE — 36416 COLLJ CAPILLARY BLOOD SPEC: CPT

## 2020-01-28 PROCEDURE — 85610 PROTHROMBIN TIME: CPT | Mod: QW

## 2020-01-28 NOTE — PROGRESS NOTES
ANTICOAGULATION FOLLOW-UP CLINIC VISIT    Patient Name:  Pooja Swartz  Date:  2020  Contact Type:  Face to Face    SUBJECTIVE:  Patient Findings     Comments:   No changes in medications, activity, or diet noted. No concerns with clotting, bleeding, or increased bruising noted. Took warfarin as prescribed. Pt did finish course of antibiotics.   Patient is to resume maintenance warfarin plan, and check INR in 2 weeks.  Patient verbalizes understanding and agrees to plan. No further questions or concerns.        Clinical Outcomes     Negatives:   Major bleeding event, Thromboembolic event, Anticoagulation-related hospital admission, Anticoagulation-related ED visit, Anticoagulation-related fatality    Comments:   No changes in medications, activity, or diet noted. No concerns with clotting, bleeding, or increased bruising noted. Took warfarin as prescribed. Pt did finish course of antibiotics.   Patient is to resume maintenance warfarin plan, and check INR in 2 weeks.  Patient verbalizes understanding and agrees to plan. No further questions or concerns.           OBJECTIVE    INR Protime   Date Value Ref Range Status   2020 2.7 (A) 0.86 - 1.14 Final       ASSESSMENT / PLAN  INR assessment THER    Recheck INR In: 2 WEEKS    INR Location Clinic      Anticoagulation Summary  As of 2020    INR goal:   2.0-3.0   TTR:   86.5 % (1 y)   INR used for dosin.7 (2020)   Warfarin maintenance plan:   4 mg (2 mg x 2) every Tue, Fri; 3 mg (2 mg x 1.5) all other days   Full warfarin instructions:   4 mg every Tue, Fri; 3 mg all other days   Weekly warfarin total:   23 mg   Plan last modified:   Monica Mg RN (2019)   Next INR check:   2020   Priority:   Maintenance   Target end date:   Indefinite    Indications    Atrial fibrillation (H) [I48.91]  Long-term (current) use of anticoagulants [Z79.01] [Z79.01]  Atrial fibrillation  unspecified type (H) [I48.91]             Anticoagulation Episode  Summary     INR check location:       Preferred lab:       Send INR reminders to:   [facility]    Comments:   * warfarin 2mg tabs      Anticoagulation Care Providers     Provider Role Specialty Phone number    Amanda Renee MD [facility] [phone]            See the Encounter Report to view Anticoagulation Flowsheet and Dosing Calendar (Go to Encounters tab in chart review, and find the Anticoagulation Therapy Visit)        Monica Mg RN

## 2020-02-04 ENCOUNTER — OFFICE VISIT (OUTPATIENT)
Dept: CARDIOLOGY | Facility: CLINIC | Age: 85
End: 2020-02-04
Attending: NURSE PRACTITIONER
Payer: COMMERCIAL

## 2020-02-04 VITALS
HEART RATE: 64 BPM | OXYGEN SATURATION: 99 % | DIASTOLIC BLOOD PRESSURE: 77 MMHG | BODY MASS INDEX: 23.24 KG/M2 | WEIGHT: 119 LBS | SYSTOLIC BLOOD PRESSURE: 175 MMHG

## 2020-02-04 DIAGNOSIS — I48.0 PAROXYSMAL ATRIAL FIBRILLATION (H): ICD-10-CM

## 2020-02-04 DIAGNOSIS — I10 BENIGN ESSENTIAL HYPERTENSION: ICD-10-CM

## 2020-02-04 PROCEDURE — 99213 OFFICE O/P EST LOW 20 MIN: CPT | Performed by: INTERNAL MEDICINE

## 2020-02-04 NOTE — LETTER
2/4/2020    Amanda Renee MD  [id number]  [facility] [address]    RE: Pooja Swartz       Dear Colleague,    I had the pleasure of seeing Pooja Swartz in the [facility].    HPI and Plan:   See dictation  [id number]  Orders Placed This Encounter   Procedures     Follow-Up with Cardiac Advanced Practice Provider       No orders of the defined types were placed in this encounter.      Medications Discontinued During This Encounter   Medication Reason     amLODIPine (NORVASC) 5 MG tablet Dose adjustment     doxycycline hyclate (VIBRA-TABS) 100 MG tablet Therapy completed     betamethasone acet & sod phos (CELESTONE) injection 6 mg      betamethasone acet & sod phos (CELESTONE) injection 6 mg      ropivacaine (NAROPIN) injection 3 mL      ropivacaine (NAROPIN) injection 3 mL          Encounter Diagnoses   Name Primary?     Paroxysmal atrial fibrillation (H)      Benign essential hypertension        CURRENT MEDICATIONS:  Current Outpatient Medications   Medication Sig Dispense Refill     allopurinol (ZYLOPRIM) 100 MG tablet Take 1 tablet (100 mg) by mouth daily 90 tablet 0     amLODIPine (NORVASC) 2.5 MG tablet Take 1 tablet (2.5 mg) by mouth daily Take with 5 mg pill for 7.5 mg total 90 tablet 1     amLODIPine (NORVASC) 5 MG tablet Take 1 tablet (5 mg) by mouth daily Take with 2.5 mg pill for total of 7.5 mg daily 90 tablet 1     brimonidine (ALPHAGAN-P) 0.15 % ophthalmic solution Place 1 drop Into the left eye 2 times daily        budesonide (ENTOCORT EC) 3 MG EC capsule Take 1 capsule (3 mg) by mouth every morning 30 capsule 6     Calcium-Vitamin D 600-200 MG-UNIT TABS Take 1 tablet by mouth 2 times daily       COSOPT 2-0.5 % OP SOLN 1 DROP INTO Left Eye twice daily       Fexofenadine HCl (ALLEGRA PO) Take 180 mg by mouth daily        latanoprost (XALATAN) 0.005 % ophthalmic solution Place 1 drop into both eyes At Bedtime        ONE-A-DAY WOMENS OR TABS 1 TABLET ORALLY DAILY        Tetrahydrozoline HCl (VISINE OP)        triamcinolone (KENALOG) 0.1 % ointment Apply  topically 3 times daily. Apply sparingly to affected area. 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, including hip      Other specified glaucoma      Renal insufficiency      Sinus node dysfunction (H)      Unspecified essential hypertension        PAST SURGICAL HISTORY:  Past Surgical History:   Procedure Laterality Date     ARTHROPLASTY REVISION HIP Left 8/19/2016    Procedure: ARTHROPLASTY REVISION HIP;  Surgeon: Kael Rojas MD;  Location: [facility] OR     C PELVIS/HIP JOINT SURGERY UNLISTED       C SHOULDER SURG PROC UNLISTED       C STOMACH SURGERY PROCEDURE UNLISTED       HC VASCULAR SURGERY PROCEDURE UNLIST       OPEN REDUCTION INTERNAL FIXATION FEMUR PROXIMAL Left 8/19/2016    Procedure: OPEN REDUCTION INTERNAL FIXATION FEMUR PROXIMAL;  Surgeon: Kael Rojas MD;  Location: [facility] OR     SURGICAL HISTORY OF -   11/1992    skin cancer, nose     SURGICAL HISTORY OF -   1978    right, vein stripping     SURGICAL HISTORY OF -   1968    breast biopsy     SURGICAL HISTORY OF -   1993    laparoscopic cholecystectomy     SURGICAL HISTORY OF -       tonsillectomy and adenoidectomy     SURGICAL HISTORY OF -   04/2004    left cataract     SURGICAL HISTORY OF -   08/09/2004    right total shoulder arthroplasty       FAMILY HISTORY:  Family History   Problem Relation Age of Onset     Heart Disease Mother         CHF      Hypertension Mother      Hypertension Father      Hypertension Maternal Grandmother      Hypertension Maternal Grandfather      Hypertension Son      Heart Disease Son         MI       SOCIAL HISTORY:  Social History     Socioeconomic History     Marital status:      Spouse name: None     Number of children: None     Years of education: None     Highest education level: None   Occupational History     None   Social Needs     Financial resource strain: None     Food insecurity:     Worry: None     Inability: None     Transportation needs:     Medical: None     Non-medical: None   Tobacco Use     Smoking status: Never Smoker     Smokeless tobacco: Never Used   Substance and Sexual Activity     Alcohol use: Yes     Comment: rare     Drug use: No     Sexual activity: Not Currently     Birth control/protection: None   Lifestyle     Physical activity:     Days per week: None     Minutes per session: None     Stress: None   Relationships     Social connections:     Talks on phone: None     Gets together: None     Attends [employer] service: None     Active member of club or organization: None     Attends meetings of clubs or organizations: None     Relationship status: None     Intimate partner violence:     Fear of current or ex partner: None     Emotionally abused: None     Physically abused: None     Forced sexual activity: None   Other Topics Concern     Parent/sibling w/ CABG, MI or angioplasty before 65F 55M? Yes   Social History Narrative     None       Review of Systems:  Skin:  Negative itching;rash     Eyes:  Positive for glasses;glaucoma    ENT:  Positive for hoarseness    Respiratory:  Negative       Cardiovascular:    Positive for patient states that she can feel the leakage in her heart when she wakes up in the am.  Gastroenterology: Negative      Genitourinary:  Negative   Renal insuffiecency, CKD  Musculoskeletal:  Positive for joint pain;joint swelling;joint stiffness    Neurologic:  Positive for  numbness or tingling of feet    Psychiatric:  Negative      Heme/Lymph/Imm:  Negative      Endocrine:  Negative        Physical Exam:  Vitals: BP (!) 175/77   Pulse 64   Wt 54 kg (119 lb)   LMP 07/07/1960   SpO2 99%   BMI 23.24 kg/m       Constitutional:  cooperative, alert and oriented, well developed, well nourished, in no acute distress appears younger than stated age      Skin:  warm and dry to the touch, no apparent skin lesions or masses noted          Head:  normocephalic, no masses or lesions        Eyes:  pupils equal and round, conjunctivae and lids unremarkable, sclera white, no xanthalasma, EOMS intact, no nystagmus        Lymph:No Cervical lymphadenopathy present     ENT:  no pallor or cyanosis, dentition good        Neck:  carotid pulses are full and equal bilaterally, JVP normal, no carotid bruit        Respiratory:  normal breath sounds, clear to auscultation, normal A-P diameter, normal symmetry, normal respiratory excursion, no use of accessory muscles tender to palpation       Cardiac: regular rhythm, normal S1/S2, no S3 or S4, apical impulse not displaced, no murmurs, gallops or rubs                                                         GI:  abdomen soft, non-tender, BS normoactive, no mass, no HSM, no bruits        Extremities and Muscular Skeletal:  no deformities, clubbing, cyanosis, erythema observed              Neurological:  no gross motor deficits        Psych:  Alert and Oriented x 3        CC  REGIS Hugo CNP  [address]  [place] [address]                Thank you for allowing me to participate in the care of your patient.      Sincerely,     Adan Franks MD     [facility]    cc:   REGIS Hugo CNP  [address]  [place] [address]

## 2020-02-04 NOTE — PROGRESS NOTES
Service Date: 2020      HISTORY OF PRESENT ILLNESS:  Thank you for allowing me to participate in the care of this very delightful patient.  As you know, Israel is a 89-year-old female, one of my favorite patients, with a history of relatively asymptomatic paroxysmal atrial fibrillation on rate control strategy without the need for any AV yolie blocker drug in the background of hypertension and preserved LV systolic function.        I last saw her almost 3 years ago.  At that time, she was recovering from her hip surgery due to a fractured hip when she fell.  She was on anticoagulation at that time.  I had a long discussion with her about her balance issues and risk of falling and discussed with her the alternative of a left atrial appendage occluder device.  The patient at that time wanted to discuss with her son, who is an internal medicine physician, and they both decided to consent to continue her warfarin since then.      Israel has otherwise been doing quite well over the past few years, free of any ER or hospitalization visits.  Her blood pressure a bit high today, but at home the systolic in the 130 range.  She still lives independently in a [facility] and is able to do daily chores and activities and continues to drive even in the wintertime.  The patient has been on amlodipine along with warfarin and allopurinol only.      I congratulated Israel on her mostly asymptomatic status and encouraged the patient to continue to be as active as she can and use her walker at times to help with balance.  She will come back to see Trudy, one of our advanced practice providers, a year from now and see me a year after that.        cc:      [place] [address]         GYPSY MIRELES MD             D: 2020   T: 2020   MT: ZE      Name:     ISRAEL RANGEL   MRN:      [id number]        Account:      [id number]   :       09/01/1925           Service Date: 02/04/2020      Document: [id number]

## 2020-02-04 NOTE — LETTER
2/4/2020      Amanda Renee MD  [address]  [facility] [address]      RE: Pooja Swartz       Dear Colleague,    I had the pleasure of seeing Pooja Swartz in the [facility].    Service Date: 02/04/2020      HISTORY OF PRESENT ILLNESS:  Thank you for allowing me to participate in the care of this very delightful patient.  As you know, Pooja is a 89-year-old female, one of my favorite patients, with a history of relatively asymptomatic paroxysmal atrial fibrillation on rate control strategy without the need for any AV yolie blocker drug in the background of hypertension and preserved LV systolic function.        I last saw her almost 3 years ago.  At that time, she was recovering from her hip surgery due to a fractured hip when she fell.  She was on anticoagulation at that time.  I had a long discussion with her about her balance issues and risk of falling and discussed with her the alternative of a left atrial appendage occluder device.  The patient at that time wanted to discuss with her son, who is an internal medicine physician, and they both decided to consent to continue her warfarin since then.      Pooja has otherwise been doing quite well over the past few years, free of any ER or hospitalization visits.  Her blood pressure a bit high today, but at home the systolic in the 130 range.  She still lives independently in a [facility] and is able to do daily chores and activities and continues to drive even in the wintertime.  The patient has been on amlodipine along with warfarin and allopurinol only.      I congratulated Pooja on her mostly asymptomatic status and encouraged the patient to continue to be as active as she can and use her walker at times to help with balance.  She will come back to see Trudy, one of our advanced practice providers, a year from now and see me a year after that.        cc:      Amanda Renee MD    [facility]    [address] [place] [address]         GYPSY MIRELES MD             D: 2020   T: 2020   MT: ZE      Name:     ISRAEL RANGEL   MRN:      [id number]        Account:      [id number]   :      1925           Service Date: 2020      Document: [id number]           Outpatient Encounter Medications as of 2020   Medication Sig Dispense Refill     allopurinol (ZYLOPRIM) 100 MG tablet Take 1 tablet (100 mg) by mouth daily 90 tablet 0     amLODIPine (NORVASC) 2.5 MG tablet Take 1 tablet (2.5 mg) by mouth daily Take with 5 mg pill for 7.5 mg total 90 tablet 1     amLODIPine (NORVASC) 5 MG tablet Take 1 tablet (5 mg) by mouth daily Take with 2.5 mg pill for total of 7.5 mg daily 90 tablet 1     brimonidine (ALPHAGAN-P) 0.15 % ophthalmic solution Place 1 drop Into the left eye 2 times daily        budesonide (ENTOCORT EC) 3 MG EC capsule Take 1 capsule (3 mg) by mouth every morning 30 capsule 6     Calcium-Vitamin D 600-200 MG-UNIT TABS Take 1 tablet by mouth 2 times daily       COSOPT 2-0.5 % OP SOLN 1 DROP INTO Left Eye twice daily       Fexofenadine HCl (ALLEGRA PO) Take 180 mg by mouth daily        latanoprost (XALATAN) 0.005 % ophthalmic solution Place 1 drop into both eyes At Bedtime        ONE-A-DAY WOMENS OR TABS 1 TABLET ORALLY DAILY       Tetrahydrozoline HCl (VISINE OP)        triamcinolone (KENALOG) 0.1 % ointment Apply  topically 3 times daily. Apply sparingly to affected area. 30 g 0     warfarin (COUMADIN) 2 MG tablet Take 4 mg every Tue, Fri; 3 mg all other days or as directed by the Anticoagulation Clinic 150 tablet 3     [DISCONTINUED] amLODIPine (NORVASC) 5 MG tablet take 1 & 1/2 tablets BY MOUTH EVERY  tablet 0     [DISCONTINUED] doxycycline hyclate (VIBRA-TABS) 100 MG tablet Take 1 tablet (100 mg) by mouth 2 times daily for 10 days 20 tablet 0     [DISCONTINUED] betamethasone acet & sod phos (CELESTONE) injection 6 mg        [DISCONTINUED] betamethasone acet  & sod phos (CELESTONE) injection 6 mg        [DISCONTINUED] ropivacaine (NAROPIN) injection 3 mL        [DISCONTINUED] ropivacaine (NAROPIN) injection 3 mL        No facility-administered encounter medications on file as of 2/4/2020.        Again, thank you for allowing me to participate in the care of your patient.      Sincerely,    Adan Franks MD     [facility]

## 2020-02-04 NOTE — PATIENT INSTRUCTIONS
"[facility]~[address]~[place], [place]~[address]  Thank you for your  Heart Care visit today. If you have questions regarding your visit, please contact your cardiology RN, Dianne Morse, at [phone].    Please arrive 15 minutes early to all cardiology appointments.    To schedule a future appointment, we kindly ask that you call cardiology scheduling at [phone] three months prior to requested revisit date.  [facility] cardiology clinic is staffed with \"Advance Practice Providers\". These are our cardiology Physician Assistants and Nurse Practitioners.   Please call cardiology scheduling if you feel you need clinical evaluation with them at any time for any cardiac reason.     Reminder:  For your safety, we ask that you bring in your current medication(s) or an updated list of your medications with you to EACH office visit. Include the medication name, dose of pill on bottle and how you are taking it. Include over-the-counter medications or supplements. Your provider will review this at each visit and plan your care based on your current information.   ~~~~~~~~~~~~~~~~~~~~~~~~~~~~~~~~~~~~~~~  \"[facility]\" [facility]el telephone numbers for reference:  Cardiology Scheduling~[phone]  Diagnostic Imaging Scheduling~[phone]  Lab Scheduling~[phone]  Anticoagulation Clinic~[phone]  Cardiac Rehabilitation~[phone]  CORE Clinic RN's~[phone] (at [facility])  Congential Team [phone] (at [facility])  Cardiology Clinic RN's~[phone] (Dianne Morse, RN)  ~~~~~~~~~~~~~~~~~~~~~~~~~~~~~~~~~~~~~~~~        "

## 2020-02-04 NOTE — PROGRESS NOTES
HPI and Plan:   See dictation  [id number]  Orders Placed This Encounter   Procedures     Follow-Up with Cardiac Advanced Practice Provider       No orders of the defined types were placed in this encounter.      Medications Discontinued During This Encounter   Medication Reason     amLODIPine (NORVASC) 5 MG tablet Dose adjustment     doxycycline hyclate (VIBRA-TABS) 100 MG tablet Therapy completed     betamethasone acet & sod phos (CELESTONE) injection 6 mg      betamethasone acet & sod phos (CELESTONE) injection 6 mg      ropivacaine (NAROPIN) injection 3 mL      ropivacaine (NAROPIN) injection 3 mL          Encounter Diagnoses   Name Primary?     Paroxysmal atrial fibrillation (H)      Benign essential hypertension        CURRENT MEDICATIONS:  Current Outpatient Medications   Medication Sig Dispense Refill     allopurinol (ZYLOPRIM) 100 MG tablet Take 1 tablet (100 mg) by mouth daily 90 tablet 0     amLODIPine (NORVASC) 2.5 MG tablet Take 1 tablet (2.5 mg) by mouth daily Take with 5 mg pill for 7.5 mg total 90 tablet 1     amLODIPine (NORVASC) 5 MG tablet Take 1 tablet (5 mg) by mouth daily Take with 2.5 mg pill for total of 7.5 mg daily 90 tablet 1     brimonidine (ALPHAGAN-P) 0.15 % ophthalmic solution Place 1 drop Into the left eye 2 times daily        budesonide (ENTOCORT EC) 3 MG EC capsule Take 1 capsule (3 mg) by mouth every morning 30 capsule 6     Calcium-Vitamin D 600-200 MG-UNIT TABS Take 1 tablet by mouth 2 times daily       COSOPT 2-0.5 % OP SOLN 1 DROP INTO Left Eye twice daily       Fexofenadine HCl (ALLEGRA PO) Take 180 mg by mouth daily        latanoprost (XALATAN) 0.005 % ophthalmic solution Place 1 drop into both eyes At Bedtime        ONE-A-DAY WOMENS OR TABS 1 TABLET ORALLY DAILY       Tetrahydrozoline HCl (VISINE OP)        triamcinolone (KENALOG) 0.1 % ointment Apply  topically 3 times daily. Apply sparingly to affected area. 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, including hip      Other specified glaucoma      Renal insufficiency      Sinus node dysfunction (H)      Unspecified essential hypertension        PAST SURGICAL HISTORY:  Past Surgical History:   Procedure Laterality Date     ARTHROPLASTY REVISION HIP Left 8/19/2016    Procedure: ARTHROPLASTY REVISION HIP;  Surgeon: Kael Rojas MD;  Location: [facility] OR     C PELVIS/HIP JOINT SURGERY UNLISTED       C SHOULDER SURG PROC UNLISTED       C STOMACH SURGERY PROCEDURE UNLISTED       HC VASCULAR SURGERY PROCEDURE UNLIST       OPEN REDUCTION INTERNAL FIXATION FEMUR PROXIMAL Left 8/19/2016    Procedure: OPEN REDUCTION INTERNAL FIXATION FEMUR PROXIMAL;  Surgeon: Kael Rojas MD;  Location: [facility] OR     SURGICAL HISTORY OF -   11/1992    skin cancer, nose     SURGICAL HISTORY OF -   1978    right, vein stripping     SURGICAL HISTORY OF -   1968    breast biopsy     SURGICAL HISTORY OF -   1993    laparoscopic cholecystectomy     SURGICAL HISTORY OF -       tonsillectomy and adenoidectomy     SURGICAL HISTORY OF -   04/2004    left cataract     SURGICAL HISTORY OF -   08/09/2004    right total shoulder arthroplasty       FAMILY HISTORY:  Family History   Problem Relation Age of Onset     Heart Disease Mother         CHF     Hypertension Mother      Hypertension Father      Hypertension Maternal Grandmother      Hypertension Maternal Grandfather      Hypertension Son      Heart Disease Son         MI       SOCIAL HISTORY:  Social History      Socioeconomic History     Marital status:      Spouse name: None     Number of children: None     Years of education: None     Highest education level: None   Occupational History     None   Social Needs     Financial resource strain: None     Food insecurity:     Worry: None     Inability: None     Transportation needs:     Medical: None     Non-medical: None   Tobacco Use     Smoking status: Never Smoker     Smokeless tobacco: Never Used   Substance and Sexual Activity     Alcohol use: Yes     Comment: rare     Drug use: No     Sexual activity: Not Currently     Birth control/protection: None   Lifestyle     Physical activity:     Days per week: None     Minutes per session: None     Stress: None   Relationships     Social connections:     Talks on phone: None     Gets together: None     Attends Taoist service: None     Active member of club or organization: None     Attends meetings of clubs or organizations: None     Relationship status: None     Intimate partner violence:     Fear of current or ex partner: None     Emotionally abused: None     Physically abused: None     Forced sexual activity: None   Other Topics Concern     Parent/sibling w/ CABG, MI or angioplasty before 65F 55M? Yes   Social History Narrative     None       Review of Systems:  Skin:  Negative itching;rash     Eyes:  Positive for glasses;glaucoma    ENT:  Positive for hoarseness    Respiratory:  Negative       Cardiovascular:    Positive for patient states that she can feel the leakage in her heart when she wakes up in the am.  Gastroenterology: Negative      Genitourinary:  Negative   Renal insuffiecency, CKD  Musculoskeletal:  Positive for joint pain;joint swelling;joint stiffness    Neurologic:  Positive for numbness or tingling of feet    Psychiatric:  Negative      Heme/Lymph/Imm:  Negative      Endocrine:  Negative        Physical Exam:  Vitals: BP (!) 175/77   Pulse 64   Wt 54 kg (119 lb)   LMP 07/07/1960   SpO2 99%    BMI 23.24 kg/m      Constitutional:  cooperative, alert and oriented, well developed, well nourished, in no acute distress appears younger than stated age      Skin:  warm and dry to the touch, no apparent skin lesions or masses noted          Head:  normocephalic, no masses or lesions        Eyes:  pupils equal and round, conjunctivae and lids unremarkable, sclera white, no xanthalasma, EOMS intact, no nystagmus        Lymph:No Cervical lymphadenopathy present     ENT:  no pallor or cyanosis, dentition good        Neck:  carotid pulses are full and equal bilaterally, JVP normal, no carotid bruit        Respiratory:  normal breath sounds, clear to auscultation, normal A-P diameter, normal symmetry, normal respiratory excursion, no use of accessory muscles tender to palpation       Cardiac: regular rhythm, normal S1/S2, no S3 or S4, apical impulse not displaced, no murmurs, gallops or rubs                                                         GI:  abdomen soft, non-tender, BS normoactive, no mass, no HSM, no bruits        Extremities and Muscular Skeletal:  no deformities, clubbing, cyanosis, erythema observed              Neurological:  no gross motor deficits        Psych:  Alert and Oriented x 3        CC  Trudy Pelaez, APRN CNP  [address]  [place] [address]

## 2020-02-11 ENCOUNTER — ANTICOAGULATION THERAPY VISIT (OUTPATIENT)
Dept: ANTICOAGULATION | Facility: CLINIC | Age: 85
End: 2020-02-11
Payer: COMMERCIAL

## 2020-02-11 DIAGNOSIS — Z79.01 LONG TERM CURRENT USE OF ANTICOAGULANT THERAPY: ICD-10-CM

## 2020-02-11 DIAGNOSIS — I48.91 ATRIAL FIBRILLATION, UNSPECIFIED TYPE (H): ICD-10-CM

## 2020-02-11 LAB — INR POINT OF CARE: 2.2 (ref 0.86–1.14)

## 2020-02-11 PROCEDURE — 85610 PROTHROMBIN TIME: CPT | Mod: QW

## 2020-02-11 PROCEDURE — 99207 ZZC NO CHARGE NURSE ONLY: CPT

## 2020-02-11 PROCEDURE — 36416 COLLJ CAPILLARY BLOOD SPEC: CPT

## 2020-02-11 NOTE — PROGRESS NOTES
ANTICOAGULATION FOLLOW-UP CLINIC VISIT    Patient Name:  Pooja Swartz  Date:  2020  Contact Type:  Face to Face    SUBJECTIVE:  Patient Findings     Comments:   No changes in medications, activity, or diet noted. No concerns with clotting, bleeding, or increased bruising noted. Took warfarin as prescribed.  Patient is to continue maintenance warfarin plan, and check INR in 3 weeks (pt has a tax appt in 4 weeks).  Patient verbalizes understanding and agrees to plan. No further questions or concerns.        Clinical Outcomes     Negatives:   Major bleeding event, Thromboembolic event, Anticoagulation-related hospital admission, Anticoagulation-related ED visit, Anticoagulation-related fatality    Comments:   No changes in medications, activity, or diet noted. No concerns with clotting, bleeding, or increased bruising noted. Took warfarin as prescribed.  Patient is to continue maintenance warfarin plan, and check INR in 3 weeks (pt has a tax appt in 4 weeks).  Patient verbalizes understanding and agrees to plan. No further questions or concerns.           OBJECTIVE    INR Protime   Date Value Ref Range Status   2020 2.2 (A) 0.86 - 1.14 Final       ASSESSMENT / PLAN  INR assessment THER    Recheck INR In: 3 WEEKS    INR Location Clinic      Anticoagulation Summary  As of 2020    INR goal:   2.0-3.0   TTR:   86.5 % (1 y)   INR used for dosin.2 (2020)   Warfarin maintenance plan:   4 mg (2 mg x 2) every Tue, Fri; 3 mg (2 mg x 1.5) all other days   Full warfarin instructions:   4 mg every Tue, Fri; 3 mg all other days   Weekly warfarin total:   23 mg   No change documented:   Monica Mg RN   Plan last modified:   Monica Mg RN (2019)   Next INR check:   3/3/2020   Priority:   Maintenance   Target end date:   Indefinite    Indications    Atrial fibrillation (H) [I48.91]  Long-term (current) use of anticoagulants [Z79.01] [Z79.01]  Atrial fibrillation  unspecified type (H)  [I48.91]             Anticoagulation Episode Summary     INR check location:       Preferred lab:       Send INR reminders to:   [facility]    Comments:   * warfarin 2mg tabs      Anticoagulation Care Providers     Provider Role Specialty Phone number    Amanda Renee MD [facility] [phone]            See the Encounter Report to view Anticoagulation Flowsheet and Dosing Calendar (Go to Encounters tab in chart review, and find the Anticoagulation Therapy Visit)        Monica Mg RN

## 2020-03-05 ENCOUNTER — ANTICOAGULATION THERAPY VISIT (OUTPATIENT)
Dept: ANTICOAGULATION | Facility: CLINIC | Age: 85
End: 2020-03-05
Payer: COMMERCIAL

## 2020-03-05 DIAGNOSIS — Z79.01 LONG TERM CURRENT USE OF ANTICOAGULANT THERAPY: ICD-10-CM

## 2020-03-05 DIAGNOSIS — I48.91 ATRIAL FIBRILLATION, UNSPECIFIED TYPE (H): ICD-10-CM

## 2020-03-05 LAB — INR POINT OF CARE: 3.4 (ref 0.86–1.14)

## 2020-03-05 PROCEDURE — 36416 COLLJ CAPILLARY BLOOD SPEC: CPT

## 2020-03-05 PROCEDURE — 85610 PROTHROMBIN TIME: CPT | Mod: QW

## 2020-03-05 PROCEDURE — 99207 ZZC NO CHARGE NURSE ONLY: CPT

## 2020-03-05 NOTE — PROGRESS NOTES
ANTICOAGULATION FOLLOW-UP CLINIC VISIT    Patient Name:  Pooja Swartz  Date:  3/5/2020  Contact Type:  Face to Face    SUBJECTIVE:  Patient Findings     Comments:   Change in health - increase arthritis pain  Patient reports no changes in medication, activity, or diet. Patient reports increased arthritis pain this week, mostly in her hands and shoulder. Patient reports has taken warfarin as instructed. Patient reports no increased bruising or bleeding and no signs or symptoms of a blood clot.   Will decrease dose today only, then resume maintenance dose and recheck INR in 2 weeks. Patient to call [facility] with any changes or concerns. Patient verbalized understanding of all instructions, denies questions or concerns at this time.             Clinical Outcomes     Comments:   Patient reports no changes in medication, activity, or diet. Patient reports increased arthritis pain this week, mostly in her hands and shoulder. Patient reports has taken warfarin as instructed. Patient reports no increased bruising or bleeding and no signs or symptoms of a blood clot.   Will decrease dose today only, then resume maintenance dose and recheck INR in 2 weeks. Patient to call [facility] with any changes or concerns. Patient verbalized understanding of all instructions, denies questions or concerns at this time.                OBJECTIVE    INR Protime   Date Value Ref Range Status   03/05/2020 3.4 (A) 0.86 - 1.14 Final       ASSESSMENT / PLAN  INR assessment SUPRA    Recheck INR In: 2 WEEKS    INR Location Clinic      Anticoagulation Summary  As of 3/5/2020    INR goal:   2.0-3.0   TTR:   84.4 % (1 y)   INR used for dosing:   3.4! (3/5/2020)   Warfarin maintenance plan:   4 mg (2 mg x 2) every Tue, Fri; 3 mg (2 mg x 1.5) all other days   Full warfarin instructions:   3/5: 2 mg; Otherwise 4 mg every Tue, Fri; 3 mg all other days   Weekly warfarin total:   23 mg   Plan last modified:   Monica Mg, LOKESH (4/2/2019)   Next INR check:    3/19/2020   Priority:   Maintenance   Target end date:   Indefinite    Indications    Atrial fibrillation (H) [I48.91]  Long-term (current) use of anticoagulants [Z79.01] [Z79.01]  Atrial fibrillation  unspecified type (H) [I48.91]             Anticoagulation Episode Summary     INR check location:       Preferred lab:       Send INR reminders to:   [facility]    Comments:   * warfarin 2mg tabs      Anticoagulation Care Providers     Provider Role Specialty Phone number    Amanda Renee MD [facility] [phone]            See the Encounter Report to view Anticoagulation Flowsheet and Dosing Calendar (Go to Encounters tab in chart review, and find the Anticoagulation Therapy Visit)      Mary Faith RN

## 2020-03-23 DIAGNOSIS — I48.91 ATRIAL FIBRILLATION, UNSPECIFIED TYPE (H): Primary | ICD-10-CM

## 2020-03-23 DIAGNOSIS — Z79.01 LONG TERM CURRENT USE OF ANTICOAGULANT THERAPY: ICD-10-CM

## 2020-03-26 ENCOUNTER — ANTICOAGULATION THERAPY VISIT (OUTPATIENT)
Dept: ANTICOAGULATION | Facility: CLINIC | Age: 85
End: 2020-03-26

## 2020-03-26 DIAGNOSIS — I48.91 ATRIAL FIBRILLATION, UNSPECIFIED TYPE (H): ICD-10-CM

## 2020-03-26 DIAGNOSIS — Z79.01 LONG TERM CURRENT USE OF ANTICOAGULANT THERAPY: ICD-10-CM

## 2020-03-26 LAB
CAPILLARY BLOOD COLLECTION: NORMAL
INR PPP: 2.6 (ref 0.86–1.14)

## 2020-03-26 PROCEDURE — 85610 PROTHROMBIN TIME: CPT | Performed by: FAMILY MEDICINE

## 2020-03-26 PROCEDURE — 36416 COLLJ CAPILLARY BLOOD SPEC: CPT | Performed by: FAMILY MEDICINE

## 2020-03-26 PROCEDURE — 99207 ZZC NO CHARGE NURSE ONLY: CPT

## 2020-03-26 NOTE — PROGRESS NOTES
ANTICOAGULATION FOLLOW-UP CLINIC VISIT    Patient Name:  Pooja Swartz  Date:  3/26/2020  Contact Type:  Telephone    SUBJECTIVE:  Patient Findings     Comments:   Patient reports no changes in medication, activity, or diet. Patient reports no changes in health. Patient reports has taken warfarin as instructed. Patient reports no increased bruising or bleeding and no signs or symptoms of a blood clot.   Will plan to continue maintenance dose and recheck INR in about 4 weeks. Patient to call [facility] with any changes or concerns. Patient verbalized understanding of all instructions, denies questions or concerns at this time.             Clinical Outcomes     Negatives:   Major bleeding event, Thromboembolic event, Anticoagulation-related hospital admission, Anticoagulation-related ED visit, Anticoagulation-related fatality    Comments:   Patient reports no changes in medication, activity, or diet. Patient reports no changes in health. Patient reports has taken warfarin as instructed. Patient reports no increased bruising or bleeding and no signs or symptoms of a blood clot.   Will plan to continue maintenance dose and recheck INR in about 4 weeks. Patient to call [facility] with any changes or concerns. Patient verbalized understanding of all instructions, denies questions or concerns at this time.                OBJECTIVE    INR   Date Value Ref Range Status   2020 2.60 (H) 0.86 - 1.14 Final     Comment:     This test is intended for monitoring Coumadin therapy.  Results are not   accurate in patients with prolonged INR due to factor deficiency.         ASSESSMENT / PLAN  INR assessment THER    Recheck INR In: 4 WEEKS    INR Location Outside lab      Anticoagulation Summary  As of 3/26/2020    INR goal:   2.0-3.0   TTR:   84.5 % (1 y)   INR used for dosin.60 (3/26/2020)   Warfarin maintenance plan:   4 mg (2 mg x 2) every Tue, Fri; 3 mg (2 mg x 1.5) all other days   Full warfarin instructions:   4 mg every Tue, Fri;  3 mg all other days   Weekly warfarin total:   23 mg   No change documented:   Mary Faith RN   Plan last modified:   Monica Mg RN (4/2/2019)   Next INR check:   4/28/2020   Priority:   Maintenance   Target end date:       Indications    Atrial fibrillation (H) [I48.91]  Long-term (current) use of anticoagulants [Z79.01] [Z79.01]  Atrial fibrillation  unspecified type (H) [I48.91]             Anticoagulation Episode Summary     INR check location:       Preferred lab:       Send INR reminders to:   [facility]    Comments:   * warfarin 2mg tabs      Anticoagulation Care Providers     Provider Role Specialty Phone number    Amanda Renee MD [facility] [phone]            See the Encounter Report to view Anticoagulation Flowsheet and Dosing Calendar (Go to Encounters tab in chart review, and find the Anticoagulation Therapy Visit)      Mary Faith, RN

## 2020-03-26 NOTE — PROGRESS NOTES
Anticoagulation Management    Unable to reach Pooja today.    Today's INR result of 2.60 is therapeutic (goal INR of 2.0-3.0).  Result received from: Clinic Lab    Follow up required to confirm warfarin dose taken and assess for changes    No instructions provided. Unable to leave voicemail.      Anticoagulation clinic to follow up    Mary Faith RN  Transfer return call to: [phone]

## 2020-03-26 NOTE — ADDENDUM NOTE
Addended by: SEFERINO RAMON on: 3/26/2020 03:25 PM     Modules accepted: Level of Service, SmartSet

## 2020-04-14 DIAGNOSIS — M1A.9XX1 CHRONIC TOPHACEOUS GOUT: ICD-10-CM

## 2020-04-14 DIAGNOSIS — S30.0XXA CONTUSION OF SACRUM, INITIAL ENCOUNTER: ICD-10-CM

## 2020-04-15 RX ORDER — ALLOPURINOL 100 MG/1
100 TABLET ORAL DAILY
Qty: 90 TABLET | Refills: 0 | Status: SHIPPED | OUTPATIENT
Start: 2020-04-15 | End: 2020-07-14

## 2020-04-15 NOTE — TELEPHONE ENCOUNTER
"Requested Prescriptions   Pending Prescriptions Disp Refills     allopurinol (ZYLOPRIM) 100 MG tablet [Pharmacy Med Name: allopurinol 100 mg tablet] 90 tablet 0     Sig: Take 1 tablet (100 mg) by mouth daily       Gout Agents Protocol Failed - 4/14/2020 10:16 AM        Failed - Has Uric Acid on file in past 12 months and value is less than 6     Recent Labs   Lab Test 12/01/15  1341   URIC 4.2     If level is 6mg/dL or greater, ok to refill one time and refer to provider.           Passed - CBC on file in past 12 months     Recent Labs   Lab Test 07/11/19  1006   WBC 9.5   RBC 3.44*   HGB 11.3*   HCT 34.8*                    Passed - ALT on file in past 12 months     Recent Labs   Lab Test 07/11/19  1006   ALT 22             Passed - Recent (12 mo) or future (30 days) visit within the authorizing provider's specialty     Patient has had an office visit with the authorizing provider or a provider within the authorizing providers department within the previous 12 mos or has a future within next 30 days. See \"Patient Info\" tab in inbasket, or \"Choose Columns\" in Meds & Orders section of the refill encounter.              Passed - Medication is active on med list        Passed - Patient is age 18 or older        Passed - No active pregnancy on record        Passed - Normal serum creatinine on file in the past 12 months     Recent Labs   Lab Test 07/11/19  1006  12/13/18  1449   CR 0.76   < >  --    CREAT  --   --  0.9    < > = values in this interval not displayed.       Ok to refill medication if creatinine is low          Passed - No positive pregnancy test in past year           Last Written Prescription Date:  1/2/20  Last Fill Quantity: 90,  # refills: 0   Last office visit: 1/16/2020 with prescribing provider:  Víctor   Future Office Visit:      "

## 2020-04-21 ENCOUNTER — TELEPHONE (OUTPATIENT)
Dept: ANTICOAGULATION | Facility: CLINIC | Age: 85
End: 2020-04-21

## 2020-04-21 NOTE — TELEPHONE ENCOUNTER
Pt is scheduled for an appt at the [facility] - due to COVID-19 the clinic lab will be closed. Pt was rescheduled to [place] lab.  Pt verbalizes understanding and agrees to plan. No further questions or concerns.  Monica Mg RN on 4/21/2020 at 9:26 AM

## 2020-04-28 ENCOUNTER — ANTICOAGULATION THERAPY VISIT (OUTPATIENT)
Dept: ANTICOAGULATION | Facility: CLINIC | Age: 85
End: 2020-04-28

## 2020-04-28 DIAGNOSIS — I48.91 ATRIAL FIBRILLATION, UNSPECIFIED TYPE (H): ICD-10-CM

## 2020-04-28 DIAGNOSIS — Z79.01 LONG TERM CURRENT USE OF ANTICOAGULANT THERAPY: ICD-10-CM

## 2020-04-28 LAB
CAPILLARY BLOOD COLLECTION: NORMAL
INR PPP: 2.6 (ref 0.86–1.14)

## 2020-04-28 PROCEDURE — 85610 PROTHROMBIN TIME: CPT | Performed by: FAMILY MEDICINE

## 2020-04-28 PROCEDURE — 36416 COLLJ CAPILLARY BLOOD SPEC: CPT | Performed by: FAMILY MEDICINE

## 2020-04-28 PROCEDURE — 99207 ZZC NO CHARGE NURSE ONLY: CPT

## 2020-04-28 NOTE — PROGRESS NOTES
ANTICOAGULATION FOLLOW-UP CLINIC VISIT    Patient Name:  Pooja Swartz  Date:  2020  Contact Type:  Telephone    SUBJECTIVE:  Patient Findings     Comments:   Patient reports no changes in medication, activity, or diet. Patient reports no changes in health and no recent illness. Patient reports warfarin was taken as instructed, no missed or extra doses. Patient reports no increased bruising or bleeding and no signs or symptoms of a blood clot.   INR is therapeutic. Patient will continue maintenance dose of warfarin and recheck INR in 6 weeks. Patient to call [facility] with any changes or concerns. Patient verbalized understanding of all instructions, denies questions or concerns at this time.             Clinical Outcomes     Negatives:   Major bleeding event, Thromboembolic event, Anticoagulation-related hospital admission, Anticoagulation-related ED visit, Anticoagulation-related fatality    Comments:   Patient reports no changes in medication, activity, or diet. Patient reports no changes in health and no recent illness. Patient reports warfarin was taken as instructed, no missed or extra doses. Patient reports no increased bruising or bleeding and no signs or symptoms of a blood clot.   INR is therapeutic. Patient will continue maintenance dose of warfarin and recheck INR in 6 weeks. Patient to call [facility] with any changes or concerns. Patient verbalized understanding of all instructions, denies questions or concerns at this time.                OBJECTIVE    INR   Date Value Ref Range Status   2020 2.60 (H) 0.86 - 1.14 Final     Comment:     This test is intended for monitoring Coumadin therapy.  Results are not   accurate in patients with prolonged INR due to factor deficiency.         ASSESSMENT / PLAN  INR assessment THER    Recheck INR In: 6 WEEKS    INR Location Outside lab      Anticoagulation Summary  As of 2020    INR goal:   2.0-3.0   TTR:   87.4 % (1 y)   INR used for dosin.60 (2020)    Warfarin maintenance plan:   4 mg (2 mg x 2) every Tue, Fri; 3 mg (2 mg x 1.5) all other days   Full warfarin instructions:   4 mg every Tue, Fri; 3 mg all other days   Weekly warfarin total:   23 mg   No change documented:   Mary Faith RN   Plan last modified:   Monica Mg RN (4/2/2019)   Next INR check:   6/9/2020   Priority:   Maintenance   Target end date:   Indefinite    Indications    Atrial fibrillation (H) [I48.91]  Long-term (current) use of anticoagulants [Z79.01] [Z79.01]  Atrial fibrillation  unspecified type (H) [I48.91]             Anticoagulation Episode Summary     INR check location:       Preferred lab:       Send INR reminders to:   [facility]    Comments:   * warfarin 2mg tabs      Anticoagulation Care Providers     Provider Role Specialty Phone number    Amanda Renee MD [facility] [phone]            See the Encounter Report to view Anticoagulation Flowsheet and Dosing Calendar (Go to Encounters tab in chart review, and find the Anticoagulation Therapy Visit)    Mary Faith, RN

## 2020-05-17 ENCOUNTER — NURSE TRIAGE (OUTPATIENT)
Dept: NURSING | Facility: CLINIC | Age: 85
End: 2020-05-17

## 2020-05-17 ENCOUNTER — VIRTUAL VISIT (OUTPATIENT)
Dept: URGENT CARE | Facility: CLINIC | Age: 85
End: 2020-05-17
Payer: COMMERCIAL

## 2020-05-17 DIAGNOSIS — I10 HYPERTENSION GOAL BP (BLOOD PRESSURE) < 140/90: Primary | ICD-10-CM

## 2020-05-17 PROCEDURE — 99214 OFFICE O/P EST MOD 30 MIN: CPT | Mod: 95 | Performed by: INTERNAL MEDICINE

## 2020-05-17 NOTE — PROGRESS NOTES
SUBJECTIVE:  This 89 year old female calls in reporting elevated BP.  She states that BP was 170s-180s systolic yesterday.  Today was 215/100.  In addition to using her home BP monitor she had a retired nurse also check and the reading was similar.  She takes amlodipine 7.5 mg daily. She denies problems with leg swelling.  She states that temperature has been normal.  Denies headache, dizziness, pre-syncope, palpitations.  She does note heart rate around 100.  No shortness of breath.  No change in exercise tolerance.  Walks with a walker; she could walk around block without resting and this is baseline for her.  She has been using acetaminophen PRN for pain and this is usual for her. Denies use of OTC NSAIDs specifically,.  Denies use of OTC decongestants.  She notes that she will drink 4 cups of coffee per day.  Denies any EtOH use.  Her PCP is Dr. Amanda Renee.     OBJECTIVE:  Pleasant and easily conversant elderly female.  Speech is fluent with normal tone and luca.  She is fully oriented to person/place/time and thought process is linear.  Respiratory pattern is normal.  No apparent cognitive deficits.    ASSESSMENT/PLAN:    ICD-10-CM    1. Hypertension goal BP (blood pressure) < 140/90  I10 Hypertensive urgency based upon SBP > 190 in the absence any symptomatic evidence of end-organ dysfunction.  Heart rate approaching 100.  Chronic atrial fibrillation per chart.  The patient has 5 mg and 2.5 mg amlodipine on hand; normally takes 7.5 mg daily.  She asks about taking an additional 2.5 mg.      The additional 2.5 mg tonight is a reasonable temporizing measure however I have some concern about her elevated heart rate and the tachyphylaxis that can accompany dihydropyridine CCBs.  I have advised that she call her primary clinic in the AM to get further guidance from Dr. Renee.  The optimal solution would be a clinic visit with opportunity to verify BP and check labs to screen for other underlying factors  that could be driving the BP up.  In the absence of a reversible secondary cause, she may do best with addition of a second agent rather than pushing amlodipine dose.     Total time spent in phone consultation was 15 minutes.    Finn Arrington MD

## 2020-05-17 NOTE — TELEPHONE ENCOUNTER
Pt reports elevated BP's since last evening. BP today is 215/100.  Pt denies symptoms.  No CP, SOB, unsteady gait, vision change, weakness/numbness.     Disposition:  See a provider within 4 hours.  Per COVID workflow, RN advised a virtual  visit within four hours.  She verbalized understanding and had no further questions.  Call transferred to scheduling, appointment made for 6:15PM this evening.    COVID 19 Nurse Triage Plan/Patient Instructions    Patient to have an Urgent Care Telephone Visit with a provider. Follow System Ambulatory Workflow for COVID 19.     Urgent Care Telephone Visits are available between the hours of 8 am to 9 pm. Staff will assist patent in scheduling an appointment for this Urgent Care Telephone Visit.     Alethea Ibrahim RN/JOAQUIN    Additional Information    Negative: Difficult to awaken or acting confused (e.g., disoriented, slurred speech)    Negative: Severe difficulty breathing (e.g., struggling for each breath, speaks in single words)    Negative: [1] Weakness of the face, arm or leg on one side of the body AND [2] new onset    Negative: [1] Numbness (i.e., loss of sensation) of the face, arm or leg on one side of the body AND [2] new onset    Negative: [1] Chest pain lasts > 5 minutes AND [2] history of heart disease  (i.e., heart attack, bypass surgery, angina, angioplasty, CHF)    Negative: [1] Chest pain AND [2] took nitrogylcerin AND [3] pain was not relieved    Negative: Sounds like a life-threatening emergency to the triager    Negative: [1] Systolic BP  >= 160 OR Diastolic >= 100 AND [2] cardiac or neurologic symptoms (e.g., chest pain, difficulty breathing, unsteady gait, blurred vision)    Negative: [1] Pregnant > 20 weeks (or postpartum < 6 weeks) AND [2] new hand or face swelling    Negative: [1] Pregnant > 20 weeks AND [2] BP Systolic BP  >= 140 OR Diastolic >= 90    [1] Systolic BP  >= 200 OR Diastolic >= 120  AND [2] having NO cardiac or neurologic  symptoms    Protocols used: HIGH BLOOD PRESSURE-A-AH

## 2020-05-18 ENCOUNTER — OFFICE VISIT (OUTPATIENT)
Dept: FAMILY MEDICINE | Facility: CLINIC | Age: 85
End: 2020-05-18
Payer: COMMERCIAL

## 2020-05-18 ENCOUNTER — TELEPHONE (OUTPATIENT)
Dept: FAMILY MEDICINE | Facility: CLINIC | Age: 85
End: 2020-05-18

## 2020-05-18 VITALS
HEART RATE: 76 BPM | HEIGHT: 60 IN | OXYGEN SATURATION: 98 % | WEIGHT: 122.4 LBS | SYSTOLIC BLOOD PRESSURE: 170 MMHG | DIASTOLIC BLOOD PRESSURE: 88 MMHG | TEMPERATURE: 98.8 F | RESPIRATION RATE: 14 BRPM | BODY MASS INDEX: 24.03 KG/M2

## 2020-05-18 DIAGNOSIS — I10 HYPERTENSION GOAL BP (BLOOD PRESSURE) < 140/90: Primary | ICD-10-CM

## 2020-05-18 DIAGNOSIS — I48.91 ATRIAL FIBRILLATION, UNSPECIFIED TYPE (H): ICD-10-CM

## 2020-05-18 LAB
ALBUMIN SERPL-MCNC: 3.5 G/DL (ref 3.4–5)
ALP SERPL-CCNC: 56 U/L (ref 40–150)
ALT SERPL W P-5'-P-CCNC: 20 U/L (ref 0–50)
ANION GAP SERPL CALCULATED.3IONS-SCNC: 5 MMOL/L (ref 3–14)
AST SERPL W P-5'-P-CCNC: 21 U/L (ref 0–45)
BASOPHILS # BLD AUTO: 0.1 10E9/L (ref 0–0.2)
BASOPHILS NFR BLD AUTO: 1 %
BILIRUB SERPL-MCNC: 0.5 MG/DL (ref 0.2–1.3)
BUN SERPL-MCNC: 23 MG/DL (ref 7–30)
CALCIUM SERPL-MCNC: 9.3 MG/DL (ref 8.5–10.1)
CHLORIDE SERPL-SCNC: 106 MMOL/L (ref 94–109)
CO2 SERPL-SCNC: 26 MMOL/L (ref 20–32)
CREAT SERPL-MCNC: 0.74 MG/DL (ref 0.52–1.04)
DIFFERENTIAL METHOD BLD: ABNORMAL
EOSINOPHIL # BLD AUTO: 0.2 10E9/L (ref 0–0.7)
EOSINOPHIL NFR BLD AUTO: 2.2 %
ERYTHROCYTE [DISTWIDTH] IN BLOOD BY AUTOMATED COUNT: 14.1 % (ref 10–15)
GFR SERPL CREATININE-BSD FRML MDRD: 69 ML/MIN/{1.73_M2}
GLUCOSE SERPL-MCNC: 100 MG/DL (ref 70–99)
HCT VFR BLD AUTO: 36.9 % (ref 35–47)
HGB BLD-MCNC: 12.1 G/DL (ref 11.7–15.7)
LYMPHOCYTES # BLD AUTO: 1.7 10E9/L (ref 0.8–5.3)
LYMPHOCYTES NFR BLD AUTO: 18.2 %
MCH RBC QN AUTO: 33.4 PG (ref 26.5–33)
MCHC RBC AUTO-ENTMCNC: 32.8 G/DL (ref 31.5–36.5)
MCV RBC AUTO: 102 FL (ref 78–100)
MONOCYTES # BLD AUTO: 0.7 10E9/L (ref 0–1.3)
MONOCYTES NFR BLD AUTO: 7.4 %
NEUTROPHILS # BLD AUTO: 6.7 10E9/L (ref 1.6–8.3)
NEUTROPHILS NFR BLD AUTO: 71.2 %
PLATELET # BLD AUTO: 207 10E9/L (ref 150–450)
POTASSIUM SERPL-SCNC: 4 MMOL/L (ref 3.4–5.3)
PROT SERPL-MCNC: 7.4 G/DL (ref 6.8–8.8)
RBC # BLD AUTO: 3.62 10E12/L (ref 3.8–5.2)
SODIUM SERPL-SCNC: 137 MMOL/L (ref 133–144)
WBC # BLD AUTO: 9.3 10E9/L (ref 4–11)

## 2020-05-18 PROCEDURE — 99213 OFFICE O/P EST LOW 20 MIN: CPT | Performed by: NURSE PRACTITIONER

## 2020-05-18 PROCEDURE — 36415 COLL VENOUS BLD VENIPUNCTURE: CPT | Performed by: NURSE PRACTITIONER

## 2020-05-18 PROCEDURE — 80053 COMPREHEN METABOLIC PANEL: CPT | Performed by: NURSE PRACTITIONER

## 2020-05-18 PROCEDURE — 85025 COMPLETE CBC W/AUTO DIFF WBC: CPT | Performed by: NURSE PRACTITIONER

## 2020-05-18 RX ORDER — AMLODIPINE BESYLATE 10 MG/1
10 TABLET ORAL DAILY
Qty: 90 TABLET | Refills: 0 | Status: SHIPPED | OUTPATIENT
Start: 2020-05-18 | End: 2020-08-07

## 2020-05-18 RX ORDER — CLONIDINE HYDROCHLORIDE 0.1 MG/1
0.1 TABLET ORAL DAILY PRN
Qty: 30 TABLET | Refills: 0 | Status: SHIPPED | OUTPATIENT
Start: 2020-05-18 | End: 2020-05-27

## 2020-05-18 ASSESSMENT — ENCOUNTER SYMPTOMS
NEUROLOGICAL NEGATIVE: 1
RESPIRATORY NEGATIVE: 1
NUMBNESS: 0
CARDIOVASCULAR NEGATIVE: 1
DIZZINESS: 0
SHORTNESS OF BREATH: 0

## 2020-05-18 ASSESSMENT — MIFFLIN-ST. JEOR: SCORE: 876.7

## 2020-05-18 NOTE — PROGRESS NOTES
"Subjective     Pooaj Swartz is a 89 year old female who presents to clinic today for the following health issues:  Chief Complaint   Patient presents with     Hypertension     Pt here due to elevated b/p the past few days.        HPI   Hypertension Follow-up      Do you check your blood pressure regularly outside of the clinic? Yes     Are you following a low salt diet? Yes    Are your blood pressures ever more than 140 on the top number (systolic) OR more   than 90 on the bottom number (diastolic), for example 140/90? Yes, pt has had elevated b/p's the past few days.  She had virtual visit yesterday and was told to increase her amlodopine to 10 mg last night only.  This morning her b/p was 211/78 and 223/93.      Additional provider notes: Last night took 10mg at bedtime. This morning BP was still >200 systolically. She reports she took an additional 2.5mg of amlodipine this morning. She double checked her BP with her neighbors BP machine and it was similar. In office this morning, SBP 170s. Denies CP, HA, fatigue, or any other symptoms. States she feels \"fine\".       Patient Active Problem List   Diagnosis     Hypertension goal BP (blood pressure) < 140/90     Other specified malignant neoplasm of skin of lower limb, including hip     Hemorrhage of gastrointestinal tract     Generalized osteoarthrosis, unspecified site     HEPATITIS B in past     Impaired fasting glucose     Disorder of bone and cartilage     Chronic kidney disease, stage III (moderate) (H)     Chronic tophaceous gout     CARDIOVASCULAR SCREENING; LDL GOAL LESS THAN 100     Sensorineural hearing loss, bilateral     Advanced directives, counseling/discussion     Sinus node dysfunction (H)     Atrial fibrillation (H)     Chest pain     Diarrhea     Health Care Home     Femur fracture, left (H)     Acute posthemorrhagic anemia     Long-term (current) use of anticoagulants [Z79.01]     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, subsequent encounter     IBD (inflammatory bowel disease)     Atrial fibrillation, unspecified type (H)     Past Surgical History:   Procedure Laterality Date     ARTHROPLASTY REVISION HIP Left 8/19/2016    Procedure: ARTHROPLASTY REVISION HIP;  Surgeon: Kael Rojas MD;  Location: [facility] OR     C PELVIS/HIP JOINT SURGERY UNLISTED       C SHOULDER SURG PROC UNLISTED       C STOMACH SURGERY PROCEDURE UNLISTED       HC VASCULAR SURGERY PROCEDURE UNLIST       OPEN REDUCTION INTERNAL FIXATION FEMUR PROXIMAL Left 8/19/2016    Procedure: OPEN REDUCTION INTERNAL FIXATION FEMUR PROXIMAL;  Surgeon: Kael Rojas MD;  Location: [facility] OR     SURGICAL HISTORY OF -   11/1992    skin cancer, nose     SURGICAL HISTORY OF -   1978    right, vein stripping     SURGICAL HISTORY OF -   1968    breast biopsy     SURGICAL HISTORY OF -   1993    laparoscopic cholecystectomy     SURGICAL HISTORY OF -       tonsillectomy and adenoidectomy     SURGICAL HISTORY OF -   04/2004    left cataract     SURGICAL HISTORY OF -   08/09/2004    right total shoulder arthroplasty       Social History     Tobacco Use     Smoking status: Never Smoker     Smokeless tobacco: Never Used   Substance Use Topics     Alcohol use: Yes     Comment: rare     Family History   Problem Relation Age of Onset     Heart Disease Mother         CHF     Hypertension Mother      Hypertension Father      Hypertension Maternal Grandmother      Hypertension Maternal Grandfather      Hypertension Son      Heart Disease Son         MI         Current Outpatient Medications   Medication Sig Dispense Refill     allopurinol (ZYLOPRIM) 100 MG tablet Take 1 tablet (100 mg) by mouth daily 90 tablet 0     amLODIPine (NORVASC) 2.5 MG tablet Take 1 tablet (2.5 mg) by mouth daily Take with 5 mg pill for 7.5 mg total 90 tablet 1     amLODIPine (NORVASC) 5 MG tablet Take 1 tablet (5 mg) by mouth daily Take with 2.5 mg pill for total of 7.5 mg daily 90 tablet 1     brimonidine  (ALPHAGAN-P) 0.15 % ophthalmic solution Place 1 drop Into the left eye 2 times daily        budesonide (ENTOCORT EC) 3 MG EC capsule Take 1 capsule (3 mg) by mouth every morning 30 capsule 6     Calcium-Vitamin D 600-200 MG-UNIT TABS Take 1 tablet by mouth 2 times daily       COSOPT 2-0.5 % OP SOLN 1 DROP INTO Left Eye twice daily       Fexofenadine HCl (ALLEGRA PO) Take 180 mg by mouth daily        latanoprost (XALATAN) 0.005 % ophthalmic solution Place 1 drop into both eyes At Bedtime        Tetrahydrozoline HCl (VISINE OP)        warfarin (COUMADIN) 2 MG tablet Take 4 mg every Tue, Fri; 3 mg all other days or as directed by the Anticoagulation Clinic 150 tablet 3     ONE-A-DAY WOMENS OR TABS 1 TABLET ORALLY DAILY       triamcinolone (KENALOG) 0.1 % ointment Apply  topically 3 times daily. Apply sparingly to affected area. (Patient not taking: Reported on 5/18/2020) 30 g 0     Allergies   Allergen Reactions     Penicillins Hives     BP Readings from Last 3 Encounters:   05/18/20 (!) 176/78   02/04/20 (!) 175/77   01/16/20 136/62    Wt Readings from Last 3 Encounters:   05/18/20 55.5 kg (122 lb 6.4 oz)   02/04/20 54 kg (119 lb)   01/16/20 54 kg (119 lb)                      Reviewed and updated as needed this visit by Provider         Review of Systems   Constitutional: Negative.  Negative for fatigue and fever.   HENT: Negative.    Respiratory: Negative.  Negative for shortness of breath.    Cardiovascular: Negative.  Negative for chest pain.   Gastrointestinal: Negative.    Neurological: Negative.  Negative for dizziness and numbness.            Objective    BP (!) 176/78   Pulse 76   Temp 98.8  F (37.1  C) (Tympanic)   Resp 14   Ht 1.524 m (5')   Wt 55.5 kg (122 lb 6.4 oz)   LMP 07/07/1960   SpO2 98%   BMI 23.90 kg/m    Body mass index is 23.9 kg/m .  Physical Exam  Vitals signs and nursing note reviewed.   Constitutional:       General: She is not in acute distress.     Appearance: Normal appearance.  She is not ill-appearing or toxic-appearing.   Cardiovascular:      Rate and Rhythm: Normal rate and regular rhythm.      Pulses: Normal pulses.      Heart sounds: Normal heart sounds.   Pulmonary:      Effort: Pulmonary effort is normal.      Breath sounds: Normal breath sounds.   Musculoskeletal: Normal range of motion.   Skin:     General: Skin is warm and dry.   Neurological:      General: No focal deficit present.      Mental Status: She is alert and oriented to person, place, and time.   Psychiatric:         Behavior: Behavior normal.            Diagnostic Test Results:  Labs reviewed in Epic; reviewed recent virtual visit note and recent PCP note.         Assessment & Plan     1. Hypertension goal BP (blood pressure) < 140/90  Collaborated with PCP on POC. Lab work ordered. Amlodipine increased to 10mg daily (max). Clonidine 0.1mg ordered PRN daily for SBP >190. Reiterated with patient only to take once daily as needed for SBP>190. Advised she make follow-up appt with PCP end of the week if SBP continues to be >190 each day and she is needing to take Clonidine daily. If that happens, BP is not well controlled and further follow-up is needed. Patient in agreement.     - CBC with platelets and differential  - Comprehensive metabolic panel (BMP + Alb, Alk Phos, ALT, AST, Total. Bili, TP)  - amLODIPine (NORVASC) 10 MG tablet; Take 1 tablet (10 mg) by mouth daily  Dispense: 90 tablet; Refill: 0  - cloNIDine (CATAPRES) 0.1 MG tablet; Take 1 tablet (0.1 mg) by mouth daily as needed (Take daily PRN if SBP>190)  Dispense: 30 tablet; Refill: 0    2. Atrial fibrillation, unspecified type (H)  HR regular. No concerns for a-fib at this time.     - CBC with platelets and differential  - Comprehensive metabolic panel (BMP + Alb, Alk Phos, ALT, AST, Total. Bili, TP)       See Patient Instructions    Return if symptoms worsen or fail to improve.    REGIS [facility]    -discussed POC with PCP  - Dr. Renee

## 2020-05-18 NOTE — TELEPHONE ENCOUNTER
Reason for call:  Patient reporting a symptom    Symptom or request: High blood pressure    Duration (how long have symptoms been present): Unsure, hadn't taken bp for a month. Had headache. Was in 170s, 180s on Saturday.    Have you been treated for this before? Yes       Additional comments: Blood pressure 211/78 this morning upon waking, Patient had telephone visit with doctor per triage nursecalled her at  pm    Phone Number patient can be reached at:  Home number on file [phone] (home)    Best Time:  Any    Can we leave a detailed message on this number:  YES    Call taken on 5/18/2020 at 8:43 AM by Bouchra Franco

## 2020-05-18 NOTE — PATIENT INSTRUCTIONS
Your blood pressure continues to be elevated. After discussing this with your PCP, we will do the followin. Lab work today.  2. Increase Amlodipine to 10mg daily.   3. Take Clonidine daily PRN for SBP (top number) >190. If you notice that you are having to take this daily, please follow-up with PCP right away.   4. If blood pressure does not come back down to your baseline or <150 over the next couple days, I would recommend you make a follow-up appointment with your PCP.  5. If you notice chest pain, headaches, swelling in extremities, please follow-up immediately or go to ER.

## 2020-05-18 NOTE — TELEPHONE ENCOUNTER
Pt continues to have elevated B/P.  5/16/20 - 170's -180's systolic.  5/17/20 -200's systolic.  Pt had virtual visit on 5/17/20 in the evening.  Pt took Amlodipine 7.5 mg @ 5:30 pm and was directed to take Amlodipine 2.5 mg @ 7:30 pm for total of 10 mg.    This AM B/P systolic is 211.  Unable to do Virtual visit with .  Scheduled visit @ [facility] today @ 1 PM.  Pt is not dizzy, no chest pain, no cough/fever.  Drinking fluids and normal urine.    Pt has  and can make this appt.    KPavelRN

## 2020-05-19 ENCOUNTER — TELEPHONE (OUTPATIENT)
Dept: FAMILY MEDICINE | Facility: CLINIC | Age: 85
End: 2020-05-19

## 2020-05-19 ASSESSMENT — ENCOUNTER SYMPTOMS
CONSTITUTIONAL NEGATIVE: 1
FEVER: 0
GASTROINTESTINAL NEGATIVE: 1
FATIGUE: 0

## 2020-05-19 NOTE — TELEPHONE ENCOUNTER
Discussed lab results with patient.     She states she feels good this morning.  this morning. She did go ahead and take 1 tablet of clonidine. Discussed only taking that once daily if SBP>190. Discussed possible s/e. Reminded her that if her SBP is >190 each day this week and she is having to take a clonidine each day, that she needs to make follow-up telephone/video visit with PCP by end of week.    She agreed with POC.    Nelida Lazaro DNP, NP-C 5/19/2020 10:03 AM

## 2020-05-26 NOTE — PROGRESS NOTES
Subjective     Pooja Swartz is a 89 year old female who presents to clinic today for the following health issues:    HPI   Hypertension Follow-up  F/u from 5-18-20 bp     Do you check your blood pressure regularly outside of the clinic? Yes     Are you following a low salt diet? No    Are your blood pressures ever more than 140 on the top number (systolic) OR more   than 90 on the bottom number (diastolic), for example 140/90? Yes 5/24/ 206-79 after taking clonidine was 155-66 5/25 199/74 after taking clonidine 152-62 5/26 183-68 185-72 195-72 than took clonidine 5/27 153-61       How many servings of fruits and vegetables do you eat daily?  2-3    On average, how many sweetened beverages do you drink each day (Examples: soda, juice, sweet tea, etc.  Do NOT count diet or artificially sweetened beverages)?   0    How many days per week do you exercise enough to make your heart beat faster? 3 or less    How many minutes a day do you exercise enough to make your heart beat faster? 9 or less    How many days per week do you miss taking your medication? 0      Has been taking clonidine 1x/day for SBP >100    Often blood pressure has been >200 systolic - comes down to 150s after the clonidine.     No headache, no chest pain/soa.  Normal renal function last week when seen.       Reviewed and updated as needed this visit by Provider  Meds         Review of Systems   Constitutional, HEENT, cardiovascular, pulmonary, gi and gu systems are negative, except as otherwise noted.      Objective    BP (!) 168/76   Pulse 62   Temp 97.5  F (36.4  C) (Tympanic)   Resp 12   Ht 1.524 m (5')   Wt 56.2 kg (124 lb)   LMP 07/07/1960   SpO2 98%   BMI 24.22 kg/m    Body mass index is 24.22 kg/m .  Physical Exam   GENERAL: healthy, alert and no distress  NECK: no adenopathy, no asymmetry, masses, or scars and thyroid normal to palpation  RESP: lungs clear to auscultation - no rales, rhonchi or wheezes  CV: regular rate and rhythm,  normal S1 S2, no S3 or S4, no murmur, click or rub, no peripheral edema and peripheral pulses strong  ABDOMEN: soft, nontender, no hepatosplenomegaly, no masses and bowel sounds normal  MS: no gross musculoskeletal defects noted, no edema    Diagnostic Test Results:  Labs reviewed in Epic        ASSESSMENT/PLAN:      ICD-10-CM    1. Accelerated hypertension  I10 cloNIDine (CATAPRES) 0.1 MG tablet   2. Hypertension goal BP (blood pressure) < 140/90  I10 cloNIDine (CATAPRES) 0.1 MG tablet     lisinopril (ZESTRIL) 10 MG tablet       Patient Instructions     Keep taking Amlodipine 10 mg daily    Lisinopril 10 mg by mouth daily in the morning.     Visit with me in 2 weeks to recheck blood pressure and kidney function (blood work)    Add Clonidine 0.1 mg twice daily if systolic blood pressure is >190      We're avoiding beta blockers (like atenolol) because of your history of bradycardia (slow heart rate) and avoiding hydrochlorothiazide due to your history of hypokalemia.      Thank you for choosing [facility]  You may be receiving an email and/or telephone survey request from [facility] regarding your visit today.  Please take a few minutes to respond to the survey to let us know how we are doing.      If you have questions or concerns, please contact us via [employer] or you can contact your care team at [phone].    Our Clinic hours are:  Monday 6:40 am  to 7:00 pm  Tuesday -Friday 6:40 am to 5:00 pm    The [place] outpatient lab hours are:  Monday - Friday 6:10 am to 4:45 pm  Saturdays 7:00 am to 11:00 am  Appointments are required, call [phone]    If you have clinical questions after hours or would like to schedule an appointment,  call the clinic at [phone].

## 2020-05-27 ENCOUNTER — OFFICE VISIT (OUTPATIENT)
Dept: FAMILY MEDICINE | Facility: CLINIC | Age: 85
End: 2020-05-27
Payer: COMMERCIAL

## 2020-05-27 VITALS
DIASTOLIC BLOOD PRESSURE: 76 MMHG | WEIGHT: 124 LBS | HEART RATE: 62 BPM | OXYGEN SATURATION: 98 % | SYSTOLIC BLOOD PRESSURE: 168 MMHG | RESPIRATION RATE: 12 BRPM | HEIGHT: 60 IN | BODY MASS INDEX: 24.35 KG/M2 | TEMPERATURE: 97.5 F

## 2020-05-27 DIAGNOSIS — I10 HYPERTENSION GOAL BP (BLOOD PRESSURE) < 140/90: ICD-10-CM

## 2020-05-27 DIAGNOSIS — I10 ACCELERATED HYPERTENSION: Primary | ICD-10-CM

## 2020-05-27 PROCEDURE — 99214 OFFICE O/P EST MOD 30 MIN: CPT | Performed by: FAMILY MEDICINE

## 2020-05-27 RX ORDER — LISINOPRIL 10 MG/1
10 TABLET ORAL DAILY
Qty: 30 TABLET | Refills: 1 | Status: SHIPPED | OUTPATIENT
Start: 2020-05-27 | End: 2020-06-15 | Stop reason: SINTOL

## 2020-05-27 RX ORDER — CLONIDINE HYDROCHLORIDE 0.1 MG/1
TABLET ORAL
Qty: 60 TABLET | Refills: 1 | Status: SHIPPED | OUTPATIENT
Start: 2020-05-27 | End: 2021-08-26

## 2020-05-27 RX ORDER — CLONIDINE HYDROCHLORIDE 0.1 MG/1
0.1 TABLET ORAL 2 TIMES DAILY
Qty: 180 TABLET | Refills: 1 | Status: SHIPPED | OUTPATIENT
Start: 2020-05-27 | End: 2020-06-18

## 2020-05-27 ASSESSMENT — MIFFLIN-ST. JEOR: SCORE: 883.96

## 2020-05-27 NOTE — PATIENT INSTRUCTIONS
Keep taking Amlodipine 10 mg daily    Lisinopril 10 mg by mouth daily in the morning.     Visit with me in 2 weeks to recheck blood pressure and kidney function (blood work)    Add Clonidine 0.1 mg twice daily if systolic blood pressure is >190      We're avoiding beta blockers (like atenolol) because of your history of bradycardia (slow heart rate) and avoiding hydrochlorothiazide due to your history of hypokalemia.      Thank you for choosing [facility].  You may be receiving an email and/or telephone survey request from [facility] Customer Experience regarding your visit today.  Please take a few minutes to respond to the survey to let us know how we are doing.      If you have questions or concerns, please contact us via [employer] or you can contact your care team at [phone].    Our Clinic hours are:  Monday 6:40 am  to 7:00 pm  Tuesday -Friday 6:40 am to 5:00 pm    The [place] outpatient lab hours are:  Monday - Friday 6:10 am to 4:45 pm  Saturdays 7:00 am to 11:00 am  Appointments are required, call [phone]    If you have clinical questions after hours or would like to schedule an appointment,  call the clinic at [phone].

## 2020-05-27 NOTE — NURSING NOTE
Asked by [facility] staff to check manual BP. 176/76 and 168/76. Patient has ReliOn 183/77 HR 64. Patient does not report any symptoms of high BP.      JERED PikeN, RN

## 2020-06-09 ENCOUNTER — ANTICOAGULATION THERAPY VISIT (OUTPATIENT)
Dept: ANTICOAGULATION | Facility: CLINIC | Age: 85
End: 2020-06-09

## 2020-06-09 DIAGNOSIS — Z79.01 LONG TERM CURRENT USE OF ANTICOAGULANT THERAPY: ICD-10-CM

## 2020-06-09 DIAGNOSIS — I48.91 ATRIAL FIBRILLATION, UNSPECIFIED TYPE (H): ICD-10-CM

## 2020-06-09 LAB
CAPILLARY BLOOD COLLECTION: NORMAL
INR PPP: 3.3 (ref 0.86–1.14)

## 2020-06-09 PROCEDURE — 99207 ZZC NO CHARGE NURSE ONLY: CPT

## 2020-06-09 PROCEDURE — 85610 PROTHROMBIN TIME: CPT | Performed by: FAMILY MEDICINE

## 2020-06-09 PROCEDURE — 36416 COLLJ CAPILLARY BLOOD SPEC: CPT | Performed by: FAMILY MEDICINE

## 2020-06-09 NOTE — PROGRESS NOTES
ANTICOAGULATION FOLLOW-UP CLINIC VISIT    Patient Name:  Pooja Swartz  Date:  6/9/2020  Contact Type:  Telephone    SUBJECTIVE:  Patient Findings     Positives:   Change in diet/appetite (ate less greens (vitamin K))    Comments:   No changes in medications or activity noted. No concerns with clotting, bleeding, or increased bruising noted. Took warfarin as prescribed.  Patient is to take 3 mg today then resume maintenance warfarin plan, and check INR in 2 weeks. Pt agreed to resume normal intake of greens (vitamin K) and education provided.  Patient educated on the increased risk for bleeding, precautions to take, and when to seek medical attention.  Patient verbalizes understanding and agrees to plan. No further questions or concerns.        Clinical Outcomes     Negatives:   Major bleeding event, Thromboembolic event, Anticoagulation-related hospital admission, Anticoagulation-related ED visit, Anticoagulation-related fatality    Comments:   No changes in medications or activity noted. No concerns with clotting, bleeding, or increased bruising noted. Took warfarin as prescribed.  Patient is to take 3 mg today then resume maintenance warfarin plan, and check INR in 2 weeks. Pt agreed to resume normal intake of greens (vitamin K) and education provided.  Patient educated on the increased risk for bleeding, precautions to take, and when to seek medical attention.  Patient verbalizes understanding and agrees to plan. No further questions or concerns.           OBJECTIVE    Recent labs: (last 7 days)     06/09/20  1011   INR 3.30*       ASSESSMENT / PLAN  INR assessment SUPRA    Recheck INR In: 2 WEEKS    INR Location Clinic      Anticoagulation Summary  As of 6/9/2020    INR goal:   2.0-3.0   TTR:   82.5 % (1 y)   INR used for dosing:   3.30! (6/9/2020)   Warfarin maintenance plan:   4 mg (2 mg x 2) every Tue, Fri; 3 mg (2 mg x 1.5) all other days   Full warfarin instructions:   6/9: 3 mg; Otherwise 4 mg every Tue,  Fri; 3 mg all other days   Weekly warfarin total:   23 mg   Plan last modified:   Monica Mg RN (4/2/2019)   Next INR check:   6/23/2020   Priority:   High   Target end date:   Indefinite    Indications    Atrial fibrillation (H) [I48.91]  Long-term (current) use of anticoagulants [Z79.01] [Z79.01]  Atrial fibrillation  unspecified type (H) [I48.91]             Anticoagulation Episode Summary     INR check location:       Preferred lab:       Send INR reminders to:   [facility]    Comments:   * warfarin 2mg tabs      Anticoagulation Care Providers     Provider Role Specialty Phone number    Amanda Renee MD [facility] [phone]            See the Encounter Report to view Anticoagulation Flowsheet and Dosing Calendar (Go to Encounters tab in chart review, and find the Anticoagulation Therapy Visit)        Monica Mg RN

## 2020-06-15 ENCOUNTER — OFFICE VISIT (OUTPATIENT)
Dept: FAMILY MEDICINE | Facility: CLINIC | Age: 85
End: 2020-06-15
Payer: COMMERCIAL

## 2020-06-15 VITALS
RESPIRATION RATE: 16 BRPM | BODY MASS INDEX: 23.75 KG/M2 | HEIGHT: 60 IN | OXYGEN SATURATION: 97 % | TEMPERATURE: 98.5 F | DIASTOLIC BLOOD PRESSURE: 62 MMHG | WEIGHT: 121 LBS | SYSTOLIC BLOOD PRESSURE: 136 MMHG | HEART RATE: 75 BPM

## 2020-06-15 DIAGNOSIS — K52.9 CHRONIC DIARRHEA: ICD-10-CM

## 2020-06-15 DIAGNOSIS — I10 HYPERTENSION GOAL BP (BLOOD PRESSURE) < 140/90: Primary | ICD-10-CM

## 2020-06-15 DIAGNOSIS — N18.30 CHRONIC KIDNEY DISEASE, STAGE III (MODERATE) (H): ICD-10-CM

## 2020-06-15 LAB
ANION GAP SERPL CALCULATED.3IONS-SCNC: 7 MMOL/L (ref 3–14)
BUN SERPL-MCNC: 23 MG/DL (ref 7–30)
CALCIUM SERPL-MCNC: 8.5 MG/DL (ref 8.5–10.1)
CHLORIDE SERPL-SCNC: 98 MMOL/L (ref 94–109)
CO2 SERPL-SCNC: 27 MMOL/L (ref 20–32)
CREAT SERPL-MCNC: 0.73 MG/DL (ref 0.52–1.04)
GFR SERPL CREATININE-BSD FRML MDRD: 70 ML/MIN/{1.73_M2}
GLUCOSE SERPL-MCNC: 104 MG/DL (ref 70–99)
POTASSIUM SERPL-SCNC: 4.8 MMOL/L (ref 3.4–5.3)
SODIUM SERPL-SCNC: 132 MMOL/L (ref 133–144)

## 2020-06-15 PROCEDURE — 36415 COLL VENOUS BLD VENIPUNCTURE: CPT | Performed by: FAMILY MEDICINE

## 2020-06-15 PROCEDURE — 80048 BASIC METABOLIC PNL TOTAL CA: CPT | Performed by: FAMILY MEDICINE

## 2020-06-15 PROCEDURE — 99214 OFFICE O/P EST MOD 30 MIN: CPT | Performed by: FAMILY MEDICINE

## 2020-06-15 RX ORDER — BUDESONIDE 3 MG/1
3 CAPSULE, COATED PELLETS ORAL EVERY MORNING
Qty: 30 CAPSULE | Refills: 6 | Status: SHIPPED | OUTPATIENT
Start: 2020-06-15 | End: 2020-11-18

## 2020-06-15 ASSESSMENT — MIFFLIN-ST. JEOR: SCORE: 870.35

## 2020-06-15 ASSESSMENT — PAIN SCALES - GENERAL: PAINLEVEL: NO PAIN (0)

## 2020-06-15 NOTE — PROGRESS NOTES
Subjective     Pooja Swartz is a 89 year old female who presents to clinic today for the following health issues:    HPI   Chief Complaint   Patient presents with     Hypertension     Medication Problem     Patient noticed the day she started lisinopril she had a large amount of diarrhea. Patient then went 8 days with no bowel movements and then 6/12 her colitis started acting up. Patient hasn't taken lisinopril the last 4 days and noticed blood pressure is still 120's/80's.        Hypertension Follow-up      Do you check your blood pressure regularly outside of the clinic? Yes     Are you following a low salt diet? Yes    Are your blood pressures ever more than 140 on the top number (systolic) OR more   than 90 on the bottom number (diastolic), for example 140/90? Yes at times      How many servings of fruits and vegetables do you eat daily?  0-1 limited to not wanting to eat    On average, how many sweetened beverages do you drink each day (Examples: soda, juice, sweet tea, etc.  Do NOT count diet or artificially sweetened beverages)?   0    How many days per week do you exercise enough to make your heart beat faster? 3 or less    How many minutes a day do you exercise enough to make your heart beat faster? 9 or less    How many days per week do you miss taking your medication? 0    Medication Followup of Lisinopril    Taking Medication as prescribed: yes- stopped 4 days ago due to colitis    Side Effects:  Weakness, no appetite, diarrhea, nausea    Medication Helping Symptoms:  Yes b/p is controlled 120/80     Didn't feel particularly well on the lisinopril.  Had two episodes of diarrhea which concerns her.  She had one within hours of taking the first pill but she continued taking it for another 1.5 weeks.  She had another episode of diarrhea 3-4 days ago and stopped taking the Lisinopril.   Her blood pressure has remained pretty normal since stopping it.  She would prefer to remain off the lisinopril.  She  does have clonidine at home in the event she starts having markedly elevated blood pressure again.     Reviewed and updated as needed this visit by Provider         Review of Systems   Constitutional, HEENT, cardiovascular, pulmonary, gi and gu systems are negative, except as otherwise noted.      Objective    /62   Pulse 75   Temp 98.5  F (36.9  C) (Tympanic)   Resp 16   Ht 1.524 m (5')   Wt 54.9 kg (121 lb)   LMP 07/07/1960   SpO2 97%   Breastfeeding No   BMI 23.63 kg/m    Body mass index is 23.63 kg/m .  Physical Exam   GENERAL: healthy, alert and no distress  NECK: no adenopathy, no asymmetry, masses, or scars and thyroid normal to palpation  RESP: lungs clear to auscultation - no rales, rhonchi or wheezes  CV: regular rate and rhythm, normal S1 S2, no S3 or S4, no murmur, click or rub, no peripheral edema and peripheral pulses strong  ABDOMEN: soft, nontender, no hepatosplenomegaly, no masses and bowel sounds normal  MS: no gross musculoskeletal defects noted, trace            Assessment & Plan       ICD-10-CM    1. Hypertension goal BP (blood pressure) < 140/90  I10 Basic metabolic panel   2. Chronic kidney disease, stage III (moderate) (H)  N18.3 Basic metabolic panel   3. Chronic diarrhea  K52.9 budesonide (ENTOCORT EC) 3 MG EC capsule      can remain off the lisinopril at this time.  HR won't tolerate more beta blocker.  If she needs something else long term can consider losartan.        No follow-ups on file.    Amanda Renee MD  [facility]

## 2020-06-15 NOTE — LETTER
June 16, 2020      Pooja Swartz  [id number] [employer]  [address] [address]        Dear ,    We are writing to inform you of your test results.    Your test results fall within the expected range(s) or remain unchanged from previous results.  Please continue with current treatment plan.    Resulted Orders   Basic metabolic panel   Result Value Ref Range    Sodium 132 (L) 133 - 144 mmol/L    Potassium 4.8 3.4 - 5.3 mmol/L    Chloride 98 94 - 109 mmol/L    Carbon Dioxide 27 20 - 32 mmol/L    Anion Gap 7 3 - 14 mmol/L    Glucose 104 (H) 70 - 99 mg/dL    Urea Nitrogen 23 7 - 30 mg/dL    Creatinine 0.73 0.52 - 1.04 mg/dL    GFR Estimate 70 >60 mL/min/[1.73_m2]      Comment:      Non  GFR Calc  Starting 12/18/2018, serum creatinine based estimated GFR (eGFR) will be   calculated using the Chronic Kidney Disease Epidemiology Collaboration   (CKD-EPI) equation.      GFR Estimate If Black 81 >60 mL/min/[1.73_m2]      Comment:       GFR Calc  Starting 12/18/2018, serum creatinine based estimated GFR (eGFR) will be   calculated using the Chronic Kidney Disease Epidemiology Collaboration   (CKD-EPI) equation.      Calcium 8.5 8.5 - 10.1 mg/dL       If you have any questions or concerns, please call the clinic at the number listed above.       Sincerely,        Amanda Renee MD

## 2020-06-15 NOTE — PATIENT INSTRUCTIONS
Our Clinic hours are:  Mondays    7:20 am - 7 pm  Tues -  Fri  7:20 am - 5 pm    Clinic Phone: [phone]    The clinic lab opens at 7:30 am Mon - Fri and appointments are required.    [facility]. [phone]  Monday  8 am - 7pm  Tues - Fri 8 am - 5:30 pm

## 2020-06-18 ENCOUNTER — TELEPHONE (OUTPATIENT)
Dept: FAMILY MEDICINE | Facility: CLINIC | Age: 85
End: 2020-06-18

## 2020-06-18 NOTE — TELEPHONE ENCOUNTER
Reason for Call:  Other call back    Detailed comments: Patient is calling asking about her BP and that it was high but now the BP was low. 101/53    Phone Number Patient can be reached at: Home number on file [phone] (home)    Best Time: any    Can we leave a detailed message on this number? YES    Call taken on 6/18/2020 at 10:23 AM by Nasrin Pack

## 2020-06-18 NOTE — TELEPHONE ENCOUNTER
B/P this AM was 101/53 then 106/56, 119/55.  Pt had a bought of Colitis last week with diarrhea.  Went out yesterday in the heat.  Felt lightheaded after being out last night.  Is taking Amlodipine 10 mg 1 tab/day.  Has not taken Clonidine in some time (clarify 2 orders on med list)?  Encouraged water, could have been dry from diarrhea, out in heat.  Wt 120 lbs.  No symptoms @ this time.  Advise.  Elpidio

## 2020-06-18 NOTE — TELEPHONE ENCOUNTER
Duplicate order removed - clonidine to only be used prn for SBP >180 or so.     Continue to push fluids.  Avoid being out in the heat for prolonged times.  If continues to be low can cut amlodipine in half and keep an eye on blood pressure.  Suspect she will need to go back to the 10 mg dose but can hold while low.     Amanda Renee M.D.

## 2020-06-23 ENCOUNTER — ANTICOAGULATION THERAPY VISIT (OUTPATIENT)
Dept: ANTICOAGULATION | Facility: CLINIC | Age: 85
End: 2020-06-23

## 2020-06-23 DIAGNOSIS — Z79.01 LONG TERM CURRENT USE OF ANTICOAGULANT THERAPY: ICD-10-CM

## 2020-06-23 DIAGNOSIS — I48.91 ATRIAL FIBRILLATION, UNSPECIFIED TYPE (H): ICD-10-CM

## 2020-06-23 LAB
CAPILLARY BLOOD COLLECTION: NORMAL
INR PPP: 3.3 (ref 0.86–1.14)

## 2020-06-23 PROCEDURE — 36416 COLLJ CAPILLARY BLOOD SPEC: CPT | Performed by: FAMILY MEDICINE

## 2020-06-23 PROCEDURE — 85610 PROTHROMBIN TIME: CPT | Performed by: FAMILY MEDICINE

## 2020-06-23 NOTE — PROGRESS NOTES
Unable to leave a VM for pt to return call to [facility] on home number. VM left for pt to return call to [facility] on mobile.   Tentative plan: reduce maintenance by 8.7% and recheck INR in 2 weeks.  Monica Mg RN on 6/23/2020 at 11:15 AM

## 2020-06-23 NOTE — PROGRESS NOTES
ANTICOAGULATION MANAGEMENT     Patient Name:  Pooja Swartz  Date:  6/23/2020    ASSESSMENT /SUBJECTIVE:    Today's INR result of 3.3 is supratherapeutic. Goal INR of 2.0-3.0      Warfarin dose taken: Warfarin taken as previously instructed    Diet: Decreased greens/vitamin K intake may be affecting INR    Medication changes/ interactions: No new medications/supplements affecting INR    Previous INR: Supratherapeutic     S/S of bleeding or thromboembolism: No    New injury or illness: No    Upcoming surgery, procedure or cardioversion: No    Additional findings: None      PLAN:    Spoke with Pooja regarding INR result and instructed:     Warfarin Dosing Instructions: Change your warfarin dose to 3 mg daily    Instructed patient to follow up no later than: 2 weeks  Lab visit scheduled    Education provided: Monitoring for bleeding signs and symptoms      Pooja verbalizes understanding and agrees to warfarin dosing plan.    Instructed to call the Anticoagulation Clinic for any changes, questions or concerns. (#[phone])        OBJECTIVE:  INR   Date Value Ref Range Status   06/23/2020 3.30 (H) 0.86 - 1.14 Final     Comment:     This test is intended for monitoring Coumadin therapy.  Results are not   accurate in patients with prolonged INR due to factor deficiency.           INR assessment SUPRA    Recheck INR In: 2 WEEKS    INR Location Clinic      Anticoagulation Summary  As of 6/23/2020    INR goal:   2.0-3.0   TTR:   78.6 % (1 y)   INR used for dosing:   3.30! (6/23/2020)   Warfarin maintenance plan:   3 mg (2 mg x 1.5) every day   Full warfarin instructions:   3 mg every day   Weekly warfarin total:   21 mg   Plan last modified:   Monica Mg RN (6/23/2020)   Next INR check:   7/7/2020   Priority:   High   Target end date:   Indefinite    Indications    Atrial fibrillation (H) [I48.91]  Long-term (current) use of anticoagulants [Z79.01] [Z79.01]  Atrial fibrillation  unspecified type (H) [I48.91]              Anticoagulation Episode Summary     INR check location:       Preferred lab:       Send INR reminders to:   [facility]    Comments:   * warfarin 2mg tabs      Anticoagulation Care Providers     Provider Role Specialty Phone number    Amanda Renee MD [facility] [phone]

## 2020-07-04 DIAGNOSIS — I10 HYPERTENSION GOAL BP (BLOOD PRESSURE) < 140/90: ICD-10-CM

## 2020-07-06 RX ORDER — AMLODIPINE BESYLATE 2.5 MG/1
2.5 TABLET ORAL DAILY
Qty: 90 TABLET | Refills: 1 | OUTPATIENT
Start: 2020-07-06

## 2020-07-06 NOTE — TELEPHONE ENCOUNTER
The original prescription was discontinued on 5/18/2020 by Nelida Lazaro APRN CNP for the following reason: Dose adjustment. Renewing this prescription may not be appropriate.

## 2020-07-07 ENCOUNTER — ANTICOAGULATION THERAPY VISIT (OUTPATIENT)
Dept: ANTICOAGULATION | Facility: CLINIC | Age: 85
End: 2020-07-07

## 2020-07-07 DIAGNOSIS — I48.19 PERSISTENT ATRIAL FIBRILLATION (H): ICD-10-CM

## 2020-07-07 DIAGNOSIS — Z79.01 LONG TERM CURRENT USE OF ANTICOAGULANT THERAPY: ICD-10-CM

## 2020-07-07 DIAGNOSIS — I48.91 ATRIAL FIBRILLATION, UNSPECIFIED TYPE (H): ICD-10-CM

## 2020-07-07 LAB
CAPILLARY BLOOD COLLECTION: NORMAL
INR PPP: 2.3 (ref 0.86–1.14)

## 2020-07-07 PROCEDURE — 85610 PROTHROMBIN TIME: CPT | Performed by: FAMILY MEDICINE

## 2020-07-07 PROCEDURE — 36416 COLLJ CAPILLARY BLOOD SPEC: CPT | Performed by: FAMILY MEDICINE

## 2020-07-07 RX ORDER — WARFARIN SODIUM 2 MG/1
TABLET ORAL
Qty: 150 TABLET | Refills: 3 | COMMUNITY
Start: 2020-07-07 | End: 2020-08-21

## 2020-07-07 NOTE — PROGRESS NOTES
ANTICOAGULATION MANAGEMENT     Patient Name:  Pooja Swartz  Date:  2020    ASSESSMENT /SUBJECTIVE:    Today's INR result of 2.30 is therapeutic. Goal INR of 2.0-3.0      Warfarin dose taken: Warfarin taken as previously instructed    Diet: No new diet changes affecting INR    Medication changes/ interactions: No new medications/supplements affecting INR    Previous INR: Supratherapeutic 3.30    S/S of bleeding or thromboembolism: No    New injury or illness: No    Upcoming surgery, procedure or cardioversion: No    Additional findings: None      PLAN:    Spoke with Pooja regarding INR result and instructed:     Warfarin Dosing Instructions: Continue your current warfarin dose 3mg daily    Instructed patient to follow up no later than: 2 weeks  Lab visit scheduled    Education provided: Importance of following up for INR monitoring at instructed interval, Monitoring for bleeding signs and symptoms and When to seek medical attention/emergency care      Pooja verbalizes understanding and agrees to warfarin dosing plan.    Instructed to call the Anticoagulation Clinic for any changes, questions or concerns. (#[phone])        OBJECTIVE:  INR   Date Value Ref Range Status   2020 2.30 (H) 0.86 - 1.14 Final     Comment:     This test is intended for monitoring Coumadin therapy.  Results are not   accurate in patients with prolonged INR due to factor deficiency.           INR assessment THER    Recheck INR In: 2 WEEKS    INR Location Clinic      Anticoagulation Summary  As of 2020    INR goal:   2.0-3.0   TTR:   77.5 % (1 y)   INR used for dosin.30 (2020)   Warfarin maintenance plan:   3 mg (2 mg x 1.5) every day   Full warfarin instructions:   3 mg every day   Weekly warfarin total:   21 mg   No change documented:   Jania Torrez RN   Plan last modified:   Monica Mg RN (2020)   Next INR check:   2020   Priority:   High   Target end date:   Indefinite    Indications     Atrial fibrillation (H) [I48.91]  Long-term (current) use of anticoagulants [Z79.01] [Z79.01]  Atrial fibrillation  unspecified type (H) [I48.91]             Anticoagulation Episode Summary     INR check location:       Preferred lab:       Send INR reminders to:   [facility]    Comments:   * warfarin 2mg tabs      Anticoagulation Care Providers     Provider Role Specialty Phone number    Amanda Renee MD [facility] [phone]

## 2020-07-13 DIAGNOSIS — S30.0XXA CONTUSION OF SACRUM, INITIAL ENCOUNTER: ICD-10-CM

## 2020-07-13 DIAGNOSIS — M1A.9XX1 CHRONIC TOPHACEOUS GOUT: ICD-10-CM

## 2020-07-14 RX ORDER — ALLOPURINOL 100 MG/1
100 TABLET ORAL DAILY
Qty: 90 TABLET | Refills: 0 | Status: SHIPPED | OUTPATIENT
Start: 2020-07-14 | End: 2020-10-05

## 2020-07-14 NOTE — TELEPHONE ENCOUNTER
"Requested Prescriptions   Pending Prescriptions Disp Refills     allopurinol (ZYLOPRIM) 100 MG tablet [Pharmacy Med Name: allopurinol 100 mg tablet] 90 tablet 0     Sig: Take 1 tablet (100 mg) by mouth daily       Gout Agents Protocol Failed - 7/13/2020  9:25 AM        Failed - Has Uric Acid on file in past 12 months and value is less than 6     Recent Labs   Lab Test 12/01/15  1341   URIC 4.2     If level is 6mg/dL or greater, ok to refill one time and refer to provider.           Passed - CBC on file in past 12 months     Recent Labs   Lab Test 05/18/20  1404   WBC 9.3   RBC 3.62*   HGB 12.1   HCT 36.9                    Passed - ALT on file in past 12 months     Recent Labs   Lab Test 05/18/20  1404   ALT 20             Passed - Recent (12 mo) or future (30 days) visit within the authorizing provider's specialty     Patient has had an office visit with the authorizing provider or a provider within the authorizing providers department within the previous 12 mos or has a future within next 30 days. See \"Patient Info\" tab in inbasket, or \"Choose Columns\" in Meds & Orders section of the refill encounter.              Passed - Medication is active on med list        Passed - Patient is age 18 or older        Passed - No active pregnancy on record        Passed - Normal serum creatinine on file in the past 12 months     Recent Labs   Lab Test 06/15/20  1121  12/13/18  1449   CR 0.73   < >  --    CREAT  --   --  0.9    < > = values in this interval not displayed.       Ok to refill medication if creatinine is low          Passed - No positive pregnancy test in past year             "

## 2020-07-14 NOTE — TELEPHONE ENCOUNTER
Routing refill request to provider for review/approval because:  Labs not current:  Last Uric Acid done on 12/1/15.  Nicholase.  Luciano

## 2020-07-21 ENCOUNTER — ANTICOAGULATION THERAPY VISIT (OUTPATIENT)
Dept: ANTICOAGULATION | Facility: CLINIC | Age: 85
End: 2020-07-21

## 2020-07-21 DIAGNOSIS — Z79.01 LONG TERM CURRENT USE OF ANTICOAGULANT THERAPY: ICD-10-CM

## 2020-07-21 DIAGNOSIS — I48.91 ATRIAL FIBRILLATION, UNSPECIFIED TYPE (H): ICD-10-CM

## 2020-07-21 LAB
CAPILLARY BLOOD COLLECTION: NORMAL
INR PPP: 1.6 (ref 0.86–1.14)

## 2020-07-21 PROCEDURE — 85610 PROTHROMBIN TIME: CPT | Performed by: FAMILY MEDICINE

## 2020-07-21 PROCEDURE — 36416 COLLJ CAPILLARY BLOOD SPEC: CPT | Performed by: FAMILY MEDICINE

## 2020-07-21 PROCEDURE — 99207 ZZC NO CHARGE NURSE ONLY: CPT

## 2020-07-21 NOTE — PROGRESS NOTES
ANTICOAGULATION FOLLOW-UP CLINIC VISIT    Patient Name:  Pooja Swartz  Date:  2020  Contact Type:  Telephone    SUBJECTIVE:  Patient Findings     Positives:   Change in diet/appetite (Ate more greens than normal - vitamin K)    Comments:   No changes in medications or activity noted. No concerns with clotting, bleeding, or increased bruising noted. Took warfarin as prescribed.  Pt plans to resume normal diet.  Take 5 mg today, then resume maintenance dose. Recheck INR in 2 weeks.  Patient educated on the increased risk for a clot/stroke, precautions to take, S &S of a clot/stroke, and when to seek medical attention. Denies concerns or S&S of a clot.  Patient verbalizes understanding and agrees to plan. No further questions or concerns.        Clinical Outcomes     Negatives:   Major bleeding event, Thromboembolic event, Anticoagulation-related hospital admission, Anticoagulation-related ED visit, Anticoagulation-related fatality    Comments:   No changes in medications or activity noted. No concerns with clotting, bleeding, or increased bruising noted. Took warfarin as prescribed.  Pt plans to resume normal diet.  Take 5 mg today, then resume maintenance dose. Recheck INR in 2 weeks.  Patient educated on the increased risk for a clot/stroke, precautions to take, S &S of a clot/stroke, and when to seek medical attention. Denies concerns or S&S of a clot.  Patient verbalizes understanding and agrees to plan. No further questions or concerns.           OBJECTIVE    Recent labs: (last 7 days)     20  1005   INR 1.60*       ASSESSMENT / PLAN  INR assessment SUB    Recheck INR In: 2 WEEKS    INR Location Clinic      Anticoagulation Summary  As of 2020    INR goal:   2.0-3.0   TTR:   75.3 % (1 y)   INR used for dosin.60! (2020)   Warfarin maintenance plan:   3 mg (2 mg x 1.5) every day   Full warfarin instructions:   : 5 mg; Otherwise 3 mg every day   Weekly warfarin total:   21 mg   Plan  last modified:   Monica Mg RN (6/23/2020)   Next INR check:   8/4/2020   Priority:   High   Target end date:   Indefinite    Indications    Atrial fibrillation (H) [I48.91]  Long-term (current) use of anticoagulants [Z79.01] [Z79.01]  Atrial fibrillation  unspecified type (H) [I48.91]             Anticoagulation Episode Summary     INR check location:       Preferred lab:       Send INR reminders to:   [facility]    Comments:   * warfarin 2mg tabs      Anticoagulation Care Providers     Provider Role Specialty Phone number    Amanda Renee MD [facility] [phone]            See the Encounter Report to view Anticoagulation Flowsheet and Dosing Calendar (Go to Encounters tab in chart review, and find the Anticoagulation Therapy Visit)        Monica Mg RN

## 2020-07-31 DIAGNOSIS — I10 HYPERTENSION GOAL BP (BLOOD PRESSURE) < 140/90: ICD-10-CM

## 2020-08-03 NOTE — TELEPHONE ENCOUNTER
Routing refill request to provider for review/approval because:  The original prescription was discontinued on 5/18/2020 by Nelida Lazaro, REGIS THORNTON for the following reason: Dose adjustment. Renewing this prescription may not be appropriate.     Last office visit: 06/15/2020 Dr. Renee.      JERED PikeN, RN

## 2020-08-04 ENCOUNTER — ANTICOAGULATION THERAPY VISIT (OUTPATIENT)
Dept: FAMILY MEDICINE | Facility: CLINIC | Age: 85
End: 2020-08-04

## 2020-08-04 DIAGNOSIS — I48.91 ATRIAL FIBRILLATION, UNSPECIFIED TYPE (H): ICD-10-CM

## 2020-08-04 DIAGNOSIS — Z79.01 LONG TERM CURRENT USE OF ANTICOAGULANT THERAPY: ICD-10-CM

## 2020-08-04 LAB
CAPILLARY BLOOD COLLECTION: NORMAL
INR PPP: 1.7 (ref 0.86–1.14)

## 2020-08-04 PROCEDURE — 36416 COLLJ CAPILLARY BLOOD SPEC: CPT | Performed by: FAMILY MEDICINE

## 2020-08-04 PROCEDURE — 85610 PROTHROMBIN TIME: CPT | Performed by: FAMILY MEDICINE

## 2020-08-04 NOTE — PROGRESS NOTES
ANTICOAGULATION MANAGEMENT     Patient Name:  Pooja Swartz  Date:  2020    ASSESSMENT /SUBJECTIVE:    Today's INR result of 1.7 is subtherapeutic. Goal INR of 2.0-3.0      Warfarin dose taken: Warfarin taken as previously instructed    Diet: No new diet changes affecting INR, same greens in diet, did not return to previous diet    Medication changes/ interactions: No new medications/supplements affecting INR    Previous INR: Subtherapeutic     S/S of bleeding or thromboembolism: No    New injury or illness: No    Upcoming surgery, procedure or cardioversion: No    Additional findings: None      PLAN:    Spoke with Pooja regarding INR result and instructed:     Warfarin Dosing Instructions: Change your warfarin dose to 4 mg every Tue; 3 mg all other days  INR was previously running too high when on 4 mg twice weekly and 3 mg all other days, so increased to 4 mg once weekly and 3 mg all other days (about a 5% increase in maint. Dose), patient agrees with this plan and will try for consistent greens in diet    Instructed patient to follow up no later than: 2 weeks  Lab visit scheduled    Education provided: Importance of consistent vitamin K intake, Target INR goal and significance of current INR result, Importance of therapeutic range, Monitoring for clotting signs and symptoms and When to seek medical attention/emergency care      Pooja verbalizes understanding and agrees to warfarin dosing plan.    Instructed to call the Anticoagulation Clinic for any changes, questions or concerns. (#[phone])        Sarahy Becker [facility]      OBJECTIVE:  Recent labs: (last 7 days)     20  1014   INR 1.70*         No question data found.  Anticoagulation Summary  As of 2020    INR goal:   2.0-3.0   TTR:   71.5 % (1 y)   INR used for dosin.70! (2020)   Warfarin maintenance plan:   4 mg (2 mg x 2) every Tue; 3 mg (2 mg x 1.5) all other days   Full warfarin instructions:   4 mg every Tue; 3 mg all other  days   Weekly warfarin total:   22 mg   Plan last modified:   Sarahy Becker, [facility] (8/4/2020)   Next INR check:   8/19/2020   Priority:   High   Target end date:   Indefinite    Indications    Atrial fibrillation (H) [I48.91]  Long-term (current) use of anticoagulants [Z79.01] [Z79.01]  Atrial fibrillation  unspecified type (H) [I48.91]             Anticoagulation Episode Summary     INR check location:       Preferred lab:       Send INR reminders to:   [facility]    Comments:   * warfarin 2mg tabs      Anticoagulation Care Providers     Provider Role Specialty Phone number    Amanda Renee MD [facility] [phone]

## 2020-08-06 RX ORDER — AMLODIPINE BESYLATE 5 MG/1
5 TABLET ORAL DAILY
Qty: 90 TABLET | Refills: 1 | OUTPATIENT
Start: 2020-08-06

## 2020-08-06 NOTE — TELEPHONE ENCOUNTER
Check with patient - this was discontinued.  Is she currently taking it or was this an auto-refill from pharmacy?    Amanda Renee M.D.

## 2020-08-07 DIAGNOSIS — I10 HYPERTENSION GOAL BP (BLOOD PRESSURE) < 140/90: ICD-10-CM

## 2020-08-07 RX ORDER — AMLODIPINE BESYLATE 10 MG/1
10 TABLET ORAL DAILY
Qty: 90 TABLET | Refills: 1 | Status: SHIPPED | OUTPATIENT
Start: 2020-08-07 | End: 2020-11-18

## 2020-08-19 ENCOUNTER — ANTICOAGULATION THERAPY VISIT (OUTPATIENT)
Dept: ANTICOAGULATION | Facility: CLINIC | Age: 85
End: 2020-08-19

## 2020-08-19 DIAGNOSIS — I48.91 ATRIAL FIBRILLATION, UNSPECIFIED TYPE (H): ICD-10-CM

## 2020-08-19 DIAGNOSIS — Z79.01 LONG TERM CURRENT USE OF ANTICOAGULANT THERAPY: ICD-10-CM

## 2020-08-19 DIAGNOSIS — I48.91 ATRIAL FIBRILLATION (H): ICD-10-CM

## 2020-08-19 LAB
CAPILLARY BLOOD COLLECTION: NORMAL
INR PPP: 1.7 (ref 0.86–1.14)

## 2020-08-19 PROCEDURE — 85610 PROTHROMBIN TIME: CPT | Performed by: FAMILY MEDICINE

## 2020-08-19 PROCEDURE — 36416 COLLJ CAPILLARY BLOOD SPEC: CPT | Performed by: FAMILY MEDICINE

## 2020-08-19 PROCEDURE — 99207 ZZC NO CHARGE NURSE ONLY: CPT

## 2020-08-19 NOTE — PROGRESS NOTES
ANTICOAGULATION FOLLOW-UP CLINIC VISIT    Patient Name:  Pooja Swartz  Date:  2020  Contact Type:  Telephone    SUBJECTIVE:  Patient Findings     Positives:   Change in diet/appetite (avoiding greens)    Comments:   Patient has been avoiding greens the last 2 weeks. She has been taking new dose as advised. INR sub therapeutic x 4+ weeks, despite dose increase. Patient denies any missed doses or any other changes. Will increase dose by 9% and recheck in 8 days. Patient will continue to not eat greens, per her preference, until INR comes back up. No clot or stroke signs.        Clinical Outcomes     Comments:   Patient has been avoiding greens the last 2 weeks. She has been taking new dose as advised. INR sub therapeutic x 4+ weeks, despite dose increase. Patient denies any missed doses or any other changes. Will increase dose by 9% and recheck in 8 days. Patient will continue to not eat greens, per her preference, until INR comes back up. No clot or stroke signs.           OBJECTIVE    Recent labs: (last 7 days)     20  1202   INR 1.70*       ASSESSMENT / PLAN  INR assessment SUB    Recheck INR In: 8 DAYS    INR Location Clinic      Anticoagulation Summary  As of 2020    INR goal:   2.0-3.0   TTR:   67.3 % (1 y)   INR used for dosin.70! (2020)   Warfarin maintenance plan:   4 mg (2 mg x 2) every Mon, Wed, Fri; 3 mg (2 mg x 1.5) all other days   Full warfarin instructions:   4 mg every Mon, Wed, Fri; 3 mg all other days   Weekly warfarin total:   24 mg   Plan last modified:   Arin Sullivan RN (2020)   Next INR check:   2020   Priority:   High   Target end date:   Indefinite    Indications    Atrial fibrillation (H) [I48.91]  Long-term (current) use of anticoagulants [Z79.01] [Z79.01]  Atrial fibrillation  unspecified type (H) [I48.91]             Anticoagulation Episode Summary     INR check location:       Preferred lab:       Send INR reminders to:   [facility]     Comments:   * warfarin 2mg tabs      Anticoagulation Care Providers     Provider Role Specialty Phone number    Amanda Renee MD [facility] [phone]            See the Encounter Report to view Anticoagulation Flowsheet and Dosing Calendar (Go to Encounters tab in chart review, and find the Anticoagulation Therapy Visit)        Arin Sullivan RN

## 2020-08-21 DIAGNOSIS — I48.19 PERSISTENT ATRIAL FIBRILLATION (H): ICD-10-CM

## 2020-08-21 DIAGNOSIS — Z79.01 LONG TERM CURRENT USE OF ANTICOAGULANT THERAPY: ICD-10-CM

## 2020-08-21 RX ORDER — WARFARIN SODIUM 2 MG/1
TABLET ORAL
Qty: 150 TABLET | Refills: 0 | Status: SHIPPED | OUTPATIENT
Start: 2020-08-21 | End: 2020-08-27

## 2020-08-21 NOTE — TELEPHONE ENCOUNTER
"Requested Prescriptions   Pending Prescriptions Disp Refills     warfarin ANTICOAGULANT (JANTOVEN ANTICOAGULANT) 2 MG tablet [Pharmacy Med Name: Jantoven 2 mg tablet] 150 tablet 3     Sig: Take 4 mg every Tue, Fri; 3 mg all other days or as directed by the Anticoagulation Clinic       Vitamin K Antagonists Failed - 8/21/2020  8:00 AM        Failed - INR is within goal in the past 6 weeks     Confirm INR is within goal in the past 6 weeks.     Recent Labs   Lab Test 08/19/20  1202   INR 1.70*                       Passed - Recent (12 mo) or future (30 days) visit within the authorizing provider's specialty     Patient has had an office visit with the authorizing provider or a provider within the authorizing providers department within the previous 12 mos or has a future within next 30 days. See \"Patient Info\" tab in inbasket, or \"Choose Columns\" in Meds & Orders section of the refill encounter.              Passed - Medication is active on med list        Passed - Patient is 18 years of age or older        Passed - Patient is not pregnant        Passed - No positive pregnancy on file in past 12 months             "

## 2020-08-21 NOTE — TELEPHONE ENCOUNTER
Current warfarin dose:  Warfarin maintenance plan:   4 mg (2 mg x 2) every Mon, Wed, Fri; 3 mg (2 mg x 1.5) all other days   Full warfarin instructions:   4 mg every Mon, Wed, Fri; 3 mg all other days   Weekly warfarin total:   24 mg     Last INR result:  INR   Date Value Ref Range Status   08/19/2020 1.70 (H) 0.86 - 1.14 Final     Comment:     This test is intended for monitoring Coumadin therapy.  Results are not   accurate in patients with prolonged INR due to factor deficiency.       Last office visit: 1/16/2020     Refill authorized per [facility] protocol.    Edwin OLMEDO RN, CACP

## 2020-08-27 ENCOUNTER — ANTICOAGULATION THERAPY VISIT (OUTPATIENT)
Dept: ANTICOAGULATION | Facility: CLINIC | Age: 85
End: 2020-08-27

## 2020-08-27 DIAGNOSIS — I48.19 PERSISTENT ATRIAL FIBRILLATION (H): ICD-10-CM

## 2020-08-27 DIAGNOSIS — Z79.01 LONG TERM CURRENT USE OF ANTICOAGULANT THERAPY: ICD-10-CM

## 2020-08-27 DIAGNOSIS — I48.91 ATRIAL FIBRILLATION, UNSPECIFIED TYPE (H): ICD-10-CM

## 2020-08-27 DIAGNOSIS — I48.91 ATRIAL FIBRILLATION (H): ICD-10-CM

## 2020-08-27 LAB
CAPILLARY BLOOD COLLECTION: NORMAL
INR PPP: 1.9 (ref 0.86–1.14)

## 2020-08-27 PROCEDURE — 36416 COLLJ CAPILLARY BLOOD SPEC: CPT | Performed by: FAMILY MEDICINE

## 2020-08-27 PROCEDURE — 85610 PROTHROMBIN TIME: CPT | Performed by: FAMILY MEDICINE

## 2020-08-27 PROCEDURE — 99207 ZZC NO CHARGE NURSE ONLY: CPT

## 2020-08-27 RX ORDER — WARFARIN SODIUM 2 MG/1
TABLET ORAL
Qty: 150 TABLET | Refills: 0 | Status: SHIPPED | OUTPATIENT
Start: 2020-08-27 | End: 2020-09-08

## 2020-09-08 ENCOUNTER — ANTICOAGULATION THERAPY VISIT (OUTPATIENT)
Dept: ANTICOAGULATION | Facility: CLINIC | Age: 85
End: 2020-09-08

## 2020-09-08 DIAGNOSIS — I48.19 PERSISTENT ATRIAL FIBRILLATION (H): ICD-10-CM

## 2020-09-08 DIAGNOSIS — Z79.01 LONG TERM CURRENT USE OF ANTICOAGULANT THERAPY: ICD-10-CM

## 2020-09-08 DIAGNOSIS — I48.91 ATRIAL FIBRILLATION, UNSPECIFIED TYPE (H): ICD-10-CM

## 2020-09-08 DIAGNOSIS — I48.91 ATRIAL FIBRILLATION (H): ICD-10-CM

## 2020-09-08 LAB
CAPILLARY BLOOD COLLECTION: NORMAL
INR PPP: 2.8 (ref 0.86–1.14)

## 2020-09-08 PROCEDURE — 85610 PROTHROMBIN TIME: CPT | Performed by: FAMILY MEDICINE

## 2020-09-08 PROCEDURE — 36416 COLLJ CAPILLARY BLOOD SPEC: CPT | Performed by: FAMILY MEDICINE

## 2020-09-08 RX ORDER — WARFARIN SODIUM 2 MG/1
TABLET ORAL
Qty: 165 TABLET | Refills: 0
Start: 2020-09-08 | End: 2021-01-12

## 2020-09-08 NOTE — PROGRESS NOTES
ANTICOAGULATION MANAGEMENT     Patient Name:  Pooja Swartz  Date:  2020    ASSESSMENT /SUBJECTIVE:    Today's INR result of 2.80 is therapeutic. Goal INR of 2.0-3.0      Warfarin dose taken: Warfarin taken as previously instructed    Diet: No new diet changes affecting INR    Medication changes/ interactions: No new medications/supplements affecting INR    Previous INR: Subtherapeutic     S/S of bleeding or thromboembolism: No    New injury or illness: No    Upcoming surgery, procedure or cardioversion: No    Additional findings: None      PLAN:    Spoke with Pooja regarding INR result and instructed:     Warfarin Dosing Instructions: Continue your current warfarin dose 3 mg every Sun, Wed; 4 mg all other days    Instructed patient to follow up no later than: 3 weeks  Lab visit scheduled    Education provided: Target INR goal and significance of current INR result      Pooja verbalizes understanding and agrees to warfarin dosing plan.    Instructed to call the Anticoagulation Clinic for any changes, questions or concerns. (#[phone])        Mary Faith RN      OBJECTIVE:  Recent labs: (last 7 days)     20  0908   INR 2.80*         INR assessment THER    Recheck INR In: 3 WEEKS    INR Location Outside lab      Anticoagulation Summary  As of 2020    INR goal:   2.0-3.0   TTR:   64.8 % (1 y)   INR used for dosin.80 (2020)   Warfarin maintenance plan:   3 mg (2 mg x 1.5) every Sun, Wed; 4 mg (2 mg x 2) all other days   Full warfarin instructions:   3 mg every Sun, Wed; 4 mg all other days   Weekly warfarin total:   26 mg   No change documented:   Mary Faith RN   Plan last modified:   Arin Sullivan RN (2020)   Next INR check:   2020   Priority:   Maintenance   Target end date:   Indefinite    Indications    Atrial fibrillation (H) [I48.91]  Long-term (current) use of anticoagulants [Z79.01] [Z79.01]  Atrial fibrillation  unspecified type (H) [I48.91]              Anticoagulation Episode Summary     INR check location:       Preferred lab:       Send INR reminders to:   [facility]    Comments:   * warfarin 2mg tabs      Anticoagulation Care Providers     Provider Role Specialty Phone number    Amanda Renee MD [facility] [phone]

## 2020-09-29 ENCOUNTER — ANTICOAGULATION THERAPY VISIT (OUTPATIENT)
Dept: ANTICOAGULATION | Facility: CLINIC | Age: 85
End: 2020-09-29

## 2020-09-29 DIAGNOSIS — I48.91 ATRIAL FIBRILLATION, UNSPECIFIED TYPE (H): ICD-10-CM

## 2020-09-29 DIAGNOSIS — I48.91 ATRIAL FIBRILLATION (H): ICD-10-CM

## 2020-09-29 DIAGNOSIS — Z79.01 LONG TERM CURRENT USE OF ANTICOAGULANT THERAPY: ICD-10-CM

## 2020-09-29 LAB
CAPILLARY BLOOD COLLECTION: NORMAL
INR PPP: 2.4 (ref 0.86–1.14)

## 2020-09-29 PROCEDURE — 36416 COLLJ CAPILLARY BLOOD SPEC: CPT | Performed by: FAMILY MEDICINE

## 2020-09-29 PROCEDURE — 99207 ZZC NO CHARGE NURSE ONLY: CPT

## 2020-09-29 PROCEDURE — 85610 PROTHROMBIN TIME: CPT | Performed by: FAMILY MEDICINE

## 2020-09-29 NOTE — PROGRESS NOTES
ANTICOAGULATION FOLLOW-UP CLINIC VISIT    Patient Name:  Pooja Swartz  Date:  2020  Contact Type:  Telephone    SUBJECTIVE:  Patient Findings     Positives:   Bruising (chronic bruises on hands/arms)    Comments:   No changes in diet, activity level, medications (including over the counter), or health. No missed doses of warfarin. Patient took dosing as prescribed. No signs of clots or bleeding concerns. Patient will continue maintenance warfarin dosing.          Clinical Outcomes     Negatives:   Major bleeding event, Thromboembolic event, Anticoagulation-related hospital admission, Anticoagulation-related ED visit, Anticoagulation-related fatality    Comments:   No changes in diet, activity level, medications (including over the counter), or health. No missed doses of warfarin. Patient took dosing as prescribed. No signs of clots or bleeding concerns. Patient will continue maintenance warfarin dosing.             OBJECTIVE    Recent labs: (last 7 days)     20  0939   INR 2.40*       ASSESSMENT / PLAN  INR assessment THER    Recheck INR In: 4 WEEKS    INR Location Clinic      Anticoagulation Summary  As of 2020    INR goal:   2.0-3.0   TTR:   64.8 % (1 y)   INR used for dosin.40 (2020)   Warfarin maintenance plan:   3 mg (2 mg x 1.5) every Sun, Wed; 4 mg (2 mg x 2) all other days   Full warfarin instructions:   3 mg every Sun, Wed; 4 mg all other days   Weekly warfarin total:   26 mg   No change documented:   Arin Sullivan RN   Plan last modified:   Arin Sullivan RN (2020)   Next INR check:   10/27/2020   Priority:   Maintenance   Target end date:   Indefinite    Indications    Atrial fibrillation (H) [I48.91]  Long-term (current) use of anticoagulants [Z79.01] [Z79.01]  Atrial fibrillation  unspecified type (H) [I48.91]             Anticoagulation Episode Summary     INR check location:       Preferred lab:       Send INR reminders to:   [facility]    Comments:   *  warfarin 2mg tabs      Anticoagulation Care Providers     Provider Role Specialty Phone number    Amanda Renee MD [facility] [phone]            See the Encounter Report to view Anticoagulation Flowsheet and Dosing Calendar (Go to Encounters tab in chart review, and find the Anticoagulation Therapy Visit)        Arin Sullivan RN

## 2020-10-03 DIAGNOSIS — M1A.9XX1 CHRONIC TOPHACEOUS GOUT: ICD-10-CM

## 2020-10-03 DIAGNOSIS — S30.0XXA CONTUSION OF SACRUM, INITIAL ENCOUNTER: ICD-10-CM

## 2020-10-05 RX ORDER — ALLOPURINOL 100 MG/1
100 TABLET ORAL DAILY
Qty: 90 TABLET | Refills: 0 | Status: SHIPPED | OUTPATIENT
Start: 2020-10-05 | End: 2020-11-18

## 2020-10-05 NOTE — TELEPHONE ENCOUNTER
"Requested Prescriptions   Pending Prescriptions Disp Refills     allopurinol (ZYLOPRIM) 100 MG tablet [Pharmacy Med Name: allopurinol 100 mg tablet] 90 tablet 0     Sig: Take 1 tablet (100 mg) by mouth daily       Gout Agents Protocol Failed - 10/3/2020  8:00 AM        Failed - Has Uric Acid on file in past 12 months and value is less than 6     Recent Labs   Lab Test 12/01/15  1341   URIC 4.2     If level is 6mg/dL or greater, ok to refill one time and refer to provider.           Passed - CBC on file in past 12 months     Recent Labs   Lab Test 05/18/20  1404   WBC 9.3   RBC 3.62*   HGB 12.1   HCT 36.9                    Passed - ALT on file in past 12 months     Recent Labs   Lab Test 05/18/20  1404   ALT 20             Passed - Recent (12 mo) or future (30 days) visit within the authorizing provider's specialty     Patient has had an office visit with the authorizing provider or a provider within the authorizing providers department within the previous 12 mos or has a future within next 30 days. See \"Patient Info\" tab in inbasket, or \"Choose Columns\" in Meds & Orders section of the refill encounter.              Passed - Medication is active on med list        Passed - Patient is age 18 or older        Passed - No active pregnancy on record        Passed - Normal serum creatinine on file in the past 12 months     Recent Labs   Lab Test 06/15/20  1121 12/13/18  1449 12/13/18  1449   CR 0.73   < >  --    CREAT  --   --  0.9    < > = values in this interval not displayed.       Ok to refill medication if creatinine is low          Passed - No positive pregnancy test in past year             "

## 2020-10-27 ENCOUNTER — ANTICOAGULATION THERAPY VISIT (OUTPATIENT)
Dept: ANTICOAGULATION | Facility: CLINIC | Age: 85
End: 2020-10-27

## 2020-10-27 DIAGNOSIS — Z79.01 LONG TERM CURRENT USE OF ANTICOAGULANT THERAPY: ICD-10-CM

## 2020-10-27 DIAGNOSIS — I48.91 ATRIAL FIBRILLATION, UNSPECIFIED TYPE (H): ICD-10-CM

## 2020-10-27 DIAGNOSIS — I48.91 ATRIAL FIBRILLATION (H): ICD-10-CM

## 2020-10-27 LAB
CAPILLARY BLOOD COLLECTION: NORMAL
INR PPP: 1.9 (ref 0.86–1.14)

## 2020-10-27 PROCEDURE — 36416 COLLJ CAPILLARY BLOOD SPEC: CPT | Performed by: FAMILY MEDICINE

## 2020-10-27 PROCEDURE — 85610 PROTHROMBIN TIME: CPT | Performed by: FAMILY MEDICINE

## 2020-10-27 PROCEDURE — 99207 PR NO CHARGE NURSE ONLY: CPT

## 2020-10-27 NOTE — PROGRESS NOTES
ANTICOAGULATION FOLLOW-UP CLINIC VISIT    Patient Name:  Pooja Swartz  Date:  10/27/2020  Contact Type:  Telephone    SUBJECTIVE:  Patient Findings     Comments:  No changes in diet, activity level, medications (including over the counter), or health. No missed doses of warfarin. Patient took dosing as prescribed. No signs of clots or bleeding concerns. Patient will continue maintenance warfarin dosing with a small booster dose today. Recheck in 9 days.          Clinical Outcomes     Negatives:  Major bleeding event, Thromboembolic event, Anticoagulation-related hospital admission, Anticoagulation-related ED visit, Anticoagulation-related fatality    Comments:  No changes in diet, activity level, medications (including over the counter), or health. No missed doses of warfarin. Patient took dosing as prescribed. No signs of clots or bleeding concerns. Patient will continue maintenance warfarin dosing with a small booster dose today. Recheck in 9 days.             OBJECTIVE    Recent labs: (last 7 days)     10/27/20  0935   INR 1.90*       ASSESSMENT / PLAN  INR assessment SUB    Recheck INR In: 9 DAYS    INR Location Clinic      Anticoagulation Summary  As of 10/27/2020    INR goal:  2.0-3.0   TTR:  63.3 % (1 y)   INR used for dosin.90 (10/27/2020)   Warfarin maintenance plan:  3 mg (2 mg x 1.5) every Sun, Wed; 4 mg (2 mg x 2) all other days   Full warfarin instructions:  10/27: 5 mg; Otherwise 3 mg every Sun, Wed; 4 mg all other days   Weekly warfarin total:  26 mg   Plan last modified:  Arin Sullivan RN (2020)   Next INR check:  2020   Priority:  High   Target end date:  Indefinite    Indications    Atrial fibrillation (H) [I48.91]  Long-term (current) use of anticoagulants [Z79.01] [Z79.01]  Atrial fibrillation  unspecified type (H) [I48.91]             Anticoagulation Episode Summary     INR check location:      Preferred lab:      Send INR reminders to:  [facility]    Comments:  *  warfarin 2mg tabs      Anticoagulation Care Providers     Provider Role Specialty Phone number    Amanda Renee MD [facility] [phone]            See the Encounter Report to view Anticoagulation Flowsheet and Dosing Calendar (Go to Encounters tab in chart review, and find the Anticoagulation Therapy Visit)        Arin Sullivan RN

## 2020-11-05 ENCOUNTER — ANTICOAGULATION THERAPY VISIT (OUTPATIENT)
Dept: ANTICOAGULATION | Facility: CLINIC | Age: 85
End: 2020-11-05

## 2020-11-05 DIAGNOSIS — Z79.01 LONG TERM CURRENT USE OF ANTICOAGULANT THERAPY: ICD-10-CM

## 2020-11-05 DIAGNOSIS — I48.91 ATRIAL FIBRILLATION, UNSPECIFIED TYPE (H): ICD-10-CM

## 2020-11-05 DIAGNOSIS — I48.91 ATRIAL FIBRILLATION (H): ICD-10-CM

## 2020-11-05 LAB
CAPILLARY BLOOD COLLECTION: NORMAL
INR PPP: 2.1 (ref 0.86–1.14)

## 2020-11-05 PROCEDURE — 85610 PROTHROMBIN TIME: CPT | Performed by: FAMILY MEDICINE

## 2020-11-05 PROCEDURE — 36416 COLLJ CAPILLARY BLOOD SPEC: CPT | Performed by: FAMILY MEDICINE

## 2020-11-05 NOTE — PROGRESS NOTES
ANTICOAGULATION MANAGEMENT     Patient Name:  Pooja Swartz  Date:  2020    ASSESSMENT /SUBJECTIVE:    Today's INR result of 2.10 is therapeutic. Goal INR of 2.0-3.0      Warfarin dose taken: Warfarin taken as instructed    Diet: No new diet changes affecting INR    Medication changes/ interactions: No new medications/supplements affecting INR    Previous INR: Subtherapeutic     S/S of bleeding or thromboembolism: No    New injury or illness: No    Upcoming surgery, procedure or cardioversion: No    Additional findings: None      PLAN:    Telephone call with Pooja regarding INR result and instructed:     Warfarin Dosing Instructions: Continue your current warfarin dose 3 mg every Sun, Wed; 4 mg all other days    Instructed patient to follow up no later than: 3.5 weeks  Lab visit scheduled (working around holiday)    Education provided: Target INR goal and significance of current INR result      Pooja verbalizes understanding and agrees to warfarin dosing plan.    Instructed to call the Anticoagulation Clinic for any changes, questions or concerns. (#[phone])        Mary Faith RN      OBJECTIVE:  Recent labs: (last 7 days)     20  1041   INR 2.10*         No question data found.  Anticoagulation Summary  As of 2020    INR goal:  2.0-3.0   TTR:  62.0 % (1 y)   INR used for dosin.10 (2020)   Warfarin maintenance plan:  3 mg (2 mg x 1.5) every Sun, Wed; 4 mg (2 mg x 2) all other days   Full warfarin instructions:  3 mg every Sun, Wed; 4 mg all other days   Weekly warfarin total:  26 mg   No change documented:  Mary Faith RN   Plan last modified:  Arin Sullivan RN (2020)   Next INR check:  2020   Priority:  High   Target end date:  Indefinite    Indications    Atrial fibrillation (H) [I48.91]  Long-term (current) use of anticoagulants [Z79.01] [Z79.01]  Atrial fibrillation  unspecified type (H) [I48.91]             Anticoagulation Episode Summary     INR  check location:      Preferred lab:      Send INR reminders to:  [facility]    Comments:  * warfarin 2mg tabs      Anticoagulation Care Providers     Provider Role Specialty Phone number    Amanda Renee MD [facility] [phone]

## 2020-11-18 ENCOUNTER — OFFICE VISIT (OUTPATIENT)
Dept: FAMILY MEDICINE | Facility: CLINIC | Age: 85
End: 2020-11-18
Payer: COMMERCIAL

## 2020-11-18 VITALS
HEART RATE: 70 BPM | HEIGHT: 60 IN | DIASTOLIC BLOOD PRESSURE: 64 MMHG | SYSTOLIC BLOOD PRESSURE: 134 MMHG | TEMPERATURE: 96.7 F | WEIGHT: 124 LBS | OXYGEN SATURATION: 97 % | BODY MASS INDEX: 24.35 KG/M2 | RESPIRATION RATE: 16 BRPM

## 2020-11-18 DIAGNOSIS — K52.9 CHRONIC DIARRHEA: ICD-10-CM

## 2020-11-18 DIAGNOSIS — I10 HYPERTENSION GOAL BP (BLOOD PRESSURE) < 140/90: ICD-10-CM

## 2020-11-18 DIAGNOSIS — Z00.00 ENCOUNTER FOR MEDICARE ANNUAL WELLNESS EXAM: Primary | ICD-10-CM

## 2020-11-18 DIAGNOSIS — D48.5 NEOPLASM OF UNCERTAIN BEHAVIOR OF SKIN: ICD-10-CM

## 2020-11-18 DIAGNOSIS — M1A.9XX1 CHRONIC TOPHACEOUS GOUT: ICD-10-CM

## 2020-11-18 LAB
ANION GAP SERPL CALCULATED.3IONS-SCNC: 5 MMOL/L (ref 3–14)
BUN SERPL-MCNC: 25 MG/DL (ref 7–30)
CALCIUM SERPL-MCNC: 9.2 MG/DL (ref 8.5–10.1)
CHLORIDE SERPL-SCNC: 106 MMOL/L (ref 94–109)
CHOLEST SERPL-MCNC: 165 MG/DL
CO2 SERPL-SCNC: 27 MMOL/L (ref 20–32)
CREAT SERPL-MCNC: 0.78 MG/DL (ref 0.52–1.04)
CREAT UR-MCNC: 54 MG/DL
GFR SERPL CREATININE-BSD FRML MDRD: 65 ML/MIN/{1.73_M2}
GLUCOSE SERPL-MCNC: 111 MG/DL (ref 70–99)
HDLC SERPL-MCNC: 74 MG/DL
LDLC SERPL CALC-MCNC: 75 MG/DL
MICROALBUMIN UR-MCNC: 31 MG/L
MICROALBUMIN/CREAT UR: 57.64 MG/G CR (ref 0–25)
NONHDLC SERPL-MCNC: 91 MG/DL
POTASSIUM SERPL-SCNC: 4 MMOL/L (ref 3.4–5.3)
SODIUM SERPL-SCNC: 138 MMOL/L (ref 133–144)
TRIGL SERPL-MCNC: 81 MG/DL
URATE SERPL-MCNC: 4.3 MG/DL (ref 2.6–6)

## 2020-11-18 PROCEDURE — 82043 UR ALBUMIN QUANTITATIVE: CPT | Performed by: FAMILY MEDICINE

## 2020-11-18 PROCEDURE — 99213 OFFICE O/P EST LOW 20 MIN: CPT | Mod: 25 | Performed by: FAMILY MEDICINE

## 2020-11-18 PROCEDURE — 80048 BASIC METABOLIC PNL TOTAL CA: CPT | Performed by: FAMILY MEDICINE

## 2020-11-18 PROCEDURE — 80061 LIPID PANEL: CPT | Performed by: FAMILY MEDICINE

## 2020-11-18 PROCEDURE — 99397 PER PM REEVAL EST PAT 65+ YR: CPT | Performed by: FAMILY MEDICINE

## 2020-11-18 PROCEDURE — 36415 COLL VENOUS BLD VENIPUNCTURE: CPT | Performed by: FAMILY MEDICINE

## 2020-11-18 PROCEDURE — 84550 ASSAY OF BLOOD/URIC ACID: CPT | Performed by: FAMILY MEDICINE

## 2020-11-18 RX ORDER — BUDESONIDE 3 MG/1
3 CAPSULE, COATED PELLETS ORAL EVERY MORNING
Qty: 30 CAPSULE | Refills: 6 | Status: SHIPPED | OUTPATIENT
Start: 2020-11-18 | End: 2021-07-01

## 2020-11-18 RX ORDER — AMLODIPINE BESYLATE 10 MG/1
10 TABLET ORAL DAILY
Qty: 90 TABLET | Refills: 3 | Status: SHIPPED | OUTPATIENT
Start: 2020-11-18 | End: 2021-08-26

## 2020-11-18 RX ORDER — ALLOPURINOL 100 MG/1
100 TABLET ORAL DAILY
Qty: 90 TABLET | Refills: 3 | Status: SHIPPED | OUTPATIENT
Start: 2020-11-18 | End: 2021-08-26

## 2020-11-18 ASSESSMENT — MIFFLIN-ST. JEOR: SCORE: 878.96

## 2020-11-18 ASSESSMENT — ACTIVITIES OF DAILY LIVING (ADL): CURRENT_FUNCTION: NO ASSISTANCE NEEDED

## 2020-11-18 ASSESSMENT — PAIN SCALES - GENERAL: PAINLEVEL: NO PAIN (0)

## 2020-11-18 NOTE — PROGRESS NOTES
"SUBJECTIVE:   Pooja Swartz is a 89 year old female who presents for Preventive Visit.    Chief Complaint   Patient presents with     Physical       Patient has been advised of split billing requirements and indicates understanding: Yes   Are you in the first 12 months of your Medicare coverage?  No    Healthy Habits:     In general, how would you rate your overall health?  Very good    Frequency of exercise:  2-3 days/week    Duration of exercise:  Less than 15 minutes    Do you usually eat at least 4 servings of fruit and vegetables a day, include whole grains    & fiber and avoid regularly eating high fat or \"junk\" foods?  Yes    Taking medications regularly:  No    Barriers to taking medications:  None    Medication side effects:  None    Ability to successfully perform activities of daily living:  No assistance needed    Home Safety:  No safety concerns identified    Hearing Impairment:  No hearing concerns    In the past 6 months, have you been bothered by leaking of urine? Yes    In general, how would you rate your overall mental or emotional health?  Good      PHQ-2 Total Score: 0    Additional concerns today:  Yes (high blood pressure spike yesterday. Right ear has bumps on her ear.)    Do you feel safe in your environment? Yes    Have you ever done Advance Care Planning? (For example, a Health Directive, POLST, or a discussion with a medical provider or your loved ones about your wishes): Yes, patient states has an Advance Care Planning document and will bring a copy to the clinic.      Fall risk  Fallen 2 or more times in the past year?: No  Any fall with injury in the past year?: No    Cognitive Screening   1) Repeat 3 items (Leader, Season, Table)    2) Clock draw: NORMAL  3) 3 item recall: Recalls 3 objects  Results: 3 items recalled: COGNITIVE IMPAIRMENT LESS LIKELY    Mini-CogTM Copyright SHALA Downey. Licensed by the author for use in [facility] [employer]; reprinted with permission (radha@.[facility]). " All rights reserved.      Do you have sleep apnea, excessive snoring or daytime drowsiness?: no    Reviewed and updated as needed this visit by clinical staff  Tobacco  Allergies  Meds  Problems  Med Hx  Surg Hx  Fam Hx          Reviewed and updated as needed this visit by Provider                Social History     Tobacco Use     Smoking status: Never Smoker     Smokeless tobacco: Never Used   Substance Use Topics     Alcohol use: Yes     Comment: rare     If you drink alcohol do you typically have >3 drinks per day or >7 drinks per week? No     No flowsheet data found.        Hypertension Follow-up      Do you check your blood pressure regularly outside of the clinic? Yes     Are you following a low salt diet? No- tries to watch what she is eating    Are your blood pressures ever more than 140 on the top number (systolic) OR more   than 90 on the bottom number (diastolic), for example 140/90? Yes      Current providers sharing in care for this patient include:   Patient Care Team:  Amanda Renee MD as PCP - General (Family Practice)  Kael Rojas MD as MD (Orthopedics)  Amanda Renee MD as Assigned PCP  Mahin Funk MD as Assigned Musculoskeletal Provider  Adan Mendoza MD as Assigned Heart and Vascular Provider    The following health maintenance items are reviewed in Epic and correct as of today:  Health Maintenance   Topic Date Due     ZOSTER IMMUNIZATION (2 of 3) 01/26/2016     DTAP/TDAP/TD IMMUNIZATION (2 - Td) 07/16/2018     MICROALBUMIN  12/13/2019     LIPID  07/11/2020     FALL RISK ASSESSMENT  07/11/2020     BMP  06/15/2021     MEDICARE ANNUAL WELLNESS VISIT  11/18/2021     ADVANCE CARE PLANNING  11/18/2025     PHQ-2  Completed     INFLUENZA VACCINE  Completed     Pneumococcal Vaccine: 65+ Years  Completed     Pneumococcal Vaccine: Pediatrics (0 to 5 Years) and At-Risk Patients (6 to 64 Years)  Aged Out     IPV IMMUNIZATION  Aged Out     MENINGITIS IMMUNIZATION  Aged Out     Lab  work is in process      Review of Systems  Constitutional, HEENT, cardiovascular, pulmonary, gi and gu systems are negative, except as otherwise noted.    OBJECTIVE:   /64   Pulse 70   Temp 96.7  F (35.9  C) (Tympanic)   Resp 16   Ht 1.524 m (5')   Wt 56.2 kg (124 lb)   LMP 07/07/1960   SpO2 97%   Breastfeeding No   BMI 24.22 kg/m   Estimated body mass index is 24.22 kg/m  as calculated from the following:    Height as of this encounter: 1.524 m (5').    Weight as of this encounter: 56.2 kg (124 lb).  Physical Exam  GENERAL: healthy, alert and no distress  NECK: no adenopathy, no asymmetry, masses, or scars and thyroid normal to palpation  RESP: lungs clear to auscultation - no rales, rhonchi or wheezes  CV: regular rates and rhythm, normal S1 S2, no S3 or S4, no murmur, click or rub, grade 3/6 systolic murmur heard best over the lusb, peripheral pulses strong and no peripheral edema  ABDOMEN: soft, nontender, no hepatosplenomegaly, no masses and bowel sounds normal  MS: no gross musculoskeletal defects noted, no edema  SKIN: nodular lesion in front of left ear (concern for basal cell carcinoma)     Diagnostic Test Results:  Labs reviewed in Epic    ASSESSMENT / PLAN:   1. Encounter for Medicare annual wellness exam       2. Hypertension goal BP (blood pressure) < 140/90  Has run high yesterday x 2 - took clonidine. Otherwise blood pressure in the 150-160 range most of the time.    - Lipid panel reflex to direct LDL Non-fasting  - Albumin Random Urine Quantitative with Creat Ratio  - amLODIPine (NORVASC) 10 MG tablet; Take 1 tablet (10 mg) by mouth daily  Dispense: 90 tablet; Refill: 3  - Basic metabolic panel    3. Chronic tophaceous gout     - allopurinol (ZYLOPRIM) 100 MG tablet; Take 1 tablet (100 mg) by mouth daily  Dispense: 90 tablet; Refill: 3  - Uric acid     4. Chronic diarrhea   stable on the entocort  - budesonide (ENTOCORT EC) 3 MG EC capsule; Take 1 capsule (3 mg) by mouth every morning   Dispense: 30 capsule; Refill: 6    5. Neoplasm of uncertain behavior of skin   doesn't want to see dermatology at this time. Understands this may be skin cancer, not interested in biopsy/removal at this time.       Patient has been advised of split billing requirements and indicates understanding: Yes  COUNSELING:  Reviewed preventive health counseling, as reflected in patient instructions       Regular exercise       Healthy diet/nutrition    Estimated body mass index is 24.22 kg/m  as calculated from the following:    Height as of this encounter: 1.524 m (5').    Weight as of this encounter: 56.2 kg (124 lb).        She reports that she has never smoked. She has never used smokeless tobacco.      Appropriate preventive services were discussed with this patient, including applicable screening as appropriate for cardiovascular disease, diabetes, osteopenia/osteoporosis, and glaucoma.  As appropriate for age/gender, discussed screening for colorectal cancer, prostate cancer, breast cancer, and cervical cancer. Checklist reviewing preventive services available has been given to the patient.    Reviewed patients plan of care and provided an AVS. The Basic Care Plan (routine screening as documented in Health Maintenance) for Pooja meets the Care Plan requirement. This Care Plan has been established and reviewed with the Patient.    Counseling Resources:  ATP IV Guidelines  Pooled Cohorts Equation Calculator  Breast Cancer Risk Calculator  Breast Cancer: Medication to Reduce Risk  FRAX Risk Assessment  ICSI Preventive Guidelines  Dietary Guidelines for Americans, 2010  [employer]'s MyPlate  ASA Prophylaxis  Lung CA Screening    Amanda Renee MD  [facility]    Identified Health Risks:

## 2020-11-18 NOTE — LETTER
November 19, 2020      Pooja Swartz  [address] [facility]  [address] [address]        Dear ,    We are writing to inform you of your test results.    Your test results fall within the expected range(s) or remain unchanged from previous results.  Please continue with current treatment plan.    Resulted Orders   Lipid panel reflex to direct LDL Non-fasting   Result Value Ref Range    Cholesterol 165 <200 mg/dL    Triglycerides 81 <150 mg/dL    HDL Cholesterol 74 >49 mg/dL    LDL Cholesterol Calculated 75 <100 mg/dL      Comment:      Desirable:       <100 mg/dl    Non HDL Cholesterol 91 <130 mg/dL   Albumin Random Urine Quantitative with Creat Ratio   Result Value Ref Range    Creatinine Urine 54 mg/dL    Albumin Urine mg/L 31 mg/L    Albumin Urine mg/g Cr 57.64 (H) 0 - 25 mg/g Cr   Basic metabolic panel   Result Value Ref Range    Sodium 138 133 - 144 mmol/L    Potassium 4.0 3.4 - 5.3 mmol/L    Chloride 106 94 - 109 mmol/L    Carbon Dioxide 27 20 - 32 mmol/L    Anion Gap 5 3 - 14 mmol/L    Glucose 111 (H) 70 - 99 mg/dL    Urea Nitrogen 25 7 - 30 mg/dL    Creatinine 0.78 0.52 - 1.04 mg/dL    GFR Estimate 65 >60 mL/min/[1.73_m2]      Comment:      Non  GFR Calc  Starting 12/18/2018, serum creatinine based estimated GFR (eGFR) will be   calculated using the Chronic Kidney Disease Epidemiology Collaboration   (CKD-EPI) equation.      GFR Estimate If Black 75 >60 mL/min/[1.73_m2]      Comment:       GFR Calc  Starting 12/18/2018, serum creatinine based estimated GFR (eGFR) will be   calculated using the Chronic Kidney Disease Epidemiology Collaboration   (CKD-EPI) equation.      Calcium 9.2 8.5 - 10.1 mg/dL   Uric acid   Result Value Ref Range    Uric Acid 4.3 2.6 - 6.0 mg/dL       If you have any questions or concerns, please call the clinic at the number listed above.       Sincerely,        Amanda Renee MD/[facility]

## 2020-11-18 NOTE — PATIENT INSTRUCTIONS
Our Clinic hours are:  Mondays    7:20 am - 7 pm  Tues -  Fri  7:20 am - 5 pm    Clinic Phone: [phone]    The clinic lab opens at 7:30 am Mon - Fri and appointments are required.    [facility]. [phone]  Monday  8 am - 7pm  Tues - Fri 8 am - 5:30 pm

## 2020-12-01 ENCOUNTER — ANTICOAGULATION THERAPY VISIT (OUTPATIENT)
Dept: ANTICOAGULATION | Facility: CLINIC | Age: 85
End: 2020-12-01

## 2020-12-01 DIAGNOSIS — I48.91 ATRIAL FIBRILLATION, UNSPECIFIED TYPE (H): ICD-10-CM

## 2020-12-01 DIAGNOSIS — Z79.01 LONG TERM CURRENT USE OF ANTICOAGULANT THERAPY: ICD-10-CM

## 2020-12-01 DIAGNOSIS — I48.91 ATRIAL FIBRILLATION (H): ICD-10-CM

## 2020-12-01 LAB
CAPILLARY BLOOD COLLECTION: NORMAL
INR PPP: 3 (ref 0.86–1.14)

## 2020-12-01 PROCEDURE — 36416 COLLJ CAPILLARY BLOOD SPEC: CPT | Performed by: FAMILY MEDICINE

## 2020-12-01 PROCEDURE — 85610 PROTHROMBIN TIME: CPT | Performed by: FAMILY MEDICINE

## 2020-12-01 PROCEDURE — 99207 PR NO CHARGE NURSE ONLY: CPT

## 2020-12-01 NOTE — PROGRESS NOTES
ANTICOAGULATION FOLLOW-UP CLINIC VISIT    Patient Name:  Pooja Sawrtz  Date:  12/1/2020  Contact Type:  Telephone    SUBJECTIVE:  Patient Findings     Comments:  No changes in medications, activity, or diet noted. No concerns with clotting, bleeding, or increased bruising noted. Took warfarin as prescribed.  Patient is to continue maintenance warfarin plan, and check INR in 4 weeks.  Patient verbalizes understanding and agrees to plan. No further questions or concerns.        Clinical Outcomes     Negatives:  Major bleeding event, Thromboembolic event, Anticoagulation-related hospital admission, Anticoagulation-related ED visit, Anticoagulation-related fatality    Comments:  No changes in medications, activity, or diet noted. No concerns with clotting, bleeding, or increased bruising noted. Took warfarin as prescribed.  Patient is to continue maintenance warfarin plan, and check INR in 4 weeks.  Patient verbalizes understanding and agrees to plan. No further questions or concerns.           OBJECTIVE    Recent labs: (last 7 days)     12/01/20  0947   INR 3.00*       ASSESSMENT / PLAN  INR assessment THER    Recheck INR In: 4 WEEKS    INR Location Clinic      Anticoagulation Summary  As of 12/1/2020    INR goal:  2.0-3.0   TTR:  65.6 % (1 y)   INR used for dosing:  3.00 (12/1/2020)   Warfarin maintenance plan:  3 mg (2 mg x 1.5) every Sun, Wed; 4 mg (2 mg x 2) all other days   Full warfarin instructions:  3 mg every Sun, Wed; 4 mg all other days   Weekly warfarin total:  26 mg   No change documented:  Monica Mg, RN   Plan last modified:  Arin Sullivan RN (8/27/2020)   Next INR check:  12/29/2020   Priority:  Maintenance   Target end date:  Indefinite    Indications    Atrial fibrillation (H) [I48.91]  Long-term (current) use of anticoagulants [Z79.01] [Z79.01]  Atrial fibrillation  unspecified type (H) [I48.91]             Anticoagulation Episode Summary     INR check location:      Preferred lab:      Send  INR reminders to:  [facility]    Comments:  * warfarin 2mg tabs      Anticoagulation Care Providers     Provider Role Specialty Phone number    Amanda Renee MD [facility] [phone]            See the Encounter Report to view Anticoagulation Flowsheet and Dosing Calendar (Go to Encounters tab in chart review, and find the Anticoagulation Therapy Visit)        Monica Mg RN

## 2020-12-29 ENCOUNTER — ANTICOAGULATION THERAPY VISIT (OUTPATIENT)
Dept: ANTICOAGULATION | Facility: CLINIC | Age: 85
End: 2020-12-29

## 2020-12-29 DIAGNOSIS — I48.91 ATRIAL FIBRILLATION, UNSPECIFIED TYPE (H): ICD-10-CM

## 2020-12-29 DIAGNOSIS — Z79.01 LONG TERM CURRENT USE OF ANTICOAGULANT THERAPY: ICD-10-CM

## 2020-12-29 DIAGNOSIS — I48.91 ATRIAL FIBRILLATION (H): ICD-10-CM

## 2020-12-29 LAB
CAPILLARY BLOOD COLLECTION: NORMAL
INR PPP: 3.3 (ref 0.86–1.14)

## 2020-12-29 PROCEDURE — 85610 PROTHROMBIN TIME: CPT | Performed by: FAMILY MEDICINE

## 2020-12-29 PROCEDURE — 36416 COLLJ CAPILLARY BLOOD SPEC: CPT | Performed by: FAMILY MEDICINE

## 2020-12-29 NOTE — PROGRESS NOTES
ANTICOAGULATION MANAGEMENT     Patient Name:  Pooja Swartz  Date:  12/29/2020    ASSESSMENT /SUBJECTIVE:    Today's INR result of 3.30 is supratherapeutic. Goal INR of 2.0-3.0      Warfarin dose taken: Warfarin taken as instructed    Diet: Decreased greens/vitamin K in diet which may be affecting INR; plans to resume previous intake    Medication changes/ interactions: No new medications/supplements affecting INR    Previous INR: Therapeutic 3.00    S/S of bleeding or thromboembolism: No    New injury or illness: No    Upcoming surgery, procedure or cardioversion: No    Additional findings: None      PLAN:    Telephone call with Pooja regarding INR result and instructed:     Warfarin Dosing Instructions: Continue your current warfarin dose 3 mg every Sun, Wed; 4 mg all other day -patient will also eat a serving of broccoli today    Instructed patient to follow up no later than: 2 weeks  Lab visit scheduled    Education provided: Importance of consistent vitamin K intake and Target INR goal and significance of current INR result      Pooja verbalizes understanding and agrees to warfarin dosing plan.    Instructed to call the Anticoagulation Clinic for any changes, questions or concerns. (#[phone])        Mary Faith RN      OBJECTIVE:  Recent labs: (last 7 days)     12/29/20  1032   INR 3.30*         No question data found.  Anticoagulation Summary  As of 12/29/2020    INR goal:  2.0-3.0   TTR:  57.9 % (1 y)   INR used for dosing:  3.30 (12/29/2020)   Warfarin maintenance plan:  3 mg (2 mg x 1.5) every Sun, Wed; 4 mg (2 mg x 2) all other days   Full warfarin instructions:  3 mg every Sun, Wed; 4 mg all other days   Weekly warfarin total:  26 mg   No change documented:  Mary Faith RN   Plan last modified:  Arin Sullivan RN (8/27/2020)   Next INR check:  1/12/2021   Priority:  Maintenance   Target end date:  Indefinite    Indications    Atrial fibrillation (H) [I48.91]  Long-term (current)  use of anticoagulants [Z79.01] [Z79.01]  Atrial fibrillation  unspecified type (H) [I48.91]             Anticoagulation Episode Summary     INR check location:      Preferred lab:      Send INR reminders to:  [facility]    Comments:  * warfarin 2mg tabs      Anticoagulation Care Providers     Provider Role Specialty Phone number    Amanda Renee MD [facility] [phone]

## 2021-01-12 ENCOUNTER — ANTICOAGULATION THERAPY VISIT (OUTPATIENT)
Dept: ANTICOAGULATION | Facility: CLINIC | Age: 86
End: 2021-01-12

## 2021-01-12 DIAGNOSIS — I48.91 ATRIAL FIBRILLATION, UNSPECIFIED TYPE (H): ICD-10-CM

## 2021-01-12 DIAGNOSIS — Z79.01 LONG TERM CURRENT USE OF ANTICOAGULANT THERAPY: ICD-10-CM

## 2021-01-12 DIAGNOSIS — I48.91 ATRIAL FIBRILLATION (H): ICD-10-CM

## 2021-01-12 DIAGNOSIS — I48.19 PERSISTENT ATRIAL FIBRILLATION (H): ICD-10-CM

## 2021-01-12 LAB
CAPILLARY BLOOD COLLECTION: NORMAL
INR PPP: 3.6 (ref 0.86–1.14)

## 2021-01-12 PROCEDURE — 85610 PROTHROMBIN TIME: CPT | Performed by: FAMILY MEDICINE

## 2021-01-12 PROCEDURE — 36416 COLLJ CAPILLARY BLOOD SPEC: CPT | Performed by: FAMILY MEDICINE

## 2021-01-12 RX ORDER — WARFARIN SODIUM 2 MG/1
TABLET ORAL
Qty: 165 TABLET | Refills: 0
Start: 2021-01-12 | End: 2021-01-26

## 2021-01-12 NOTE — PROGRESS NOTES
ANTICOAGULATION MANAGEMENT     Patient Name:  Pooja Swartz  Date:  1/12/2021    ASSESSMENT /SUBJECTIVE:    Today's INR result of 3.60 is supratherapeutic. Goal INR of 2.0-3.0      Warfarin dose taken: Warfarin taken as instructed    Diet: No new diet changes affecting INR patient did eat broccoli as planned    Medication changes/ interactions: No new medications/supplements affecting INR    Previous INR: Supratherapeutic     S/S of bleeding or thromboembolism: No-no more bruising than normal for her    New injury or illness: No    Upcoming surgery, procedure or cardioversion: No    Additional findings: None      PLAN:    Telephone call with Pooja regarding INR result and instructed:     Warfarin Dosing Instructions: hold your dose today then change your warfarin dose to 4 mg every Tue, Thur, Sat; 3 mg all other days  . (8 % change)    Instructed patient to follow up no later than: 2 weeks  Lab visit scheduled    Education provided: Target INR goal and significance of current INR result, Monitoring for bleeding signs and symptoms and When to seek medical attention/emergency care      Pooja verbalizes understanding and agrees to warfarin dosing plan.    Instructed to call the Anticoagulation Clinic for any changes, questions or concerns. (#[phone])        Mary Faith RN      OBJECTIVE:  Recent labs: (last 7 days)     01/12/21  1030   INR 3.60*         No question data found.  Anticoagulation Summary  As of 1/12/2021    INR goal:  2.0-3.0   TTR:  55.2 % (1 y)   INR used for dosing:  3.60 (1/12/2021)   Warfarin maintenance plan:  4 mg (2 mg x 2) every Tue, Thu, Sat; 3 mg (2 mg x 1.5) all other days   Full warfarin instructions:  1/12: Hold; Otherwise 4 mg every Tue, Thu, Sat; 3 mg all other days   Weekly warfarin total:  24 mg   Plan last modified:  Mary Faith RN (1/12/2021)   Next INR check:  1/26/2021   Priority:  Maintenance   Target end date:  Indefinite    Indications    Atrial fibrillation  (H) [I48.91]  Long-term (current) use of anticoagulants [Z79.01] [Z79.01]  Atrial fibrillation  unspecified type (H) [I48.91]             Anticoagulation Episode Summary     INR check location:      Preferred lab:      Send INR reminders to:  [facility]    Comments:  * warfarin 2mg tabs      Anticoagulation Care Providers     Provider Role Specialty Phone number    Amanda Renee MD [facility] [phone]

## 2021-01-26 ENCOUNTER — ANTICOAGULATION THERAPY VISIT (OUTPATIENT)
Dept: ANTICOAGULATION | Facility: CLINIC | Age: 86
End: 2021-01-26

## 2021-01-26 ENCOUNTER — DOCUMENTATION ONLY (OUTPATIENT)
Dept: FAMILY MEDICINE | Facility: CLINIC | Age: 86
End: 2021-01-26

## 2021-01-26 DIAGNOSIS — I48.91 ATRIAL FIBRILLATION, UNSPECIFIED TYPE (H): ICD-10-CM

## 2021-01-26 DIAGNOSIS — I48.91 ATRIAL FIBRILLATION (H): ICD-10-CM

## 2021-01-26 DIAGNOSIS — Z79.01 LONG TERM CURRENT USE OF ANTICOAGULANT THERAPY: ICD-10-CM

## 2021-01-26 DIAGNOSIS — I48.19 PERSISTENT ATRIAL FIBRILLATION (H): ICD-10-CM

## 2021-01-26 DIAGNOSIS — I48.91 ATRIAL FIBRILLATION, UNSPECIFIED TYPE (H): Primary | ICD-10-CM

## 2021-01-26 LAB
CAPILLARY BLOOD COLLECTION: NORMAL
INR PPP: 3.5 (ref 0.86–1.14)

## 2021-01-26 PROCEDURE — 85610 PROTHROMBIN TIME: CPT | Performed by: FAMILY MEDICINE

## 2021-01-26 PROCEDURE — 36416 COLLJ CAPILLARY BLOOD SPEC: CPT | Performed by: FAMILY MEDICINE

## 2021-01-26 RX ORDER — WARFARIN SODIUM 2 MG/1
TABLET ORAL
Qty: 165 TABLET | Refills: 0
Start: 2021-01-26 | End: 2021-02-08

## 2021-01-26 NOTE — PROGRESS NOTES
ANTICOAGULATION MANAGEMENT      Pooja Swartz due for annual renewal of referral to anticoagulation monitoring. Order pended for your review and signature.      ANTICOAGULATION SUMMARY      Warfarin indication(s)     Atrial fibrillation    Heart valve present?  NO       Current goal range   INR: 2.0-3.0     Goal appropriate for indication? Yes, INR 2-3 appropriate for hx of DVT, PE, hypercoagulable state, Afib, LVAD, or bileaflet AVR without risk factors     Current duration of therapy Indefinite/long term therapy   Time in Therapeutic Range (TTR)  (Goal > 60%) 54.3 %       Office visit with referring provider's group within last year yes on 11/18/2020       Mary Faith, RN

## 2021-01-26 NOTE — PROGRESS NOTES
ANTICOAGULATION MANAGEMENT     Patient Name:  Pooja Swartz  Date:  1/26/2021    ASSESSMENT /SUBJECTIVE:    Today's INR result of 3.50 is supratherapeutic. Goal INR of 2.0-3.0      Warfarin dose taken: Warfarin taken as instructed    Diet: No new diet changes affecting INR- patient is consistent with greens, ate spinach salad x 2 days this week    Medication changes/ interactions: No new medications/supplements affecting INR    Previous INR: Supratherapeutic 3.60    S/S of bleeding or thromboembolism: No    New injury or illness: No    Upcoming surgery, procedure or cardioversion: No    Additional findings: Patient reports at times her leg/hip will ache from a previous hip fracture/replacement and that is typically worse in the winter.       PLAN:    Telephone call with Pooja regarding INR result and instructed:     Warfarin Dosing Instructions: Hold dose today then change your warfarin dose to 4 mg every Thu; 3 mg all other days  . (8 % change)    Instructed patient to follow up no later than: 2 weeks  Lab visit scheduled    Education provided: Target INR goal and significance of current INR result and Monitoring for bleeding signs and symptoms      Pooja verbalizes understanding and agrees to warfarin dosing plan.    Instructed to call the Anticoagulation Clinic for any changes, questions or concerns. (#[phone])        Mary Faith RN      OBJECTIVE:  Recent labs: (last 7 days)     01/26/21  0932   INR 3.50*         No question data found.  Anticoagulation Summary  As of 1/26/2021    INR goal:  2.0-3.0   TTR:  54.3 % (1 y)   INR used for dosing:  3.50 (1/26/2021)   Warfarin maintenance plan:  4 mg (2 mg x 2) every Tue, Thu, Sat; 3 mg (2 mg x 1.5) all other days   Full warfarin instructions:  4 mg every Tue, Thu, Sat; 3 mg all other days   Weekly warfarin total:  24 mg   Plan last modified:  Mary Faith, RN (1/12/2021)   Next INR check:     Priority:  Maintenance   Target end date:  Indefinite     Indications    Atrial fibrillation (H) [I48.91]  Long-term (current) use of anticoagulants [Z79.01] [Z79.01]  Atrial fibrillation  unspecified type (H) [I48.91]             Anticoagulation Episode Summary     INR check location:      Preferred lab:      Send INR reminders to:  [facility]    Comments:  * warfarin 2mg tabs      Anticoagulation Care Providers     Provider Role Specialty Phone number    Amanda Renee MD [facility] [phone]

## 2021-02-04 ENCOUNTER — IMMUNIZATION (OUTPATIENT)
Dept: FAMILY MEDICINE | Facility: CLINIC | Age: 86
End: 2021-02-04
Payer: COMMERCIAL

## 2021-02-04 PROCEDURE — 91300 PR COVID VAC PFIZER DIL RECON 30 MCG/0.3 ML IM: CPT

## 2021-02-04 PROCEDURE — 0001A PR COVID VAC PFIZER DIL RECON 30 MCG/0.3 ML IM: CPT

## 2021-02-08 DIAGNOSIS — I48.19 PERSISTENT ATRIAL FIBRILLATION (H): ICD-10-CM

## 2021-02-08 DIAGNOSIS — Z79.01 LONG TERM CURRENT USE OF ANTICOAGULANT THERAPY: ICD-10-CM

## 2021-02-08 RX ORDER — WARFARIN SODIUM 2 MG/1
TABLET ORAL
Qty: 150 TABLET | Refills: 0 | Status: SHIPPED | OUTPATIENT
Start: 2021-02-08 | End: 2021-02-11

## 2021-02-08 NOTE — TELEPHONE ENCOUNTER
Current warfarin dose:  Warfarin maintenance plan:  4 mg (2 mg x 2) every Tue, Thu, Sat; 3 mg (2 mg x 1.5) all other days   Full warfarin instructions:  4 mg every Tue, Thu, Sat; 3 mg all other days   Weekly warfarin total:  24 mg       Last INR result:  INR   Date Value Ref Range Status   01/26/2021 3.50 (H) 0.86 - 1.14 Final     Comment:     This test is intended for monitoring Coumadin therapy.  Results are not   accurate in patients with prolonged INR due to factor deficiency.       Last office visit: 11/18/2020    Refill authorized per [facility] protocol.    Edwin OLMEDO RN, CACP

## 2021-02-08 NOTE — TELEPHONE ENCOUNTER
"Requested Prescriptions   Pending Prescriptions Disp Refills     warfarin ANTICOAGULANT (JANTOVEN ANTICOAGULANT) 2 MG tablet [Pharmacy Med Name: Jantoven 2 mg tablet] 150 tablet 0     Sig: Take 4 mg (2 mg x 2) every Sun/Wed; 3 mg (2 mg x 1.5) all other days or as directed by the Anticoagulation Clinic       Vitamin K Antagonists Failed - 2/8/2021  9:34 AM        Failed - INR is within goal in the past 6 weeks     Confirm INR is within goal in the past 6 weeks.     Recent Labs   Lab Test 01/26/21  0932   INR 3.50*                       Passed - Recent (12 mo) or future (30 days) visit within the authorizing provider's specialty     Patient has had an office visit with the authorizing provider or a provider within the authorizing providers department within the previous 12 mos or has a future within next 30 days. See \"Patient Info\" tab in inbasket, or \"Choose Columns\" in Meds & Orders section of the refill encounter.              Passed - Medication is active on med list        Passed - Patient is 18 years of age or older        Passed - Patient is not pregnant        Passed - No positive pregnancy on file in past 12 months             "

## 2021-02-11 ENCOUNTER — ANTICOAGULATION THERAPY VISIT (OUTPATIENT)
Dept: ANTICOAGULATION | Facility: CLINIC | Age: 86
End: 2021-02-11

## 2021-02-11 DIAGNOSIS — Z79.01 LONG TERM CURRENT USE OF ANTICOAGULANT THERAPY: ICD-10-CM

## 2021-02-11 DIAGNOSIS — I48.91 ATRIAL FIBRILLATION, UNSPECIFIED TYPE (H): ICD-10-CM

## 2021-02-11 DIAGNOSIS — I48.19 PERSISTENT ATRIAL FIBRILLATION (H): ICD-10-CM

## 2021-02-11 LAB
CAPILLARY BLOOD COLLECTION: NORMAL
INR PPP: 3 (ref 0.86–1.14)

## 2021-02-11 PROCEDURE — 85610 PROTHROMBIN TIME: CPT | Performed by: FAMILY MEDICINE

## 2021-02-11 PROCEDURE — 36416 COLLJ CAPILLARY BLOOD SPEC: CPT | Performed by: FAMILY MEDICINE

## 2021-02-11 RX ORDER — WARFARIN SODIUM 2 MG/1
TABLET ORAL
Qty: 150 TABLET | Refills: 0
Start: 2021-02-11 | End: 2021-05-28

## 2021-02-11 NOTE — PROGRESS NOTES
ANTICOAGULATION MANAGEMENT     Patient Name:  Pooja Swartz  Date:  2/11/2021    ASSESSMENT /SUBJECTIVE:    Today's INR result of 3.00 is therapeutic. Goal INR of 2.0-3.0      Warfarin dose taken: Warfarin taken as instructed    Diet: No new diet changes affecting INR    Medication changes/ interactions: No new medications/supplements affecting INR    Previous INR: Supratherapeutic 3.50    S/S of bleeding or thromboembolism: No    New injury or illness: No    Upcoming surgery, procedure or cardioversion: No    Additional findings: Maintenance dose has been decreased due to supratherapeutic INRs. Patient has 2nd covid 19 vaccine in 2 weeks.       PLAN:    Telephone call with Pooja regarding INR result and instructed:     Warfarin Dosing Instructions: Continue your current warfarin dose 4 mg every Thu; 3 mg all other days    Instructed patient to follow up no later than: 3 weeks  Lab visit scheduled    Education provided: Target INR goal and significance of current INR result      Pooja verbalizes understanding and agrees to warfarin dosing plan.    Instructed to call the Anticoagulation Clinic for any changes, questions or concerns. (#[phone])        Mary Faith RN      OBJECTIVE:  Recent labs: (last 7 days)     02/11/21  0934   INR 3.00*         No question data found.  Anticoagulation Summary  As of 2/11/2021    INR goal:  2.0-3.0   TTR:  49.9 % (1 y)   INR used for dosing:  3.00 (2/11/2021)   Warfarin maintenance plan:  4 mg (2 mg x 2) every Thu; 3 mg (2 mg x 1.5) all other days   Full warfarin instructions:  4 mg every Thu; 3 mg all other days   Weekly warfarin total:  22 mg   No change documented:  Mary Faith RN   Plan last modified:  Mary Faith RN (1/26/2021)   Next INR check:  3/4/2021   Priority:  Maintenance   Target end date:  Indefinite    Indications    Atrial fibrillation (H) [I48.91]  Long-term (current) use of anticoagulants [Z79.01] [Z79.01]  Atrial  fibrillation  unspecified type (H) [I48.91]             Anticoagulation Episode Summary     INR check location:      Preferred lab:      Send INR reminders to:  [facility]    Comments:  * warfarin 2mg tabs      Anticoagulation Care Providers     Provider Role Specialty Phone number    Amanda Renee MD Referring Family Medicine [phone]

## 2021-02-25 ENCOUNTER — IMMUNIZATION (OUTPATIENT)
Dept: FAMILY MEDICINE | Facility: CLINIC | Age: 86
End: 2021-02-25
Attending: FAMILY MEDICINE
Payer: COMMERCIAL

## 2021-02-25 PROCEDURE — 91300 PR COVID VAC PFIZER DIL RECON 30 MCG/0.3 ML IM: CPT

## 2021-02-25 PROCEDURE — 0002A PR COVID VAC PFIZER DIL RECON 30 MCG/0.3 ML IM: CPT

## 2021-03-04 ENCOUNTER — ANTICOAGULATION THERAPY VISIT (OUTPATIENT)
Dept: ANTICOAGULATION | Facility: CLINIC | Age: 86
End: 2021-03-04

## 2021-03-04 DIAGNOSIS — I48.91 ATRIAL FIBRILLATION, UNSPECIFIED TYPE (H): ICD-10-CM

## 2021-03-04 DIAGNOSIS — Z79.01 LONG TERM CURRENT USE OF ANTICOAGULANT THERAPY: ICD-10-CM

## 2021-03-04 LAB
CAPILLARY BLOOD COLLECTION: NORMAL
INR PPP: 2.4 (ref 0.86–1.14)

## 2021-03-04 PROCEDURE — 85610 PROTHROMBIN TIME: CPT | Performed by: FAMILY MEDICINE

## 2021-03-04 PROCEDURE — 36416 COLLJ CAPILLARY BLOOD SPEC: CPT | Performed by: FAMILY MEDICINE

## 2021-03-04 NOTE — PROGRESS NOTES
ANTICOAGULATION MANAGEMENT     Patient Name:  Pooja Swartz  Date:  3/4/2021    ASSESSMENT /SUBJECTIVE:    Today's INR result of 2.40 is therapeutic. Goal INR of 2.0-3.0      Warfarin dose taken: Warfarin taken as instructed    Diet: No new diet changes affecting INR    Medication changes/ interactions: No new medications/supplements affecting INR    Previous INR: Therapeutic 3.00    S/S of bleeding or thromboembolism: No    New injury or illness: No    Upcoming surgery, procedure or cardioversion: No    Additional findings: None      PLAN:    Telephone call with Pooja regarding INR result and instructed:     Warfarin Dosing Instructions: Continue your current warfarin dose 4mg Thurs; 3mg all other days    Instructed patient to follow up no later than: 3 weeks  Lab visit scheduled    Education provided: Importance of notifying clinic for changes in medications or health; a sooner lab recheck maybe needed       Pooja verbalizes understanding and agrees to warfarin dosing plan.    Instructed to call the Anticoagulation Clinic for any changes, questions or concerns. (#[phone])        Jania Torrez RN CACP       OBJECTIVE:  Recent labs: (last 7 days)     21  0939   INR 2.40*         INR assessment THER    Recheck INR In: 3 WEEKS    INR Location Clinic      Anticoagulation Summary  As of 3/4/2021    INR goal:  2.0-3.0   TTR:  51.7 % (1 y)   INR used for dosin.40 (3/4/2021)   Warfarin maintenance plan:  4 mg (2 mg x 2) every Thu; 3 mg (2 mg x 1.5) all other days   Full warfarin instructions:  4 mg every Thu; 3 mg all other days   Weekly warfarin total:  22 mg   No change documented:  Jania Torrez RN   Plan last modified:  Mary Faith RN (2021)   Next INR check:  3/25/2021   Priority:  Maintenance   Target end date:  Indefinite    Indications    Atrial fibrillation (H) [I48.91]  Long-term (current) use of anticoagulants [Z79.01] [Z79.01]  Atrial fibrillation  unspecified type  (H) [I48.91]             Anticoagulation Episode Summary     INR check location:      Preferred lab:      Send INR reminders to:  [facility]    Comments:  * warfarin 2mg tabs      Anticoagulation Care Providers     Provider Role Specialty Phone number    Amanda Renee MD Referring Family Medicine [phone]

## 2021-03-07 NOTE — PROGRESS NOTES
ANTICOAGULATION FOLLOW-UP CLINIC VISIT    Patient Name:  Pooja Swartz  Date:  2020  Contact Type:  Telephone    SUBJECTIVE:  Patient Findings     Comments:   No changes in diet, activity level, medications (including over the counter), or health. No missed doses of warfarin. Patient took dosing as prescribed. No signs of clots or bleeding concerns. Patient has had several dose increases but INR still slightly sub therapeutic. INR increased 0.2 with the last 9% dose increase. Will increase another ~8% and recheck in 12 days.             Clinical Outcomes     Negatives:   Major bleeding event, Thromboembolic event, Anticoagulation-related hospital admission, Anticoagulation-related ED visit, Anticoagulation-related fatality    Comments:   No changes in diet, activity level, medications (including over the counter), or health. No missed doses of warfarin. Patient took dosing as prescribed. No signs of clots or bleeding concerns. Patient has had several dose increases but INR still slightly sub therapeutic. INR increased 0.2 with the last 9% dose increase. Will increase another ~8% and recheck in 12 days.                OBJECTIVE    Recent labs: (last 7 days)     20  0907   INR 1.90*       ASSESSMENT / PLAN  INR assessment SUB    Recheck INR In: 10 DAYS    INR Location Clinic      Anticoagulation Summary  As of 2020    INR goal:   2.0-3.0   TTR:   65.2 % (1 y)   INR used for dosin.90! (2020)   Warfarin maintenance plan:   3 mg (2 mg x 1.5) every Sun, Wed; 4 mg (2 mg x 2) all other days   Full warfarin instructions:   3 mg every Sun, Wed; 4 mg all other days   Weekly warfarin total:   26 mg   Plan last modified:   Arin Sullivan RN (2020)   Next INR check:   2020   Priority:   High   Target end date:   Indefinite    Indications    Atrial fibrillation (H) [I48.91]  Long-term (current) use of anticoagulants [Z79.01] [Z79.01]  Atrial fibrillation  unspecified type (H) [I48.91]         Clothing      Anticoagulation Episode Summary     INR check location:       Preferred lab:       Send INR reminders to:   [facility]    Comments:   * warfarin 2mg tabs      Anticoagulation Care Providers     Provider Role Specialty Phone number    Amanda Renee MD [facility] [phone]            See the Encounter Report to view Anticoagulation Flowsheet and Dosing Calendar (Go to Encounters tab in chart review, and find the Anticoagulation Therapy Visit)        Arin Sullivan RN

## 2021-03-25 ENCOUNTER — ANTICOAGULATION THERAPY VISIT (OUTPATIENT)
Dept: ANTICOAGULATION | Facility: CLINIC | Age: 86
End: 2021-03-25

## 2021-03-25 DIAGNOSIS — I48.91 ATRIAL FIBRILLATION, UNSPECIFIED TYPE (H): ICD-10-CM

## 2021-03-25 DIAGNOSIS — Z79.01 LONG TERM CURRENT USE OF ANTICOAGULANT THERAPY: ICD-10-CM

## 2021-03-25 LAB
CAPILLARY BLOOD COLLECTION: NORMAL
INR PPP: 2.6 (ref 0.86–1.14)

## 2021-03-25 PROCEDURE — 85610 PROTHROMBIN TIME: CPT | Performed by: FAMILY MEDICINE

## 2021-03-25 PROCEDURE — 36416 COLLJ CAPILLARY BLOOD SPEC: CPT | Performed by: FAMILY MEDICINE

## 2021-03-25 NOTE — PROGRESS NOTES
Patient returned call to [facility], left.    Please followup with patient when able.    Thanks,   Ema Welch RN

## 2021-03-25 NOTE — PROGRESS NOTES
ANTICOAGULATION MANAGEMENT     Patient Name:  Pooja Swartz  Date:  3/25/2021    ASSESSMENT /SUBJECTIVE:    Today's INR result of 2.6 is therapeutic. Goal INR of 2.0-3.0      Warfarin dose taken: Warfarin taken as instructed    Diet: No new diet changes affecting INR    Medication changes/ interactions: No new medications/supplements affecting INR    Previous INR: Therapeutic     S/S of bleeding or thromboembolism: No    New injury or illness: No    Upcoming surgery, procedure or cardioversion: No    Additional findings: None      PLAN:    Telephone call with Pooja regarding INR result and instructed:     Warfarin Dosing Instructions: Continue your current warfarin dose 4 mg Thursday and 3 mg all other days    Instructed patient to follow up no later than: 4 weeks  Lab visit scheduled    Education provided: Target INR goal and significance of current INR result, Importance of therapeutic range, Importance of following up for INR monitoring at instructed interval and Importance of taking warfarin as instructed      Pooja verbalizes understanding and agrees to warfarin dosing plan.    Instructed to call the Anticoagulation Clinic for any changes, questions or concerns. (#[phone])        Elisabeth Steven RN      OBJECTIVE:  Recent labs: (last 7 days)     21  0845   INR 2.60*         No question data found.  Anticoagulation Summary  As of 3/25/2021    INR goal:  2.0-3.0   TTR:  54.9 % (1 y)   INR used for dosin.60 (3/25/2021)   Warfarin maintenance plan:  4 mg (2 mg x 2) every Thu; 3 mg (2 mg x 1.5) all other days   Full warfarin instructions:  4 mg every Thu; 3 mg all other days   Weekly warfarin total:  22 mg   No change documented:  Elisabeth Steven RN   Plan last modified:  Mary Faith RN (2021)   Next INR check:     Priority:  Maintenance   Target end date:  Indefinite    Indications    Atrial fibrillation (H) [I48.91]  Long-term (current) use of anticoagulants [Z79.01]  [Z79.01]  Atrial fibrillation  unspecified type (H) [I48.91]             Anticoagulation Episode Summary     INR check location:      Preferred lab:      Send INR reminders to:  [facility]    Comments:  * warfarin 2mg tabs      Anticoagulation Care Providers     Provider Role Specialty Phone number    Amanda Renee MD Referring Family Medicine [phone]

## 2021-04-22 ENCOUNTER — ANTICOAGULATION THERAPY VISIT (OUTPATIENT)
Dept: ANTICOAGULATION | Facility: CLINIC | Age: 86
End: 2021-04-22

## 2021-04-22 DIAGNOSIS — Z79.01 LONG TERM CURRENT USE OF ANTICOAGULANT THERAPY: ICD-10-CM

## 2021-04-22 DIAGNOSIS — I48.91 ATRIAL FIBRILLATION, UNSPECIFIED TYPE (H): ICD-10-CM

## 2021-04-22 LAB
CAPILLARY BLOOD COLLECTION: NORMAL
INR PPP: 2.1 (ref 0.86–1.14)

## 2021-04-22 PROCEDURE — 36416 COLLJ CAPILLARY BLOOD SPEC: CPT | Performed by: FAMILY MEDICINE

## 2021-04-22 PROCEDURE — 85610 PROTHROMBIN TIME: CPT | Performed by: FAMILY MEDICINE

## 2021-04-22 NOTE — PROGRESS NOTES
ANTICOAGULATION MANAGEMENT     Patient Name:  Pooja Swartz  Date:  2021    ASSESSMENT /SUBJECTIVE:    Today's INR result of 2.10 is therapeutic. Goal INR of 2.0-3.0      Warfarin dose taken: Warfarin taken as instructed    Diet: No new diet changes affecting INR    Medication changes/ interactions: No new medications/supplements affecting INR    Previous INR: Therapeutic     S/S of bleeding or thromboembolism: No    New injury or illness: No    Upcoming surgery, procedure or cardioversion: No    Additional findings: None      PLAN:    Telephone call with Pooja regarding INR result and instructed:     Warfarin Dosing Instructions: Continue your current warfarin dose 4 mg every Thu; 3 mg all other days    Instructed patient to follow up no later than: 5 weeks  Lab visit scheduled    Education provided: Target INR goal and significance of current INR result      Pooja verbalizes understanding and agrees to warfarin dosing plan.    Instructed to call the Anticoagulation Clinic for any changes, questions or concerns. (#[phone])        Mary Faith RN      OBJECTIVE:  Recent labs: (last 7 days)     21  0850   INR 2.10*         INR assessment THER    Recheck INR In: 4 WEEKS    INR Location Clinic      Anticoagulation Summary  As of 2021    INR goal:  2.0-3.0   TTR:  54.9 % (1 y)   INR used for dosin.10 (2021)   Warfarin maintenance plan:  4 mg (2 mg x 2) every Thu; 3 mg (2 mg x 1.5) all other days   Full warfarin instructions:  4 mg every Thu; 3 mg all other days   Weekly warfarin total:  22 mg   No change documented:  Mary Faith RN   Plan last modified:  Mary Faith RN (2021)   Next INR check:  2021   Priority:  Maintenance   Target end date:  Indefinite    Indications    Atrial fibrillation (H) [I48.91]  Long-term (current) use of anticoagulants [Z79.01] [Z79.01]  Atrial fibrillation  unspecified type (H) [I48.91]             Anticoagulation Episode  Summary     INR check location:      Preferred lab:      Send INR reminders to:  [facility]    Comments:  * warfarin 2mg tabs      Anticoagulation Care Providers     Provider Role Specialty Phone number    Amanda Renee MD Referring Family Medicine [phone]       Anticoagulation Management    Unable to reach Pooja today.    Today's INR result of 2.10 is therapeutic (goal INR of 2.0-3.0).  Result received from: Clinic Lab    Follow up required to confirm warfarin dose taken and assess for changes    Left message requesting return call to [facility]. Tentative plan: if no changes/concerns continue same dose and recheck INR in 4 weeks      Anticoagulation clinic to follow up    Mary Faith RN

## 2021-05-27 ENCOUNTER — ANTICOAGULATION THERAPY VISIT (OUTPATIENT)
Dept: ANTICOAGULATION | Facility: CLINIC | Age: 86
End: 2021-05-27

## 2021-05-27 DIAGNOSIS — I48.91 ATRIAL FIBRILLATION, UNSPECIFIED TYPE (H): ICD-10-CM

## 2021-05-27 DIAGNOSIS — Z79.01 LONG TERM CURRENT USE OF ANTICOAGULANT THERAPY: ICD-10-CM

## 2021-05-27 LAB
CAPILLARY BLOOD COLLECTION: NORMAL
INR PPP: 1.7 (ref 0.86–1.14)

## 2021-05-27 PROCEDURE — 36416 COLLJ CAPILLARY BLOOD SPEC: CPT | Performed by: FAMILY MEDICINE

## 2021-05-27 PROCEDURE — 85610 PROTHROMBIN TIME: CPT | Performed by: FAMILY MEDICINE

## 2021-05-27 NOTE — PROGRESS NOTES
ANTICOAGULATION MANAGEMENT     Patient Name:  Pooja Swartz  Date:  2021    ASSESSMENT /SUBJECTIVE:    Today's INR result of 1.7 is subtherapeutic. Goal INR of 2.0-3.0      Warfarin dose taken: Warfarin taken as instructed    Diet: Increased greens/vitamin K in diet; plans to resume previous intake    Medication changes/ interactions: No new medications/supplements affecting INR    Previous INR: Therapeutic     S/S of bleeding or thromboembolism: yes - pt has more bruises but believes they are from bumping herself on things. Pt agreed to continue to monitor them.    New injury or illness: No    Upcoming surgery, procedure or cardioversion: No    Additional findings: None      PLAN:    Telephone call with Pooja regarding INR result and instructed:     Warfarin Dosing Instructions: 6 mg  then continue your current warfarin dose of 4 mg every Thur; 3 mg all other days    Instructed patient to follow up no later than: 2 weeks  Lab visit scheduled    Education provided: Please call back if any changes to your diet, medications or how you've been taking warfarin and Monitoring for clotting signs and symptoms      Pt verbalizes understanding and agrees to warfarin dosing plan.    Instructed to call the Anticoagulation Clinic for any changes, questions or concerns. (#[phone])        Monica Mg RN      OBJECTIVE:  Recent labs: (last 7 days)     21  0854   INR 1.70*         INR assessment SUB    Recheck INR In: 2 WEEKS    INR Location Clinic      Anticoagulation Summary  As of 2021    INR goal:  2.0-3.0   TTR:  49.0 % (1 y)   INR used for dosin.70 (2021)   Warfarin maintenance plan:  4 mg (2 mg x 2) every Thu; 3 mg (2 mg x 1.5) all other days   Full warfarin instructions:  : 6 mg; Otherwise 4 mg every Thu; 3 mg all other days   Weekly warfarin total:  22 mg   Plan last modified:  Mary Faith RN (2021)   Next INR check:  6/10/2021   Priority:  High   Target end date:   Indefinite    Indications    Atrial fibrillation (H) [I48.91]  Long-term (current) use of anticoagulants [Z79.01] [Z79.01]  Atrial fibrillation  unspecified type (H) [I48.91]             Anticoagulation Episode Summary     INR check location:      Preferred lab:      Send INR reminders to:  [facility]    Comments:  * warfarin 2mg tabs      Anticoagulation Care Providers     Provider Role Specialty Phone number    Amanda Renee MD Referring Family Medicine [phone]

## 2021-05-28 DIAGNOSIS — Z79.01 LONG TERM CURRENT USE OF ANTICOAGULANT THERAPY: ICD-10-CM

## 2021-05-28 DIAGNOSIS — I48.19 PERSISTENT ATRIAL FIBRILLATION (H): ICD-10-CM

## 2021-05-28 RX ORDER — WARFARIN SODIUM 2 MG/1
TABLET ORAL
Qty: 140 TABLET | Refills: 0 | Status: SHIPPED | OUTPATIENT
Start: 2021-05-28 | End: 2021-06-10

## 2021-05-28 NOTE — TELEPHONE ENCOUNTER
Current warfarin dose:  Warfarin maintenance plan:  4 mg (2 mg x 2) every Thu; 3 mg (2 mg x 1.5) all other days   Full warfarin instructions:  5/27: 6 mg; Otherwise 4 mg every Thu; 3 mg all other days   Weekly warfarin total:  22 mg   Next INR check:  6/10/2021     Last INR result:  INR   Date Value Ref Range Status   05/27/2021 1.70 (H) 0.86 - 1.14 Final     Comment:     This test is intended for monitoring Coumadin therapy.  Results are not   accurate in patients with prolonged INR due to factor deficiency.       Last office visit: 11/18/2020    Refill authorized per [facility] protocol.    Edwin OLMEDO RN, CACP

## 2021-06-10 ENCOUNTER — ANTICOAGULATION THERAPY VISIT (OUTPATIENT)
Dept: ANTICOAGULATION | Facility: CLINIC | Age: 86
End: 2021-06-10

## 2021-06-10 DIAGNOSIS — I48.91 ATRIAL FIBRILLATION, UNSPECIFIED TYPE (H): ICD-10-CM

## 2021-06-10 DIAGNOSIS — Z79.01 LONG TERM CURRENT USE OF ANTICOAGULANT THERAPY: ICD-10-CM

## 2021-06-10 DIAGNOSIS — I48.19 PERSISTENT ATRIAL FIBRILLATION (H): ICD-10-CM

## 2021-06-10 LAB
CAPILLARY BLOOD COLLECTION: NORMAL
INR PPP: 1.7 (ref 0.86–1.14)

## 2021-06-10 PROCEDURE — 85610 PROTHROMBIN TIME: CPT | Performed by: FAMILY MEDICINE

## 2021-06-10 PROCEDURE — 36416 COLLJ CAPILLARY BLOOD SPEC: CPT | Performed by: FAMILY MEDICINE

## 2021-06-10 RX ORDER — WARFARIN SODIUM 2 MG/1
TABLET ORAL
Qty: 140 TABLET | Refills: 0 | COMMUNITY
Start: 2021-06-10 | End: 2021-08-23

## 2021-06-10 NOTE — PROGRESS NOTES
ANTICOAGULATION MANAGEMENT     Patient Name:  Pooja Swartz  Date:  6/10/2021    ASSESSMENT /SUBJECTIVE:    Today's INR result of 1.7 is subtherapeutic. Goal INR of 2.0-3.0      Warfarin dose taken: Warfarin taken as instructed    Diet: No new diet changes affecting INR    Medication changes/ interactions: No new medications/supplements affecting INR    Previous INR: Subtherapeutic     S/S of bleeding or thromboembolism: No    New injury or illness: No    Upcoming surgery, procedure or cardioversion: No    Additional findings: None      PLAN:    Warfarin Dosing Instructions: Change your warfarin dose to 4 mg Mon, Thur, Sat; 3 mg all other days  . (9.1 % change)    Instructed patient to follow up no later than: 2 weeks  Lab visit scheduled    Education provided: Please call back if any changes to your diet, medications or how you've been taking warfarin and Monitoring for clotting signs and symptoms    Telephone call with Pooja whom verbalizes understanding and agrees to plan    Instructed to call the Anticoagulation Clinic for any changes, questions or concerns. (#[phone])        Monica Mg RN      OBJECTIVE:  Recent labs: (last 7 days)     06/10/21  0850   INR 1.70*         No question data found.  Anticoagulation Summary  As of 6/10/2021    INR goal:  2.0-3.0   TTR:  49.0 % (1 y)   INR used for dosin.70 (6/10/2021)   Warfarin maintenance plan:  4 mg (2 mg x 2) every Mon, Wed, Fri; 3 mg (2 mg x 1.5) all other days   Full warfarin instructions:  6/10: 6 mg; Otherwise 4 mg every Mon, Wed, Fri; 3 mg all other days   Weekly warfarin total:  24 mg   Plan last modified:  Monica Mg, RN (6/10/2021)   Next INR check:  2021   Priority:  High   Target end date:  Indefinite    Indications    Atrial fibrillation (H) [I48.91]  Long-term (current) use of anticoagulants [Z79.01] [Z79.01]  Atrial fibrillation  unspecified type (H) [I48.91]             Anticoagulation Episode Summary     INR check location:       Preferred lab:      Send INR reminders to:  [facility]    Comments:  * warfarin 2mg tabs      Anticoagulation Care Providers     Provider Role Specialty Phone number    Amanda Renee MD Referring Family Medicine [phone]

## 2021-06-24 ENCOUNTER — ANTICOAGULATION THERAPY VISIT (OUTPATIENT)
Dept: ANTICOAGULATION | Facility: CLINIC | Age: 86
End: 2021-06-24

## 2021-06-24 DIAGNOSIS — I48.91 ATRIAL FIBRILLATION, UNSPECIFIED TYPE (H): ICD-10-CM

## 2021-06-24 DIAGNOSIS — Z79.01 LONG TERM CURRENT USE OF ANTICOAGULANT THERAPY: ICD-10-CM

## 2021-06-24 LAB
CAPILLARY BLOOD COLLECTION: NORMAL
INR PPP: 2.5 (ref 0.86–1.14)

## 2021-06-24 PROCEDURE — 85610 PROTHROMBIN TIME: CPT | Performed by: FAMILY MEDICINE

## 2021-06-24 PROCEDURE — 36416 COLLJ CAPILLARY BLOOD SPEC: CPT | Performed by: FAMILY MEDICINE

## 2021-06-24 NOTE — PROGRESS NOTES
ANTICOAGULATION MANAGEMENT     Pooja Swartz 89 year old female is on warfarin with therapeutic INR result. (Goal INR 2.0-3.0)    Recent labs: (last 7 days)     06/24/21  0848   INR 2.50*       ASSESSMENT     Source(s): Patient/Caregiver Call       Warfarin doses taken: Warfarin taken as instructed    Diet: No new diet changes identified    New illness, injury, or hospitalization: No    Medication/supplement changes: None noted    Signs or symptoms of bleeding or clotting: No    Previous INR: Subtherapeutic    Additional findings: None     PLAN     Recommended plan for no diet, medication or health factor changes affecting INR     Dosing Instructions: Continue your current warfarin dose with next INR in 4 weeks       Summary  As of 6/24/2021    Full warfarin instructions:  4 mg every Mon, Thu, Sat; 3 mg all other days   Next INR check:  7/22/2021             Telephone call with Pooja whom verbalizes understanding and agrees to plan    Lab visit scheduled    Education provided: Please call back if any changes to your diet, medications or how you've been taking warfarin    Plan made per [facility] anticoagulation protocol    Monica Mg RN  Anticoagulation Clinic  6/24/2021    _______________________________________________________________________     Anticoagulation Episode Summary     Current INR goal:  2.0-3.0   TTR:  51.4 % (1 y)   Target end date:  Indefinite   Send INR reminders to:  [facility]    Indications    Atrial fibrillation (H) [I48.91]  Long-term (current) use of anticoagulants [Z79.01] [Z79.01]  Atrial fibrillation  unspecified type (H) [I48.91]           Comments:  * warfarin 2mg tabs         Anticoagulation Care Providers     Provider Role Specialty Phone number    Amanda Renee MD Referring Family Medicine [phone]

## 2021-06-29 DIAGNOSIS — K52.9 CHRONIC DIARRHEA: ICD-10-CM

## 2021-07-01 RX ORDER — BUDESONIDE 3 MG/1
3 CAPSULE, COATED PELLETS ORAL EVERY MORNING
Qty: 30 CAPSULE | Refills: 6 | Status: SHIPPED | OUTPATIENT
Start: 2021-07-01 | End: 2022-01-24

## 2021-07-12 DIAGNOSIS — I48.91 ATRIAL FIBRILLATION, UNSPECIFIED TYPE (H): Primary | ICD-10-CM

## 2021-07-12 DIAGNOSIS — Z79.01 LONG TERM CURRENT USE OF ANTICOAGULANT THERAPY: ICD-10-CM

## 2021-07-22 ENCOUNTER — LAB (OUTPATIENT)
Dept: LAB | Facility: CLINIC | Age: 86
End: 2021-07-22
Payer: COMMERCIAL

## 2021-07-22 ENCOUNTER — ANTICOAGULATION THERAPY VISIT (OUTPATIENT)
Dept: ANTICOAGULATION | Facility: CLINIC | Age: 86
End: 2021-07-22

## 2021-07-22 DIAGNOSIS — I48.91 ATRIAL FIBRILLATION (H): Primary | ICD-10-CM

## 2021-07-22 DIAGNOSIS — I48.91 ATRIAL FIBRILLATION, UNSPECIFIED TYPE (H): ICD-10-CM

## 2021-07-22 DIAGNOSIS — Z79.01 LONG TERM CURRENT USE OF ANTICOAGULANT THERAPY: ICD-10-CM

## 2021-07-22 LAB — INR BLD: 2.3 (ref 0.9–1.1)

## 2021-07-22 PROCEDURE — 36416 COLLJ CAPILLARY BLOOD SPEC: CPT

## 2021-07-22 PROCEDURE — 85610 PROTHROMBIN TIME: CPT

## 2021-07-22 NOTE — PROGRESS NOTES
ANTICOAGULATION MANAGEMENT     Pooja Swartz 89 year old female is on warfarin with therapeutic INR result. (Goal INR 2.0-3.0)    Recent labs: (last 7 days)     07/22/21  1014   INR 2.3*       ASSESSMENT     Source(s): Patient/Caregiver Call       Warfarin doses taken: Warfarin taken as instructed    Diet: No new diet changes identified    New illness, injury, or hospitalization: No    Medication/supplement changes: None noted    Signs or symptoms of bleeding or clotting: No    Previous INR: Therapeutic last visit; previously outside of goal range    Additional findings: None     PLAN     Recommended plan for no diet, medication or health factor changes affecting INR     Dosing Instructions: Continue your current warfarin dose with next INR in 5 weeks       Summary  As of 7/22/2021    Full warfarin instructions:  4 mg every Mon, Thu, Sat; 3 mg all other days   Next INR check:  8/26/2021             Telephone call with Pooja who verbalizes understanding and agrees to plan    Lab visit scheduled    Education provided: Contact [phone]  with any changes, questions or concerns.     Plan made per [facility] anticoagulation protocol    Mary Faith RN  Anticoagulation Clinic  7/22/2021    _______________________________________________________________________     Anticoagulation Episode Summary     Current INR goal:  2.0-3.0   TTR:  54.8 % (1 y)   Target end date:  Indefinite   Send INR reminders to:  [facility]    Indications    Atrial fibrillation (H) [I48.91]  Long-term (current) use of anticoagulants [Z79.01] [Z79.01]  Atrial fibrillation  unspecified type (H) [I48.91]           Comments:  * warfarin 2mg tabs         Anticoagulation Care Providers     Provider Role Specialty Phone number    Amanda Renee MD Referring Family Medicine [phone]

## 2021-08-02 ENCOUNTER — TELEPHONE (OUTPATIENT)
Dept: FAMILY MEDICINE | Facility: CLINIC | Age: 86
End: 2021-08-02

## 2021-08-21 DIAGNOSIS — I48.19 PERSISTENT ATRIAL FIBRILLATION (H): ICD-10-CM

## 2021-08-21 DIAGNOSIS — Z79.01 LONG TERM CURRENT USE OF ANTICOAGULANT THERAPY: ICD-10-CM

## 2021-08-23 RX ORDER — WARFARIN SODIUM 2 MG/1
TABLET ORAL
Qty: 140 TABLET | Refills: 0 | Status: SHIPPED | OUTPATIENT
Start: 2021-08-23 | End: 2021-08-26

## 2021-08-25 ENCOUNTER — TRANSFERRED RECORDS (OUTPATIENT)
Dept: HEALTH INFORMATION MANAGEMENT | Facility: CLINIC | Age: 86
End: 2021-08-25

## 2021-08-26 ENCOUNTER — APPOINTMENT (OUTPATIENT)
Dept: CARDIOLOGY | Facility: HOSPITAL | Age: 86
End: 2021-08-26
Attending: NURSE PRACTITIONER
Payer: COMMERCIAL

## 2021-08-26 ENCOUNTER — OFFICE VISIT (OUTPATIENT)
Dept: FAMILY MEDICINE | Facility: CLINIC | Age: 86
End: 2021-08-26
Payer: COMMERCIAL

## 2021-08-26 ENCOUNTER — TRANSCRIBE ORDERS (OUTPATIENT)
Dept: CARDIOLOGY | Facility: CLINIC | Age: 86
End: 2021-08-26

## 2021-08-26 ENCOUNTER — HOSPITAL ENCOUNTER (OUTPATIENT)
Facility: HOSPITAL | Age: 86
Discharge: HOME | End: 2021-08-27
Attending: EMERGENCY MEDICINE | Admitting: FAMILY MEDICINE
Payer: COMMERCIAL

## 2021-08-26 ENCOUNTER — TELEPHONE (OUTPATIENT)
Dept: CARDIOLOGY | Facility: CLINIC | Age: 86
End: 2021-08-26

## 2021-08-26 ENCOUNTER — LAB (OUTPATIENT)
Dept: LAB | Facility: CLINIC | Age: 86
End: 2021-08-26

## 2021-08-26 VITALS
HEART RATE: 48 BPM | OXYGEN SATURATION: 98 % | WEIGHT: 126 LBS | DIASTOLIC BLOOD PRESSURE: 60 MMHG | BODY MASS INDEX: 24.74 KG/M2 | SYSTOLIC BLOOD PRESSURE: 90 MMHG | HEIGHT: 60 IN | TEMPERATURE: 96 F | RESPIRATION RATE: 16 BRPM

## 2021-08-26 DIAGNOSIS — Z95.0 CARDIAC PACEMAKER IN SITU: Primary | ICD-10-CM

## 2021-08-26 DIAGNOSIS — Z79.01 ANTICOAGULATED ON COUMADIN: ICD-10-CM

## 2021-08-26 DIAGNOSIS — R00.1 BRADYCARDIA: Primary | ICD-10-CM

## 2021-08-26 DIAGNOSIS — H40.003 GLAUCOMA SUSPECT, BILATERAL: ICD-10-CM

## 2021-08-26 DIAGNOSIS — I48.91 ATRIAL FIBRILLATION, UNSPECIFIED TYPE (H): ICD-10-CM

## 2021-08-26 DIAGNOSIS — I10 HYPERTENSION GOAL BP (BLOOD PRESSURE) < 140/90: ICD-10-CM

## 2021-08-26 DIAGNOSIS — Z85.828 HISTORY OF BASAL CELL CARCINOMA: ICD-10-CM

## 2021-08-26 DIAGNOSIS — I45.5 SINUS ARREST: ICD-10-CM

## 2021-08-26 DIAGNOSIS — M1A.9XX1 CHRONIC TOPHACEOUS GOUT: ICD-10-CM

## 2021-08-26 DIAGNOSIS — Z79.01 LONG TERM CURRENT USE OF ANTICOAGULANT THERAPY: ICD-10-CM

## 2021-08-26 DIAGNOSIS — I49.5 SINUS NODE DYSFUNCTION (H): Primary | ICD-10-CM

## 2021-08-26 DIAGNOSIS — R00.1 SYMPTOMATIC BRADYCARDIA: ICD-10-CM

## 2021-08-26 DIAGNOSIS — D48.5 NEOPLASM OF UNCERTAIN BEHAVIOR OF SKIN: ICD-10-CM

## 2021-08-26 LAB
ANION GAP SERPL CALCULATED.3IONS-SCNC: 11 MMOL/L (ref 5–18)
ATRIAL RATE - MUSE: 44 BPM
BUN SERPL-MCNC: 39 MG/DL (ref 8–28)
CALCIUM SERPL-MCNC: 10 MG/DL (ref 8.5–10.5)
CHLORIDE BLD-SCNC: 106 MMOL/L (ref 98–107)
CO2 SERPL-SCNC: 20 MMOL/L (ref 22–31)
CREAT SERPL-MCNC: 1.11 MG/DL (ref 0.6–1.1)
DIASTOLIC BLOOD PRESSURE - MUSE: NORMAL MMHG
ERYTHROCYTE [DISTWIDTH] IN BLOOD BY AUTOMATED COUNT: 15.6 % (ref 10–15)
GFR SERPL CREATININE-BSD FRML MDRD: 42 ML/MIN/1.73M2
GLUCOSE BLD-MCNC: 121 MG/DL (ref 70–125)
HCT VFR BLD AUTO: 36 % (ref 35–47)
HGB BLD-MCNC: 11.7 G/DL (ref 11.7–15.7)
INR PPP: 2 (ref 0.85–1.15)
INTERPRETATION ECG - MUSE: NORMAL
LVEF ECHO: NORMAL
MAGNESIUM SERPL-MCNC: 2 MG/DL (ref 1.8–2.6)
MCH RBC QN AUTO: 33.6 PG (ref 26.5–33)
MCHC RBC AUTO-ENTMCNC: 32.5 G/DL (ref 31.5–36.5)
MCV RBC AUTO: 103 FL (ref 78–100)
P AXIS - MUSE: NORMAL DEGREES
PLATELET # BLD AUTO: 208 10E3/UL (ref 150–450)
POTASSIUM BLD-SCNC: 4.8 MMOL/L (ref 3.5–5)
PR INTERVAL - MUSE: NORMAL MS
QRS DURATION - MUSE: 118 MS
QT - MUSE: 480 MS
QTC - MUSE: 410 MS
R AXIS - MUSE: -15 DEGREES
RBC # BLD AUTO: 3.48 10E6/UL (ref 3.8–5.2)
SARS-COV-2 RNA RESP QL NAA+PROBE: NEGATIVE
SODIUM SERPL-SCNC: 137 MMOL/L (ref 136–145)
SYSTOLIC BLOOD PRESSURE - MUSE: NORMAL MMHG
T AXIS - MUSE: 40 DEGREES
TROPONIN I SERPL-MCNC: <0.01 NG/ML (ref 0–0.29)
TSH SERPL DL<=0.005 MIU/L-ACNC: 0.96 UIU/ML (ref 0.3–5)
VENTRICULAR RATE- MUSE: 44 BPM
WBC # BLD AUTO: 9.6 10E3/UL (ref 4–11)

## 2021-08-26 PROCEDURE — C9803 HOPD COVID-19 SPEC COLLECT: HCPCS

## 2021-08-26 PROCEDURE — 99285 EMERGENCY DEPT VISIT HI MDM: CPT | Mod: 25

## 2021-08-26 PROCEDURE — 83735 ASSAY OF MAGNESIUM: CPT | Performed by: EMERGENCY MEDICINE

## 2021-08-26 PROCEDURE — 93000 ELECTROCARDIOGRAM COMPLETE: CPT | Performed by: FAMILY MEDICINE

## 2021-08-26 PROCEDURE — 93321 DOPPLER ECHO F-UP/LMTD STD: CPT | Mod: 26 | Performed by: INTERNAL MEDICINE

## 2021-08-26 PROCEDURE — 210N000001 HC R&B IMCU HEART CARE

## 2021-08-26 PROCEDURE — 99219 PR INITIAL OBSERVATION CARE,LEVEL II: CPT | Mod: GC | Performed by: FAMILY MEDICINE

## 2021-08-26 PROCEDURE — 85610 PROTHROMBIN TIME: CPT | Performed by: EMERGENCY MEDICINE

## 2021-08-26 PROCEDURE — 99223 1ST HOSP IP/OBS HIGH 75: CPT | Performed by: INTERNAL MEDICINE

## 2021-08-26 PROCEDURE — 93325 DOPPLER ECHO COLOR FLOW MAPG: CPT | Mod: 26 | Performed by: INTERNAL MEDICINE

## 2021-08-26 PROCEDURE — 80048 BASIC METABOLIC PNL TOTAL CA: CPT | Performed by: EMERGENCY MEDICINE

## 2021-08-26 PROCEDURE — 36415 COLL VENOUS BLD VENIPUNCTURE: CPT | Performed by: EMERGENCY MEDICINE

## 2021-08-26 PROCEDURE — 87635 SARS-COV-2 COVID-19 AMP PRB: CPT | Performed by: EMERGENCY MEDICINE

## 2021-08-26 PROCEDURE — 93005 ELECTROCARDIOGRAM TRACING: CPT | Performed by: EMERGENCY MEDICINE

## 2021-08-26 PROCEDURE — 99207 PR INPT ADMISSION FROM CLINIC: CPT | Performed by: FAMILY MEDICINE

## 2021-08-26 PROCEDURE — 84443 ASSAY THYROID STIM HORMONE: CPT | Performed by: EMERGENCY MEDICINE

## 2021-08-26 PROCEDURE — 84484 ASSAY OF TROPONIN QUANT: CPT | Performed by: EMERGENCY MEDICINE

## 2021-08-26 PROCEDURE — 85027 COMPLETE CBC AUTOMATED: CPT | Performed by: EMERGENCY MEDICINE

## 2021-08-26 PROCEDURE — 250N000013 HC RX MED GY IP 250 OP 250 PS 637: Performed by: FAMILY MEDICINE

## 2021-08-26 PROCEDURE — 93005 ELECTROCARDIOGRAM TRACING: CPT | Performed by: STUDENT IN AN ORGANIZED HEALTH CARE EDUCATION/TRAINING PROGRAM

## 2021-08-26 PROCEDURE — 250N000009 HC RX 250: Performed by: FAMILY MEDICINE

## 2021-08-26 PROCEDURE — 93350 STRESS TTE ONLY: CPT | Mod: 26 | Performed by: INTERNAL MEDICINE

## 2021-08-26 PROCEDURE — C8924 2D TTE W OR W/O FOL W/CON,FU: HCPCS

## 2021-08-26 PROCEDURE — 255N000002 HC RX 255 OP 636: Performed by: FAMILY MEDICINE

## 2021-08-26 RX ORDER — AMLODIPINE BESYLATE 5 MG/1
10 TABLET ORAL AT BEDTIME
Status: DISCONTINUED | OUTPATIENT
Start: 2021-08-26 | End: 2021-08-27 | Stop reason: HOSPADM

## 2021-08-26 RX ORDER — ONDANSETRON 2 MG/ML
4 INJECTION INTRAMUSCULAR; INTRAVENOUS EVERY 6 HOURS PRN
Status: DISCONTINUED | OUTPATIENT
Start: 2021-08-26 | End: 2021-08-27 | Stop reason: HOSPADM

## 2021-08-26 RX ORDER — LIDOCAINE 40 MG/G
CREAM TOPICAL
Status: DISCONTINUED | OUTPATIENT
Start: 2021-08-26 | End: 2021-08-27 | Stop reason: HOSPADM

## 2021-08-26 RX ORDER — POLYETHYLENE GLYCOL 3350 17 G/17G
17 POWDER, FOR SOLUTION ORAL DAILY PRN
Status: DISCONTINUED | OUTPATIENT
Start: 2021-08-26 | End: 2021-08-27 | Stop reason: HOSPADM

## 2021-08-26 RX ORDER — ONDANSETRON 4 MG/1
4 TABLET, ORALLY DISINTEGRATING ORAL EVERY 6 HOURS PRN
Status: DISCONTINUED | OUTPATIENT
Start: 2021-08-26 | End: 2021-08-27 | Stop reason: HOSPADM

## 2021-08-26 RX ORDER — BUDESONIDE 3 MG/1
3 CAPSULE, COATED PELLETS ORAL EVERY MORNING
Status: CANCELLED | OUTPATIENT
Start: 2021-08-27

## 2021-08-26 RX ORDER — DORZOLAMIDE HCL 20 MG/ML
1 SOLUTION/ DROPS OPHTHALMIC 3 TIMES DAILY
Status: DISCONTINUED | OUTPATIENT
Start: 2021-08-26 | End: 2021-08-27 | Stop reason: HOSPADM

## 2021-08-26 RX ORDER — WARFARIN SODIUM 1 MG/1
3-4 TABLET ORAL DAILY
Status: DISCONTINUED | OUTPATIENT
Start: 2021-08-26 | End: 2021-08-27 | Stop reason: HOSPADM

## 2021-08-26 RX ORDER — ALLOPURINOL 100 MG/1
100 TABLET ORAL DAILY
Status: DISCONTINUED | OUTPATIENT
Start: 2021-08-27 | End: 2021-08-27 | Stop reason: HOSPADM

## 2021-08-26 RX ORDER — FEXOFENADINE HCL 60 MG/1
180 TABLET, FILM COATED ORAL DAILY
Status: DISCONTINUED | OUTPATIENT
Start: 2021-08-27 | End: 2021-08-27 | Stop reason: HOSPADM

## 2021-08-26 RX ORDER — BRIMONIDINE TARTRATE 2 MG/ML
1 SOLUTION/ DROPS OPHTHALMIC 3 TIMES DAILY
Status: ON HOLD | COMMUNITY
End: 2021-08-27

## 2021-08-26 RX ORDER — AMLODIPINE BESYLATE 10 MG/1
10 TABLET ORAL DAILY
Qty: 90 TABLET | Refills: 3 | Status: SHIPPED | OUTPATIENT
Start: 2021-08-26 | End: 2022-10-13

## 2021-08-26 RX ORDER — LATANOPROSTENE BUNOD 0.24 MG/ML
1 SOLUTION/ DROPS OPHTHALMIC AT BEDTIME
Status: ON HOLD | COMMUNITY
End: 2021-08-27

## 2021-08-26 RX ORDER — ACETAMINOPHEN 325 MG/1
650 TABLET ORAL EVERY 6 HOURS PRN
Status: DISCONTINUED | OUTPATIENT
Start: 2021-08-26 | End: 2021-08-27 | Stop reason: HOSPADM

## 2021-08-26 RX ORDER — WARFARIN SODIUM 2 MG/1
TABLET ORAL
COMMUNITY
End: 2021-11-23

## 2021-08-26 RX ORDER — LATANOPROST 50 UG/ML
1 SOLUTION/ DROPS OPHTHALMIC AT BEDTIME
Status: DISCONTINUED | OUTPATIENT
Start: 2021-08-26 | End: 2021-08-27 | Stop reason: HOSPADM

## 2021-08-26 RX ORDER — ACETAMINOPHEN 650 MG/1
650 SUPPOSITORY RECTAL EVERY 6 HOURS PRN
Status: DISCONTINUED | OUTPATIENT
Start: 2021-08-26 | End: 2021-08-27 | Stop reason: HOSPADM

## 2021-08-26 RX ORDER — BRIMONIDINE TARTRATE 2 MG/ML
1 SOLUTION/ DROPS OPHTHALMIC 3 TIMES DAILY
Status: DISCONTINUED | OUTPATIENT
Start: 2021-08-26 | End: 2021-08-27 | Stop reason: HOSPADM

## 2021-08-26 RX ORDER — ALLOPURINOL 100 MG/1
100 TABLET ORAL DAILY
Qty: 90 TABLET | Refills: 3 | Status: SHIPPED | OUTPATIENT
Start: 2021-08-26 | End: 2022-09-26

## 2021-08-26 RX ADMIN — AMLODIPINE BESYLATE 10 MG: 5 TABLET ORAL at 22:00

## 2021-08-26 RX ADMIN — DORZOLAMIDE HCL 1 DROP: 20 SOLUTION/ DROPS OPHTHALMIC at 22:00

## 2021-08-26 RX ADMIN — LATANOPROST 1 DROP: 50 SOLUTION/ DROPS OPHTHALMIC at 22:00

## 2021-08-26 RX ADMIN — PERFLUTREN 3 ML: 6.52 INJECTION, SUSPENSION INTRAVENOUS at 14:15

## 2021-08-26 RX ADMIN — BRIMONIDINE TARTRATE 1 DROP: 2 SOLUTION OPHTHALMIC at 22:00

## 2021-08-26 ASSESSMENT — ANXIETY QUESTIONNAIRES
4. TROUBLE RELAXING: NOT AT ALL
7. FEELING AFRAID AS IF SOMETHING AWFUL MIGHT HAPPEN: NOT AT ALL
1. FEELING NERVOUS, ANXIOUS, OR ON EDGE: NOT AT ALL
7. FEELING AFRAID AS IF SOMETHING AWFUL MIGHT HAPPEN: NOT AT ALL
3. WORRYING TOO MUCH ABOUT DIFFERENT THINGS: NOT AT ALL
2. NOT BEING ABLE TO STOP OR CONTROL WORRYING: NOT AT ALL
8. IF YOU CHECKED OFF ANY PROBLEMS, HOW DIFFICULT HAVE THESE MADE IT FOR YOU TO DO YOUR WORK, TAKE CARE OF THINGS AT HOME, OR GET ALONG WITH OTHER PEOPLE?: SOMEWHAT DIFFICULT
GAD7 TOTAL SCORE: 0
5. BEING SO RESTLESS THAT IT IS HARD TO SIT STILL: NOT AT ALL
6. BECOMING EASILY ANNOYED OR IRRITABLE: NOT AT ALL
GAD7 TOTAL SCORE: 0
GAD7 TOTAL SCORE: 0

## 2021-08-26 ASSESSMENT — MIFFLIN-ST. JEOR
SCORE: 1033.18
SCORE: 888.03
SCORE: 888.03

## 2021-08-26 ASSESSMENT — PATIENT HEALTH QUESTIONNAIRE - PHQ9
10. IF YOU CHECKED OFF ANY PROBLEMS, HOW DIFFICULT HAVE THESE PROBLEMS MADE IT FOR YOU TO DO YOUR WORK, TAKE CARE OF THINGS AT HOME, OR GET ALONG WITH OTHER PEOPLE: NOT DIFFICULT AT ALL
SUM OF ALL RESPONSES TO PHQ QUESTIONS 1-9: 4
SUM OF ALL RESPONSES TO PHQ QUESTIONS 1-9: 4

## 2021-08-26 ASSESSMENT — ACTIVITIES OF DAILY LIVING (ADL): DEPENDENT_IADLS:: INDEPENDENT

## 2021-08-26 NOTE — ED PROVIDER NOTES
EMERGENCY DEPARTMENT ENCOUNTER      NAME: Pooja Swartz  AGE: 89 year old female  YOB: 1925  MRN: [id number]  EVALUATION DATE & TIME: 8/26/2021 12:19 PM    PCP: Amanda Renee    ED PROVIDER: Letty Donnelly MD    Chief Complaint   Patient presents with     Bradycardia         FINAL IMPRESSION:  1. Sinus node dysfunction (H)    2. Sinus arrest    3. Symptomatic bradycardia    4. Anticoagulated on Coumadin          ED COURSE & MEDICAL DECISION MAKING:    Pertinent Labs & Imaging studies reviewed. (See chart for details)  89 year old female with history of fibrillation on Coumadin, CKD, history of sinus node dysfunction but this was resolved per patient report after she was taken off the beta-blocker and she never needed PPM, HTN who presents to the Emergency Department for evaluation of lightheadedness dizziness and near syncopal symptoms with standing.  Found to be bradycardic in clinic.  Differential includes arrhythmia, dehydration, electrolyte abnormality, symptomatic anemia.  No associated chest pain to suspect ACS.  Patient denies any infectious symptomatology.    Patient placed on monitor, IV established and blood obtained.  Twelve-lead EKG shows junctional rhythm with rates in the 40s.  Patient hemodynamically stable actually hypertensive in the 170s.  Case had already been discussed with Dr. Pacheco of cardiology prior to patient arrival who is arranging for likely PPM assuming patient laboratory work-up is unremarkable and no signs of infectious symptomatology.  CBC, BMP, magnesium, troponin, INR, TSH, Covid swab unremarkable.  Case discussed with cardiology,, and will be admitted to medicine pending PPM placement, likely today, pending echo.    3:07 PM -awaiting inpatient bed patient patient had spontaneous rhythm change with rates into the 70s.  EKG repeated showing sinus rhythm, right bundle branch block.   .  I do not think this changes the above plan.       12:16 PM  I met with the patient for initial exam and diagnoses. Discussed plan of care and ED course including labs, imaging, and admission.  12:55 PM I spoke with cardiologist Dr. Arcos, over the phone.  At the conclusion of the encounter I discussed the results of all of the tests and the disposition. The questions were answered. The patient or family acknowledged understanding and was agreeable with the care plan.    CONSULTS:  Cardiology, Dr. Pacheco, Dr. Arcos    CRITICAL CARE:  Critical Care  Performed by: Letty Donnelly MD  Authorized by: Letty Donnelly MD     Total critical care time: 42 minutes  Criticalcare time was exclusive of separately billable procedures and treating other patients.  Critical care was necessary to treat or prevent imminent or life-threatening deterioration of the following conditions: Cardiopulmonary decompensation, death, disability  Critical care was time spent personally by me on the following activities: development of treatment plan with patient or surrogate, discussions with consultants, examination of patient, evaluation of patient's response totreatment, obtaining history from patient or surrogate, ordering and performing treatments and interventions, ordering and review of laboratory studies, ordering and review of radiographic studies and re-evaluation ofpatient's condition, this excludes any separately billable procedures.      MEDICATIONS GIVEN IN THE EMERGENCY:  Medications   lidocaine 1 % 0.1-1 mL (has no administration in time range)   lidocaine (LMX4) cream (has no administration in time range)   sodium chloride (PF) 0.9% PF flush 3 mL (3 mLs Intracatheter Not Given 8/26/21 3553)   sodium chloride (PF) 0.9% PF flush 3 mL (has no administration in time range)   sodium chloride (PF) 0.9% PF flush 3 mL (has no administration in time range)   vancomycin 1 g in 0.9% NaCl 500 mL irrigation (btl) (has no administration in time range)   dorzolamide (TRUSOPT) 2 % ophthalmic solution 1  drop (has no administration in time range)   perflutren lipid microsphere (DEFINITY) injection SUSP 3 mL (3 mLs Intravenous Given 8/26/21 1415)       NEW PRESCRIPTIONS STARTED AT TODAY'S ER VISIT  New Prescriptions    No medications on file          =================================================================    HPI    Patient information was obtained from: patient, EMS    Use of Intrepreter: N/A       Pooja Swartz is a 89 year old female with pertinent medical history of sinus arrest, hypertension, atrial fibrillation, CKD stage III, GI tract hemorrhage, acute posthemorrhegic anemia, basal cell carcinoma who presents bradycardic.    Prior to arriving to the ED, patient was at her clinic to have a checkup of her INR and coumadin. Patient suddenly experienced presyncopal symptoms like dizziness and light headedness. EKG readings at the clinic also showed sinus arrest, ventricular escape rhythm with rates in the 30s. EMS was called to deliver her to the ED. EMS reports blood sugar level of 125.    In the ED, patient reports that intermittent episodes of dizziness and weakness, exacerbated upon standing, began about 2 months ago; and last week they have become more constant. Patient has been eating and drinking normally. She has trouble urinating but is baseline. Denies heart palpitations, chest pain, or shortness of breath.      REVIEW OF SYSTEMS  Constitutional:  Denies fever, chills, weight loss or weakness  Respiratory: No SOB, wheeze or cough  Cardiovascular:  No CP, palpitations. Positive for bradycardia.  GI:  Denies abdominal pain, nausea, vomiting, diarrhea  : Denies dysuria, denies hematuria. Positive for trouble urinating (baseline).  Musculoskeletal:  Denies any new muscle/joint pain, swelling or loss of function.  Neurologic:  Denies headache, focal weakness or sensory changes. Positive for dizziness, light headedness, and weakness.    All other systems negative unless noted in HPI.      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, including hip      Other specified glaucoma      Renal insufficiency      Sinus node dysfunction (H)      Unspecified essential hypertension        PAST SURGICAL HISTORY:  Past Surgical History:   Procedure Laterality Date     ARTHROPLASTY REVISION HIP Left 8/19/2016    Procedure: ARTHROPLASTY REVISION HIP;  Surgeon: Kael Rojas MD;  Location: [facility] OR     C PELVIS/HIP JOINT SURGERY UNLISTED       C SHOULDER SURG PROC UNLISTED       C STOMACH SURGERY PROCEDURE UNLISTED       HC VASCULAR SURGERY PROCEDURE UNLIST       OPEN REDUCTION INTERNAL FIXATION FEMUR PROXIMAL Left 8/19/2016    Procedure: OPEN REDUCTION INTERNAL FIXATION FEMUR PROXIMAL;  Surgeon: Kael Rojas MD;  Location: [facility] OR     SURGICAL HISTORY OF -   11/1992    skin cancer, nose     SURGICAL HISTORY OF -   1978    right, vein stripping     SURGICAL HISTORY OF -   1968    breast biopsy     SURGICAL HISTORY OF -   1993    laparoscopic cholecystectomy     SURGICAL HISTORY OF -       tonsillectomy and adenoidectomy     SURGICAL HISTORY OF -   04/2004    left cataract     SURGICAL HISTORY OF -   08/09/2004    right total shoulder arthroplasty       CURRENT MEDICATIONS:    Prior to Admission Medications   Prescriptions Last Dose Informant Patient Reported? Taking?   COSOPT 2-0.5 % OP SOLN 8/26/2021 at am Self Yes Yes   Sig: Place 1 drop into both eyes 3 times daily 3 x s daily   Calcium-Vitamin D 600-200 MG-UNIT TABS 8/26/2021 at am Self Yes Yes   Sig: Take 1 tablet by mouth 2 times daily   Fexofenadine HCl (ALLEGRA PO) 8/26/2021 at am Self Yes Yes   Sig: Take 180 mg by mouth daily    ONE-A-DAY WOMENS OR TABS 8/26/2021 at  Unknown time Self Yes Yes   Sig: Take 1 tablet by mouth daily    allopurinol (ZYLOPRIM) 100 MG tablet 8/26/2021 at am  No Yes   Sig: Take 1 tablet (100 mg) by mouth daily   amLODIPine (NORVASC) 10 MG tablet 8/25/2021 at PM  No Yes   Sig: Take 1 tablet (10 mg) by mouth daily   brimonidine (ALPHAGAN) 0.2 % ophthalmic solution 8/26/2021 at am  Yes Yes   Sig: Place 1 drop into both eyes 3 times daily   budesonide (ENTOCORT EC) 3 MG EC capsule 8/26/2021 at am  No Yes   Sig: Take 1 capsule (3 mg) by mouth every morning   latanoprostene bunod (VYZULTA) 0.024 % SOLN ophthalmic solution 8/25/2021 at pm  Yes Yes   Sig: Place 1 drop into both eyes At Bedtime    warfarin ANTICOAGULANT (COUMADIN) 2 MG tablet 8/25/2021 at PM  Yes Yes   Sig: Take 3-4 mg by mouth daily 4 mg (2 tabs) Mon, Thurs, Sat  3 mg (1.5 tabs) all other days      Facility-Administered Medications: None       ALLERGIES:  Allergies   Allergen Reactions     Pcn [Penicillins] Hives       FAMILY HISTORY:  Family History   Problem Relation Age of Onset     Heart Disease Mother         CHF     Hypertension Mother      Hypertension Father      Hypertension Maternal Grandmother      Hypertension Maternal Grandfather      Hypertension Son      Heart Disease Son         MI       SOCIAL HISTORY:  Social History     Tobacco Use     Smoking status: Never Smoker     Smokeless tobacco: Never Used   Substance Use Topics     Alcohol use: Yes     Comment: rare     Drug use: No        VITALS:  Patient Vitals for the past 24 hrs:   BP Temp Temp src Pulse Resp SpO2 Height Weight   08/26/21 1400 (!) 195/82 -- -- (!) 47 22 95 % -- --   08/26/21 1330 (!) 171/73 -- -- (!) 43 28 96 % -- --   08/26/21 1300 (!) 174/76 -- -- (!) 49 28 96 % -- --   08/26/21 1245 (!) 162/74 -- -- (!) 44 20 96 % -- --   08/26/21 1230 (!) 170/73 -- -- (!) 40 16 97 % -- --   08/26/21 1226 (!) 175/77 98  F (36.7  C) Oral (!) 47 20 98 % 1.524 m (5') 57.2 kg (126 lb)       PHYSICAL EXAM    General Appearance:  Well-appearing, well-nourished Pleasant elderly female.  Head:  Normocephalic  Eyes:  conjunctiva/corneas clear  ENT:  membranes are moist without pallor  Neck:  Supple  Cardio:  Regular rhythm, no murmur/gallop/rub, Bradycardic in the 40s.  Pulm:  No respiratory distress, clear to auscultation bilaterally  Back:  No CVA tenderness, normal ROM  Abdomen:  Soft, non-tender, non distended,no rebound or guarding.  Extremities: Remains atraumatic.  No edema  Skin:  Skin warm, dry, no rashes  Neuro:  Alert and oriented ×3, moving all extremities, no gross sensory defects     RADIOLOGY/LABS:  Reviewed all pertinent imaging. Please see official radiology report. All pertinent labs reviewed and interpreted.    Results for orders placed or performed during the hospital encounter of 08/26/21   Basic metabolic panel   Result Value Ref Range    Sodium 137 136 - 145 mmol/L    Potassium 4.8 3.5 - 5.0 mmol/L    Chloride 106 98 - 107 mmol/L    Carbon Dioxide (CO2) 20 (L) 22 - 31 mmol/L    Anion Gap 11 5 - 18 mmol/L    Urea Nitrogen 39 (H) 8 - 28 mg/dL    Creatinine 1.11 (H) 0.60 - 1.10 mg/dL    Calcium 10.0 8.5 - 10.5 mg/dL    Glucose 121 70 - 125 mg/dL    GFR Estimate 42 (L) >60 mL/min/1.73m2   Result Value Ref Range    Magnesium 2.0 1.8 - 2.6 mg/dL   CBC with platelets   Result Value Ref Range    WBC Count 9.6 4.0 - 11.0 10e3/uL    RBC Count 3.48 (L) 3.80 - 5.20 10e6/uL    Hemoglobin 11.7 11.7 - 15.7 g/dL    Hematocrit 36.0 35.0 - 47.0 %     (H) 78 - 100 fL    MCH 33.6 (H) 26.5 - 33.0 pg    MCHC 32.5 31.5 - 36.5 g/dL    RDW 15.6 (H) 10.0 - 15.0 %    Platelet Count 208 150 - 450 10e3/uL   Result Value Ref Range    Troponin I <0.01 0.00 - 0.29 ng/mL   Protime-INR   Result Value Ref Range    INR 2.00 (H) 0.85 - 1.15   TSH with free T4 reflex   Result Value Ref Range    TSH 0.96 0.30 - 5.00 uIU/mL   Asymptomatic COVID-19 Virus (Coronavirus) by PCR Nasopharyngeal    Specimen: Nasopharyngeal; Swab   Result Value Ref Range    SARS  CoV2 PCR Negative Negative   ECG 12-lead with Muse ([facility])   Result Value Ref Range    Systolic Blood Pressure  mmHg    Diastolic Blood Pressure  mmHg    Ventricular Rate 44 BPM    Atrial Rate 44 BPM    OH Interval  ms    QRS Duration 118 ms     ms    QTc 410 ms    P Axis  degrees    R AXIS -15 degrees    T Axis 40 degrees    Interpretation ECG       Junctional bradycardia  Right bundle branch block  Abnormal ECG  When compared with ECG of 25-AUG-2016 08:51,  Junctional rhythm has replaced Sinus rhythm  Vent. rate has decreased BY  41 BPM  Right bundle branch block is now Present  Confirmed by SEE ED PROVIDER NOTE FOR, ECG INTERPRETATION ([id number]),  ALICIA MATIAS ([id number]) on 8/26/2021 2:51:01 PM         EKG:  Performed at: 12:23    Impression: Junctionol rhythm with right bundle branch block.     Rate: 44 bpm  QRS Interval: 118 ms  QTc Interval: 410 ms  Comparison: 8/25/16, junctional rhythm has replaced sinus rhythm. Ventricular rate has decreased by 41 bpm. Right bundle branch block is now present.  I have independently reviewed and interpreted the EKG(s) documented above.    The creation of this record is based on the scribe s observations of the work being performed by Letty Donnelly MD and the provider s statements to them. It was created on his behalf by Jerad Castro, a trained medical scribe. This document has been checked and approved by the attending provider.    Letty Donnelly MD  Emergency Medicine  [facility] EMERGENCY DEPARTMENT  [facility] [facility] [id number] [phone]  Dept: [phone]     Letty Donnelly MD  08/26/21 7684       Letty Donnelly MD  08/26/21 1198

## 2021-08-26 NOTE — CONSULTS
Care Management Initial Consult    General Information  Assessment completed with: Patient, pt  Type of CM/SW Visit: Initial Assessment    Primary Care Provider verified and updated as needed: Yes   Readmission within the last 30 days: no previous admission in last 30 days   Return Category: Progression of disease  Reason for Consult: discharge planning  Advance Care Planning: Advance Care Planning Reviewed: no concerns identified          Communication Assessment  Patient's communication style: spoken language (English or Bilingual)    Hearing Difficulty or Deaf: no   Wear Glasses or Blind: no    Cognitive  Cognitive/Neuro/Behavioral: WDL                      Living Environment:   People in home: alone     Current living Arrangements: house      Able to return to prior arrangements:         Family/Social Support:  Care provided by: self  Provides care for: no one                Description of Support System:           Current Resources:   Patient receiving home care services: No     Community Resources: DME  Equipment currently used at home: walker, standard  Supplies currently used at home: None    Employment/Financial:  Employment Status:          Financial Concerns: No concerns identified   Referral to Financial Counselor: No       Lifestyle & Psychosocial Needs:  Social Determinants of Health     Tobacco Use: Low Risk      Smoking Tobacco Use: Never Smoker     Smokeless Tobacco Use: Never Used   Alcohol Use:      Frequency of Alcohol Consumption:      Average Number of Drinks:      Frequency of Binge Drinking:    Financial Resource Strain:      Difficulty of Paying Living Expenses:    Food Insecurity:      Worried About Running Out of Food in the Last Year:      Ran Out of Food in the Last Year:    Transportation Needs:      Lack of Transportation (Medical):      Lack of Transportation (Non-Medical):    Physical Activity:      Days of Exercise per Week:      Minutes of Exercise per Session:    Stress:       Feeling of Stress :    Social Connections:      Frequency of Communication with Friends and Family:      Frequency of Social Gatherings with Friends and Family:      Attends Baptism Services:      Active Member of Clubs or Organizations:      Attends Club or Organization Meetings:      Marital Status:    Intimate Partner Violence:      Fear of Current or Ex-Partner:      Emotionally Abused:      Physically Abused:      Sexually Abused:    Depression: Not at risk     PHQ-2 Score: 0   Housing Stability:      Unable to Pay for Housing in the Last Year:      Number of Places Lived in the Last Year:      Unstable Housing in the Last Year:        Functional Status:  Prior to admission patient needed assistance:   Dependent ADLs:: Independent  Dependent IADLs:: Independent  Assesssment of Functional Status: At functional baseline    Mental Health Status:  Mental Health Status: No Current Concerns       Chemical Dependency Status:                Values/Beliefs:  Spiritual, Cultural Beliefs, Baptism Practices, Values that affect care:                 Additional Information:  Assessed, lives alone and still drives, family and friend to transport at discharge and no svcs. Requested copy of HCD.       Yareli Maxwell RN

## 2021-08-26 NOTE — ED NOTES
Pt repositioned in bed by NT and monitor shows NSR.  ekg repeated.  Provider notified. Pt reports no change in symptoms.  Feeling fine, denies pain or weakness or dizziness.

## 2021-08-26 NOTE — H&P
[facility]    History and Physical - Phalen Service        Date of Admission:  8/26/2021    Assessment & Plan      Pooja Swartz is a 89 year old female admitted on 8/26/2021. She has a history significant for HTN, paroxysmal AF on warfarin, CKD, and sinus node dysfunction that resolved with the discontinuation of atenolol per patient, presenting from primary care clinic with presyncopal symptoms in the setting of junctional rhythm on EKG. Admitted for further work-up and possible placement of permanent pacemaker.    Near syncopal symptoms  EKG with junctional bradycardia  Patient endorses feeling progressively lightheaded, dizzy and generally weak for the past few weeks. Today she presented to her primary care clinic for an INR check and work-up for presyncopal symptoms. She was found to be bradycardic to the 40's with an EKG suggestive of junctional bradycardia.The cardiology team was notified and recommended she come to the emergency room for possible permanent pacemaker placement. Here in the ED, her electrolytes are normal. TSH normal. She does take Cosopt for glaucoma that includes timolol. Troponin negative. Last ECHO was in 2016 and revealed normal LV function. No clinical symptoms to suggest heart failure. Will plan to hold Cosopt, add alternative per ophthalmologist, and monitor. Depending on course, possible pacemaker placement tomorrow.  - Cardiology consult   - hold Cosopt, per ophthalmologist okay to replace with dorzolamide TID   - monitor on telemetry   - ECHO  - NPO at [facility] for possible procedure  - hold warfarin today     Paroxysmal atrial fibrillation on warfarin  Patient on warfarin for atrial fibrillation. Stopped taking atenolol due to slow heart rates. EKG in ER with junctional rhythm.   - hold warfarin today     MARY on CKD  Creatinine on admission 1.11, BUN 39, GFR estimate 42. Baseline appears to be between 0.7-0.8, GFR 60-70. Likely prerenal in setting of  decreased po intake.   - encourage po intake, NPO at midnight  - monitor BMP in morning    Chronic Medical Conditions  Glaucoma- cont. pta alphagan, vyzulta, stop Cosopt, start dorzolamide TID  HTN- continue pta amlodipine  Gout- continue pta allopurinol     Diet: NPO per Anesthesia Guidelines for Procedure/Surgery Except for: Meds  NPO for Medical/Clinical Reasons Except for: Other; Specify: May take medications with a drink of water prior to Electrophysiology study    DVT Prophylaxis: Pneumatic Compression Devices  Nichols Catheter: Not present  Fluids: None  Central Lines: None  Code Status:  Full    Clinically Significant Risk Factors Present on Admission             # Coagulation Defect: home medication list includes an anticoagulant medication       Disposition Plan   Expected discharge: 1-2 days recommended to prior living arrangement pending possible placement of pacemaker.     The patient's care was discussed with the Attending Physician, Dr. Morrison.    Natalee Forbes MD  [facility]  Securely message with the Vocera Web Console (learn more here)  Text page via [employer] Paging/Directory    ______________________________________________________________________    Chief Complaint   General weakness, presyncopal symptoms    History is obtained from the patient    History of Present Illness   Pooja Swartz is a 89 year old female history significant for hypertension, paroxysmal atrial fibrillation on warfarin, and CKD presenting from primary care clinic with general weakness, dizziness, and lightheadedness.    The patient has felt progressively generally weak over the past few weeks. She endorses light-headedness and dizziness when standing and some dyspnea on exertion that has been present for many months. She presented to clinic for an INR check and noted presyncopal symptoms as described above. She was found to be bradycardic to the 40's with EKG suggesting a junctional  rhythm. A call was made to the cardiology team at [facility] who recommended transfer here for possible permanent pacemaker placement. No chest pain or dyspnea beyond baseline. Last ECHO in 2016 with normal LV function.     Review of Systems    The 10 point Review of Systems is negative other than noted in the HPI or here.     Past Medical History    I have reviewed this patient's medical history and updated it with pertinent information if needed.   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  Past Surgical History:   Procedure Laterality Date     ARTHROPLASTY REVISION HIP Left 8/19/2016    Procedure: ARTHROPLASTY REVISION HIP;  Surgeon: Kael Rojas MD;  Location: [facility] OR     C PELVIS/HIP JOINT SURGERY UNLISTED       C SHOULDER SURG PROC UNLISTED       C STOMACH SURGERY PROCEDURE UNLISTED       HC VASCULAR SURGERY PROCEDURE UNLIST       OPEN REDUCTION INTERNAL FIXATION FEMUR PROXIMAL Left 8/19/2016    Procedure: OPEN REDUCTION INTERNAL FIXATION FEMUR PROXIMAL;  Surgeon: Kael Rojas MD;  Location: [facility] OR     SURGICAL HISTORY OF -   11/1992    skin cancer, nose     SURGICAL HISTORY OF -   1978    right, vein stripping     SURGICAL HISTORY OF -   1968    breast biopsy     SURGICAL HISTORY OF -   1993    laparoscopic cholecystectomy     SURGICAL HISTORY OF -       tonsillectomy and adenoidectomy     SURGICAL HISTORY OF -   04/2004    left cataract     SURGICAL HISTORY OF -    08/09/2004    right total shoulder arthroplasty        Social History   I have reviewed this patient's social history and updated it with pertinent information if needed. Pooja Swartz  reports that she has never smoked. She has never used smokeless tobacco. She reports current alcohol use. She reports that she does not use drugs. She lives alone and independently in a senior residential complex in [facility]. Has one son and three daughters.     Family History   I have reviewed this patient's family history and updated it with pertinent information if needed.  Family History   Problem Relation Age of Onset     Heart Disease Mother         CHF     Hypertension Mother      Hypertension Father      Hypertension Maternal Grandmother      Hypertension Maternal Grandfather      Hypertension Son      Heart Disease Son         MI       Prior to Admission Medications   Prior to Admission Medications   Prescriptions Last Dose Informant Patient Reported? Taking?   COSOPT 2-0.5 % OP SOLN 8/26/2021 at am Self Yes Yes   Sig: Place 1 drop into both eyes 3 times daily 3 x s daily   Calcium-Vitamin D 600-200 MG-UNIT TABS 8/26/2021 at am Self Yes Yes   Sig: Take 1 tablet by mouth 2 times daily   Fexofenadine HCl (ALLEGRA PO) 8/26/2021 at am Self Yes Yes   Sig: Take 180 mg by mouth daily    ONE-A-DAY WOMENS OR TABS 8/26/2021 at Unknown time Self Yes Yes   Sig: Take 1 tablet by mouth daily    allopurinol (ZYLOPRIM) 100 MG tablet 8/26/2021 at am  No Yes   Sig: Take 1 tablet (100 mg) by mouth daily   amLODIPine (NORVASC) 10 MG tablet 8/25/2021 at PM  No Yes   Sig: Take 1 tablet (10 mg) by mouth daily   brimonidine (ALPHAGAN) 0.2 % ophthalmic solution 8/26/2021 at am  Yes Yes   Sig: Place 1 drop into both eyes 3 times daily   budesonide (ENTOCORT EC) 3 MG EC capsule 8/26/2021 at am  No Yes   Sig: Take 1 capsule (3 mg) by mouth every morning   latanoprostene bunod (VYZULTA) 0.024 % SOLN ophthalmic solution 8/25/2021 at pm  Yes Yes    Sig: Place 1 drop into both eyes At Bedtime    warfarin ANTICOAGULANT (COUMADIN) 2 MG tablet 2021 at PM  Yes Yes   Sig: Take 3-4 mg by mouth daily 4 mg (2 tabs) Mon, Thurs, Sat  3 mg (1.5 tabs) all other days      Facility-Administered Medications: None     Allergies   Allergies   Allergen Reactions     Pcn [Penicillins] Hives       Physical Exam   Vital Signs: Temp: 98  F (36.7  C) Temp src: Oral BP: (!) 195/82 Pulse: (!) 47   Resp: 22 SpO2: 95 % O2 Device: None (Room air)    Weight: 126 lbs 0 oz    Constitutional: awake, alert, cooperative, no apparent distress, and appears stated age  Respiratory: good air exchange and clear to auscultation, no crackles or wheezing  Cardiovascular: bradycardic with regular rhythm and murmurs include systolic murmur located at left upper sternal border   GI: normal bowel sounds, non-distended and non-tender  Skin: normal skin color, texture, turgor  Musculoskeletal: no lower extremity pitting edema present  Neurologic: Awake, alert, oriented to name, place and time. Left pupil non-reactive to light.  Moving all extremities.    Data   Data reviewed today: I reviewed all medications, new labs and imaging results over the last 24 hours. I personally reviewed the EKG tracing showing junctional rhythm with rate 44.    Recent Labs   Lab 21  1239   WBC 9.6   HGB 11.7   *      INR 2.00*      POTASSIUM 4.8   CHLORIDE 106   CO2 20*   BUN 39*   CR 1.11*   ANIONGAP 11   [facility] 10.0        Recent Results (from the past 24 hour(s))   Echocardiogram Limited   Result Value    LVEF  60-65%    Narrative    [id number]  [id number]  [id number]  [id number]^REBEKAH^STEPHAN[place] [address]     Name: ISRAEL RANGEL  MRN: [id number]  : 1925  Study Date: 2021 01:54 PM  Age: 89 yrs  Gender: Female  Patient Location: [facility]  Reason For Study: Sick Sinus Syndrome  Ordering Physician: STEPHANIE WELCH  C  Performed By: BP     BSA: 1.5 m2  Height: 60 in  Weight: 126 lb  HR: 46  ______________________________________________________________________________  Procedure  Definity (NDC #87908-640) given intravenously.  ______________________________________________________________________________  Interpretation Summary     1. Left ventricular size, wall motion and function are normal. The ejection  fraction is 60-65%.  2. Normal right ventricle size and systolic function.  3. Mild to moderate valvular aortic stenosis.  4. Right ventricular systolic pressure could not be approximated due to  inadequate tricuspid regurgitation.  5. Small pericardial effusion  ______________________________________________________________________________  Left Ventricle  Left ventricular size, wall motion and function are normal. The ejection  fraction is 60-65%. There is normal left ventricular wall thickness. Left  ventricular diastolic function is indeterminate. No regional wall motion  abnormalities noted.     Right Ventricle  Normal right ventricle size and systolic function.     Atria  The left atrium is severely dilated. The right atrium is severely dilated.  There is no color Doppler evidence of an atrial shunt.     Mitral Valve  There is moderate mitral annular calcification. There is trace to mild mitral  regurgitation. There is mild mitral stenosis.     Tricuspid Valve  Tricuspid valve leaflets appear normal. Right ventricular systolic pressure  could not be approximated due to inadequate tricuspid regurgitation. No  significant tricuspid stenosis.     Aortic Valve  Severe aortic valve calcification is present. There is trace aortic  regurgitation. Mild to moderate valvular aortic stenosis.     Pulmonic Valve  The pulmonic valve is not well seen, but is grossly normal. This degree of  valvular regurgitation is within normal limits.     Vessels  The aorta root is normal. Normal size ascending aorta. Moderate  atherosclerotic  plaque(s) in the ascending aorta. IVC diameter <2.1 cm  collapsing >50% with sniff suggests a normal RA pressure of 3 mmHg.     Pericardium  Small pericardial effusion.     ______________________________________________________________________________  MMode/2D Measurements & Calculations  IVSd: 0.98 cm  LVIDd: 3.5 cm  LVIDs: 1.9 cm  LVPWd: 0.96 cm     FS: 47.3 %  LV mass(C)d: 99.7 grams  LV mass(C)dI: 65.0 grams/m2     EF(MOD-bp): 66.4 %  RWT: 0.54     Time Measurements  MM HR: 46.0 BPM     Doppler Measurements & Calculations  MV max P.5 mmHg  MV mean PG: 3.1 mmHg  MV V2 VTI: 57.0 cm  MV P1/2t max swapnil: 181.9 cm/sec  MV P1/2t: 95.1 msec  MVA(P1/2t): 2.3 cm2  MV dec slope: 560.1 cm/sec2  Ao V2 max: 270.1 cm/sec  Ao max P.0 mmHg  Ao V2 mean: 160.6 cm/sec  Ao mean P.3 mmHg  Ao V2 VTI: 58.5 cm     ______________________________________________________________________________  Report approved by: Chance Cornejo 2021 03:44 PM

## 2021-08-26 NOTE — ED PROVIDER NOTES
Expected Patient Referral to ED  11:44 AM    Referring Clinic/Provider:  dario Sparrow    Reason for referral/Clinical facts:  pcp called cards today  Pt went to clinic with lightheadedness/dizziness  ekg with sinus arrest with escape rhythm w HR 30s  Severe bradycardia, symptomatic  EMS to ED  Needs labs, including INR, stat ECHO  Discussing w EP for PPM today    Recommendations provided:  none    Caller was informed that this institution does  possess the capabilities and/or resources to provide for patient and should be transferred to our institution.    Based on the information provided, discussed that this patient likely is nota good candidate for direct admission to this institution and that provider could proceed as such.  If however direct admit is sought and any hurdles encountered, this ED would be happy to see the patient and evaluate.    Informed caller that recommendations provided are recommendations based only on the facts provided and that they responsible to accept or reject the advice, or to seek a formal in person consultation as needed and that this ED will see/treat patient should they arrive.      Letty Donnelly MD  Emergency Medicine  [facility] EMERGENCY DEPARTMENT  [address] [place] [id number]  [phone]     Letty Donnelly MD  08/26/21 [id number]

## 2021-08-26 NOTE — ED NOTES
Assisted pt. To sit up more in bed per her request. Adjusted the way she was sitting for her comfort.

## 2021-08-26 NOTE — PROGRESS NOTES
Brief EP progress note:    Pooja Swartz is a 89-year-old white female with past medical history of atrial fibrillation; on chronic warfarin, HTN, CKD, and glaucoma who presented to the ED earlier today with a 1 to 2-month history of lightheadedness/dizziness and near syncope after being evaluated by her primary care doctor earlier today with the aforementioned symptoms.  EKG preformed at that time revealed a junctional rhythm with heart rates in the 40s. The patient was then brought to the ED for further evaluation. She takes Cosopt eyedrops for control of her glaucoma 3 times daily which contain timolol; a beta-blocker which may cause systemic effects such as bradycardia.    Patient is followed at associated eye care in [facility] by Dr. Healy.  I spoke with Dr. Healy by phone regarding an acceptable substitute for the Cosopt. Dr. Healy  recommended to change from Cosopt to dorzolamide 3 times daily. She will continue to take the Alphagan eyedrops as previously prescribed. He will update her chart regarding this change.      We will hold off on permanent pacemaker implant for now and continue to observe off of beta-blocker. Marcelina Arcos and Anne updated on patient status.     Danielle Piña, [facility]  Invasive Cardiology/Cardiac Electrophysiology

## 2021-08-26 NOTE — DISCHARGE INSTRUCTIONS
Electrophysiology  Discharge Instructions for Pacemakers    1.  For four weeks, with your pacemaker arm,  Do not:    Raise your arm above the height of your shoulder    Perform any vigorous arm movements    Swim, golf, wash windows, shovel snow or vacuum    Lift greater than 10-15 pounds    2. Check the implant site daily for the first week.  Contact the [facility]   [facility] [phone] should you experience any of the following:    Swelling    Redness    Drainage    Fever greater than  100.5 lasting more than 24 hours    3. You may shower in 3 days.  If you have steri strips over your incision, leave them on.  They are glued on and will be removed at your follow up appointment.      4. It is not necessary to cover your incision with a dressing.  Do not put any lotions or creams over the incision site    5. To reduce the risk of infection, avoid dental procedures for the first 6 weeks.  Contact your cardiology clinic for an antibiotic should you need to see the dentist in the first 6 weeks post pacemaker implant    6. You may travel by any mode of transportation; just show your pacemaker identification card.  Follow the recommendation by [facility] staff if you are traveling by air    7.  For any future surgery or colonoscopy let your doctor know you have a pacemaker    8. Most household appliances including a microwave, telephone, cell phone will not interfere with your pacemaker function.  If you suspect interference, simply move away from the source.  Do not carry a cell phone in the shirt pocket directly over your pacemaker     9. Please refer to your pacemaker booklet from the  or their web site under the section on electromagnetic interference (BRIGIDO) for further guidelines on things that may interfere with your pacemaker  10. No driving for 3 days    11. If the pacemaker site is uncomfortable you may place an ice pack (with a small dish cloth between skin and ice pack) over the site;  alternate on 20 minutes - off 20 minutes      Your Procedural Physician was: Dr. Jeffrey Rodríguez   To reach the Electrophysiology Registered Nurses working with Dr Rodríguez please call [phone]   To reach the Device Registered Nurses regarding questions about your device incision or device function please call [phone] Option #3      [facility]:  [phone]  If you are calling after hours, please listen to the entire voice mail, a live  will answer at the end of the message.

## 2021-08-26 NOTE — ED NOTES
Bed: JNED-03  Expected date:   Expected time:   Means of arrival: Ambulance  Comments:  [place]: Bradycardia

## 2021-08-26 NOTE — TELEPHONE ENCOUNTER
Called by Dr. Renee and her primary care clinic.  Ms. Swartz arrived with dizziness and lightheadedness symptoms along with fatigue.  Twelve-lead EKG demonstrated sinus node arrest with escape rhythm of 36 bpm.  Plan for urgent transfer to ED at [place] via EMS.  Spoke with ED provider.  Cath lab aware of transfer and need to possible pacemaker today.      Please: On arrival please obtain CBC, BMP, INR.  Recommend ordering stat echocardiogram.  Obtain twelve-lead EKG.

## 2021-08-26 NOTE — CONSULTS
Cardiology Consult Note    Thank you, Dr. Donnelly, for asking the [facility] team to see Pooja Swartz in consultation at [facility] to evaluate bradycardia.      Assessment:   1.  Atrial fibrillation with slow ventricular response, likely due to combination of underlying conduction system disease as well as possible systemic effects of Timoptic eyedrops utilized for glaucoma.  Will contact patient's ophthalmologist to see if alternative eyedrop is an option; if so, consider holding Cosopt eyedrop and monitoring heart rate response.  If there is not an alternative option for her treatment, would recommend permanent pacemaker insertion.  This has been discussed with the patient and she would be agreeable.  2.  Near syncope, likely secondary to #1 above.  3.  Paroxysmal atrial fibrillation, on long-term warfarin anticoagulation.  Patient states she had been on atenolol many years ago although this was stopped due to slow heart rates.  Has not had any issues until today  4.  Glaucoma requiring several eyedrops including Cosopt which has beta-blocking effects.     Plan:   1.  Hold glaucoma medications for now  2.  Contact the patient's ophthalmologist to see if there is an alternative for Cosopt eyedrop.  If not, would plan to pursue permanent pacemaker.  If there is an alternative option, would hold Cosopt to see if improvement in heart rate but pursue permanent pacemaker if heart rate does not improve within the next 24 hours.  3.  Echocardiogram to follow-up on LV function which had been normal back in 2016.     Current History:   Ms. Pooja Swartz is a 89 year old female with history of paroxysmal atrial fibrillation, sinus node dysfunction with subsequent discontinuation of atenolol several years ago who presented to her primary clinic today for an INR check as well as to have an evaluation for near syncopal spells.  She was found to be bradycardic with heart rates suggesting a junctional  bradycardia.  A call was placed to one of my colleagues who recommended transfer here to [facility] ED for possible permanent pacemaker implant.  Patient reports no complaints of chest discomfort.  She does note exertional dyspnea if she tries to do too much or walk fast.  No orthopnea, PND or lower extremity edema.    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, including hip      Other specified glaucoma      Renal insufficiency      Sinus node dysfunction (H)      Unspecified essential hypertension        Past Surgical History:     Past Surgical History:   Procedure Laterality Date     ARTHROPLASTY REVISION HIP Left 8/19/2016    Procedure: ARTHROPLASTY REVISION HIP;  Surgeon: Kael Rojas MD;  Location: [facility] OR     C PELVIS/HIP JOINT SURGERY UNLISTED       C SHOULDER SURG PROC UNLISTED       C STOMACH SURGERY PROCEDURE UNLISTED       HC VASCULAR SURGERY PROCEDURE UNLIST       OPEN REDUCTION INTERNAL FIXATION FEMUR PROXIMAL Left 8/19/2016    Procedure: OPEN REDUCTION INTERNAL FIXATION FEMUR PROXIMAL;  Surgeon: Kael Rojas MD;  Location: [facility] OR     SURGICAL HISTORY OF -   11/1992    skin cancer, nose     SURGICAL HISTORY OF -   1978    right, vein stripping     SURGICAL HISTORY OF -   1968    breast biopsy     SURGICAL HISTORY OF -   1993    laparoscopic cholecystectomy     SURGICAL HISTORY OF -       tonsillectomy and adenoidectomy     SURGICAL HISTORY OF -   04/2004    left cataract     SURGICAL HISTORY OF -   08/09/2004    right total shoulder arthroplasty       Family History:     Family History   Problem Relation Age of Onset     Heart Disease Mother         CHF     Hypertension Mother      Hypertension Father       Hypertension Maternal Grandmother      Hypertension Maternal Grandfather      Hypertension Son      Heart Disease Son         MI       Social History:    reports that she has never smoked. She has never used smokeless tobacco. She reports current alcohol use. She reports that she does not use drugs.    Meds:     Current Facility-Administered Medications:      HOLD all oral hypoglycemics glipiZIDE (GLUCOTROL), glyBURIDE (DIABETA/MICRONASE/GLYNASE), glimepiride (AMARYL), gliclazide, rosiglitazone (AVANDIA), pioglitazone (ACTOS),  sitagliptin (JANUVIA), saxagliptin (ONGLYZA) and linagliptin (TRAJENTA) metformin (GLUCOPHAGE, GLUMETZA, FORTAMET, RIOMET) and metformin containing medications: alogliptin/metformin (KAZANO), glipizide/metformin (METAGLIP), glyburide/metformin (GLUCOVANCE), rosiglitazone/metformin (AVANDAMET), dapagliflozin/metformin (XIGDUO XR), sitagliptin/metformin (JANUMET, JANUMET XR), linagliptin/metformin (JENTADUETO), repaglinide/metformin (PRANDIMET), saxagliptin/metformin (KOMBIGLYZE XR), canagliflozin/metformin (INVOKAMET), and pioglitazone/metformin (ACTOPLUS MET, ACTOPLUS MET XR) the morning of the procedure., , Does not apply, HOLD, Wang Danielle C, CNP     HOLD all short acting insulins (aspart, lispro, regular) the morning of the procedure, , Does not apply, HOLD, Wang Danielle C, CNP     HOLD enoxaparin (LOVENOX) pre-procedure, , Does not apply, HOLDWang Danielle C, CNP     HOLD heparin IV 4 hours pre-procedure, , Does not apply, HOLDWang Danielle C, CNP     HOLD mixed insulins (70/30, 75/25, 50/50) the morning of the procedure.  Instead, give 66% of NPH (N) component, , Does not apply, HOLDWang Danielle C, CNP     lidocaine (LMX4) cream, , Topical, Q1H PRN, Danielle Piña CNP     lidocaine 1 % 0.1-1 mL, 0.1-1 mL, Other, Q1H PRN, Danielle Piña, CNP     sodium chloride (PF) 0.9% PF flush 3 mL, 3 mL, Intracatheter, Q8H,  Danielle Piña, CNP     sodium chloride (PF) 0.9% PF flush 3 mL, 3 mL, Intracatheter, Q1H PRN, Danielle Piña, CNP     sodium chloride (PF) 0.9% PF flush 3 mL, 3 mL, Intracatheter, q1 min prn, Danielle Piña CNP     vancomycin 1 g in 0.9% NaCl 500 mL irrigation (btl), 1,000 mg, Irrigation, Once, Danielle Piña CNP     vancomycin 1000 mg in 0.9% NaCl 250 mL intermittent infusion 1,000 mg, 1,000 mg, Intravenous, Pre-Op/Pre-procedure x 1 dose, Danielle Piña CNP    Current Outpatient Medications:      allopurinol (ZYLOPRIM) 100 MG tablet, Take 1 tablet (100 mg) by mouth daily, Disp: 90 tablet, Rfl: 3     amLODIPine (NORVASC) 10 MG tablet, Take 1 tablet (10 mg) by mouth daily, Disp: 90 tablet, Rfl: 3     brimonidine (ALPHAGAN) 0.2 % ophthalmic solution, Place 1 drop into both eyes 3 times daily, Disp: , Rfl:      budesonide (ENTOCORT EC) 3 MG EC capsule, Take 1 capsule (3 mg) by mouth every morning, Disp: 30 capsule, Rfl: 6     Calcium-Vitamin D 600-200 MG-UNIT TABS, Take 1 tablet by mouth 2 times daily, Disp: , Rfl:      COSOPT 2-0.5 % OP SOLN, Place 1 drop into both eyes 3 times daily 3 x s daily, Disp: , Rfl:      Fexofenadine HCl (ALLEGRA PO), Take 180 mg by mouth daily , Disp: , Rfl:      latanoprostene bunod (VYZULTA) 0.024 % SOLN ophthalmic solution, Place 1 drop into both eyes At Bedtime , Disp: , Rfl:      ONE-A-DAY WOMENS OR TABS, Take 1 tablet by mouth daily , Disp: , Rfl:      warfarin ANTICOAGULANT (COUMADIN) 2 MG tablet, Take 3-4 mg by mouth daily 4 mg (2 tabs) Mon, Thurs, Sat 3 mg (1.5 tabs) all other days, Disp: , Rfl:     sodium chloride (PF)  3 mL Intracatheter Q8H     vancomycin 1 g in 0.9% NaCl 1000 mL irrigation (btl)  1,000 mg Irrigation Once     vancomycin  1,000 mg Intravenous Pre-Op/Pre-procedure x 1 dose       Allergies:   Pcn [penicillins]    Review of Systems:   A 12 point comprehensive review of systems was negative except as noted in the  current history.    Objective:      Physical Exam  [unfilled]  [unfilled]  Body mass index is 24.61 kg/m .  BP (!) 175/77   Pulse (!) 47   Temp 98  F (36.7  C) (Oral)   Resp 20   Ht 1.524 m (5')   Wt 57.2 kg (126 lb)   LMP 07/07/1960   SpO2 98%   BMI 24.61 kg/m      General Appearance:    Well developed, slender elderly female who appears in no acute distress.   HEENT:   Normocephalic, atraumatic.  Sclera nonicteric.  Mucous membranes moist.   Neck:  No jugular venous distention or carotid bruits   Chest:  Symmetric with normal AP diameter   Lungs:    Clear to auscultation bilaterally.   Cardiovascular:    Mildly bradycardic rhythm which is regular.  S1, S2 normal.  No murmur or gallop   Abdomen:   Soft, nondistended, nontender.  No organomegaly or mass.   Extremities:  No peripheral edema, clubbing or cyanosis.  Distal pulses are symmetric   Skin:  No rash or lesions   Neurologic:  Alert and oriented x3.  No gross focal deficits.             Cardiographics (personally reviewed)  ECG demonstrates junctional bradycardia, right bundle branch block pattern    Imaging (personally reviewed)  Echocardiogram currently pending.    Lab Review (personally reviewed all results)  Lab Results   Component Value Date     08/26/2021     11/18/2020    CO2 20 08/26/2021    CO2 27 11/18/2020    BUN 39 08/26/2021    BUN 25 11/18/2020     Lab Results   Component Value Date    WBC 9.6 08/26/2021    WBC 9.3 05/18/2020    HGB 11.7 08/26/2021    HGB 12.1 05/18/2020    HCT 36.0 08/26/2021    HCT 36.9 05/18/2020     08/26/2021     05/18/2020     08/26/2021     05/18/2020     Lab Results   Component Value Date    CHOL 165 11/18/2020    TRIG 81 11/18/2020    HDL 74 11/18/2020     Troponin I   Date Value Ref Range Status   08/26/2021 <0.01 0.00 - 0.29 ng/mL Final     No results found for: BNP

## 2021-08-26 NOTE — ED TRIAGE NOTES
Patient arrives via [facility], coming form a clinic.  She drove herself to the clinic today because she needed her INR drawn plus she had been feeling weak, lightheaded and dizzy over the past couple of months.  When they did an EKG they found her to be in SB in the 30's.    She arrives alert and oriented, with no complaints of pain.

## 2021-08-26 NOTE — PROGRESS NOTES
Assessment & Plan     Bradycardia  Sinus arrest?  Needs pacemaker  EMS called for transport to ER - talked with cardiologist who recommend [facility] ER for further blood work, echo, etc and likely pacemaker placement.    - EKG 12-lead complete w/read - Clinics    Hypertension goal BP (blood pressure) < 140/90  Slightly hypotensive, no change right now pending pacemaker placement   - amLODIPine (NORVASC) 10 MG tablet; Take 1 tablet (10 mg) by mouth daily  - Basic metabolic panel  (Ca, Cl, CO2, Creat, Gluc, K, Na, BUN); Future    Chronic tophaceous gout     - allopurinol (ZYLOPRIM) 100 MG tablet; Take 1 tablet (100 mg) by mouth daily  - Uric acid; Future    Neoplasm of uncertain behavior of skin     - Adult Dermatology Referral; Future    History of basal cell carcinoma     - Adult Dermatology Referral; Future    Atrial fibrillation, unspecified type (H)   on warfarin                   No follow-ups on file.    Amanda Renee MD  [facility]    Daniella Patton is a 89 year old who presents for the following health issues     HPI     Patient here for med check and skin lesion. Notes that she's dizzy/lightheaded at times and fatigued.  HR noted to be 48 and EKG shows sinus node arrest with escape beats.    Consulted with cardiologist who agrees that pacemaker is needed.  [facility].    Skin Lesion  Onset/Duration: x months  Description  Location - R ear  Color: brown and skin colored  Border description: evenly pigmented  Character: round  Itching: mild  Bleeding:  no  Intensity:  mild  Progression of Symptoms:  same  Accompanying signs and symptoms:   Bleeding: no  Scaling: no  Excessive sun exposure/tanning: no  Sunscreen used: no  History:           Any previous history of skin cancer: YES  Any family history of melanoma: no  Previous episodes of similar lesion: YES  Precipitating or alleviating factors: nothing  Therapies tried and outcome:  none    Dizziness  Onset/Duration: x a few months  Description:   Do you feel faint: no  Does it feel like the surroundings (bed, room) are moving: YES  Unsteady/off balance: YES  Have you passed out or fallen: no  Intensity: moderate  Progression of Symptoms: waxing and waning  Accompanying Signs & Symptoms:  Heart palpitations or chest pain: no  Nausea, vomiting: no  Weakness or lack of coordination in arms or legs: YES  Vision or speech changes: no  Numbness or tingling: no  Ringing in ears (Tinnitus): no  Hearing Loss: no  History:   Head trauma/concussion history: no  Previous similar symptoms: no  Recent bleeding history: no  Any new medications (BP?): no  Precipitating factors:   Worse with activity: YES  Worse with head movement: YES  Alleviating factors:   Does staying in a fixed position give relief: YES  Therapies tried and outcome: None      Review of Systems   Constitutional, HEENT, cardiovascular, pulmonary, gi and gu systems are negative, except as otherwise noted.      Objective    BP 90/60 (BP Location: Right arm, Patient Position: Sitting, Cuff Size: Adult Regular)   Pulse (!) 48   Temp (!) 96  F (35.6  C) (Tympanic)   Resp 16   Ht 1.524 m (5')   Wt 57.2 kg (126 lb)   LMP 07/07/1960   SpO2 98%   Breastfeeding No   BMI 24.61 kg/m    Body mass index is 24.61 kg/m .  Physical Exam   GENERAL: healthy, alert and no distress  NECK: no adenopathy, no asymmetry, masses, or scars and thyroid normal to palpation  RESP: lungs clear to auscultation - no rales, rhonchi or wheezes  CV: bradycardia, irregularly irregular rhythm, normal S1 S2, no S3 or S4 and no murmur, click or rub  ABDOMEN: soft, nontender, no hepatosplenomegaly, no masses and bowel sounds normal  MS: no gross musculoskeletal defects noted, no edema  Neuro:alert, no distress    EKG - Reviewed and interpreted by me:  Bradycardic with PVC, ?escape beats.  No pWaves noted.                    Answers for HPI/ROS submitted by the patient on  8/26/2021  If you checked off any problems, how difficult have these problems made it for you to do your work, take care of things at home, or get along with other people?: Not difficult at all  PHQ9 TOTAL SCORE: 4  SABRINA 7 TOTAL SCORE: 0

## 2021-08-26 NOTE — PHARMACY-ADMISSION MEDICATION HISTORY
Pharmacy Note - Admission Medication History    Pertinent Provider Information: patient reports she has samples of Vyzulta at home but no one to bring here.  She is OK not using this medication as she is only planning to be here one night.     ______________________________________________________________________    Prior To Admission (PTA) med list completed and updated in EMR.       PTA Med List   Medication Sig Last Dose     allopurinol (ZYLOPRIM) 100 MG tablet Take 1 tablet (100 mg) by mouth daily 8/26/2021 at am     amLODIPine (NORVASC) 10 MG tablet Take 1 tablet (10 mg) by mouth daily 8/25/2021 at PM     brimonidine (ALPHAGAN) 0.2 % ophthalmic solution Place 1 drop into both eyes 3 times daily 8/26/2021 at am     budesonide (ENTOCORT EC) 3 MG EC capsule Take 1 capsule (3 mg) by mouth every morning 8/26/2021 at am     Calcium-Vitamin D 600-200 MG-UNIT TABS Take 1 tablet by mouth 2 times daily 8/26/2021 at am     COSOPT 2-0.5 % OP SOLN Place 1 drop into both eyes 3 times daily 3 x s daily 8/26/2021 at am     Fexofenadine HCl (ALLEGRA PO) Take 180 mg by mouth daily  8/26/2021 at am     latanoprostene bunod (VYZULTA) 0.024 % SOLN ophthalmic solution Place 1 drop into both eyes At Bedtime  8/25/2021 at pm     ONE-A-DAY WOMENS OR TABS Take 1 tablet by mouth daily  8/26/2021 at Unknown time     warfarin ANTICOAGULANT (COUMADIN) 2 MG tablet Take 3-4 mg by mouth daily 4 mg (2 tabs) Mon, Thurs, Sat  3 mg (1.5 tabs) all other days 8/25/2021 at PM       Information source(s): Patient, [facility]  Method of interview communication: in-person    Summary of Changes to PTA Med List  New: none  Discontinued: clonidine, Xalatan, triamcinolone  Changed: warfarin, brimonidine    Patient was asked about OTC/herbal products specifically.  PTA med list reflects this.    In the past week, patient estimated taking medication this percent of the time:  greater than 90%.    Allergies were reviewed,  assessed, and updated with the patient.      Patient did not bring any medications to the hospital and can't retrieve from home. No multi-dose medications are available for use during hospital stay.   She is OK not using Vyzyulta if only here one night.    The information provided in this note is only as accurate as the sources available at the time of the update(s).    Thank you for the opportunity to participate in the care of this patient.    Madelin Block [facility]  8/26/2021 1:23 PM

## 2021-08-27 ENCOUNTER — DOCUMENTATION ONLY (OUTPATIENT)
Dept: ANTICOAGULATION | Facility: CLINIC | Age: 86
End: 2021-08-27

## 2021-08-27 VITALS
WEIGHT: 121.1 LBS | HEART RATE: 65 BPM | BODY MASS INDEX: 23.78 KG/M2 | SYSTOLIC BLOOD PRESSURE: 148 MMHG | DIASTOLIC BLOOD PRESSURE: 67 MMHG | RESPIRATION RATE: 19 BRPM | TEMPERATURE: 98.4 F | HEIGHT: 60 IN | OXYGEN SATURATION: 97 %

## 2021-08-27 DIAGNOSIS — I48.91 ATRIAL FIBRILLATION (H): Primary | ICD-10-CM

## 2021-08-27 DIAGNOSIS — Z79.01 LONG TERM CURRENT USE OF ANTICOAGULANT THERAPY: ICD-10-CM

## 2021-08-27 DIAGNOSIS — I48.91 ATRIAL FIBRILLATION, UNSPECIFIED TYPE (H): ICD-10-CM

## 2021-08-27 PROBLEM — H40.003 GLAUCOMA SUSPECT, BILATERAL: Status: ACTIVE | Noted: 2021-08-27

## 2021-08-27 PROBLEM — M97.02XD PERIPROSTHETIC FRACTURE AROUND INTERNAL PROSTHETIC LEFT HIP JOINT, SUBSEQUENT ENCOUNTER: Status: RESOLVED | Noted: 2017-01-09 | Resolved: 2021-08-27

## 2021-08-27 PROBLEM — R00.1 SYMPTOMATIC BRADYCARDIA: Status: ACTIVE | Noted: 2021-08-27

## 2021-08-27 LAB
ANION GAP SERPL CALCULATED.3IONS-SCNC: 8 MMOL/L (ref 5–18)
BUN SERPL-MCNC: 28 MG/DL (ref 8–28)
CALCIUM SERPL-MCNC: 8.8 MG/DL (ref 8.5–10.5)
CHLORIDE BLD-SCNC: 112 MMOL/L (ref 98–107)
CO2 SERPL-SCNC: 20 MMOL/L (ref 22–31)
CREAT SERPL-MCNC: 0.75 MG/DL (ref 0.6–1.1)
ERYTHROCYTE [DISTWIDTH] IN BLOOD BY AUTOMATED COUNT: 15.1 % (ref 10–15)
GFR SERPL CREATININE-BSD FRML MDRD: 68 ML/MIN/1.73M2
GLUCOSE BLD-MCNC: 91 MG/DL (ref 70–125)
HCT VFR BLD AUTO: 34.6 % (ref 35–47)
HGB BLD-MCNC: 11.2 G/DL (ref 11.7–15.7)
MCH RBC QN AUTO: 33.1 PG (ref 26.5–33)
MCHC RBC AUTO-ENTMCNC: 32.4 G/DL (ref 31.5–36.5)
MCV RBC AUTO: 102 FL (ref 78–100)
PLATELET # BLD AUTO: 190 10E3/UL (ref 150–450)
POTASSIUM BLD-SCNC: 3.6 MMOL/L (ref 3.5–5)
RBC # BLD AUTO: 3.38 10E6/UL (ref 3.8–5.2)
SODIUM SERPL-SCNC: 140 MMOL/L (ref 136–145)
WBC # BLD AUTO: 8.6 10E3/UL (ref 4–11)

## 2021-08-27 PROCEDURE — 85027 COMPLETE CBC AUTOMATED: CPT | Performed by: FAMILY MEDICINE

## 2021-08-27 PROCEDURE — 250N000013 HC RX MED GY IP 250 OP 250 PS 637: Performed by: FAMILY MEDICINE

## 2021-08-27 PROCEDURE — 99233 SBSQ HOSP IP/OBS HIGH 50: CPT | Performed by: INTERNAL MEDICINE

## 2021-08-27 PROCEDURE — 80048 BASIC METABOLIC PNL TOTAL CA: CPT | Performed by: FAMILY MEDICINE

## 2021-08-27 PROCEDURE — 99217 PR OBSERVATION CARE DISCHARGE: CPT | Mod: GC | Performed by: FAMILY MEDICINE

## 2021-08-27 PROCEDURE — 36415 COLL VENOUS BLD VENIPUNCTURE: CPT | Performed by: FAMILY MEDICINE

## 2021-08-27 RX ORDER — LATANOPROST 50 UG/ML
1 SOLUTION/ DROPS OPHTHALMIC AT BEDTIME
Qty: 1.5 ML | Refills: 0 | Status: SHIPPED | OUTPATIENT
Start: 2021-08-27 | End: 2023-01-01

## 2021-08-27 RX ORDER — DORZOLAMIDE HCL 20 MG/ML
1 SOLUTION/ DROPS OPHTHALMIC 3 TIMES DAILY
Qty: 5 ML | Refills: 0 | Status: SHIPPED | OUTPATIENT
Start: 2021-08-27 | End: 2021-09-26

## 2021-08-27 RX ADMIN — ALLOPURINOL 100 MG: 100 TABLET ORAL at 08:23

## 2021-08-27 RX ADMIN — FEXOFENADINE HYDROCHLORIDE 180 MG: 60 TABLET, FILM COATED ORAL at 08:23

## 2021-08-27 RX ADMIN — DORZOLAMIDE HCL 1 DROP: 20 SOLUTION/ DROPS OPHTHALMIC at 08:23

## 2021-08-27 RX ADMIN — BRIMONIDINE TARTRATE 1 DROP: 2 SOLUTION OPHTHALMIC at 08:24

## 2021-08-27 ASSESSMENT — PATIENT HEALTH QUESTIONNAIRE - PHQ9: SUM OF ALL RESPONSES TO PHQ QUESTIONS 1-9: 4

## 2021-08-27 ASSESSMENT — ANXIETY QUESTIONNAIRES: GAD7 TOTAL SCORE: 0

## 2021-08-27 ASSESSMENT — MIFFLIN-ST. JEOR: SCORE: 865.81

## 2021-08-27 NOTE — PROGRESS NOTES
Patient discharged to home today with friend as transport. Personal belongings taken with patient including wallet from security. Discharge instructions provided. Questions and concerns addressed prior to leaving. [place] [facility] contacted to schedule follow up appointment and clinic has already contacted patient to conform appointment.

## 2021-08-27 NOTE — PROGRESS NOTES
ANTICOAGULATION  MANAGEMENT: Discharge Review    Pooja Swartz chart reviewed for anticoagulation continuity of care    Hospital Admission on 8/26/21 for Lightheaded,dizzy,weak.    Discharge disposition: Home    Results:    Recent labs: (last 7 days)     08/26/21  1239   INR 2.00*     Anticoagulation inpatient management:     home regimen continued    Anticoagulation discharge instructions:     Warfarin dosing: home regimen continued   Bridging: No   INR goal change: No      Medication changes affecting anticoagulation: No    Additional factors affecting anticoagulation: No    Plan     No adjustment to anticoagulation plan needed    Spoke with Pooja    Anticoagulation Calendar updated    Dillon Fox RN

## 2021-08-27 NOTE — DISCHARGE SUMMARY
[facility]  Discharge Summary - Medicine & Pediatrics       Date of Admission:  8/26/2021  Date of Discharge:  8/27/2021  1:23 PM  Discharging Provider: Renuka Murphy MD   Discharge Service: [facility]    Discharge Diagnoses   1. Sinus node dysfunction (H)    2. Sinus arrest    3. Symptomatic bradycardia    4. Anticoagulated on Coumadin    5. Glaucoma suspect, bilateral        Follow-ups Needed After Discharge   Follow-up Appointments     Follow Up and recommended labs and tests      Follow up with primary care provider in 7 days.  No follow up labs or   test are needed.  Follow up with specialist, Cardiologist within 7 days.  No follow up labs   or test are needed.             Discharge Disposition   Discharged to assisted living  Condition at discharge: Stable    Hospital Course   Pooja Swartz is a 89 year old female admitted on 8/26/2021. She has a history significant for HTN, paroxysmal AF on warfarin, CKD, and sinus node dysfunction that resolved with the discontinuation of atenolol per patient, presenting from primary care clinic with presyncopal symptoms in the setting of junctional rhythm on EKG.     Near syncopal symptoms  EKG with junctional bradycardia  Patient endorses feeling progressively lightheaded, dizzy and generally weak for the past few weeks. Today she presented to her primary care clinic for an INR check and work-up for presyncopal symptoms. She was found to be bradycardic to the 40's with an EKG suggestive of junctional bradycardia.The cardiology team was notified and recommended she come to the emergency room for possible permanent pacemaker placement. Here in the ED, her electrolytes are normal. TSH normal. She does take Cosopt for glaucoma that includes timolol. Troponin negative. Last ECHO was in 2016 and revealed normal LV function. No clinical symptoms to suggest heart failure. Held cosopft and per ophthalmologist, replaced with dorzolamide TID. She  was monitored on tele and had resolution of bradycardia and symptoms. HR was in 60s-80s on discharge. Cardiology recommended follow up with EP for possible pacemaker as outpatient.    Consultations This Hospital Stay   CARDIOLOGY IP CONSULT  SOCIAL WORK IP CONSULT    Code Status   Full Code       The patient was discussed with Dr. Delano Wick MD  [facility] Service [facility]  [address] [place] [address]  Phone: [phone]  Fax: [phone]  ______________________________________________________________________    Physical Exam   Vital Signs: Temp: 98.4  F (36.9  C) Temp src: Oral BP: (!) 148/67 Pulse: 65   Resp: 19 SpO2: 97 % O2 Device: None (Room air)    Weight: 121 lbs 1.6 oz  Constitutional: awake, alert, cooperative, no apparent distress, and appears stated age  Respiratory: No increased work of breathing, good air exchange, clear to auscultation bilaterally, no crackles or wheezing  Cardiovascular: regular rate, rhythm. Systolic murmur at upper L sternal border  GI: soft, ND, Nt  Skin: no bruising or bleeding      Primary Care Physician   Amanda Renee    Discharge Orders      Follow Up and recommended labs and tests    Follow up with primary care provider in 7 days.  No follow up labs or test are needed.  Follow up with specialist, Cardiologist within 7 days.  No follow up labs or test are needed.     Reason for your hospital stay    Symptomatic bradycardia     Activity - Up with assistive device     Full Code     Fall precautions     Advance Diet as Tolerated    Follow this diet upon discharge: Orders Placed This Encounter      Regular Diet Adult       Significant Results and Procedures   Results for orders placed or performed during the hospital encounter of 08/26/21   Echocardiogram Limited     Value    LVEF  60-65%    Narrative    [id number]  [id number]  [id number]  [id number]^FIDELIOTIS^STEPHANIE^C     [facility] [place] [address]     Name: ISRAEL RANGEL  MRN: [id number]  : 1925  Study Date: 2021 01:54 PM  Age: 89 yrs  Gender: Female  Patient Location: [facility]  Reason For Study: Sick Sinus Syndrome  Ordering Physician: STEPHANIE WELCH  Performed By: BP     BSA: 1.5 m2  Height: 60 in  Weight: 126 lb  HR: 46  ______________________________________________________________________________  Procedure  Definity (NDC #[id number]) given intravenously.  ______________________________________________________________________________  Interpretation Summary     1. Left ventricular size, wall motion and function are normal. The ejection  fraction is 60-65%.  2. Normal right ventricle size and systolic function.  3. Mild to moderate valvular aortic stenosis.  4. Right ventricular systolic pressure could not be approximated due to  inadequate tricuspid regurgitation.  5. Small pericardial effusion  ______________________________________________________________________________  Left Ventricle  Left ventricular size, wall motion and function are normal. The ejection  fraction is 60-65%. There is normal left ventricular wall thickness. Left  ventricular diastolic function is indeterminate. No regional wall motion  abnormalities noted.     Right Ventricle  Normal right ventricle size and systolic function.     Atria  The left atrium is severely dilated. The right atrium is severely dilated.  There is no color Doppler evidence of an atrial shunt.     Mitral Valve  There is moderate mitral annular calcification. There is trace to mild mitral  regurgitation. There is mild mitral stenosis.     Tricuspid Valve  Tricuspid valve leaflets appear normal. Right ventricular systolic pressure  could not be approximated due to inadequate tricuspid regurgitation. No  significant tricuspid stenosis.     Aortic Valve  Severe aortic valve calcification is present. There is trace aortic  regurgitation. Mild to  moderate valvular aortic stenosis.     Pulmonic Valve  The pulmonic valve is not well seen, but is grossly normal. This degree of  valvular regurgitation is within normal limits.     Vessels  The aorta root is normal. Normal size ascending aorta. Moderate  atherosclerotic plaque(s) in the ascending aorta. IVC diameter <2.1 cm  collapsing >50% with sniff suggests a normal RA pressure of 3 mmHg.     Pericardium  Small pericardial effusion.     ______________________________________________________________________________  MMode/2D Measurements & Calculations  IVSd: 0.98 cm  LVIDd: 3.5 cm  LVIDs: 1.9 cm  LVPWd: 0.96 cm     FS: 47.3 %  LV mass(C)d: 99.7 grams  LV mass(C)dI: 65.0 grams/m2     EF(MOD-bp): 66.4 %  RWT: 0.54     Time Measurements  MM HR: 46.0 BPM     Doppler Measurements & Calculations  MV max P.5 mmHg  MV mean PG: 3.1 mmHg  MV V2 VTI: 57.0 cm  MV P1/2t max swapnil: 181.9 cm/sec  MV P1/2t: 95.1 msec  MVA(P1/2t): 2.3 cm2  MV dec slope: 560.1 cm/sec2  Ao V2 max: 270.1 cm/sec  Ao max P.0 mmHg  Ao V2 mean: 160.6 cm/sec  Ao mean P.3 mmHg  Ao V2 VTI: 58.5 cm     ______________________________________________________________________________  Report approved by: Chance Cornejo 2021 03:44 PM               Discharge Medications   Discharge Medication List as of 2021 12:18 PM        START taking these medications    Details   dorzolamide (TRUSOPT) 2 % ophthalmic solution Place 1 drop into both eyes 3 times daily, Disp-5 mL, R-0, E-Prescribe      latanoprost (XALATAN) 0.005 % ophthalmic solution Place 1 drop into both eyes At Bedtime, Disp-1.5 mL, R-0, E-Prescribe           CONTINUE these medications which have NOT CHANGED    Details   allopurinol (ZYLOPRIM) 100 MG tablet Take 1 tablet (100 mg) by mouth daily, Disp-90 tablet, R-3, E-Prescribe      amLODIPine (NORVASC) 10 MG tablet Take 1 tablet (10 mg) by mouth daily, Disp-90 tablet, R-3, E-Prescribe      budesonide (ENTOCORT EC) 3 MG EC  capsule Take 1 capsule (3 mg) by mouth every morning, Disp-30 capsule, R-6, E-PrescribeThis prescription was filled on 6/8/2021. Any refills authorized will be placed on file.      Calcium-Vitamin D 600-200 MG-UNIT TABS Take 1 tablet by mouth 2 times daily, Historical      Fexofenadine HCl (ALLEGRA PO) Take 180 mg by mouth daily , Historical      ONE-A-DAY WOMENS OR TABS Take 1 tablet by mouth daily , Historical      warfarin ANTICOAGULANT (COUMADIN) 2 MG tablet Take 3-4 mg by mouth daily 4 mg (2 tabs) Mon, Thurs, Sat  3 mg (1.5 tabs) all other days, Historical           STOP taking these medications       brimonidine (ALPHAGAN) 0.2 % ophthalmic solution Comments:   Reason for Stopping:         COSOPT 2-0.5 % OP SOLN Comments:   Reason for Stopping:         latanoprostene bunod (VYZULTA) 0.024 % SOLN ophthalmic solution Comments:   Reason for Stopping:             Allergies   Allergies   Allergen Reactions    Pcn [Penicillins] Hives

## 2021-08-27 NOTE — PROGRESS NOTES
Care Management Discharge Note    Discharge Date: 08/27/2021       Discharge Disposition: Home    Discharge Services: None    Discharge DME: None    Discharge Transportation: family or friend will provide    Private pay costs discussed: Not applicable    PAS Confirmation Code:  NA  Patient/family educated on Medicare website which has current facility and service quality ratings:  (NA)    Education Provided on the Discharge Plan:  Yes  Persons Notified of Discharge Plans: Patient  Patient/Family in Agreement with the Plan: yes    Handoff Referral Completed:NA    Additional Information:    Pt discharging home; no CM needs identified.  Family to assist as needed.  Family / Friend to transport.     MARCIE Valverde

## 2021-08-27 NOTE — UTILIZATION REVIEW
"Admission Status; Secondary Review Determination     Under the authority of the Utilization Management Committee, the utilization review process indicated a secondary review on Pooja Swartz.  The review outcome is based on review of the medical records, discussions with staff, and applying clinical experience noted on the date of the review.     (x) Observation Status Appropriate - This patient does not meet hospital inpatient criteria and is placed in observation status. If this patient's primary payer is Medicare and was admitted as an inpatient, Condition Code 44 should be used and patient status changed to \"observation\".     RATIONALE FOR DETERMINATION   89-year-old female admitted with junctional bradycardia, now resolved following discontinuation of Cosopt eyedrops which contain timolol.  Now on dorzolamide drops in place of Cosopt cardiology consulted.  No evidence of AV block or pauses overnight.  No indication for pacemaker placement currently.  Outpatient EP follow-up recommended.    The severity of illness, intensity of service provided, expected LOS and risk for adverse outcome make the care appropriate for further observation; however, doesn't meet criteria for hospital inpatient admission. Dr Murphy notified of this determination via amcon text and agrees with status change.      The information on this document is developed by the utilization review team in order for the business office to ensure compliance.  This only denotes the appropriateness of proper admission status and does not reflect the quality of care rendered.         The definitions of Inpatient Status and Observation Status used in making the determination above are those provided in the CMS Coverage Manual, Chapter 1 and Chapter 6, section 70.4.      Sincerely,  Christopher Renee MD  Utilization Review  Physician Advisor  [facility]  "

## 2021-08-27 NOTE — PLAN OF CARE
Problem: Cardiac Output Decreased  Goal: Effective Cardiac Output  Outcome: Improving  Intervention: Optimize Cardiac Output  Recent Flowsheet Documentation  Taken 8/27/2021 0045 by Carlene Garcia, RN  Head of Bed (HOB) Positioning: HOB at 20-30 degrees    Pt comfortable over noc. Denies dizziness, weakness. NSR on tele-rates 60-70.

## 2021-08-27 NOTE — PLAN OF CARE
Problem: Cardiac Output Decreased  Goal: Effective Cardiac Output  Outcome: Improving   Pt A&O, delightful, high functioning 89 yr old. HR= SR 70-80s, this evening, occasional 59.   Cosopt, eye gtt held. Pt asymptomatic. NPO for possible procedure tomorrow.

## 2021-08-27 NOTE — PROGRESS NOTES
Cardiology Progress Note    Assessment/Plan:  1.  Junctional bradycardia, resolved following discontinuation of Cosopt eyedrops which contain timolol.  No evidence of AV block or pauses overnight.  At this point, no indication to pursue permanent pacemaker implant although I did tell the patient that she ultimately will need a pacemaker if evidence of recurrent bradycardia or atrial fibrillation with rapid ventricular response.  She appears comfortable at this point holding off on pacemaker.  Will recommend follow-up with the EP up at [facility].  2.  Essential hypertension, controlled  3.  Glaucoma now on dorzolamide drops in place of Cosopt.    Subjective:  Patient reports feeling well this morning.  Denies any lightheadedness.  Has not been up ambulating yet.      allopurinol  100 mg Oral Daily     amLODIPine  10 mg Oral At Bedtime     brimonidine  1 drop Both Eyes TID     dorzolamide  1 drop Both Eyes TID     fexofenadine  180 mg Oral Daily     latanoprost  1 drop Both Eyes At Bedtime     sodium chloride (PF)  3 mL Intracatheter Q8H     sodium chloride (PF)  3 mL Intracatheter Q8H     vancomycin 1 g in 0.9% NaCl 1000 mL irrigation (btl)  1,000 mg Irrigation Once     [Held by provider] warfarin ANTICOAGULANT  3-4 mg Oral Daily         Objective:   Vital signs in last 24 hours:  Temp:  [96  F (35.6  C)-98.2  F (36.8  C)] 97.8  F (36.6  C)  Pulse:  [40-87] 81  Resp:  [16-28] 20  BP: ()/(60-88) 187/80  SpO2:  [95 %-100 %] 97 %  Weight:   [unfilled]     Review of Systems:  Negative    Physical Exam:  General appearance: alert, cooperative, no distress   Head: Normocephalic, without obvious abnormality, atraumatic  Neck: no JVD   Lungs: clear to auscultation bilaterally   Heart: Regular rate and rhythm.  S1, S2 normal.  2/6 mid peaking crescendo decrescendo systolic murmur heard at the left upper sternal border.  Extremities: No peripheral edema bilaterally    Cardiographics (personally reviewed):    Telemetry demonstrates sinus rhythm, rate 60s to 70s.  No evidence of high-grade AV block, significant bradycardia or pauses.    Imaging (personally reviewed):   Echo Limited With Contrast 2021    Narrative  [id number]  [id number]  [id number]^REBEKAH^STEPHANIE^[place] [address]    Name: ISRAEL RANGEL  MRN: [id number]  : 1925  Study Date: 2021 01:54 PM  Age: 89 yrs  Gender: Female  Patient Location: [facility]  Reason For Study: Sick Sinus Syndrome  Ordering Physician: STEPHANIE WELCH  Performed By: BP    BSA: 1.5 m2  Height: 60 in  Weight: 126 lb  HR: 46  ______________________________________________________________________________  Procedure  Definity (NDC #[id number]) given intravenously.  ______________________________________________________________________________  Interpretation Summary    1. Left ventricular size, wall motion and function are normal. The ejection  fraction is 60-65%.  2. Normal right ventricle size and systolic function.  3. Mild to moderate valvular aortic stenosis.  4. Right ventricular systolic pressure could not be approximated due to  inadequate tricuspid regurgitation.  5. Small pericardial effusion  ______________________________________________________________________________  Left Ventricle  Left ventricular size, wall motion and function are normal. The ejection  fraction is 60-65%. There is normal left ventricular wall thickness. Left  ventricular diastolic function is indeterminate. No regional wall motion  abnormalities noted.    Right Ventricle  Normal right ventricle size and systolic function.    Atria  The left atrium is severely dilated. The right atrium is severely dilated.  There is no color Doppler evidence of an atrial shunt.    Mitral Valve  There is moderate mitral annular calcification. There is trace to mild mitral  regurgitation. There is mild mitral stenosis.    Tricuspid  Valve  Tricuspid valve leaflets appear normal. Right ventricular systolic pressure  could not be approximated due to inadequate tricuspid regurgitation. No  significant tricuspid stenosis.    Aortic Valve  Severe aortic valve calcification is present. There is trace aortic  regurgitation. Mild to moderate valvular aortic stenosis.    Pulmonic Valve  The pulmonic valve is not well seen, but is grossly normal. This degree of  valvular regurgitation is within normal limits.    Vessels  The aorta root is normal. Normal size ascending aorta. Moderate  atherosclerotic plaque(s) in the ascending aorta. IVC diameter <2.1 cm  collapsing >50% with sniff suggests a normal RA pressure of 3 mmHg.    Pericardium  Small pericardial effusion.  _____________________________________________________________________________  Report approved by: Chance Cornejo 08/26/2021 03:44 PM      Lab Results (personally reviewed):   Recent Labs   Lab 08/27/21  0448      CO2 20*   BUN 28       INR   Date Value Ref Range Status   08/26/2021 2.00 (H) 0.85 - 1.15 Final     Comment:     Effective 7/11/2021, the reference range for this assay has changed.   07/22/2021 2.3 (H) 0.9 - 1.1 Final   06/24/2021 2.50 (H) 0.86 - 1.14 Final     Comment:     This test is intended for monitoring Coumadin therapy.  Results are not   accurate in patients with prolonged INR due to factor deficiency.     06/10/2021 1.70 (H) 0.86 - 1.14 Final     Comment:     This test is intended for monitoring Coumadin therapy.  Results are not   accurate in patients with prolonged INR due to factor deficiency.         Troponin I   Date Value Ref Range Status   08/26/2021 <0.01 0.00 - 0.29 ng/mL Final       No results found for: BNP    Hemoglobin   Date Value Ref Range Status   08/27/2021 11.2 (L) 11.7 - 15.7 g/dL Final   05/18/2020 12.1 11.7 - 15.7 g/dL Final         Bouchra Arcos MD

## 2021-08-27 NOTE — PLAN OF CARE
Problem: Cardiac Output Decreased  Goal: Effective Cardiac Output  Outcome: Improving  Intervention: Optimize Cardiac Output  Recent Flowsheet Documentation  Taken 8/26/2021 1700 by Maegan Pierson, RN     Pt A&O, delightful, high functioning 89 yr old. HR=SR 70-80s, this evening, HR occasional 59.  Cosopt eye gtt held. Pt asymptomatic. Npo for possible procedure tomorrow.

## 2021-08-27 NOTE — PROGRESS NOTES
ANTICOAGULATION MANAGEMENT     Pooja Swartz 89 year old female is on warfarin with therapeutic INR result. (Goal INR 2.0-3.0)    Recent labs: (last 7 days)     08/26/21  1239   INR 2.00*       ASSESSMENT     Source(s): Patient/Caregiver Call       Warfarin doses taken: Warfarin taken as instructed    Diet: No new diet changes identified    New illness, injury, or hospitalization: Yes: IN hospital 8/26-8/27 for Sinus John    Medication/supplement changes: None noted    Signs or symptoms of bleeding or clotting: No    Previous INR: Therapeutic last 2(+) visits    Additional findings: None     PLAN     Recommended plan for no diet, medication or health factor changes affecting INR     Dosing Instructions: Continue your current warfarin dose with next INR in 4 weeks       Summary  As of 8/27/2021    Full warfarin instructions:  4 mg every Mon, Thu, Sat; 3 mg all other days   Next INR check:  9/24/2021             Telephone call with Pooja who verbalizes understanding and agrees to plan    Lab visit scheduled    Education provided: Please call back if any changes to your diet, medications or how you've been taking warfarin and Contact [phone]  with any changes, questions or concerns.     Plan made per [facility] anticoagulation protocol    Dillon Fox RN  Anticoagulation Clinic  8/27/2021    _______________________________________________________________________     Anticoagulation Episode Summary     Current INR goal:  2.0-3.0   TTR:  64.6 % (1 y)   Target end date:  Indefinite   Send INR reminders to:  [facility]    Indications    Atrial fibrillation (H) [I48.91]  Long-term (current) use of anticoagulants [Z79.01] [Z79.01]  Atrial fibrillation  unspecified type (H) [I48.91]           Comments:  * warfarin 2mg tabs         Anticoagulation Care Providers     Provider Role Specialty Phone number    Amanda Renee MD Referring Family Medicine [phone]

## 2021-08-30 ENCOUNTER — OFFICE VISIT (OUTPATIENT)
Dept: FAMILY MEDICINE | Facility: CLINIC | Age: 86
End: 2021-08-30
Payer: COMMERCIAL

## 2021-08-30 VITALS
WEIGHT: 126 LBS | DIASTOLIC BLOOD PRESSURE: 66 MMHG | HEIGHT: 60 IN | BODY MASS INDEX: 24.74 KG/M2 | TEMPERATURE: 98.6 F | HEART RATE: 85 BPM | OXYGEN SATURATION: 98 % | SYSTOLIC BLOOD PRESSURE: 150 MMHG | RESPIRATION RATE: 16 BRPM

## 2021-08-30 DIAGNOSIS — I35.0 AORTIC VALVE STENOSIS, ETIOLOGY OF CARDIAC VALVE DISEASE UNSPECIFIED: Primary | ICD-10-CM

## 2021-08-30 DIAGNOSIS — I49.5 SINUS NODE DYSFUNCTION (H): ICD-10-CM

## 2021-08-30 DIAGNOSIS — H40.89 OTHER GLAUCOMA OF BOTH EYES: ICD-10-CM

## 2021-08-30 PROCEDURE — 99214 OFFICE O/P EST MOD 30 MIN: CPT | Performed by: FAMILY MEDICINE

## 2021-08-30 ASSESSMENT — PAIN SCALES - GENERAL: PAINLEVEL: NO PAIN (0)

## 2021-08-30 ASSESSMENT — MIFFLIN-ST. JEOR: SCORE: 888.03

## 2021-08-30 NOTE — ED AVS SNAPSHOT
[facility] Emergency Department    [address] [facility] [address]    Phone:  [phone]    Fax:  [phone]                                       Pooja Swartz   MRN: [id number]    Department:  [facility] Emergency Department   Date of Visit:  2/8/2017           Patient Information     Date Of Birth          9/1/1925        Your diagnoses for this visit were:     Fall, initial encounter     Closed head injury, initial encounter     Scalp hematoma, initial encounter     Elbow laceration, left, initial encounter     Contusion of left hip, initial encounter        You were seen by Kehinde Ramirez MD.      Follow-up Information     Schedule an appointment as soon as possible for a visit with LEE Winkler MD.    Specialty:  Family Practice    Why:  For suture removal    Contact information:    [address] [address] [phone]          Discharge Instructions         Mechanical Fall  You have had a fall today. It appears that the cause is  mechanical . That means that you slipped, tripped or lost your balance. If your fall had been due to fainting or a seizure, further tests would be required.  Home Care:    Rest today and resume your normal activities when you are feeling back to normal.    If you were injured during the fall, follow the advice from your doctor regarding care of your injury.    You may use acetaminophen (Tylenol) or ibuprofen (Motrin, Advil) to control pain, unless another pain medicine was prescribed. [NOTE: If you have chronic liver or kidney disease or ever had a stomach ulcer or GI bleeding, talk with your doctor before using these medicines.]  Fall Prevention:    Was there anything that caused your fall that can be fixed, removed, or replaced?    Make your home safe by keeping walkways clear of objects you may trip over.    Use non-slip pads under rugs.    Do not walk in poorly lit areas.    Do not stand on chairs or  Go to your pharmacy and  gentamycin ointment      Apply the gentamycin ointment 2-3 times per day to the rectal wound  Use pea sized amount  No dressings needed  Keep the area clean; ok to shower; use tap water to cleanse the area gently  Do not aggressively pull apart the buttocks or scrub the area  Can use sanitary napkin/pad in the underwear to protect clothing               wobbly ladders.    Use caution when reaching overhead or looking upward. This position can cause a loss of balance.    Be sure your shoes fit properly, have non-slip bottoms and are in good condition.    Be cautious when going up and down curbs, and walking on uneven sidewalks.    If your balance is poor, consider using a cane or walker.    Stay as active as you can. Balance, flexibility, strength, and endurance all come from exercise. They all play a role in preventing falls.  Follow Up  with your doctor or as advised by our staff.  Get Prompt Medical Attention  if any of the following occur:    Repeated mechanical falls, or unexplained falls    Dizziness, fainting or seizure    Severe headache    Chest pain or shortness of breath    Palpitations (very rapid or very slow or irregular heartbeat)    Blood in vomit, stools (black or red color)    Weakness of an arm or leg or one side of the face    Difficulty with speech or vision    7186-6299 [employer]. [address] [place] [address]. All rights reserved. This information is not intended as a substitute for professional medical care. Always follow your healthcare professional's instructions.          Bruises (Contusions)    A contusion is a bruise. A bruise happens when a blow to your body doesn't break the skin but does break blood vessels beneath the skin. Blood leaking from the broken vessels causes redness and swelling. As it heals, your bruise is likely to turn colors like purple, green, and yellow. This is normal. The bruise should fade in 2 or 3 weeks.  Factors that make you more likely to bruise  Almost everyone bruises now and then. Certain people do bruise more easily than others. You're more prone to bruising as you get older. That's because blood vessels become more fragile with age. You're also more likely to bruise if you have a clotting disorder such as hemophilia or take medications that reduce clotting, including  aspirin.  When to go to the emergency room (ER)  Bruises almost always heal on their own without special treatment. But for some people, a bad bruise can be serious. Seek medical care if you:    Have a clotting disorder such as hemophilia.    Have cirrhosis or other serious liver disease.    Take blood-thinning medications such as warfarin (Coumadin).  What to expect in the ER  A doctor will examine your bruise and ask about any health conditions you have. In some cases, you may have a test to check how well your blood clots. Other treatment will depend on your needs.  Follow-up care  Sometimes a bruise gets worse instead of better. It may become larger and more swollen. This can occur when your body walls off a small pool of blood under the skin (hematoma). In very rare cases, your doctor may need to drain excess blood from the area.  Tip:  Apply an ice pack or bag of frozen peas to a bruise (keep a thin cloth between the cold source and your skin). This can help reduce redness and swelling.     8815-9273 [employer]. [address] [place] [address]. All rights reserved. This information is not intended as a substitute for professional medical care. Always follow your healthcare professional's instructions.      Ice affected areas for comfort 20 minutes out of each hour while awake over the next 2 days.  Ibuprofen if needed for pain.  Removal of sutures with her primary care provider in 14-21 days.      Discharge References/Attachments     LACERATION, ALL (ENGLISH)      Future Appointments        Provider Department Dept Phone Center    2/24/2017 11:00 AM [facility] Anticoagulation provider, [facility] [phone] [facility]    3/7/2017 3:00 PM Adan Franks MD [facility] PHYSICIAN HEART AT [facility] [phone] [facility]      24 Hour Appointment Hotline       To make an appointment at any [facility], call [phone] ([phone]). If you  don't have a family doctor or clinic, we will help you find one. [facility] are conveniently located to serve the needs of you and your family.             Review of your medicines      Our records show that you are taking the medicines listed below. If these are incorrect, please call your family doctor or clinic.        Dose / Directions Last dose taken    ALLEGRA PO   Dose:  180 mg        Take 180 mg by mouth daily   Refills:  0        allopurinol 100 MG tablet   Commonly known as:  ZYLOPRIM   Dose:  100 mg   Quantity:  90 tablet        Take 1 tablet (100 mg) by mouth daily   Refills:  3        AMLODIPINE BESYLATE PO   Dose:  7.5 mg        Take 7.5 mg by mouth daily   Refills:  0        brimonidine 0.15 % ophthalmic solution   Commonly known as:  ALPHAGAN-P   Dose:  1 drop        Place 1 drop Into the left eye 2 times daily   Refills:  0        Calcium-Vitamin D 600-200 MG-UNIT Tabs   Dose:  1 tablet        Take 1 tablet by mouth 2 times daily   Refills:  0        COSOPT 2-0.5 % ophthalmic solution   Generic drug:  dorzolamide-timolol        1 DROP INTO Left Eye twice daily   Refills:  0        latanoprost 0.005 % ophthalmic solution   Commonly known as:  XALATAN   Dose:  1 drop        Place 1 drop into both eyes At Bedtime   Refills:  0        miconazole 2 % powder   Commonly known as:  MICATIN; MICRO GUARD        Apply topically 2 times daily   Refills:  0        ONE-A-DAY WITHIN Tabs        1 TABLET ORALLY DAILY   Refills:  0        PROBIOTIC DAILY PO   Dose:  1 capsule        Take 1 capsule by mouth daily   Refills:  0        senna-docusate 8.6-50 MG per tablet   Commonly known as:  SENOKOT-S;PERICOLACE   Dose:  1-2 tablet   Quantity:  100 tablet        Take 1-2 tablets by mouth 2 times daily   Refills:  0        traMADol 50 MG tablet   Commonly known as:  ULTRAM   Dose:  50 mg   Quantity:  50 tablet        Take 1 tablet (50 mg) by mouth every 6 hours as needed   Refills:  0        triamcinolone 0.1 %  ointment   Commonly known as:  KENALOG   Quantity:  30 g        Apply  topically 3 times daily. Apply sparingly to affected area.   Refills:  0        * Warfarin Therapy Reminder   Dose:  1 each        1 each continuous prn   Refills:  0        * warfarin 2 MG tablet   Commonly known as:  COUMADIN   Quantity:  150 tablet        Take 4 mg on Mon, Wed, Fri; 3 mg all other days or as directed by the Anticoagulation Clinic   Refills:  0        * Notice:  This list has 2 medication(s) that are the same as other medications prescribed for you. Read the directions carefully, and ask your doctor or other care provider to review them with you.            Procedures and tests performed during your visit     CBC with platelets, differential    CT Head w/o Contrast    Comprehensive metabolic panel    INR    XR Elbow Left G/E 3 Views    XR Pelvis and Hip Bilateral 2 Views      Orders Needing Specimen Collection     None      Pending Results     No orders found from 2/7/2017 to 2/9/2017.            Pending Culture Results     No orders found from 2/7/2017 to 2/9/2017.       Test Results from your hospital stay           2/8/2017 11:38 AM - Interface, Radiant Ib      Narrative     CT SCAN OF THE HEAD WITHOUT CONTRAST   2/8/2017 11:27 AM     HISTORY: Fall, head injury    TECHNIQUE:  Axial images of the head and coronal reformations without  IV contrast material. Radiation dose for this scan was reduced using  automated exposure control, adjustment of the mA and/or kV according  to patient size, or iterative reconstruction technique.    COMPARISON: 8/16/2016    FINDINGS:  There is generalized atrophy of the brain.  There is low  attenuation in the white matter of the cerebral hemispheres consistent  with sequelae of small vessel ischemic disease. There is no evidence  of intracranial hemorrhage, mass, acute infarct or anomaly.     The visualized portions of the sinuses and mastoids appear normal.  There is no evidence of trauma.         Impression     IMPRESSION:   1. No evidence of acute trauma.  2.  Atrophy of the brain.  White matter changes consistent with  sequelae of small vessel ischemic disease.  3. No change.      SARAH HASKINS MD         2/8/2017 11:59 AM - Interface, Radiant Ib      Narrative     XR ELBOW LT G/E 3 VW  2/8/2017 11:44 AM    HISTORY:  Pain    COMPARISON:  None.        Impression     IMPRESSION:  A dressing overlies the posterior elbow. There may be a  soft tissue laceration. Bones appear normal. No acute osseous  abnormality identified.     ANIKA DELUNA MD               2/8/2017 12:26 PM - Interface, Radiant Ib      Narrative     PELVIS AND HIP BILATERAL TWO VIEWS 2/8/2017 11:43 AM     HISTORY: Fall, pain.    COMPARISON: 1/19/2017        Impression     IMPRESSION: Osteopenia. 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  Longstem femoral component in the left femur. There is heterotopic  ossification about the proximal left femur. Cortical irregularity in  the left femoral neck is unchanged compared with the prior x-ray. No  definite acute fracture is seen.    AMBER KAISER MD         2/8/2017 11:15 AM - Interface, Flexilab Results      Component Results     Component Value Ref Range & Units Status    WBC 8.0 4.0 - 11.0 10e9/L Final    RBC Count 4.14 3.8 - 5.2 10e12/L Final    Hemoglobin 13.2 11.7 - 15.7 g/dL Final    Hematocrit 40.5 35.0 - 47.0 % Final    MCV 98 78 - 100 fl Final    MCH 31.9 26.5 - 33.0 pg Final    MCHC 32.6 31.5 - 36.5 g/dL Final    RDW 15.3 (H) 10.0 - 15.0 % Final    Platelet Count 205 150 - 450 10e9/L Final    Diff Method Automated Method  Final    % Neutrophils 66.8 % Final    % Lymphocytes 22.3 % Final    % Monocytes 8.8 % Final    % Eosinophils 1.0 % Final    % Basophils 0.6 % Final    % Immature Granulocytes 0.5 % Final    Absolute Neutrophil 5.4 1.6 - 8.3 10e9/L Final    Absolute Lymphocytes 1.8 0.8 - 5.3 10e9/L Final    Absolute Monocytes 0.7 0.0 - 1.3 10e9/L Final    Absolute  "Eosinophils 0.1 0.0 - 0.7 10e9/L Final    Absolute Basophils 0.1 0.0 - 0.2 10e9/L Final    Abs Immature Granulocytes 0.0 0 - 0.4 10e9/L Final         2/8/2017 11:34 AM - Interface, Flexilab Results      Component Results     Component Value Ref Range & Units Status    Sodium 138 133 - 144 mmol/L Final    Potassium 4.3 3.4 - 5.3 mmol/L Final    Chloride 106 94 - 109 mmol/L Final    Carbon Dioxide 24 20 - 32 mmol/L Final    Anion Gap 8 3 - 14 mmol/L Final    Glucose 110 (H) 70 - 99 mg/dL Final    Urea Nitrogen 23 7 - 30 mg/dL Final    Creatinine 0.82 0.52 - 1.04 mg/dL Final    GFR Estimate 65 >60 mL/min/1.7m2 Final    Non  GFR Calc    GFR Estimate If Black 79 >60 mL/min/1.7m2 Final    African American GFR Calc    Calcium 9.2 8.5 - 10.1 mg/dL Final    Bilirubin Total 0.7 0.2 - 1.3 mg/dL Final    Albumin 3.7 3.4 - 5.0 g/dL Final    Protein Total 7.9 6.8 - 8.8 g/dL Final    Alkaline Phosphatase 88 40 - 150 U/L Final    ALT 22 0 - 50 U/L Final    AST 29 0 - 45 U/L Final         2/8/2017 11:23 AM - Interface, Flexilab Results      Component Results     Component Value Ref Range & Units Status    INR 2.02 (H) 0.86 - 1.14 Final                Thank you for choosing [facility]       Thank you for choosing [facility] for your care. Our goal is always to provide you with excellent care. Hearing back from our patients is one way we can continue to improve our services. Please take a few minutes to complete the written survey that you may receive in the mail after you visit with us. Thank you!        "[employer] Information     [employer] lets you send messages to your doctor, view your test results, renew your prescriptions, schedule appointments and more. To sign up, go to [email or web address] . Click on \"Log in\" on the left side of the screen, which will take you to the Welcome page. Then click on \"Sign up Now\" on the right side of the page.     You will be asked to enter the access code listed below, as well as some " personal information. Please follow the directions to create your username and password.     Your access code is: [email or web address]  Expires: 3/7/2017  6:31 AM     Your access code will  in 90 days. If you need help or a new code, please call your [facility] clinic or [phone].        Care EveryWhere ID     This is your Care EveryWhere ID. This could be used by other organizations to access your [facility] medical records  [id number]        After Visit Summary       This is your record. Keep this with you and show to your community pharmacist(s) and doctor(s) at your next visit.

## 2021-08-30 NOTE — PROGRESS NOTES
"    Assessment & Plan     Aortic valve stenosis, etiology of cardiac valve disease unspecified   has follow up scheduled with cardiology    Sinus node dysfunction (H)   resolved off the timolol  Was offered pacemaker but she declined as it wasn't felt to be necessary  Symptoms have resolved, improved.    Other glaucoma of both eyes  Has follow up scheduled with her ophthalmologist.                    No follow-ups on file.    Amanda Renee MD  [facility]    Daniella Patton is a 89 year old who presents for the following health issues     HPI       Hospital Follow-up Visit:    Hospital/Nursing Home/ Rehab Facility: [facility]  Date of Admission: 8/26/21  Date of Discharge: 8/27/21  Reason(s) for Admission:   1. Sinus node dysfunction (H)    2. Sinus arrest    3. Symptomatic bradycardia    4. Anticoagulated on Coumadin    5. Glaucoma suspect, bilateral             Was your hospitalization related to COVID-19? No   Problems taking medications regularly:  None  Medication changes since discharge: latanoprost, dorzolamide  Problems adhering to non-medication therapy:  None    Summary of hospitalization:  [facility] discharge summary reviewed  Diagnostic Tests/Treatments reviewed.  Follow up needed: follow-up with cardiologist  Other Healthcare Providers Involved in Patient s Care:         None  Update since discharge: improved. Post Discharge Medication Reconciliation: discharge medications reconciled and changed, per note/orders.  Plan of care communicated with patient       Patient reports that three months before she was in to be seen, her ophthalmologist did increase her timolol dose.     Now without the timolol she is \"feeling great\" with her heart rate now in a reasonable range.          Hospital Course     Pooja Swartz is a 89 year old female admitted on 8/26/2021. She has a history significant for HTN, paroxysmal AF on warfarin, CKD, " and sinus node dysfunction that resolved with the discontinuation of atenolol per patient, presenting from primary care clinic with presyncopal symptoms in the setting of junctional rhythm on EKG.     Near syncopal symptoms  EKG with junctional bradycardia  Patient endorses feeling progressively lightheaded, dizzy and generally weak for the past few weeks. Today she presented to her primary care clinic for an INR check and work-up for presyncopal symptoms. She was found to be bradycardic to the 40's with an EKG suggestive of junctional bradycardia.The cardiology team was notified and recommended she come to the emergency room for possible permanent pacemaker placement. Here in the ED, her electrolytes are normal. TSH normal. She does take Cosopt for glaucoma that includes timolol. Troponin negative. Last ECHO was in 2016 and revealed normal LV function. No clinical symptoms to suggest heart failure. Held cosopft and per ophthalmologist, replaced with dorzolamide TID. She was monitored on tele and had resolution of bradycardia and symptoms. HR was in 60s-80s on discharge. Cardiology recommended follow up with EP for possible pacemaker as outpatient.      heart rates at home all in the 70-80s range.      Blood pressure is in the 150s systolic.          Review of Systems   Constitutional, HEENT, cardiovascular, pulmonary, gi and gu systems are negative, except as otherwise noted.      Objective    BP (!) 150/66   Pulse 85   Temp 98.6  F (37  C) (Tympanic)   Resp 16   Ht 1.524 m (5')   Wt 57.2 kg (126 lb)   LMP 07/07/1960   SpO2 98%   Breastfeeding No   BMI 24.61 kg/m    Body mass index is 24.61 kg/m .  Physical Exam   GENERAL: healthy, alert and no distress  NECK: no adenopathy, no asymmetry, masses, or scars and thyroid normal to palpation  RESP: lungs clear to auscultation - no rales, rhonchi or wheezes  CV: regular rates and rhythm, normal S1 S2, no S3 or S4, grade 3/6 systolic murmur heard best over the  rusb, peripheral pulses strong and no peripheral edema  ABDOMEN: soft, nontender, no hepatosplenomegaly, no masses and bowel sounds normal  MS: no gross musculoskeletal defects noted, no edema    Echo, ekg and labs all reviewed from hospital stay

## 2021-08-30 NOTE — PATIENT INSTRUCTIONS
Our Clinic hours are:  Mondays    7:20 am - 7 pm  Tues -  Fri  7:20 am - 5 pm    Clinic Phone: [phone]    The clinic lab opens at 7:30 am Mon - Fri and appointments are required.    [facility]. [phone]  Monday  8 am - 7pm  Tues - Fri 8 am - 5:30 pm

## 2021-08-31 LAB
ATRIAL RATE - MUSE: 76 BPM
DIASTOLIC BLOOD PRESSURE - MUSE: 85 MMHG
INTERPRETATION ECG - MUSE: NORMAL
P AXIS - MUSE: 67 DEGREES
PR INTERVAL - MUSE: 192 MS
QRS DURATION - MUSE: 126 MS
QT - MUSE: 432 MS
QTC - MUSE: 486 MS
R AXIS - MUSE: -8 DEGREES
SYSTOLIC BLOOD PRESSURE - MUSE: 201 MMHG
T AXIS - MUSE: 35 DEGREES
VENTRICULAR RATE- MUSE: 76 BPM

## 2021-09-24 ENCOUNTER — LAB (OUTPATIENT)
Dept: LAB | Facility: CLINIC | Age: 86
End: 2021-09-24
Payer: COMMERCIAL

## 2021-09-24 ENCOUNTER — ANTICOAGULATION THERAPY VISIT (OUTPATIENT)
Dept: ANTICOAGULATION | Facility: CLINIC | Age: 86
End: 2021-09-24

## 2021-09-24 DIAGNOSIS — I48.91 ATRIAL FIBRILLATION (H): Primary | ICD-10-CM

## 2021-09-24 DIAGNOSIS — I48.91 ATRIAL FIBRILLATION, UNSPECIFIED TYPE (H): ICD-10-CM

## 2021-09-24 DIAGNOSIS — Z79.01 LONG TERM CURRENT USE OF ANTICOAGULANT THERAPY: ICD-10-CM

## 2021-09-24 DIAGNOSIS — M1A.9XX1 CHRONIC TOPHACEOUS GOUT: ICD-10-CM

## 2021-09-24 DIAGNOSIS — I10 HYPERTENSION GOAL BP (BLOOD PRESSURE) < 140/90: ICD-10-CM

## 2021-09-24 LAB
ANION GAP SERPL CALCULATED.3IONS-SCNC: 7 MMOL/L (ref 3–14)
BUN SERPL-MCNC: 19 MG/DL (ref 7–30)
CALCIUM SERPL-MCNC: 9.2 MG/DL (ref 8.5–10.1)
CHLORIDE BLD-SCNC: 106 MMOL/L (ref 94–109)
CO2 SERPL-SCNC: 26 MMOL/L (ref 20–32)
CREAT SERPL-MCNC: 0.83 MG/DL (ref 0.52–1.04)
GFR SERPL CREATININE-BSD FRML MDRD: 60 ML/MIN/1.73M2
GLUCOSE BLD-MCNC: 110 MG/DL (ref 70–99)
INR BLD: 1.4 (ref 0.9–1.1)
POTASSIUM BLD-SCNC: 3.6 MMOL/L (ref 3.4–5.3)
SODIUM SERPL-SCNC: 139 MMOL/L (ref 133–144)
URATE SERPL-MCNC: 3.8 MG/DL (ref 2.6–6)

## 2021-09-24 PROCEDURE — 84550 ASSAY OF BLOOD/URIC ACID: CPT

## 2021-09-24 PROCEDURE — 36415 COLL VENOUS BLD VENIPUNCTURE: CPT

## 2021-09-24 PROCEDURE — 80048 BASIC METABOLIC PNL TOTAL CA: CPT

## 2021-09-24 PROCEDURE — 85610 PROTHROMBIN TIME: CPT

## 2021-09-24 NOTE — LETTER
September 27, 2021      Pooja Swartz  [address]  [address] [address]        Dear ,    We are writing to inform you of your test results.    All of the labs were normal or acceptable.     Please contact my office if you have questions.       Resulted Orders   Uric acid   Result Value Ref Range    Uric Acid 3.8 2.6 - 6.0 mg/dL   Basic metabolic panel  (Ca, Cl, CO2, Creat, Gluc, K, Na, BUN)   Result Value Ref Range    Sodium 139 133 - 144 mmol/L    Potassium 3.6 3.4 - 5.3 mmol/L    Chloride 106 94 - 109 mmol/L    Carbon Dioxide (CO2) 26 20 - 32 mmol/L    Anion Gap 7 3 - 14 mmol/L    Urea Nitrogen 19 7 - 30 mg/dL    Creatinine 0.83 0.52 - 1.04 mg/dL    Calcium 9.2 8.5 - 10.1 mg/dL    Glucose 110 (H) 70 - 99 mg/dL    GFR Estimate 60 (L) >60 mL/min/1.73m2      Comment:      As of July 11, 2021, eGFR is calculated by the CKD-EPI creatinine equation, without race adjustment. eGFR can be influenced by muscle mass, exercise, and diet. The reported eGFR is an estimation only and is only applicable if the renal function is stable.       If you have any questions or concerns, please call the clinic at the number listed above.       Sincerely,      Amanda Renee MD/[facility]

## 2021-09-24 NOTE — PROGRESS NOTES
ANTICOAGULATION MANAGEMENT     Pooja Swartz 89 year old female is on warfarin with subtherapeutic INR result. (Goal INR 2.0-3.0)    Recent labs: (last 7 days)     09/24/21  1016   INR 1.4*       ASSESSMENT     Source(s): Chart Review and Patient/Caregiver Call       Warfarin doses taken: Warfarin taken as instructed    Diet: No new diet changes identified    New illness, injury, or hospitalization: No    Medication/supplement changes: None noted    Signs or symptoms of bleeding or clotting: No    Previous INR: Therapeutic last 2(+) visits    Additional findings: Writer wonders if pt may have missed a dose, although she states she did not. There have been no diet or med changes. If still sub next week, will need to consider increasing maintenance dose.     PLAN     Recommended plan for no diet, medication or health factor changes affecting INR     Dosing Instructions: Booster dose then continue your current warfarin dose with next INR in 6 days       Summary  As of 9/24/2021    Full warfarin instructions:  9/24: 6 mg; Otherwise 4 mg every Mon, Thu, Sat; 3 mg all other days   Next INR check:  9/30/2021             Telephone call with Pooja who verbalizes understanding and agrees to plan    Lab visit scheduled    Education provided: Please call back if any changes to your diet, medications or how you've been taking warfarin, Monitoring for clotting signs and symptoms, When to seek medical attention/emergency care and Contact [phone]  with any changes, questions or concerns.     Plan made per [facility] anticoagulation protocol    Arin Sullivan RN  Anticoagulation Clinic  9/24/2021    _______________________________________________________________________     Anticoagulation Episode Summary     Current INR goal:  2.0-3.0   TTR:  57.2 % (1 y)   Target end date:  Indefinite   Send INR reminders to:  [facility]    Indications    Atrial fibrillation (H) [I48.91]  Long-term (current) use of anticoagulants  [Z79.01] [Z79.01]  Atrial fibrillation  unspecified type (H) [I48.91]           Comments:  * warfarin 2mg tabs         Anticoagulation Care Providers     Provider Role Specialty Phone number    Amanda Renee MD [facility] [phone]

## 2021-09-27 NOTE — RESULT ENCOUNTER NOTE
Pooja,    All of the labs were normal or acceptable.    Please contact my office if you have questions.    Amanda Renee M.D.

## 2021-09-30 ENCOUNTER — LAB (OUTPATIENT)
Dept: LAB | Facility: CLINIC | Age: 86
End: 2021-09-30
Payer: COMMERCIAL

## 2021-09-30 ENCOUNTER — ANTICOAGULATION THERAPY VISIT (OUTPATIENT)
Dept: FAMILY MEDICINE | Facility: CLINIC | Age: 86
End: 2021-09-30

## 2021-09-30 DIAGNOSIS — I48.91 ATRIAL FIBRILLATION, UNSPECIFIED TYPE (H): ICD-10-CM

## 2021-09-30 DIAGNOSIS — I48.91 ATRIAL FIBRILLATION (H): Primary | ICD-10-CM

## 2021-09-30 DIAGNOSIS — Z79.01 LONG TERM CURRENT USE OF ANTICOAGULANT THERAPY: ICD-10-CM

## 2021-09-30 LAB — INR BLD: 1.7 (ref 0.9–1.1)

## 2021-09-30 PROCEDURE — 36416 COLLJ CAPILLARY BLOOD SPEC: CPT

## 2021-09-30 PROCEDURE — 85610 PROTHROMBIN TIME: CPT

## 2021-09-30 NOTE — PROGRESS NOTES
ANTICOAGULATION MANAGEMENT     Pooja Swartz 89 year old female is on warfarin with subtherapeutic INR result. (Goal INR 2.0-3.0)    Recent labs: (last 7 days)     09/30/21  0932   INR 1.7*       ASSESSMENT     Source(s): Chart Review and Patient/Caregiver Call       Warfarin doses taken: Warfarin taken as instructed    Diet: No new diet changes identified    New illness, injury, or hospitalization: No    Medication/supplement changes: None noted    Signs or symptoms of bleeding or clotting: No    Previous INR: Subtherapeutic    Additional findings: None     PLAN     Recommended plan for no diet, medication or health factor changes affecting INR     Dosing Instructions:  Increase your warfarin dose (8.3% change) with next INR in 2 weeks       Summary  As of 9/30/2021    Full warfarin instructions:  3 mg every Sun, Wed; 4 mg all other days   Next INR check:  10/14/2021             Telephone call with Pooja who verbalizes understanding and agrees to plan    Lab visit scheduled    Education provided: Please call back if any changes to your diet, medications or how you've been taking warfarin and Monitoring for clotting signs and symptoms    Plan made per [facility] anticoagulation protocol    Monica Mg RN  Anticoagulation Clinic  9/30/2021    _______________________________________________________________________     Anticoagulation Episode Summary     Current INR goal:  2.0-3.0   TTR:  55.5 % (1 y)   Target end date:  Indefinite   Send INR reminders to:  [facility]    Indications    Atrial fibrillation (H) [I48.91]  Long-term (current) use of anticoagulants [Z79.01] [Z79.01]  Atrial fibrillation  unspecified type (H) [I48.91]           Comments:  * warfarin 2mg tabs         Anticoagulation Care Providers     Provider Role Specialty Phone number    Amanda Renee MD Referring Family Medicine [phone]

## 2021-10-14 ENCOUNTER — LAB (OUTPATIENT)
Dept: LAB | Facility: CLINIC | Age: 86
End: 2021-10-14
Payer: COMMERCIAL

## 2021-10-14 ENCOUNTER — ANTICOAGULATION THERAPY VISIT (OUTPATIENT)
Dept: ANTICOAGULATION | Facility: CLINIC | Age: 86
End: 2021-10-14

## 2021-10-14 DIAGNOSIS — I48.91 ATRIAL FIBRILLATION, UNSPECIFIED TYPE (H): ICD-10-CM

## 2021-10-14 DIAGNOSIS — Z79.01 LONG TERM CURRENT USE OF ANTICOAGULANT THERAPY: ICD-10-CM

## 2021-10-14 DIAGNOSIS — I48.91 ATRIAL FIBRILLATION (H): Primary | ICD-10-CM

## 2021-10-14 LAB — INR BLD: 1.6 (ref 0.9–1.1)

## 2021-10-14 PROCEDURE — 36416 COLLJ CAPILLARY BLOOD SPEC: CPT

## 2021-10-14 PROCEDURE — 85610 PROTHROMBIN TIME: CPT

## 2021-10-14 NOTE — PROGRESS NOTES
ANTICOAGULATION MANAGEMENT     Pooja Swartz 89 year old female is on warfarin with subtherapeutic INR result. (Goal INR 2.0-3.0)    Recent labs: (last 7 days)     10/14/21  1059   INR 1.6*       ASSESSMENT     Source(s): Chart Review and Patient/Caregiver Call       Warfarin doses taken: Warfarin taken as instructed    Diet: No new diet changes identified    New illness, injury, or hospitalization: No    Medication/supplement changes: None noted    Signs or symptoms of bleeding or clotting: No    Previous INR: Subtherapeutic    Additional findings: None     PLAN     Recommended plan for no diet, medication or health factor changes affecting INR     Dosing Instructions:  Increase your warfarin dose (11.5% change) with next INR in 1 week       Summary  As of 10/14/2021    Full warfarin instructions:  5 mg every Thu; 4 mg all other days   Next INR check:  10/21/2021             Telephone call with Pooja who verbalizes understanding and agrees to plan    Lab visit scheduled    Education provided: Please call back if any changes to your diet, medications or how you've been taking warfarin and Contact [phone]  with any changes, questions or concerns.     Plan made per [facility] anticoagulation protocol    Arin Sullivan RN  Anticoagulation Clinic  10/14/2021    _______________________________________________________________________     Anticoagulation Episode Summary     Current INR goal:  2.0-3.0   TTR:  51.7 % (1 y)   Target end date:  Indefinite   Send INR reminders to:  [facility]    Indications    Atrial fibrillation (H) [I48.91]  Long-term (current) use of anticoagulants [Z79.01] [Z79.01]  Atrial fibrillation  unspecified type (H) [I48.91]           Comments:  * warfarin 2mg tabs         Anticoagulation Care Providers     Provider Role Specialty Phone number    Amanda Renee MD Referring Family Medicine [phone]

## 2021-10-19 ENCOUNTER — OFFICE VISIT (OUTPATIENT)
Dept: CARDIOLOGY | Facility: CLINIC | Age: 86
End: 2021-10-19
Payer: COMMERCIAL

## 2021-10-19 VITALS
BODY MASS INDEX: 24.8 KG/M2 | SYSTOLIC BLOOD PRESSURE: 175 MMHG | DIASTOLIC BLOOD PRESSURE: 76 MMHG | HEART RATE: 83 BPM | WEIGHT: 127 LBS

## 2021-10-19 DIAGNOSIS — I10 PRIMARY HYPERTENSION: Primary | ICD-10-CM

## 2021-10-19 PROCEDURE — 99214 OFFICE O/P EST MOD 30 MIN: CPT | Performed by: INTERNAL MEDICINE

## 2021-10-19 RX ORDER — DORZOLAMIDE HCL 20 MG/ML
SOLUTION/ DROPS OPHTHALMIC
COMMUNITY
Start: 2021-09-29

## 2021-10-19 RX ORDER — LISINOPRIL 2.5 MG/1
2.5 TABLET ORAL EVERY EVENING
Qty: 90 TABLET | Refills: 3 | Status: SHIPPED | OUTPATIENT
Start: 2021-10-19 | End: 2022-10-13

## 2021-10-19 NOTE — PROGRESS NOTES
HPI and Plan:   See dictation  [id number]  No orders of the defined types were placed in this encounter.      Orders Placed This Encounter   Medications     dorzolamide (TRUSOPT) 2 % ophthalmic solution     Sig: instill 1 drop by ophthalmic route 3 times every day into both eyes     lisinopril (ZESTRIL) 2.5 MG tablet     Sig: Take 1 tablet (2.5 mg) by mouth every evening     Dispense:  90 tablet     Refill:  3       There are no discontinued medications.      Encounter Diagnosis   Name Primary?     Primary hypertension Yes       CURRENT MEDICATIONS:  Current Outpatient Medications   Medication Sig Dispense Refill     allopurinol (ZYLOPRIM) 100 MG tablet Take 1 tablet (100 mg) by mouth daily 90 tablet 3     amLODIPine (NORVASC) 10 MG tablet Take 1 tablet (10 mg) by mouth daily 90 tablet 3     budesonide (ENTOCORT EC) 3 MG EC capsule Take 1 capsule (3 mg) by mouth every morning 30 capsule 6     Calcium-Vitamin D 600-200 MG-UNIT TABS Take 1 tablet by mouth 2 times daily       dorzolamide (TRUSOPT) 2 % ophthalmic solution instill 1 drop by ophthalmic route 3 times every day into both eyes       Fexofenadine HCl (ALLEGRA PO) Take 180 mg by mouth daily        lisinopril (ZESTRIL) 2.5 MG tablet Take 1 tablet (2.5 mg) by mouth every evening 90 tablet 3     ONE-A-DAY WOMENS OR TABS Take 1 tablet by mouth daily        warfarin ANTICOAGULANT (COUMADIN) 2 MG tablet Take 5 mg Thurs; 4 mg all other days or As directed by Anticoagulation clinic       latanoprost (XALATAN) 0.005 % ophthalmic solution Place 1 drop into both eyes At Bedtime 1.5 mL 0       ALLERGIES     Allergies   Allergen Reactions     Pcn [Penicillins] Hives       PAST MEDICAL HISTORY:  Past Medical History:   Diagnosis Date     Atrial fibrillation (H)      Basal cell carcinoma      Chronic kidney disease      Disorders of bursae and tendons in shoulder region, unspecified     right shoulder     Femur fracture, left (H) 8/17/2016     Hemorrhage of gastrointestinal  tract, unspecified      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, including hip      Other specified glaucoma      Periprosthetic fracture around internal prosthetic left hip joint, subsequent encounter 1/9/2017     Renal insufficiency      Sinus node dysfunction (H)      Unspecified essential hypertension        PAST SURGICAL HISTORY:  Past Surgical History:   Procedure Laterality Date     ARTHROPLASTY REVISION HIP Left 8/19/2016    Procedure: ARTHROPLASTY REVISION HIP;  Surgeon: Kael Rojas MD;  Location: [facility] OR     C PELVIS/HIP JOINT SURGERY UNLISTED       C SHOULDER SURG PROC UNLISTED       C STOMACH SURGERY PROCEDURE UNLISTED       HC VASCULAR SURGERY PROCEDURE UNLIST       OPEN REDUCTION INTERNAL FIXATION FEMUR PROXIMAL Left 8/19/2016    Procedure: OPEN REDUCTION INTERNAL FIXATION FEMUR PROXIMAL;  Surgeon: Kael Rojas MD;  Location: [facility] OR     SURGICAL HISTORY OF -   11/1992    skin cancer, nose     SURGICAL HISTORY OF -   1978    right, vein stripping     SURGICAL HISTORY OF -   1968    breast biopsy     SURGICAL HISTORY OF -   1993    laparoscopic cholecystectomy     SURGICAL HISTORY OF -       tonsillectomy and adenoidectomy     SURGICAL HISTORY OF -   04/2004    left cataract     SURGICAL HISTORY OF -   08/09/2004    right total shoulder arthroplasty       FAMILY HISTORY:  Family History   Problem Relation Age of Onset     Heart Disease Mother         CHF     Hypertension Mother      Hypertension Father      Hypertension Maternal Grandmother      Hypertension Maternal Grandfather      Hypertension Son      Heart Disease Son         MI       SOCIAL HISTORY:  Social History     Socioeconomic History     Marital status:      Spouse name: None     Number of children: None     Years of education: None     Highest education level: None   Occupational History     None   Tobacco Use     Smoking status: Never Smoker     Smokeless tobacco: Never Used   Vaping Use     Vaping Use: Never used    Substance and Sexual Activity     Alcohol use: Yes     Comment: rare     Drug use: No     Sexual activity: Not Currently     Birth control/protection: None   Other Topics Concern     Parent/sibling w/ CABG, MI or angioplasty before 65F 55M? Yes   Social History Narrative     None     Social Determinants of Health     Financial Resource Strain:      Difficulty of Paying Living Expenses:    Food Insecurity:      Worried About Running Out of Food in the Last Year:      Ran Out of Food in the Last Year:    Transportation Needs:      Lack of Transportation (Medical):      Lack of Transportation (Non-Medical):    Physical Activity:      Days of Exercise per Week:      Minutes of Exercise per Session:    Stress:      Feeling of Stress :    Social Connections:      Frequency of Communication with Friends and Family:      Frequency of Social Gatherings with Friends and Family:      Attends [employer] Services:      Active Member of Clubs or Organizations:      Attends Club or Organization Meetings:      Marital Status:    Intimate Partner Violence:      Fear of Current or Ex-Partner:      Emotionally Abused:      Physically Abused:      Sexually Abused:        Review of Systems:  Skin:  Negative       Eyes:  Positive for glasses;glaucoma    ENT:  Positive for hoarseness    Respiratory:  Negative dyspnea on exertion     Cardiovascular:    Positive for patient states that she can feel the leakage in her heart when she wakes up in the am.  Gastroenterology: Negative      Genitourinary:  Negative   Renal insuffiecency, CKD  Musculoskeletal:  Positive for joint pain;joint swelling;joint stiffness    Neurologic:  Positive for numbness or tingling of feet    Psychiatric:  Negative      Heme/Lymph/Imm:  Negative      Endocrine:  Negative        Physical Exam:  Vitals: BP (!) 175/76   Pulse 83   Wt 57.6 kg (127 lb)   LMP 07/07/1960   BMI 24.80 kg/m      Constitutional:  cooperative, alert and oriented, well developed, well  nourished, in no acute distress thin;frail      Skin:  warm and dry to the touch, no apparent skin lesions or masses noted          Head:  normocephalic, no masses or lesions        Eyes:  pupils equal and round, conjunctivae and lids unremarkable, sclera white, no xanthalasma, EOMS intact, no nystagmus        Lymph:No Cervical lymphadenopathy present     ENT:  no pallor or cyanosis        Neck:  carotid pulses are full and equal bilaterally, JVP normal, no carotid bruit        Respiratory:  normal breath sounds, clear to auscultation, normal A-P diameter, normal symmetry, normal respiratory excursion, no use of accessory muscles         Cardiac: regular rhythm, normal S1/S2, no S3 or S4, apical impulse not displaced, no murmurs, gallops or rubs                pulses full and equal, no bruits auscultated                                        GI:  abdomen soft, non-tender, BS normoactive, no mass, no HSM, no bruits        Extremities and Muscular Skeletal:  no deformities, clubbing, cyanosis, erythema observed              Neurological:  no gross motor deficits        Psych:  Alert and Oriented x 3        CC  No referring provider defined for this encounter.

## 2021-10-19 NOTE — LETTER
10/19/2021    Amanda Renee MD  [id number]  [facility] [id number]    RE: Pooja Swartz       Dear Colleague,    I had the pleasure of seeing Pooja Swartz in the [facility].    HPI and Plan:   See dictation  [id number]  No orders of the defined types were placed in this encounter.      Orders Placed This Encounter   Medications     dorzolamide (TRUSOPT) 2 % ophthalmic solution     Sig: instill 1 drop by ophthalmic route 3 times every day into both eyes     lisinopril (ZESTRIL) 2.5 MG tablet     Sig: Take 1 tablet (2.5 mg) by mouth every evening     Dispense:  90 tablet     Refill:  3       There are no discontinued medications.      Encounter Diagnosis   Name Primary?     Primary hypertension Yes       CURRENT MEDICATIONS:  Current Outpatient Medications   Medication Sig Dispense Refill     allopurinol (ZYLOPRIM) 100 MG tablet Take 1 tablet (100 mg) by mouth daily 90 tablet 3     amLODIPine (NORVASC) 10 MG tablet Take 1 tablet (10 mg) by mouth daily 90 tablet 3     budesonide (ENTOCORT EC) 3 MG EC capsule Take 1 capsule (3 mg) by mouth every morning 30 capsule 6     Calcium-Vitamin D 600-200 MG-UNIT TABS Take 1 tablet by mouth 2 times daily       dorzolamide (TRUSOPT) 2 % ophthalmic solution instill 1 drop by ophthalmic route 3 times every day into both eyes       Fexofenadine HCl (ALLEGRA PO) Take 180 mg by mouth daily        lisinopril (ZESTRIL) 2.5 MG tablet Take 1 tablet (2.5 mg) by mouth every evening 90 tablet 3     ONE-A-DAY WOMENS OR TABS Take 1 tablet by mouth daily        warfarin ANTICOAGULANT (COUMADIN) 2 MG tablet Take 5 mg Thurs; 4 mg all other days or As directed by Anticoagulation clinic       latanoprost (XALATAN) 0.005 % ophthalmic solution Place 1 drop into both eyes At Bedtime 1.5 mL 0       ALLERGIES     Allergies   Allergen Reactions     Pcn [Penicillins] Hives       PAST MEDICAL HISTORY:  Past Medical History:   Diagnosis Date      Atrial fibrillation (H)      Basal cell carcinoma      Chronic kidney disease      Disorders of bursae and tendons in shoulder region, unspecified     right shoulder     Femur fracture, left (H) 8/17/2016     Hemorrhage of gastrointestinal tract, unspecified      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, including hip      Other specified glaucoma      Periprosthetic fracture around internal prosthetic left hip joint, subsequent encounter 1/9/2017     Renal insufficiency      Sinus node dysfunction (H)      Unspecified essential hypertension        PAST SURGICAL HISTORY:  Past Surgical History:   Procedure Laterality Date     ARTHROPLASTY REVISION HIP Left 8/19/2016    Procedure: ARTHROPLASTY REVISION HIP;  Surgeon: Kael Rojas MD;  [facility]     C PELVIS/HIP JOINT SURGERY UNLISTED       C SHOULDER SURG PROC UNLISTED       C STOMACH SURGERY PROCEDURE UNLISTED       HC VASCULAR SURGERY PROCEDURE UNLIST       OPEN REDUCTION INTERNAL FIXATION FEMUR PROXIMAL Left 8/19/2016    Procedure: OPEN REDUCTION INTERNAL FIXATION FEMUR PROXIMAL;  Surgeon: Kael Rojas MD;  [facility]     SURGICAL HISTORY OF -   11/1992    skin cancer, nose     SURGICAL HISTORY OF -   1978    right, vein stripping     SURGICAL HISTORY OF -   1968    breast biopsy     SURGICAL HISTORY OF -   1993    laparoscopic cholecystectomy     SURGICAL HISTORY OF -       tonsillectomy and adenoidectomy     SURGICAL HISTORY OF -   04/2004    left cataract     SURGICAL HISTORY OF -   08/09/2004    right total shoulder arthroplasty       FAMILY HISTORY:  Family History   Problem Relation Age of Onset     Heart Disease Mother         CHF     Hypertension Mother      Hypertension Father      Hypertension Maternal Grandmother      Hypertension Maternal Grandfather      Hypertension Son      Heart Disease Son         MI       SOCIAL HISTORY:  Social History     Socioeconomic History     Marital status:      Spouse name: None      Number of children: None     Years of education: None     Highest education level: None   Occupational History     None   Tobacco Use     Smoking status: Never Smoker     Smokeless tobacco: Never Used   Vaping Use     Vaping Use: Never used   Substance and Sexual Activity     Alcohol use: Yes     Comment: rare     Drug use: No     Sexual activity: Not Currently     Birth control/protection: None   Other Topics Concern     Parent/sibling w/ CABG, MI or angioplasty before 65F 55M? Yes   Social History Narrative     None     Social Determinants of Health     Financial Resource Strain:      Difficulty of Paying Living Expenses:    Food Insecurity:      Worried About Running Out of Food in the Last Year:      Ran Out of Food in the Last Year:    Transportation Needs:      Lack of Transportation (Medical):      Lack of Transportation (Non-Medical):    Physical Activity:      Days of Exercise per Week:      Minutes of Exercise per Session:    Stress:      Feeling of Stress :    Social Connections:      Frequency of Communication with Friends and Family:      Frequency of Social Gatherings with Friends and Family:      Attends [employer] Services:      Active Member of Clubs or Organizations:      Attends Club or Organization Meetings:      Marital Status:    Intimate Partner Violence:      Fear of Current or Ex-Partner:      Emotionally Abused:      Physically Abused:      Sexually Abused:        Review of Systems:  Skin:  Negative       Eyes:  Positive for glasses;glaucoma    ENT:  Positive for hoarseness    Respiratory:  Negative dyspnea on exertion     Cardiovascular:    Positive for patient states that she can feel the leakage in her heart when she wakes up in the am.  Gastroenterology: Negative      Genitourinary:  Negative   Renal insuffiecency, CKD  Musculoskeletal:  Positive for joint pain;joint swelling;joint stiffness    Neurologic:  Positive for numbness or tingling of feet    Psychiatric:  Negative       Heme/Lymph/Imm:  Negative      Endocrine:  Negative        Physical Exam:  Vitals: BP (!) 175/76   Pulse 83   Wt 57.6 kg (127 lb)   LMP 07/07/1960   BMI 24.80 kg/m      Constitutional:  cooperative, alert and oriented, well developed, well nourished, in no acute distress thin;frail      Skin:  warm and dry to the touch, no apparent skin lesions or masses noted          Head:  normocephalic, no masses or lesions        Eyes:  pupils equal and round, conjunctivae and lids unremarkable, sclera white, no xanthalasma, EOMS intact, no nystagmus        Lymph:No Cervical lymphadenopathy present     ENT:  no pallor or cyanosis        Neck:  carotid pulses are full and equal bilaterally, JVP normal, no carotid bruit        Respiratory:  normal breath sounds, clear to auscultation, normal A-P diameter, normal symmetry, normal respiratory excursion, no use of accessory muscles         Cardiac: regular rhythm, normal S1/S2, no S3 or S4, apical impulse not displaced, no murmurs, gallops or rubs                pulses full and equal, no bruits auscultated                                        GI:  abdomen soft, non-tender, BS normoactive, no mass, no HSM, no bruits        Extremities and Muscular Skeletal:  no deformities, clubbing, cyanosis, erythema observed              Neurological:  no gross motor deficits        Psych:  Alert and Oriented x 3        CC  No referring provider defined for this encounter.                  Thank you for allowing me to participate in the care of your patient.      Sincerely,     Adan Franks MD     [facility]  cc:   No referring provider defined for this encounter.

## 2021-10-19 NOTE — PROGRESS NOTES
Service Date: 10/19/2021    Thank you for allowing me to participate in the care of your very delightful patient.  As you know, Pooja is a 89-year-old female, one of my very favorite patients, with a history of asymptomatic paroxysmal atrial fibrillation, on rate control strategy without any need for AV yolie blocker drugs in the background of hypertension and preserved LV systolic function.  I last I last saw Pooja in 02/2020.    Recently, she felt quite fatigued and tired and noted a heart rate in the 40s.  In light of that, she was transferred to [facility] for a pacemaker.  The patient stated that she was all prepped and ready to go until the very last minute when they changed their mind, as they thought that her current eyedrops could have played a role in causing her to have bradycardia.  After changing her eye drops, her heart rate went back to her baseline in the 70s with resolution of her fatigue as well.  The patient is here for followup of that hospitalization.    Pooja continues to drive and asked about getting a third COVID booster.  She continues to live independently [place] and she remains quite active, both socially and physically.  She noted her blood pressure at home is usually in 160-170.  She checks about once a week.  Sometimes, it goes down to 130.  She only used clonidine one time on a p.r.n. basis when it got above 190.  She has no drug allergy including lisinopril.  The patient is known to have normal electrolytes.  I did repeat her blood pressure today.  It is still above 170.    I think it is not unreasonable to have Pooja on a low dose of lisinopril to take in the evening, as she does take amlodipine in the morning.  I am quite hesitant to ask her to take clonidine on a p.r.n. basis given her age.  The goal is for Pooja to have a blood pressure in the 150 range, as she is known to have have chronic hypertension for quite some time now and her blood pressure is in  160-170 and yet she is 89, so I cannot argue about that strategy.  I did go over side effects of the lisinopril including a dry cough, and in the event she she feels extremely tired and checks her blood pressure and notes her blood pressure is quite low, less than 120, I asked her to stop it.  I also asked about the location of the COVID booster for her as well.  Our pharmacy does offer it, so she can do a walk-in or go to her own pharmacy in [place] to  her medication and also inquire about her COVID vaccine.  I encouraged Pooja to continue to be as active as she has been and see me back in a few years' time.    Adan Mendoza MD        D: 10/19/2021   T: 10/19/2021   MT: fer    Name:     POOJA RANGEL  MRN:      [id number]        Account:      [id number]   :      1925           Service Date: 10/19/2021       Document: [id number]

## 2021-10-21 ENCOUNTER — ANTICOAGULATION THERAPY VISIT (OUTPATIENT)
Dept: ANTICOAGULATION | Facility: CLINIC | Age: 86
End: 2021-10-21

## 2021-10-21 ENCOUNTER — LAB (OUTPATIENT)
Dept: LAB | Facility: CLINIC | Age: 86
End: 2021-10-21
Payer: COMMERCIAL

## 2021-10-21 DIAGNOSIS — I48.91 ATRIAL FIBRILLATION, UNSPECIFIED TYPE (H): ICD-10-CM

## 2021-10-21 DIAGNOSIS — I48.91 ATRIAL FIBRILLATION (H): Primary | ICD-10-CM

## 2021-10-21 DIAGNOSIS — Z79.01 LONG TERM CURRENT USE OF ANTICOAGULANT THERAPY: ICD-10-CM

## 2021-10-21 LAB — INR BLD: 2 (ref 0.9–1.1)

## 2021-10-21 PROCEDURE — 36416 COLLJ CAPILLARY BLOOD SPEC: CPT

## 2021-10-21 PROCEDURE — 85610 PROTHROMBIN TIME: CPT

## 2021-10-21 NOTE — PROGRESS NOTES
ANTICOAGULATION MANAGEMENT     Pooja Swartz 89 year old female is on warfarin with therapeutic INR result. (Goal INR 2.0-3.0)    Recent labs: (last 7 days)     10/21/21  1455   INR 2.0*       ASSESSMENT     Source(s): Chart Review and Patient/Caregiver Call       Warfarin doses taken: Warfarin taken as instructed    Diet: No new diet changes identified    New illness, injury, or hospitalization: No    Medication/supplement changes: lisinopril started on 10/19/21 No interaction anticipated    Signs or symptoms of bleeding or clotting: No    Previous INR: Subtherapeutic    Additional findings: None     PLAN     Recommended plan for no diet, medication or health factor changes affecting INR     Dosing Instructions: Continue your current warfarin dose with next INR in 2 weeks       Summary  As of 10/21/2021    Full warfarin instructions:  5 mg every Thu; 4 mg all other days   Next INR check:  11/4/2021             Telephone call with Pooja who verbalizes understanding and agrees to plan    Lab visit scheduled    Education provided: No interaction anticipated between warfarin and lisinopril and Contact [phone]  with any changes, questions or concerns.     Plan made per [facility] anticoagulation protocol    Mary Faith RN  Anticoagulation Clinic  10/21/2021    _______________________________________________________________________     Anticoagulation Episode Summary     Current INR goal:  2.0-3.0   TTR:  49.8 % (1 y)   Target end date:  Indefinite   Send INR reminders to:  [facility]    Indications    Atrial fibrillation (H) [I48.91]  Long-term (current) use of anticoagulants [Z79.01] [Z79.01]  Atrial fibrillation  unspecified type (H) [I48.91]           Comments:  * warfarin 2mg tabs         Anticoagulation Care Providers     Provider Role Specialty Phone number    Amanda Renee MD Referring Family Medicine [phone]

## 2021-11-04 ENCOUNTER — LAB (OUTPATIENT)
Dept: LAB | Facility: CLINIC | Age: 86
End: 2021-11-04
Payer: COMMERCIAL

## 2021-11-04 ENCOUNTER — ANTICOAGULATION THERAPY VISIT (OUTPATIENT)
Dept: ANTICOAGULATION | Facility: CLINIC | Age: 86
End: 2021-11-04

## 2021-11-04 DIAGNOSIS — Z79.01 LONG TERM CURRENT USE OF ANTICOAGULANT THERAPY: ICD-10-CM

## 2021-11-04 DIAGNOSIS — I48.91 ATRIAL FIBRILLATION, UNSPECIFIED TYPE (H): ICD-10-CM

## 2021-11-04 DIAGNOSIS — I48.91 ATRIAL FIBRILLATION (H): Primary | ICD-10-CM

## 2021-11-04 LAB — INR BLD: 1.9 (ref 0.9–1.1)

## 2021-11-04 PROCEDURE — 85610 PROTHROMBIN TIME: CPT

## 2021-11-04 PROCEDURE — 36416 COLLJ CAPILLARY BLOOD SPEC: CPT

## 2021-11-04 NOTE — PROGRESS NOTES
ANTICOAGULATION MANAGEMENT     Pooja Swartz 89 year old female is on warfarin with subtherapeutic INR result. (Goal INR 2.0-3.0)    Recent labs: (last 7 days)     11/04/21  1349   INR 1.9*       ASSESSMENT     Source(s): Chart Review and Patient/Caregiver Call       Warfarin doses taken: Warfarin taken as instructed    Diet: No new diet changes identified    New illness, injury, or hospitalization: No    Medication/supplement changes: None noted    Signs or symptoms of bleeding or clotting: No    Previous INR: Therapeutic last visit; previously outside of goal range    Additional findings: None     PLAN     Recommended plan for no diet, medication or health factor changes affecting INR     Dosing Instructions:  Increase your warfarin dose (7% change) with next INR in 2 weeks       Summary  As of 11/4/2021    Full warfarin instructions:  5 mg every Mon, Thu, Sat; 4 mg all other days   Next INR check:  11/18/2021             Telephone call with Pooja who verbalizes understanding and agrees to plan    Lab visit scheduled    Education provided: Please call back if any changes to your diet, medications or how you've been taking warfarin and Contact [phone]  with any changes, questions or concerns.     Plan made per [facility] anticoagulation protocol    Arin Sullivan RN  Anticoagulation Clinic  11/4/2021    _______________________________________________________________________     Anticoagulation Episode Summary     Current INR goal:  2.0-3.0   TTR:  48.7 % (1 y)   Target end date:  Indefinite   Send INR reminders to:  [facility]    Indications    Atrial fibrillation (H) [I48.91]  Long-term (current) use of anticoagulants [Z79.01] [Z79.01]  Atrial fibrillation  unspecified type (H) [I48.91]           Comments:  * warfarin 2mg tabs         Anticoagulation Care Providers     Provider Role Specialty Phone number    Amanda Renee MD Referring Family Medicine [phone]

## 2021-11-18 ENCOUNTER — LAB (OUTPATIENT)
Dept: LAB | Facility: CLINIC | Age: 86
End: 2021-11-18
Payer: COMMERCIAL

## 2021-11-18 ENCOUNTER — ANTICOAGULATION THERAPY VISIT (OUTPATIENT)
Dept: ANTICOAGULATION | Facility: CLINIC | Age: 86
End: 2021-11-18

## 2021-11-18 DIAGNOSIS — Z79.01 LONG TERM CURRENT USE OF ANTICOAGULANT THERAPY: ICD-10-CM

## 2021-11-18 DIAGNOSIS — I48.91 ATRIAL FIBRILLATION (H): Primary | ICD-10-CM

## 2021-11-18 DIAGNOSIS — I48.91 ATRIAL FIBRILLATION, UNSPECIFIED TYPE (H): ICD-10-CM

## 2021-11-18 LAB — INR BLD: 2.6 (ref 0.9–1.1)

## 2021-11-18 PROCEDURE — 36416 COLLJ CAPILLARY BLOOD SPEC: CPT

## 2021-11-18 PROCEDURE — 85610 PROTHROMBIN TIME: CPT

## 2021-11-18 NOTE — PROGRESS NOTES
ANTICOAGULATION MANAGEMENT     Pooja Swartz 89 year old female is on warfarin with therapeutic INR result. (Goal INR 2.0-3.0)    Recent labs: (last 7 days)     11/18/21  1259   INR 2.6*       ASSESSMENT     Source(s): Chart Review and Patient/Caregiver Call       Warfarin doses taken: Warfarin taken as instructed    Diet: No new diet changes identified    New illness, injury, or hospitalization: No    Medication/supplement changes: None noted    Signs or symptoms of bleeding or clotting: No    Previous INR: Subtherapeutic    Additional findings: None     PLAN     Recommended plan for no diet, medication or health factor changes affecting INR     Dosing Instructions: Continue your current warfarin dose with next INR in 3 weeks       Summary  As of 11/18/2021    Full warfarin instructions:  5 mg every Mon, Thu, Sat; 4 mg all other days   Next INR check:  12/9/2021             Telephone call with Pooja who verbalizes understanding and agrees to plan    Lab visit scheduled    Education provided: Contact [phone]  with any changes, questions or concerns.     Plan made per [facility] anticoagulation protocol    Mary Faith RN  Anticoagulation Clinic  11/18/2021    _______________________________________________________________________     Anticoagulation Episode Summary     Current INR goal:  2.0-3.0   TTR:  48.1 % (1 y)   Target end date:  Indefinite   Send INR reminders to:  [facility]    Indications    Atrial fibrillation (H) [I48.91]  Long-term (current) use of anticoagulants [Z79.01] [Z79.01]  Atrial fibrillation  unspecified type (H) [I48.91]           Comments:  *         Anticoagulation Care Providers     Provider Role Specialty Phone number    Amanda Renee MD Referring Family Medicine [phone]

## 2021-11-19 DIAGNOSIS — Z79.01 ANTICOAGULATED ON COUMADIN: Primary | ICD-10-CM

## 2021-11-23 RX ORDER — WARFARIN SODIUM 2 MG/1
TABLET ORAL
Qty: 140 TABLET | Refills: 0 | Status: SHIPPED | OUTPATIENT
Start: 2021-11-23 | End: 2021-12-01

## 2021-11-23 NOTE — TELEPHONE ENCOUNTER
Pt is out of this med today does need this refilled.  Phar is YOU Lindsey.    Cydney Rico  [facility]

## 2021-12-01 DIAGNOSIS — Z79.01 ANTICOAGULATED ON COUMADIN: ICD-10-CM

## 2021-12-01 RX ORDER — WARFARIN SODIUM 2 MG/1
TABLET ORAL
Qty: 140 TABLET | Refills: 0 | Status: SHIPPED | OUTPATIENT
Start: 2021-12-01 | End: 2022-01-13

## 2021-12-09 ENCOUNTER — LAB (OUTPATIENT)
Dept: LAB | Facility: CLINIC | Age: 86
End: 2021-12-09
Payer: COMMERCIAL

## 2021-12-09 ENCOUNTER — ANTICOAGULATION THERAPY VISIT (OUTPATIENT)
Dept: ANTICOAGULATION | Facility: CLINIC | Age: 86
End: 2021-12-09

## 2021-12-09 DIAGNOSIS — Z79.01 LONG TERM CURRENT USE OF ANTICOAGULANT THERAPY: ICD-10-CM

## 2021-12-09 DIAGNOSIS — I48.91 ATRIAL FIBRILLATION (H): Primary | ICD-10-CM

## 2021-12-09 DIAGNOSIS — I48.91 ATRIAL FIBRILLATION, UNSPECIFIED TYPE (H): ICD-10-CM

## 2021-12-09 LAB — INR BLD: 2.3 (ref 0.9–1.1)

## 2021-12-09 PROCEDURE — 36416 COLLJ CAPILLARY BLOOD SPEC: CPT

## 2021-12-09 PROCEDURE — 85610 PROTHROMBIN TIME: CPT

## 2021-12-09 NOTE — PROGRESS NOTES
ANTICOAGULATION MANAGEMENT     Pooja Swartz 89 year old female is on warfarin with therapeutic INR result. (Goal INR 2.0-3.0)    Recent labs: (last 7 days)     12/09/21  1048   INR 2.3*       ASSESSMENT     Source(s): Chart Review and Patient/Caregiver Call       Warfarin doses taken: Warfarin taken as instructed    Diet: No new diet changes identified    New illness, injury, or hospitalization: No    Medication/supplement changes: None noted    Signs or symptoms of bleeding or clotting: No    Previous INR: Therapeutic last visit; previously outside of goal range    Additional findings: None     PLAN     Recommended plan for no diet, medication or health factor changes affecting INR     Dosing Instructions: Continue your current warfarin dose with next INR in 4 weeks       Summary  As of 12/9/2021    Full warfarin instructions:  5 mg every Mon, Thu, Sat; 4 mg all other days   Next INR check:  1/6/2022             Telephone call with Pooja who verbalizes understanding and agrees to plan    Lab visit scheduled    Education provided: Contact [phone]  with any changes, questions or concerns.     Plan made per [facility] anticoagulation protocol    Mary Faith RN  Anticoagulation Clinic  12/9/2021    _______________________________________________________________________     Anticoagulation Episode Summary     Current INR goal:  2.0-3.0   TTR:  50.2 % (1 y)   Target end date:  Indefinite   Send INR reminders to:  [facility]    Indications    Atrial fibrillation (H) [I48.91]  Long-term (current) use of anticoagulants [Z79.01] [Z79.01]  Atrial fibrillation  unspecified type (H) [I48.91]           Comments:  *         Anticoagulation Care Providers     Provider Role Specialty Phone number    Amanda Renee MD Referring Family Medicine [phone]

## 2021-12-13 ENCOUNTER — OFFICE VISIT (OUTPATIENT)
Dept: FAMILY MEDICINE | Facility: CLINIC | Age: 86
End: 2021-12-13
Payer: COMMERCIAL

## 2021-12-13 VITALS
OXYGEN SATURATION: 100 % | RESPIRATION RATE: 16 BRPM | BODY MASS INDEX: 24.35 KG/M2 | HEIGHT: 60 IN | TEMPERATURE: 98.7 F | WEIGHT: 124 LBS | DIASTOLIC BLOOD PRESSURE: 52 MMHG | HEART RATE: 78 BPM | SYSTOLIC BLOOD PRESSURE: 142 MMHG

## 2021-12-13 DIAGNOSIS — N18.31 STAGE 3A CHRONIC KIDNEY DISEASE (H): ICD-10-CM

## 2021-12-13 DIAGNOSIS — B18.1 HEPATITIS B CARRIER (H): ICD-10-CM

## 2021-12-13 DIAGNOSIS — Z13.220 SCREENING FOR HYPERLIPIDEMIA: ICD-10-CM

## 2021-12-13 DIAGNOSIS — I49.5 SINUS NODE DYSFUNCTION (H): ICD-10-CM

## 2021-12-13 DIAGNOSIS — Z00.00 ENCOUNTER FOR MEDICARE ANNUAL WELLNESS EXAM: Primary | ICD-10-CM

## 2021-12-13 DIAGNOSIS — I48.91 ATRIAL FIBRILLATION, UNSPECIFIED TYPE (H): ICD-10-CM

## 2021-12-13 DIAGNOSIS — I10 HYPERTENSION GOAL BP (BLOOD PRESSURE) < 140/90: ICD-10-CM

## 2021-12-13 PROCEDURE — 99397 PER PM REEVAL EST PAT 65+ YR: CPT | Performed by: NURSE PRACTITIONER

## 2021-12-13 ASSESSMENT — MIFFLIN-ST. JEOR: SCORE: 873.96

## 2021-12-13 NOTE — PROGRESS NOTES
"  SUBJECTIVE:   Pooja Swartz is a 89 year old female who presents for Preventive Visit.    Does not require medication refills today. She has a history of HTN, Sinus node dysfunction, A Fib, stage III chronic kidney disease, hep B carrier.  She does not require any medication refills today.  She denies any side effects from medications and overall feels well.    Patient has been advised of split billing requirements and indicates understanding: Yes  Are you in the first 12 months of your Medicare Part B coverage?  No    Physical Health:    In general, how would you rate your overall physical health? good    Outside of work, how many days during the week do you exercise? 6-7 days/week    Outside of work, approximately how many minutes a day do you exercise?less than 15 minutes    If you drink alcohol do you typically have >3 drinks per day or >7 drinks per week? No    Do you usually eat at least 4 servings of fruit and vegetables a day, include whole grains & fiber and avoid regularly eating high fat or \"junk\" foods? Yes    Do you have any problems taking medications regularly?  No    Do you have any side effects from medications? none    Needs assistance for the following daily activities: housework    Which of the following safety concerns are present in your home?  none identified     Hearing impairment: No    In the past 6 months, have you been bothered by leaking of urine? yes    Mental Health:    In general, how would you rate your overall mental or emotional health? good  PHQ-2 Score:  0    Do you feel safe in your environment? Yes    Have you ever done Advance Care Planning? (For example, a Health Directive, POLST, or a discussion with a medical provider or your loved ones about your wishes): No, advance care planning information given to patient to review.  Patient plans to discuss their wishes with loved ones or provider.      Additional concerns to address?  No    Fall risk:  Fallen 2 or more times in the " past year?: No  Any fall with injury in the past year?: No    Cognitive Screenin) Repeat 3 items (Leader, Season, Table)    2) Clock draw: NORMAL  3) 3 item recall: Recalls 3 objects  Results: 3 items recalled: COGNITIVE IMPAIRMENT LESS LIKELY    Mini-CogTM Copyright S Shayan. Licensed by the author for use in [facility]; reprinted with permission (radha@[employer]). All rights reserved.      Do you have sleep apnea, excessive snoring or daytime drowsiness?: no    Reviewed and updated as needed this visit by clinical staff  Tobacco  Allergies  Meds  Problems  Med Hx  Surg Hx  Fam Hx  Soc Hx         Reviewed and updated as needed this visit by Provider  Tobacco              Social History     Tobacco Use     Smoking status: Never Smoker     Smokeless tobacco: Never Used   Substance Use Topics     Alcohol use: Yes     Comment: rare                           Current providers sharing in care for this patient include:   Patient Care Team:  Amanda Renee MD as PCP - General (Family Practice)  Kael Rojas MD as MD (Orthopedics)  Amanda Renee MD as Assigned PCP  Adan Mendoza MD as Assigned Heart and Vascular Provider    The following health maintenance items are reviewed in Epic and correct as of today:  Health Maintenance   Topic Date Due     ZOSTER IMMUNIZATION (2 of 3) 2016     DTAP/TDAP/TD IMMUNIZATION (2 - Td or Tdap) 2018     LIPID  2021     MICROALBUMIN  2021     FALL RISK ASSESSMENT  2021     HEMOGLOBIN  2022     BMP  2022     MEDICARE ANNUAL WELLNESS VISIT  2022     ANNUAL REVIEW OF HM ORDERS  2022     ADVANCE CARE PLANNING  2026     PHQ-2  Completed     INFLUENZA VACCINE  Completed     Pneumococcal Vaccine: 65+ Years  Completed     URINALYSIS  Completed     COVID-19 Vaccine  Completed     IPV IMMUNIZATION  Aged Out     MENINGITIS IMMUNIZATION  Aged Out     BP Readings from Last 3 Encounters:   21 (!) 142/52    10/19/21 (!) 175/76   08/30/21 (!) 150/66    Wt Readings from Last 3 Encounters:   12/13/21 56.2 kg (124 lb)   10/19/21 57.6 kg (127 lb)   08/30/21 57.2 kg (126 lb)                  Patient Active Problem List   Diagnosis     Hypertension goal BP (blood pressure) < 140/90     Other specified malignant neoplasm of skin of lower limb, including hip     Hemorrhage of gastrointestinal tract     Generalized osteoarthrosis, unspecified site     HEPATITIS B in past     Impaired fasting glucose     Disorder of bone and cartilage     Chronic kidney disease, stage III (moderate) (H)     Chronic tophaceous gout     CARDIOVASCULAR SCREENING; LDL GOAL LESS THAN 100     Sensorineural hearing loss, bilateral     Advanced directives, counseling/discussion     Sinus node dysfunction (H)     Atrial fibrillation (H)     Health Care Home     Long-term (current) use of anticoagulants [Z79.01]     IBD (inflammatory bowel disease)     Atrial fibrillation, unspecified type (H)     Sinus arrest     Other specified glaucoma     Junctional bradycardia     Anticoagulated on Coumadin     Glaucoma suspect, bilateral     Past Surgical History:   Procedure Laterality Date     ARTHROPLASTY REVISION HIP Left 8/19/2016    Procedure: ARTHROPLASTY REVISION HIP;  Surgeon: Kael Rojas MD;  Location: [facility] OR     C PELVIS/HIP JOINT SURGERY UNLISTED       C SHOULDER SURG PROC UNLISTED       C STOMACH SURGERY PROCEDURE UNLISTED       HC VASCULAR SURGERY PROCEDURE UNLIST       OPEN REDUCTION INTERNAL FIXATION FEMUR PROXIMAL Left 8/19/2016    Procedure: OPEN REDUCTION INTERNAL FIXATION FEMUR PROXIMAL;  Surgeon: Kael Rojas MD;  Location: [facility] OR     SURGICAL HISTORY OF -   11/1992    skin cancer, nose     SURGICAL HISTORY OF -   1978    right, vein stripping     SURGICAL HISTORY OF -   1968    breast biopsy     SURGICAL HISTORY OF -   1993    laparoscopic cholecystectomy     SURGICAL HISTORY OF -       tonsillectomy and adenoidectomy     SURGICAL  HISTORY OF -   04/2004    left cataract     SURGICAL HISTORY OF -   08/09/2004    right total shoulder arthroplasty       Social History     Tobacco Use     Smoking status: Never Smoker     Smokeless tobacco: Never Used   Substance Use Topics     Alcohol use: Yes     Comment: rare     Family History   Problem Relation Age of Onset     Heart Disease Mother         CHF     Hypertension Mother      Hypertension Father      Hypertension Maternal Grandmother      Hypertension Maternal Grandfather      Hypertension Son      Heart Disease Son         MI         Current Outpatient Medications   Medication Sig Dispense Refill     allopurinol (ZYLOPRIM) 100 MG tablet Take 1 tablet (100 mg) by mouth daily 90 tablet 3     amLODIPine (NORVASC) 10 MG tablet Take 1 tablet (10 mg) by mouth daily 90 tablet 3     budesonide (ENTOCORT EC) 3 MG EC capsule Take 1 capsule (3 mg) by mouth every morning 30 capsule 6     Calcium-Vitamin D 600-200 MG-UNIT TABS Take 1 tablet by mouth 2 times daily       dorzolamide (TRUSOPT) 2 % ophthalmic solution instill 1 drop by ophthalmic route 3 times every day into both eyes       Fexofenadine HCl (ALLEGRA PO) Take 180 mg by mouth daily        JANTOVEN ANTICOAGULANT 2 MG tablet Take 4 mg (2 mg x 2) every Thu; 3 mg (2 mg x 1.5) all other days or as directed by the Anticoagulation Clinic 140 tablet 0     lisinopril (ZESTRIL) 2.5 MG tablet Take 1 tablet (2.5 mg) by mouth every evening 90 tablet 3     ONE-A-DAY WOMENS OR TABS Take 1 tablet by mouth daily        latanoprost (XALATAN) 0.005 % ophthalmic solution Place 1 drop into both eyes At Bedtime 1.5 mL 0     Allergies   Allergen Reactions     Pcn [Penicillins] Hives     Mammogram Screening: Mammogram Screening - Patient over age 75, has elected to discontinue screenings.    ROS:  Constitutional, HEENT, cardiovascular, pulmonary, GI, , musculoskeletal, neuro, skin, endocrine and psych systems are negative, except as otherwise noted.    OBJECTIVE:   BP  (!) 142/52   Pulse 78   Temp 98.7  F (37.1  C)   Resp 16   Ht 1.524 m (5')   Wt 56.2 kg (124 lb)   LMP 07/07/1960   SpO2 100%   BMI 24.22 kg/m   Estimated body mass index is 24.22 kg/m  as calculated from the following:    Height as of this encounter: 1.524 m (5').    Weight as of this encounter: 56.2 kg (124 lb).  EXAM:   GENERAL: healthy, alert and no distress  NECK: no adenopathy, no asymmetry, masses, or scars and thyroid normal to palpation  RESP: lungs clear to auscultation - no rales, rhonchi or wheezes  CV: regular rate and rhythm, normal S1 S2, no S3 or S4, no murmur, click or rub, no peripheral edema and peripheral pulses strong  ABDOMEN: soft, nontender, no hepatosplenomegaly, no masses and bowel sounds normal  MS: no gross musculoskeletal defects noted, no edema    ASSESSMENT / PLAN:       ICD-10-CM    1. Encounter for Medicare annual wellness exam  Z00.00    2. Stage 3a chronic kidney disease (H)  N18.31 Albumin Random Urine Quantitative with Creat Ratio   3. Screening for hyperlipidemia  Z13.220 Lipid panel reflex to direct LDL Fasting   4. Hepatitis B carrier (H)  B18.1    5. Hypertension goal BP (blood pressure) < 140/90  I10    6. Atrial fibrillation, unspecified type (H)  I48.91    7. Sinus node dysfunction (H)  I49.5      Annual wellness visit completed today.  Reviewed past medical history and chronic problems.  No medication refills are due today.  Patient can return for labs at her convenience.  Specifically she can have this done at her INR.  No additional labs need to be done today on review of blood work that she has had done.  She is stable.      Patient has been advised of split billing requirements and indicates understanding: Yes    COUNSELING:  Reviewed preventive health counseling, as reflected in patient instructions       Healthy diet/nutrition    Estimated body mass index is 24.22 kg/m  as calculated from the following:    Height as of this encounter: 1.524 m (5').    Weight  as of this encounter: 56.2 kg (124 lb).        She reports that she has never smoked. She has never used smokeless tobacco.    Appropriate preventive services were discussed with this patient, including applicable screening as appropriate for cardiovascular disease, diabetes, osteopenia/osteoporosis, and glaucoma.  As appropriate for age/gender, discussed screening for colorectal cancer, prostate cancer, breast cancer, and cervical cancer. Checklist reviewing preventive services available has been given to the patient.    Reviewed patients plan of care and provided an AVS. The Basic Care Plan (routine screening as documented in Health Maintenance) for Pooja meets the Care Plan requirement. This Care Plan has been established and reviewed with the Patient.    Counseling Resources:  ATP IV Guidelines  Pooled Cohorts Equation Calculator  Breast Cancer Risk Calculator  BRCA-Related Cancer Risk Assessment: FHS-7 Tool  FRAX Risk Assessment  ICSI Preventive Guidelines  Dietary Guidelines for Americans, 2010  USDA's MyPlate  ASA Prophylaxis  Lung CA Screening    REGIS Hays CNP  [facility]

## 2021-12-13 NOTE — PATIENT INSTRUCTIONS
Patient Education   Personalized Prevention Plan  You are due for the preventive services outlined below.  Your care team is available to assist you in scheduling these services.  If you have already completed any of these items, please share that information with your care team to update in your medical record.  Health Maintenance Due   Topic Date Due     ANNUAL REVIEW OF HM ORDERS  Never done     Zoster (Shingles) Vaccine (2 of 3) 01/26/2016     Diptheria Tetanus Pertussis (DTAP/TDAP/TD) Vaccine (2 - Td or Tdap) 07/16/2018     Cholesterol Lab  11/18/2021     Kidney Microalbumin Urine Test  11/18/2021     FALL RISK ASSESSMENT  11/18/2021     Annual Wellness Visit  11/18/2021

## 2022-01-13 ENCOUNTER — LAB (OUTPATIENT)
Dept: LAB | Facility: CLINIC | Age: 87
End: 2022-01-13
Payer: COMMERCIAL

## 2022-01-13 ENCOUNTER — ANTICOAGULATION THERAPY VISIT (OUTPATIENT)
Dept: ANTICOAGULATION | Facility: CLINIC | Age: 87
End: 2022-01-13

## 2022-01-13 ENCOUNTER — DOCUMENTATION ONLY (OUTPATIENT)
Dept: FAMILY MEDICINE | Facility: CLINIC | Age: 87
End: 2022-01-13

## 2022-01-13 DIAGNOSIS — Z79.01 ANTICOAGULATED ON COUMADIN: ICD-10-CM

## 2022-01-13 DIAGNOSIS — I48.91 ATRIAL FIBRILLATION (H): Primary | ICD-10-CM

## 2022-01-13 DIAGNOSIS — I48.91 ATRIAL FIBRILLATION, UNSPECIFIED TYPE (H): ICD-10-CM

## 2022-01-13 DIAGNOSIS — Z79.01 LONG TERM CURRENT USE OF ANTICOAGULANT THERAPY: ICD-10-CM

## 2022-01-13 DIAGNOSIS — I48.91 ATRIAL FIBRILLATION, UNSPECIFIED TYPE (H): Primary | ICD-10-CM

## 2022-01-13 LAB — INR BLD: 2 (ref 0.9–1.1)

## 2022-01-13 PROCEDURE — 85610 PROTHROMBIN TIME: CPT

## 2022-01-13 PROCEDURE — 36416 COLLJ CAPILLARY BLOOD SPEC: CPT

## 2022-01-13 RX ORDER — WARFARIN SODIUM 2 MG/1
TABLET ORAL
Qty: 140 TABLET | Refills: 0 | COMMUNITY
Start: 2022-01-13 | End: 2022-03-31

## 2022-01-13 NOTE — PROGRESS NOTES
ANTICOAGULATION MANAGEMENT      Pooja Swartz due for annual renewal of referral to anticoagulation monitoring. Order pended for your review and signature.      ANTICOAGULATION SUMMARY      Warfarin indication(s)     Atrial fibrillation    Heart valve present?  NO       Current goal range   INR: 2.0-3.0     Goal appropriate for indication? Yes, INR 2-3 appropriate for hx of DVT, PE, hypercoagulable state, Afib, LVAD, or bileaflet AVR without risk factors     Current duration of therapy Indefinite/long term therapy   Time in Therapeutic Range (TTR)  (Goal > 60%) 59.8%       Office visit with referring provider's group within last year yes on 12/13/2021       Mary Faith, RN

## 2022-01-13 NOTE — PROGRESS NOTES
ANTICOAGULATION MANAGEMENT     Pooja Swartz 89 year old female is on warfarin with therapeutic INR result. (Goal INR 2.0-3.0)    Recent labs: (last 7 days)     01/13/22  1004   INR 2.0*       ASSESSMENT     Source(s): Chart Review and Patient/Caregiver Call     Warfarin doses taken: Warfarin taken as instructed  Diet: No new diet changes identified  New illness, injury, or hospitalization: No  Medication/supplement changes: None noted  Signs or symptoms of bleeding or clotting: No  Previous INR: Therapeutic last 2(+) visits  Additional findings: None     PLAN     Recommended plan for no diet, medication or health factor changes affecting INR     Dosing Instructions: Continue your current warfarin dose with next INR in 4 weeks       Summary  As of 1/13/2022    Full warfarin instructions:  5 mg every Mon, Thu, Sat; 4 mg all other days   Next INR check:  2/10/2022             Telephone call with Pooja who verbalizes understanding and agrees to plan    Lab visit scheduled    Education provided: Contact [phone]  with any changes, questions or concerns.     Plan made per [facility] anticoagulation protocol    Mary Faith RN  Anticoagulation Clinic  1/13/2022    _______________________________________________________________________     Anticoagulation Episode Summary     Current INR goal:  2.0-3.0   TTR:  59.8 % (1 y)   Target end date:  Indefinite   Send INR reminders to:  [facility]    Indications    Atrial fibrillation (H) [I48.91]  Long-term (current) use of anticoagulants [Z79.01] [Z79.01]  Atrial fibrillation  unspecified type (H) [I48.91]           Comments:  *         Anticoagulation Care Providers     Provider Role Specialty Phone number    Amanda Renee MD Referring Family Medicine [phone]

## 2022-01-24 DIAGNOSIS — K52.9 CHRONIC DIARRHEA: ICD-10-CM

## 2022-01-24 RX ORDER — BUDESONIDE 3 MG/1
CAPSULE, COATED PELLETS ORAL
Qty: 30 CAPSULE | Refills: 6 | Status: SHIPPED | OUTPATIENT
Start: 2022-01-24 | End: 2022-08-22

## 2022-02-01 ENCOUNTER — DOCUMENTATION ONLY (OUTPATIENT)
Dept: OTHER | Facility: CLINIC | Age: 87
End: 2022-02-01
Payer: COMMERCIAL

## 2022-02-10 ENCOUNTER — TELEPHONE (OUTPATIENT)
Dept: FAMILY MEDICINE | Facility: CLINIC | Age: 87
End: 2022-02-10

## 2022-02-10 ENCOUNTER — ANTICOAGULATION THERAPY VISIT (OUTPATIENT)
Dept: ANTICOAGULATION | Facility: CLINIC | Age: 87
End: 2022-02-10

## 2022-02-10 ENCOUNTER — NURSE TRIAGE (OUTPATIENT)
Dept: FAMILY MEDICINE | Facility: CLINIC | Age: 87
End: 2022-02-10

## 2022-02-10 ENCOUNTER — ALLIED HEALTH/NURSE VISIT (OUTPATIENT)
Dept: FAMILY MEDICINE | Facility: CLINIC | Age: 87
End: 2022-02-10
Payer: COMMERCIAL

## 2022-02-10 ENCOUNTER — LAB (OUTPATIENT)
Dept: LAB | Facility: CLINIC | Age: 87
End: 2022-02-10

## 2022-02-10 VITALS — SYSTOLIC BLOOD PRESSURE: 146 MMHG | DIASTOLIC BLOOD PRESSURE: 68 MMHG | HEART RATE: 58 BPM | OXYGEN SATURATION: 99 %

## 2022-02-10 DIAGNOSIS — Z01.30 BLOOD PRESSURE CHECK: Primary | ICD-10-CM

## 2022-02-10 DIAGNOSIS — I48.91 ATRIAL FIBRILLATION, UNSPECIFIED TYPE (H): ICD-10-CM

## 2022-02-10 DIAGNOSIS — Z79.01 LONG TERM CURRENT USE OF ANTICOAGULANT THERAPY: ICD-10-CM

## 2022-02-10 DIAGNOSIS — I48.91 ATRIAL FIBRILLATION (H): ICD-10-CM

## 2022-02-10 DIAGNOSIS — I48.91 ATRIAL FIBRILLATION (H): Primary | ICD-10-CM

## 2022-02-10 LAB — INR BLD: 2.9 (ref 0.9–1.1)

## 2022-02-10 PROCEDURE — 36416 COLLJ CAPILLARY BLOOD SPEC: CPT

## 2022-02-10 PROCEDURE — 85610 PROTHROMBIN TIME: CPT

## 2022-02-10 PROCEDURE — 99207 PR NO CHARGE NURSE ONLY: CPT

## 2022-02-10 NOTE — PROGRESS NOTES
Pooja Swartz is a 89 year old year old patient who comes in today for a Blood Pressure check because of mild shortness of breath today.  Vital Signs as repeated by /68  Patient is taking medication as prescribed  Patient is tolerating medications well.  Patient is monitoring Blood Pressure at home.  Average readings if yes are 129/'s/60's  Current complaints: SOB SPO2 99%  Disposition:  patient to continue with the same medication, ER if worsening SOB, C/P, headache, leg swelling, weakness.     Camryn Mccray RN on 2/10/2022 at 10:58 AM

## 2022-02-10 NOTE — TELEPHONE ENCOUNTER
Pooja Hammond Maxime is a 89 year old year old patient who comes in today for a Blood Pressure check because of mild shortness of breath today and variable BP readings on home monitor and friends monitor.   Vital Signs as repeated by /68  Patient is taking medication as prescribed  Patient is tolerating medications well.  Patient is monitoring Blood Pressure at home.  Average readings if yes are 129/'s/60's  Current complaints: SOB SPO2 99%  Disposition:  patient to continue with the same medication, ER if worsening SOB, C/P, headache, leg swelling, weakness.     Camryn Mccray RN on 2/10/2022 at 10:58 AM

## 2022-02-10 NOTE — TELEPHONE ENCOUNTER
"Patient presents for blood pressure check. She was getting readings at home 120's-150's. 146/68 today. SPO2 99. States she has been slightly short of breath today. Patient speaks in full sentences, appears very comfortable. Skin pink, warm, dry. No cyanosis or pallor. Advised ER/UC if worsening SOB or develops chest pain, edema, headache, cough.     Camryn Mccray RN on 2/10/2022 at 11:04 AM      Reason for Disposition    [1] MILD difficulty breathing (e.g., minimal/no SOB at rest, SOB with walking, pulse <100) AND [2] NEW-onset or WORSE than normal    Additional Information    Negative: Fever > 103 F (39.4 C)    Negative: [1] Fever > 101 F (38.3 C) AND [2] age > 60    Negative: [1] Fever > 100.0 F (37.8 C) AND [2] bedridden (e.g., nursing home patient, CVA, chronic illness, recovering from surgery)    Negative: [1] Fever > 100.0 F (37.8 C) AND [2] diabetes mellitus or weak immune system (e.g., HIV positive, cancer chemo, splenectomy, organ transplant, chronic steroids)    Negative: [1] Periods where breathing stops and then resumes normally AND [2] bedridden (e.g., nursing home patient, CVA)    Negative: Pregnant or postpartum (< 1 month since delivery)    Negative: Patient sounds very sick or weak to the triager    Negative: [1] MODERATE difficulty breathing (e.g., speaks in phrases, SOB even at rest, pulse 100-120) AND [2] NEW-onset or WORSE than normal    Negative: Wheezing can be heard across the room    Negative: Drooling or spitting out saliva (because can't swallow)    Negative: History of prior \"blood clot\" in leg or lungs (i.e., deep vein thrombosis, pulmonary embolism)    Negative: History of inherited increased risk of blood clots (e.g., Factor 5 Leiden, Anti-thrombin 3, Protein C or Protein S deficiency, Prothrombin mutation)    Negative: Major surgery in the past month    Negative: Hip or leg fracture (broken bone) in past month (or had cast on leg or ankle in past month)    Negative: Illness " "requiring prolonged bedrest in past month (e.g., immobilization, long hospital stay)    Negative: Long-distance travel in past month (e.g., car, bus, train, plane; with trip lasting 6 or more hours)    Negative: Extra heart beats OR irregular heart beating   (i.e., \"palpitations\")    Negative: Chest pain    Negative: [1] Wheezing (high pitched whistling sound) AND [2] previous asthma attacks or use of asthma medicines    Negative: [1] Difficulty breathing AND [2] only present when coughing    Negative: [1] Difficulty breathing AND [2] only from stuffy or runny nose    Negative: [1] Difficulty breathing AND [2] within 14 days of COVID-19 Exposure    Negative: [1] Breathing stopped AND [2] hasn't returned    Negative: Choking on something    Negative: Severe difficulty breathing (e.g., struggling for each breath, speaks in single words)    Negative: Bluish (or gray) lips or face now    Negative: Difficult to awaken or acting confused (e.g., disoriented, slurred speech)    Negative: Passed out (i.e., lost consciousness, collapsed and was not responding)    Negative: Wheezing started suddenly after medicine, an allergic food or bee sting    Negative: Stridor    Negative: Slow, shallow and weak breathing    Negative: Sounds like a life-threatening emergency to the triager    Answer Assessment - Initial Assessment Questions  1. RESPIRATORY STATUS: \"Describe your breathing?\" (e.g., wheezing, shortness of breath, unable to speak, severe coughing)       Mild shortness of breath mostly with activity  2. ONSET: \"When did this breathing problem begin?\"       This morning  3. PATTERN \"Does the difficult breathing come and go, or has it been constant since it started?\"       Comes and goes  4. SEVERITY: \"How bad is your breathing?\" (e.g., mild, moderate, severe)     - MILD: No SOB at rest, mild SOB with walking, speaks normally in sentences, can lay down, no retractions, pulse < 100.     - MODERATE: SOB at rest, SOB with minimal " "exertion and prefers to sit, cannot lie down flat, speaks in phrases, mild retractions, audible wheezing, pulse 100-120.     - SEVERE: Very SOB at rest, speaks in single words, struggling to breathe, sitting hunched forward, retractions, pulse > 120       mild  5. RECURRENT SYMPTOM: \"Have you had difficulty breathing before?\" If so, ask: \"When was the last time?\" and \"What happened that time?\"       Yes, a fib  6. CARDIAC HISTORY: \"Do you have any history of heart disease?\" (e.g., heart attack, angina, bypass surgery, angioplasty)       A fib, PPM  7. LUNG HISTORY: \"Do you have any history of lung disease?\"  (e.g., pulmonary embolus, asthma, emphysema)      denies  8. CAUSE: \"What do you think is causing the breathing problem?\"       Not sure, \"have a little bug\"  9. OTHER SYMPTOMS: \"Do you have any other symptoms? (e.g., dizziness, runny nose, cough, chest pain, fever)      denies  10. PREGNANCY: \"Is there any chance you are pregnant?\" \"When was your last menstrual period?\"        na  11. TRAVEL: \"Have you traveled out of the country in the last month?\" (e.g., travel history, exposures)        no    Protocols used: BREATHING DIFFICULTY-A-AH      "

## 2022-02-10 NOTE — PROGRESS NOTES
ANTICOAGULATION MANAGEMENT     Pooja Swartz 89 year old female is on warfarin with therapeutic INR result. (Goal INR 2.0-3.0)    Recent labs: (last 7 days)     02/10/22  1010   INR 2.9*       ASSESSMENT     Source(s): Chart Review and Patient/Caregiver Call     Warfarin doses taken: Warfarin taken as instructed  Diet: No new diet changes identified  New illness, injury, or hospitalization: No  Medication/supplement changes: None noted  Signs or symptoms of bleeding or clotting: No  Previous INR: Therapeutic last 2(+) visits  Additional findings: None     PLAN     Recommended plan for no diet, medication or health factor changes affecting INR     Dosing Instructions: Continue your current warfarin dose with next INR in 5 weeks       Summary  As of 2/10/2022    Full warfarin instructions:  5 mg every Mon, Thu, Sat; 4 mg all other days   Next INR check:  3/17/2022             Telephone call with Pooja who verbalizes understanding and agrees to plan    Lab visit scheduled    Education provided: Contact [phone]  with any changes, questions or concerns.     Plan made per [facility] anticoagulation protocol    Mary Faith RN  Anticoagulation Clinic  2/10/2022    _______________________________________________________________________     Anticoagulation Episode Summary     Current INR goal:  2.0-3.0   TTR:  67.5 % (1 y)   Target end date:  Indefinite   Send INR reminders to:  [facility]    Indications    Atrial fibrillation (H) [I48.91]  Long-term (current) use of anticoagulants [Z79.01] [Z79.01]  Atrial fibrillation  unspecified type (H) [I48.91]           Comments:  *         Anticoagulation Care Providers     Provider Role Specialty Phone number    Amanda Renee MD Referring Family Medicine [phone]

## 2022-02-23 ENCOUNTER — OFFICE VISIT (OUTPATIENT)
Dept: DERMATOLOGY | Facility: CLINIC | Age: 87
End: 2022-02-23
Payer: COMMERCIAL

## 2022-02-23 VITALS
SYSTOLIC BLOOD PRESSURE: 157 MMHG | HEIGHT: 60 IN | BODY MASS INDEX: 24.22 KG/M2 | HEART RATE: 102 BPM | DIASTOLIC BLOOD PRESSURE: 74 MMHG

## 2022-02-23 DIAGNOSIS — D18.01 ANGIOMA OF SKIN: ICD-10-CM

## 2022-02-23 DIAGNOSIS — Z85.828 HISTORY OF SKIN CANCER: ICD-10-CM

## 2022-02-23 DIAGNOSIS — L81.4 LENTIGO: ICD-10-CM

## 2022-02-23 DIAGNOSIS — L82.1 SEBORRHEIC KERATOSIS: Primary | ICD-10-CM

## 2022-02-23 DIAGNOSIS — D23.9 DERMAL NEVUS: ICD-10-CM

## 2022-02-23 DIAGNOSIS — C44.212 BASAL CELL CARCINOMA (BCC) OF RIGHT EAR: ICD-10-CM

## 2022-02-23 PROCEDURE — 99203 OFFICE O/P NEW LOW 30 MIN: CPT | Mod: 25 | Performed by: DERMATOLOGY

## 2022-02-23 PROCEDURE — 88331 PATH CONSLTJ SURG 1 BLK 1SPC: CPT | Performed by: DERMATOLOGY

## 2022-02-23 PROCEDURE — 69100 BIOPSY OF EXTERNAL EAR: CPT | Performed by: DERMATOLOGY

## 2022-02-23 NOTE — LETTER
2/23/2022         RE: Pooja Swartz  [id number]  [address] [place] [phone]        Dear Colleague,    Thank you for referring your patient, Pooja Swartz, to the [facility]. Please see a copy of my visit note below.    Pooja Swartz , a 89 year old year old female patient, I was asked to see by CELSO davis for spot on face.  Patient states this has been present for sometime.  Patient reports the following symptoms:  Growing and tender .  Patient reports the following previous treatments none.  Patient reports the following modifying factors none.  Associated symptoms: none.  Patient has no other skin complaints today.  Remainder of the HPI, Meds, PMH, Allergies, FH, and SH was reviewed in chart.      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, subsequent encounter 1/9/2017     Renal insufficiency      Sinus node dysfunction (H)      Unspecified essential hypertension        Past Surgical History:   Procedure Laterality Date     ARTHROPLASTY REVISION HIP Left 8/19/2016    Procedure: ARTHROPLASTY REVISION HIP;  Surgeon: Kael Rojas MD;  [facility]: [facility]      VASCULAR SURGERY PROCEDURE UNLIST       OPEN REDUCTION INTERNAL FIXATION FEMUR PROXIMAL Left 8/19/2016    Procedure: OPEN REDUCTION INTERNAL FIXATION FEMUR PROXIMAL;  Surgeon: Kael Rojas MD;  Location: [facility] OR     SURGICAL HISTORY OF -   11/1992    skin cancer, nose     SURGICAL HISTORY OF -   1978    right, vein stripping     SURGICAL HISTORY OF -   1968    breast biopsy     SURGICAL  HISTORY OF -   1993    laparoscopic cholecystectomy     SURGICAL HISTORY OF -       tonsillectomy and adenoidectomy     SURGICAL HISTORY OF -   04/2004    left cataract     SURGICAL HISTORY OF -   08/09/2004    right total shoulder arthroplasty     ZZC PELVIS/HIP JOINT SURGERY UNLISTED       Z SHOULDER SURG PROC UNLISTED       [facility] STOMACH SURGERY PROCEDURE UNLISTED          Family History   Problem Relation Age of Onset     Heart Disease Mother         CHF     Hypertension Mother      Hypertension Father      Hypertension Maternal Grandmother      Hypertension Maternal Grandfather      Hypertension Son      Heart Disease Son         MI       Social History     Socioeconomic History     Marital status:      Spouse name: Not on file     Number of children: Not on file     Years of education: Not on file     Highest education level: Not on file   Occupational History     Not on file   Tobacco Use     Smoking status: Never Smoker     Smokeless tobacco: Never Used   Vaping Use     Vaping Use: Never used   Substance and Sexual Activity     Alcohol use: Yes     Comment: rare     Drug use: No     Sexual activity: Not Currently     Birth control/protection: None   Other Topics Concern     Parent/sibling w/ CABG, MI or angioplasty before 65F 55M? Yes   Social History Narrative     Not on file     Social Determinants of Health     Financial Resource Strain: Not on file   Food Insecurity: Not on file   Transportation Needs: Not on file   Physical Activity: Not on file   Stress: Not on file   Social Connections: Not on file   Intimate Partner Violence: Not on file   Housing Stability: Not on file       Outpatient Encounter Medications as of 2/23/2022   Medication Sig Dispense Refill     allopurinol (ZYLOPRIM) 100 MG tablet Take 1 tablet (100 mg) by mouth daily 90 tablet 3     amLODIPine (NORVASC) 10 MG tablet Take 1 tablet (10 mg) by mouth daily 90 tablet 3     budesonide (ENTOCORT EC) 3 MG EC capsule TAKE 1 CAPSULE BY  MOUTH EVERY MORNING 30 capsule 6     Calcium-Vitamin D 600-200 MG-UNIT TABS Take 1 tablet by mouth 2 times daily       dorzolamide (TRUSOPT) 2 % ophthalmic solution instill 1 drop by ophthalmic route 3 times every day into both eyes       Fexofenadine HCl (ALLEGRA PO) Take 180 mg by mouth daily        latanoprost (XALATAN) 0.005 % ophthalmic solution Place 1 drop into both eyes At Bedtime 1.5 mL 0     lisinopril (ZESTRIL) 2.5 MG tablet Take 1 tablet (2.5 mg) by mouth every evening 90 tablet 3     ONE-A-DAY WOMENS OR TABS Take 1 tablet by mouth daily        warfarin ANTICOAGULANT (JANTOVEN ANTICOAGULANT) 2 MG tablet Take 5 mg every Mon, Thu, Sat; 4 mg all other days or as directed by the Anticoagulation Clinic 140 tablet 0     No facility-administered encounter medications on file as of 2/23/2022.             Review Of Systems  Skin: As above  Eyes: negative  Ears/Nose/Throat: negative  Respiratory: No shortness of breath, dyspnea on exertion, cough, or hemoptysis  Cardiovascular: negative  Gastrointestinal: negative  Genitourinary: negative  Musculoskeletal: negative  Neurologic: negative  Psychiatric: negative  Hematologic/Lymphatic/Immunologic: negative  Endocrine: negative      O:   NAD, WDWN, Alert & Oriented, Mood & Affect wnl, Vitals stable   Here today alone   General appearance chris ii   Vitals stable   Alert, oriented and in no acute distress      Following lymph nodes palpated: Occipital, Cervical, Supraclavicular no lad  R pre auricular cheek/tragus ulcerated ill-defined keratotic 1.3cm nodule     Stuck on papules and brown macules on trunk and ext    Red papules on trunk   Flesh colored papules on trunk          Eyes: Conjunctivae/lids:Normal     ENT: Lips, buccal mucosa, tongue: normal    MSK:Normal    Cardiovascular: peripheral edema none    Pulm: Breathing Normal    Lymph Nodes: No Head and Neck Lymphadenopathy     Neuro/Psych: Orientation:Normal; Mood/Affect:Normal      MICRO:   R PA  cheek/tragus:Orthokeratosis of epidermis with a proliferation of nests of basaloid cells, with peripheral palisading and a haphazard arrangement in the center extending into the dermis, forming infiltrative strands. .  The tumor cells have hyperchromatic nuclei. Poor cytoplasm and intercellular bridging.    A/P:  1. Seborrheic keratosis, lentigo, angioma, dermal nevus, hx of non-melanoma skin cancer   2. R PA cheek/tragus r/o squamous cell carcinoma   TANGENTIAL BIOPSY IN HOUSE:  After consent, anesthesia with LEC and prep, tangential excision performed and dx above confirmed with frozen section histology.  No complications and routine wound care.  Patient is on  anticoagulants and risk of bleeding discussed with patient.       I have personally reviewed all specimens and/or slides and used them with my medical judgement to determine or confirm the final diagnosis.     Patient told result basal cell carcinoma excision .      It was a pleasure speaking to Pooja Swartz today.  Previous clinic  notes and pertinent laboratory tests were reviewed prior to Pooja Swartz's visit.  Nature and genetics of benign skin lesions dicussed with patient.  Signs and Symptoms of skin cancer discussed with patient.  Patient encouraged to perform monthly skin exams.  UV precautions reviewed with patient.  Risks of non-melanoma skin cancer discussed with patient   Return to clinic next appt        Again, thank you for allowing me to participate in the care of your patient.        Sincerely,        Myron Sheppard MD

## 2022-02-23 NOTE — PROGRESS NOTES
Pooja Swartz , a 89 year old year old female patient, I was asked to see by CELSO davis for spot on face.  Patient states this has been present for sometime.  Patient reports the following symptoms:  Growing and tender .  Patient reports the following previous treatments none.  Patient reports the following modifying factors none.  Associated symptoms: none.  Patient has no other skin complaints today.  Remainder of the HPI, Meds, PMH, Allergies, FH, and SH was reviewed in chart.      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, subsequent encounter 1/9/2017     Renal insufficiency      Sinus node dysfunction (H)      Unspecified essential hypertension        Past Surgical History:   Procedure Laterality Date     ARTHROPLASTY REVISION HIP Left 8/19/2016    Procedure: ARTHROPLASTY REVISION HIP;  Surgeon: Kael Rojas MD;  [facility]      VASCULAR SURGERY PROCEDURE UNLIST       OPEN REDUCTION INTERNAL FIXATION FEMUR PROXIMAL Left 8/19/2016    Procedure: OPEN REDUCTION INTERNAL FIXATION FEMUR PROXIMAL;  Surgeon: Kael Rojas MD;  [facility]     SURGICAL HISTORY OF -   11/1992    skin cancer, nose     SURGICAL HISTORY OF -   1978    right, vein stripping     SURGICAL HISTORY OF -   1968    breast biopsy     SURGICAL HISTORY OF -   1993    laparoscopic cholecystectomy     SURGICAL HISTORY OF -       tonsillectomy and adenoidectomy     SURGICAL HISTORY OF -   04/2004    left cataract     SURGICAL HISTORY OF -   08/09/2004    right total shoulder arthroplasty     [facility]  SURGERY UNLISTED       ZZC SHOULDER SURG PROC UNLISTED       ZZC STOMACH SURGERY PROCEDURE UNLISTED          Family History   Problem Relation Age of Onset     Heart Disease Mother         CHF     Hypertension Mother      Hypertension Father      Hypertension Maternal Grandmother      Hypertension Maternal Grandfather      Hypertension Son      Heart Disease Son         MI       Social History     Socioeconomic History     Marital status:      Spouse name: Not on file     Number of children: Not on file     Years of education: Not on file     Highest education level: Not on file   Occupational History     Not on file   Tobacco Use     Smoking status: Never Smoker     Smokeless tobacco: Never Used   Vaping Use     Vaping Use: Never used   Substance and Sexual Activity     Alcohol use: Yes     Comment: rare     Drug use: No     Sexual activity: Not Currently     Birth control/protection: None   Other Topics Concern     Parent/sibling w/ CABG, MI or angioplasty before 65F 55M? Yes   Social History Narrative     Not on file     Social Determinants of Health     Financial Resource Strain: Not on file   Food Insecurity: Not on file   Transportation Needs: Not on file   Physical Activity: Not on file   Stress: Not on file   Social Connections: Not on file   Intimate Partner Violence: Not on file   Housing Stability: Not on file       Outpatient Encounter Medications as of 2/23/2022   Medication Sig Dispense Refill     allopurinol (ZYLOPRIM) 100 MG tablet Take 1 tablet (100 mg) by mouth daily 90 tablet 3     amLODIPine (NORVASC) 10 MG tablet Take 1 tablet (10 mg) by mouth daily 90 tablet 3     budesonide (ENTOCORT EC) 3 MG EC capsule TAKE 1 CAPSULE BY MOUTH EVERY MORNING 30 capsule 6     Calcium-Vitamin D 600-200 MG-UNIT TABS Take 1 tablet by mouth 2 times daily       dorzolamide (TRUSOPT) 2 % ophthalmic solution instill 1 drop by ophthalmic route 3 times every day into both eyes       Fexofenadine HCl (ALLEGRA PO)  Take 180 mg by mouth daily        latanoprost (XALATAN) 0.005 % ophthalmic solution Place 1 drop into both eyes At Bedtime 1.5 mL 0     lisinopril (ZESTRIL) 2.5 MG tablet Take 1 tablet (2.5 mg) by mouth every evening 90 tablet 3     ONE-A-DAY WOMENS OR TABS Take 1 tablet by mouth daily        warfarin ANTICOAGULANT (JANTOVEN ANTICOAGULANT) 2 MG tablet Take 5 mg every Mon, Thu, Sat; 4 mg all other days or as directed by the Anticoagulation Clinic 140 tablet 0     No facility-administered encounter medications on file as of 2/23/2022.             Review Of Systems  Skin: As above  Eyes: negative  Ears/Nose/Throat: negative  Respiratory: No shortness of breath, dyspnea on exertion, cough, or hemoptysis  Cardiovascular: negative  Gastrointestinal: negative  Genitourinary: negative  Musculoskeletal: negative  Neurologic: negative  Psychiatric: negative  Hematologic/Lymphatic/Immunologic: negative  Endocrine: negative      O:   NAD, WDWN, Alert & Oriented, Mood & Affect wnl, Vitals stable   Here today alone   General appearance chris ii   Vitals stable   Alert, oriented and in no acute distress      Following lymph nodes palpated: Occipital, Cervical, Supraclavicular no lad  R pre auricular cheek/tragus ulcerated ill-defined keratotic 1.3cm nodule     Stuck on papules and brown macules on trunk and ext    Red papules on trunk   Flesh colored papules on trunk          Eyes: Conjunctivae/lids:Normal     ENT: Lips, buccal mucosa, tongue: normal    MSK:Normal    Cardiovascular: peripheral edema none    Pulm: Breathing Normal    Lymph Nodes: No Head and Neck Lymphadenopathy     Neuro/Psych: Orientation:Normal; Mood/Affect:Normal      MICRO:   R PA cheek/tragus:Orthokeratosis of epidermis with a proliferation of nests of basaloid cells, with peripheral palisading and a haphazard arrangement in the center extending into the dermis, forming infiltrative strands. .  The tumor cells have hyperchromatic nuclei. Poor cytoplasm and  intercellular bridging.    A/P:  1. Seborrheic keratosis, lentigo, angioma, dermal nevus, hx of non-melanoma skin cancer   2. R PA cheek/tragus r/o squamous cell carcinoma   TANGENTIAL BIOPSY IN HOUSE:  After consent, anesthesia with LEC and prep, tangential excision performed and dx above confirmed with frozen section histology.  No complications and routine wound care.  Patient is on  anticoagulants and risk of bleeding discussed with patient.       I have personally reviewed all specimens and/or slides and used them with my medical judgement to determine or confirm the final diagnosis.     Patient told result basal cell carcinoma excision .      It was a pleasure speaking to Pooja Swartz today.  Previous clinic  notes and pertinent laboratory tests were reviewed prior to Pooja Swartz's visit.  Nature and genetics of benign skin lesions dicussed with patient.  Signs and Symptoms of skin cancer discussed with patient.  Patient encouraged to perform monthly skin exams.  UV precautions reviewed with patient.  Risks of non-melanoma skin cancer discussed with patient   Return to clinic next appt

## 2022-02-23 NOTE — PATIENT INSTRUCTIONS
Wound Care Instructions     FOR SUPERFICIAL WOUNDS     [facility] [phone]    [facility] [phone]                       AFTER 24 HOURS YOU SHOULD REMOVE THE BANDAGE AND BEGIN DAILY DRESSING CHANGES AS FOLLOWS:     1) Remove Dressing.     2) Clean and dry the area with tap water using a Q-tip or sterile gauze pad.     3) Apply Vaseline, Aquaphor, Polysporin ointment or Bacitracin ointment over entire wound.  Do NOT use Neosporin ointment.     4) Cover the wound with a band-aid, or a sterile non-stick gauze pad and micropore paper tape      REPEAT THESE INSTRUCTIONS AT LEAST ONCE A DAY UNTIL THE WOUND HAS COMPLETELY HEALED.    It is an old wives tale that a wound heals better when it is exposed to air and allowed to dry out. The wound will heal faster with a better cosmetic result if it is kept moist with ointment and covered with a bandage.    **Do not let the wound dry out.**      Supplies Needed:      *Cotton tipped applicators (Q-tips)    *Polysporin Ointment or Bacitracin Ointment (NOT NEOSPORIN)    *Band-aids or non-stick gauze pads and micropore paper tape.      PATIENT INFORMATION:    During the healing process you will notice a number of changes. All wounds develop a small halo of redness surrounding the wound.  This means healing is occurring. Severe itching with extensive redness usually indicates sensitivity to the ointment or bandage tape used to dress the wound.  You should call our office if this develops.      Swelling  and/or discoloration around your surgical site is common, particularly when performed around the eye.    All wounds normally drain.  The larger the wound the more drainage there will be.  After 7-10 days, you will notice the wound beginning to shrink and new skin will begin to grow.  The wound is healed when you can see skin has formed over the entire area.  A healed wound has a healthy, shiny look to the surface and is red to dark pink in color  to normalize.  Wounds may take approximately 4-6 weeks to heal.  Larger wounds may take 6-8 weeks.  After the wound is healed you may discontinue dressing changes.    You may experience a sensation of tightness as your wound heals. This is normal and will gradually subside.    Your healed wound may be sensitive to temperature changes. This sensitivity improves with time, but if you re having a lot of discomfort, try to avoid temperature extremes.    Patients frequently experience itching after their wound appears to have healed because of the continue healing under the skin.  Plain Vaseline will help relieve the itching.        POSSIBLE COMPLICATIONS    BLEEDIN. Leave the bandage in place.  2. Use tightly rolled up gauze or a cloth to apply direct pressure over the bandage for 30  minutes.  3. Reapply pressure for an additional 30 minutes if necessary  4. Use additional gauze and tape to maintain pressure once the bleeding has stopped.  5.

## 2022-03-07 ENCOUNTER — OFFICE VISIT (OUTPATIENT)
Dept: DERMATOLOGY | Facility: CLINIC | Age: 87
End: 2022-03-07
Payer: COMMERCIAL

## 2022-03-07 VITALS
HEIGHT: 60 IN | HEART RATE: 71 BPM | BODY MASS INDEX: 24.22 KG/M2 | SYSTOLIC BLOOD PRESSURE: 159 MMHG | DIASTOLIC BLOOD PRESSURE: 68 MMHG

## 2022-03-07 DIAGNOSIS — C44.212 BASAL CELL CARCINOMA (BCC) OF TRAGUS OF RIGHT EAR: Primary | ICD-10-CM

## 2022-03-07 DIAGNOSIS — C44.319 BASAL CELL CARCINOMA (BCC) OF RIGHT CHEEK: ICD-10-CM

## 2022-03-07 PROCEDURE — 99207 PR NO CHARGE LOS: CPT | Performed by: DERMATOLOGY

## 2022-03-07 PROCEDURE — 17311 MOHS 1 STAGE H/N/HF/G: CPT | Performed by: DERMATOLOGY

## 2022-03-07 PROCEDURE — 14041 TIS TRNFR F/C/C/M/N/A/G/H/F: CPT | Performed by: DERMATOLOGY

## 2022-03-07 NOTE — PROGRESS NOTES
Surgical Office Location :   [facility]  [address] [place] [address]

## 2022-03-07 NOTE — LETTER
3/7/2022         RE: Pooja Swartz  [id number]  [address]  [place] [address]        Dear Colleague,    Thank you for referring your patient, Pooja Swartz, to the [facility]. Please see a copy of my visit note below.    Surgical Office Location :   [facility]  [facility] [place] [address]      Pooja Swartz is an extremely pleasant 89 year old year old female patient here today for evaluation and managment of basal cell carcinoma on right tragus/pa cheek.  Patient has no other skin complaints today.  Remainder of the HPI, Meds, PMH, Allergies, FH, and SH was reviewed in chart.      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, subsequent encounter 1/9/2017     Renal insufficiency      Sinus node dysfunction (H)      Unspecified essential hypertension        Past Surgical History:   Procedure Laterality Date     ARTHROPLASTY REVISION HIP Left 8/19/2016    Procedure: ARTHROPLASTY REVISION HIP;  Surgeon: Kael Rojas MD;  Location: [facility]      VASCULAR SURGERY PROCEDURE UNLIST       OPEN REDUCTION INTERNAL FIXATION FEMUR PROXIMAL Left 8/19/2016    Procedure: OPEN REDUCTION INTERNAL FIXATION FEMUR PROXIMAL;  Surgeon: Kael Rojas MD;  Location: [facility] OR     SURGICAL HISTORY OF -   11/1992    skin cancer, nose     SURGICAL HISTORY OF -   1978    right, vein stripping     SURGICAL HISTORY OF -   1968    breast biopsy     SURGICAL HISTORY OF -   1993    laparoscopic cholecystectomy     SURGICAL HISTORY OF -       tonsillectomy and  adenoidectomy     SURGICAL HISTORY OF -   04/2004    left cataract     SURGICAL HISTORY OF -   08/09/2004    right total shoulder arthroplasty     [facility] PELVIS/HIP JOINT SURGERY UNLISTED       [facility] SHOULDER SURG PROC UNLISTED       [facility] STOMACH SURGERY PROCEDURE UNLISTED          Family History   Problem Relation Age of Onset     Heart Disease Mother         CHF     Hypertension Mother      Hypertension Father      Hypertension Maternal Grandmother      Hypertension Maternal Grandfather      Hypertension Son      Heart Disease Son         MI       Social History     Socioeconomic History     Marital status:      Spouse name: Not on file     Number of children: Not on file     Years of education: Not on file     Highest education level: Not on file   Occupational History     Not on file   Tobacco Use     Smoking status: Never Smoker     Smokeless tobacco: Never Used   Vaping Use     Vaping Use: Never used   Substance and Sexual Activity     Alcohol use: Yes     Comment: rare     Drug use: No     Sexual activity: Not Currently     Birth control/protection: None   Other Topics Concern     Parent/sibling w/ CABG, MI or angioplasty before 65F 55M? Yes   Social History Narrative     Not on file     Social Determinants of Health     Financial Resource Strain: Not on file   Food Insecurity: Not on file   Transportation Needs: Not on file   Physical Activity: Not on file   Stress: Not on file   Social Connections: Not on file   Intimate Partner Violence: Not on file   Housing Stability: Not on file       Outpatient Encounter Medications as of 3/7/2022   Medication Sig Dispense Refill     allopurinol (ZYLOPRIM) 100 MG tablet Take 1 tablet (100 mg) by mouth daily 90 tablet 3     amLODIPine (NORVASC) 10 MG tablet Take 1 tablet (10 mg) by mouth daily 90 tablet 3     budesonide (ENTOCORT EC) 3 MG EC capsule TAKE 1 CAPSULE BY MOUTH EVERY MORNING 30 capsule 6     Calcium-Vitamin D 600-200 MG-UNIT TABS Take 1 tablet by mouth 2  times daily       dorzolamide (TRUSOPT) 2 % ophthalmic solution instill 1 drop by ophthalmic route 3 times every day into both eyes       Fexofenadine HCl (ALLEGRA PO) Take 180 mg by mouth daily        latanoprost (XALATAN) 0.005 % ophthalmic solution Place 1 drop into both eyes At Bedtime 1.5 mL 0     lisinopril (ZESTRIL) 2.5 MG tablet Take 1 tablet (2.5 mg) by mouth every evening 90 tablet 3     ONE-A-DAY WOMENS OR TABS Take 1 tablet by mouth daily        warfarin ANTICOAGULANT (JANTOVEN ANTICOAGULANT) 2 MG tablet Take 5 mg every Mon, Thu, Sat; 4 mg all other days or as directed by the Anticoagulation Clinic 140 tablet 0     No facility-administered encounter medications on file as of 3/7/2022.             O:   NAD, WDWN, Alert & Oriented, Mood & Affect wnl, Vitals stable   Here today alone   BP (!) 159/68   Pulse 71   Ht 1.524 m (5')   LMP 07/07/1960   BMI 24.22 kg/m     General appearance normal   Vitals stable   Alert, oriented and in no acute distress     R tragus 1.3cm ulcerated red plaque       Eyes: Conjunctivae/lids:Normal     ENT: Lips, buccal mucosa, tongue: normal    MSK:Normal    Cardiovascular: peripheral edema none    Pulm: Breathing Normal    Neuro/Psych: Orientation:Alert and Orientedx3 ; Mood/Affect:normal       A/P:  1. R tragus/ PA cheek basal cell carcinoma   MOHS:   Location    The rationale for Mohs surgery was discussed with the patient and consent was obtained.  The risks and benefits as well as alternatives to therapy were discussed, in detail.  Specifically, the risks of infection, scarring, bleeding, prolonged wound healing, incomplete removal, allergy to anesthesia, nerve injury and recurrence were addressed.  Indication for Mohs was Location. Prior to the procedure, the treatment site was clearly identified and, if available, confirmed with previous photos and confirmed by the patient   All components of the Universal Protocol/PAUSE rule were completed.  The Mohs surgeon operated  in two distinct and integrated capacities as the surgeon and pathologist.      The area was prepped with Betasept.  A rim of normal appearing skin was marked circumferentially around the lesion.  The area was infiltrated with local anesthesia.  The tumor was first debulked to remove all clinically apparent tumor.  An incision following the standard Mohs approach was done and the specimen was oriented,mapped and placed in 1 block(s).  Each specimen was then chromacoded and processed in the Mohs laboratory using standard Mohs technique and submitted for frozen section histology.  Frozen section analysis showed no residual tumor but CLEAR MARGINS.      The tumor was excised using standard Mohs technique in 1 stages(s).  CLEAR MARGINS OBTAINED and Final defect size was 2 cm.     We discussed the options for wound management in full with the patient including risks/benefits/ possible outcomes.        REPAIR RHOMBIC: Because of Because of the size and full thickness nature of the defect, Because of the tightness of the surrounding skin, To maintain form and function, In order to avoid distortion and Because of the proximity to the ear, a rhombic transposition flap was planned. After anesthesia and prep, the rhombic flap was carefully incised within relaxed skin tension lines, and the resulting Burow's triangle removed. The flap was raised and the wound edges were all undermined in the subcutaneous plane. After hemostasis, the flap was transposed into the defect and sutured in a layered fashion using Vicryl and Fast Absorbing Plain Gut sutures. Postoperative size was 4.6 x 2.8 cm.  EBL minimal; complications none; wound care routine.  The patient was discharged in good condition and will return in one week for wound evaluation.  It was a pleasure speaking to Pooja Swartz today.  Previous clinic notes and pertinent laboratory tests were reviewed prior to Pooja Swartz's visit.  Nature and genetics of benign skin lesions  dicussed with patient.  Signs and Symptoms of skin cancer discussed with patient.  Patient encouraged to perform monthly skin exams.  UV precautions reviewed with patient.  Risks of non-melanoma skin cancer discussed with patient   Return to clinic 6 months        Again, thank you for allowing me to participate in the care of your patient.        Sincerely,        Myron Sheppard MD

## 2022-03-07 NOTE — PROGRESS NOTES
Pooja Swartz is an extremely pleasant 89 year old year old female patient here today for evaluation and managment of basal cell carcinoma on right tragus/pa cheek.  Patient has no other skin complaints today.  Remainder of the HPI, Meds, PMH, Allergies, FH, and SH was reviewed in chart.      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, subsequent encounter 1/9/2017     Renal insufficiency      Sinus node dysfunction (H)      Unspecified essential hypertension        Past Surgical History:   Procedure Laterality Date     ARTHROPLASTY REVISION HIP Left 8/19/2016    Procedure: ARTHROPLASTY REVISION HIP;  Surgeon: Kael Rojas MD;  Location:  OR      VASCULAR SURGERY PROCEDURE UNLIST       OPEN REDUCTION INTERNAL FIXATION FEMUR PROXIMAL Left 8/19/2016    Procedure: OPEN REDUCTION INTERNAL FIXATION FEMUR PROXIMAL;  Surgeon: Kael Rojas MD;  [facility]     SURGICAL HISTORY OF -   11/1992    skin cancer, nose     SURGICAL HISTORY OF -   1978    right, vein stripping     SURGICAL HISTORY OF -   1968    breast biopsy     SURGICAL HISTORY OF -   1993    laparoscopic cholecystectomy     SURGICAL HISTORY OF -       tonsillectomy and adenoidectomy     SURGICAL HISTORY OF -   04/2004    left cataract     SURGICAL HISTORY OF -   08/09/2004    right total shoulder arthroplasty     [facility] PELVIS/HIP JOINT SURGERY UNLISTED       [facility] SHOULDER SURG PROC UNLISTED       [facility] STOMACH SURGERY PROCEDURE UNLISTED          Family History   Problem Relation Age of Onset     Heart Disease Mother         CHF      Hypertension Mother      Hypertension Father      Hypertension Maternal Grandmother      Hypertension Maternal Grandfather      Hypertension Son      Heart Disease Son         MI       Social History     Socioeconomic History     Marital status:      Spouse name: Not on file     Number of children: Not on file     Years of education: Not on file     Highest education level: Not on file   Occupational History     Not on file   Tobacco Use     Smoking status: Never Smoker     Smokeless tobacco: Never Used   Vaping Use     Vaping Use: Never used   Substance and Sexual Activity     Alcohol use: Yes     Comment: rare     Drug use: No     Sexual activity: Not Currently     Birth control/protection: None   Other Topics Concern     Parent/sibling w/ CABG, MI or angioplasty before 65F 55M? Yes   Social History Narrative     Not on file     Social Determinants of Health     Financial Resource Strain: Not on file   Food Insecurity: Not on file   Transportation Needs: Not on file   Physical Activity: Not on file   Stress: Not on file   Social Connections: Not on file   Intimate Partner Violence: Not on file   Housing Stability: Not on file       Outpatient Encounter Medications as of 3/7/2022   Medication Sig Dispense Refill     allopurinol (ZYLOPRIM) 100 MG tablet Take 1 tablet (100 mg) by mouth daily 90 tablet 3     amLODIPine (NORVASC) 10 MG tablet Take 1 tablet (10 mg) by mouth daily 90 tablet 3     budesonide (ENTOCORT EC) 3 MG EC capsule TAKE 1 CAPSULE BY MOUTH EVERY MORNING 30 capsule 6     Calcium-Vitamin D 600-200 MG-UNIT TABS Take 1 tablet by mouth 2 times daily       dorzolamide (TRUSOPT) 2 % ophthalmic solution instill 1 drop by ophthalmic route 3 times every day into both eyes       Fexofenadine HCl (ALLEGRA PO) Take 180 mg by mouth daily        latanoprost (XALATAN) 0.005 % ophthalmic solution Place 1 drop into both eyes At Bedtime 1.5 mL 0     lisinopril (ZESTRIL) 2.5 MG tablet Take 1 tablet (2.5 mg) by  mouth every evening 90 tablet 3     ONE-A-DAY WOMENS OR TABS Take 1 tablet by mouth daily        warfarin ANTICOAGULANT (JANTOVEN ANTICOAGULANT) 2 MG tablet Take 5 mg every Mon, Thu, Sat; 4 mg all other days or as directed by the Anticoagulation Clinic 140 tablet 0     No facility-administered encounter medications on file as of 3/7/2022.             O:   NAD, WDWN, Alert & Oriented, Mood & Affect wnl, Vitals stable   Here today alone   BP (!) 159/68   Pulse 71   Ht 1.524 m (5')   LMP 07/07/1960   BMI 24.22 kg/m     General appearance normal   Vitals stable   Alert, oriented and in no acute distress     R tragus 1.3cm ulcerated red plaque       Eyes: Conjunctivae/lids:Normal     ENT: Lips, buccal mucosa, tongue: normal    MSK:Normal    Cardiovascular: peripheral edema none    Pulm: Breathing Normal    Neuro/Psych: Orientation:Alert and Orientedx3 ; Mood/Affect:normal       A/P:  1. R tragus/ PA cheek basal cell carcinoma   MOHS:   Location    The rationale for Mohs surgery was discussed with the patient and consent was obtained.  The risks and benefits as well as alternatives to therapy were discussed, in detail.  Specifically, the risks of infection, scarring, bleeding, prolonged wound healing, incomplete removal, allergy to anesthesia, nerve injury and recurrence were addressed.  Indication for Mohs was Location. Prior to the procedure, the treatment site was clearly identified and, if available, confirmed with previous photos and confirmed by the patient   All components of the Universal Protocol/PAUSE rule were completed.  The Mohs surgeon operated in two distinct and integrated capacities as the surgeon and pathologist.      The area was prepped with Betasept.  A rim of normal appearing skin was marked circumferentially around the lesion.  The area was infiltrated with local anesthesia.  The tumor was first debulked to remove all clinically apparent tumor.  An incision following the standard Mohs approach  was done and the specimen was oriented,mapped and placed in 1 block(s).  Each specimen was then chromacoded and processed in the Mohs laboratory using standard Mohs technique and submitted for frozen section histology.  Frozen section analysis showed no residual tumor but CLEAR MARGINS.      The tumor was excised using standard Mohs technique in 1 stages(s).  CLEAR MARGINS OBTAINED and Final defect size was 2 cm.     We discussed the options for wound management in full with the patient including risks/benefits/ possible outcomes.        REPAIR RHOMBIC: Because of Because of the size and full thickness nature of the defect, Because of the tightness of the surrounding skin, To maintain form and function, In order to avoid distortion and Because of the proximity to the ear, a rhombic transposition flap was planned. After anesthesia and prep, the rhombic flap was carefully incised within relaxed skin tension lines, and the resulting Burow's triangle removed. The flap was raised and the wound edges were all undermined in the subcutaneous plane. After hemostasis, the flap was transposed into the defect and sutured in a layered fashion using Vicryl and Fast Absorbing Plain Gut sutures. Postoperative size was 4.6 x 2.8 cm.  EBL minimal; complications none; wound care routine.  The patient was discharged in good condition and will return in one week for wound evaluation.  It was a pleasure speaking to Pooja Swartz today.  Previous clinic notes and pertinent laboratory tests were reviewed prior to Pooja Swartz's visit.  Nature and genetics of benign skin lesions dicussed with patient.  Signs and Symptoms of skin cancer discussed with patient.  Patient encouraged to perform monthly skin exams.  UV precautions reviewed with patient.  Risks of non-melanoma skin cancer discussed with patient   Return to clinic 6 months

## 2022-03-07 NOTE — PATIENT INSTRUCTIONS
Sutured Wound Care     Right cheek    [facility]: [phone]    [facility]: [phone]          ? No strenuous activity for 48 hours. Resume moderate activity in 48 hours. No heavy exercising until you are seen for follow up in one week.     ? Take Tylenol as needed for discomfort.                         ? Do not drink alcoholic beverages for 48 hours.     ? Keep the pressure bandage in place for 24 hours. If the bandage becomes blood tinged or loose, reinforce it with gauze and tape.        (Refer to the reverse side of this page for management of bleeding).    ? Remove pressure bandage in 24 hours     ? Leave the flat bandage in place until your follow up appointment.    ? Keep the bandage dry. Wash around it carefully.    ? If the tape becomes soiled or starts to come off, reinforce it with additional paper tape.    ? Do not smoke for 3 weeks; smoking is detrimental to wound healing.    ? It is normal to have swelling and bruising around the surgical site. The bruising will fade in approximately 10-14 days. Elevate the area to reduce swelling.    ? Numbness, itchiness and sensitivity to temperature changes can occur after surgery and may take up to 18 months to normalize.      POSSIBLE COMPLICATIONS    BLEEDIN. Leave the bandage in place.  2. Use tightly rolled up gauze or a cloth to apply direct pressure over the bandage for 20   minutes.  3. Reapply pressure for an additional 20 minutes if necessary  4. Call the office or go to the nearest emergency room if pressure fails to stop the bleeding.  5. Use additional gauze and tape to maintain pressure once the bleeding has stopped.        PAIN:    1. Post operative pain should slowly get better, never worse.  2. A severe increase in pain may indicate a problem. Call the office if this occurs.    In case of emergency phone:Dr Sheppard [phone]

## 2022-03-14 ENCOUNTER — OFFICE VISIT (OUTPATIENT)
Dept: DERMATOLOGY | Facility: CLINIC | Age: 87
End: 2022-03-14
Payer: COMMERCIAL

## 2022-03-14 DIAGNOSIS — Z48.01 ENCOUNTER FOR CHANGE OR REMOVAL OF SURGICAL WOUND DRESSING: Primary | ICD-10-CM

## 2022-03-14 PROCEDURE — 99207 PR NO CHARGE NURSE ONLY: CPT

## 2022-03-14 NOTE — PATIENT INSTRUCTIONS
Open Wound Care     for _______Ear_______        ? No strenuous activity for 48 hours    ? Take Tylenol as needed for discomfort.                                                .         ? Do not drink alcoholic beverages for 48 hours.    ? Keep the pressure bandage in place for 24 hours. If the bandage becomes blood tinged or loose, reinforce it with gauze and tape.        (Refer to the reverse side of this page for management of bleeding).    ? Remove bandage in 24 hours and begin wound care as follows:     1. Clean area with tap water using a Q tip or gauze pad, (shower / bathe normally)  2. Dry wound with Q tip or gauze pad  3. Apply Aquaphor, Vaseline, Polysporin or Bacitracin Ointment with a Q tip    Do NOT use Neosporin Ointment *  4. Cover the wound with a band-aid or nonstick gauze pad and paper tape.  5. Repeat wound care once a day until wound is completely healed.    It is an old wives tale that a wound heals better when it is exposed to air and allowed to dry out. The wound will heal faster with a better cosmetic result if it is kept moist with ointment and covered with a bandage.  Do not let the wound dry out.      Supplies Needed:                Qtips or gauze pads                Polysporin or Bacitracin Ointment                Bandaids or nonstick gauze pads and paper tape    Wound care kits and brown paper tape are available for purchase at   the pharmacy.    BLEEDIN. Use tightly rolled up gauze or cloth to apply direct pressure over the bandage for 20   minutes.  2. Reapply pressure for an additional 20 minutes if necessary  3. Call the office or go to the nearest emergency room if pressure fails to stop the bleeding.  4. Use additional gauze and tape to maintain pressure once the bleeding has stopped.  5. Begin wound care 24 hours after surgery as directed.                  WOUND HEALING    1. One week after surgery a pink / red halo will form around the outside of the wound.   This is new  skin.  2. The center of the wound will appear yellowish white and produce some drainage.  3. The pink halo will slowly migrate in toward the center of the wound until the wound is covered with new shiny pink skin.  4. There will be no more drainage when the wound is completely healed.  5. It will take six months to one year for the redness to fade.  6. The scar may be itchy, tight and sensitive to extreme temperatures for a year after the surgery.  7. Massaging the area several times a day for several minutes after the wound is completely healed will help the scar soften and normalize faster. Begin massage only after healing is complete.      In case of emergency call: Dr Sheppard: [phone]    [facility]: [phone]    [facility]:[phone]

## 2022-03-15 NOTE — PROGRESS NOTES
Pt returned to clinic for post surgery 1 week follow up bandage change. Pt has no complaints, denies pain. Bandage removed from right ear, area cleansed with normal saline. Site is open, draining, and odorous. Site checked by Dr. Sheppard, who recommended open wound care and follow up in 3 months. Aquaphor and non stick bandage applied to site.    Advised to watch for signs/sx of infection; spreading redness, drainage, odor, fever. Call or report promptly to clinic. Pt given written instructions and informed to rtc as needed. Patient verbalized understanding.     Lisa ANGLIN,  CMA

## 2022-03-17 ENCOUNTER — LAB (OUTPATIENT)
Dept: LAB | Facility: CLINIC | Age: 87
End: 2022-03-17
Payer: COMMERCIAL

## 2022-03-17 ENCOUNTER — ANTICOAGULATION THERAPY VISIT (OUTPATIENT)
Dept: ANTICOAGULATION | Facility: CLINIC | Age: 87
End: 2022-03-17

## 2022-03-17 DIAGNOSIS — Z79.01 LONG TERM CURRENT USE OF ANTICOAGULANT THERAPY: ICD-10-CM

## 2022-03-17 DIAGNOSIS — I48.91 ATRIAL FIBRILLATION, UNSPECIFIED TYPE (H): ICD-10-CM

## 2022-03-17 DIAGNOSIS — I48.91 ATRIAL FIBRILLATION (H): Primary | ICD-10-CM

## 2022-03-17 LAB — INR BLD: 3.3 (ref 0.9–1.1)

## 2022-03-17 PROCEDURE — 36416 COLLJ CAPILLARY BLOOD SPEC: CPT

## 2022-03-17 PROCEDURE — 85610 PROTHROMBIN TIME: CPT

## 2022-03-17 NOTE — PROGRESS NOTES
ANTICOAGULATION MANAGEMENT     Pooja Swartz 89 year old female is on warfarin with supratherapeutic INR result. (Goal INR 2.0-3.0)    Recent labs: (last 7 days)     03/17/22  1346   INR 3.3*       ASSESSMENT     Source(s): Chart Review and Patient/Caregiver Call     Warfarin doses taken: Warfarin taken as instructed  Diet: Decreased greens/vitamin K in diet; plans to resume previous intake  New illness, injury, or hospitalization: Yes: patient had a MOHS procedure last week and follow up/bandage change done on Monday  Medication/supplement changes: None noted  Signs or symptoms of bleeding or clotting: No  Previous INR: Therapeutic last 2(+) visits  Additional findings: None       PLAN     Recommended plan for temporary change(s) affecting INR     Dosing Instructions: Partial hold then continue your current warfarin dose with next INR in 2 weeks       Summary  As of 3/17/2022    Full warfarin instructions:  3/17: 4 mg; Otherwise 5 mg every Mon, Thu, Sat; 4 mg all other days   Next INR check:  3/31/2022             Telephone call with Pooja who verbalizes understanding and agrees to plan    Lab visit scheduled    Education provided: Contact [phone]  with any changes, questions or concerns.     Plan made per [facility] anticoagulation protocol    Mary Faith, RN  Anticoagulation Clinic  3/17/2022    _______________________________________________________________________     Anticoagulation Episode Summary     Current INR goal:  2.0-3.0   TTR:  60.6 % (1 y)   Target end date:  Indefinite   Send INR reminders to:  [facility]    Indications    Atrial fibrillation (H) [I48.91]  Long-term (current) use of anticoagulants [Z79.01] [Z79.01]  Atrial fibrillation  unspecified type (H) [I48.91]           Comments:  *         Anticoagulation Care Providers     Provider Role Specialty Phone number    Amanda Renee MD Referring Family Medicine [phone]

## 2022-03-31 ENCOUNTER — ANTICOAGULATION THERAPY VISIT (OUTPATIENT)
Dept: ANTICOAGULATION | Facility: CLINIC | Age: 87
End: 2022-03-31

## 2022-03-31 ENCOUNTER — LAB (OUTPATIENT)
Dept: LAB | Facility: CLINIC | Age: 87
End: 2022-03-31

## 2022-03-31 ENCOUNTER — ALLIED HEALTH/NURSE VISIT (OUTPATIENT)
Dept: FAMILY MEDICINE | Facility: CLINIC | Age: 87
End: 2022-03-31
Payer: COMMERCIAL

## 2022-03-31 DIAGNOSIS — I48.91 ATRIAL FIBRILLATION, UNSPECIFIED TYPE (H): ICD-10-CM

## 2022-03-31 DIAGNOSIS — Z79.01 LONG TERM CURRENT USE OF ANTICOAGULANT THERAPY: ICD-10-CM

## 2022-03-31 DIAGNOSIS — Z79.01 ANTICOAGULATED ON COUMADIN: Primary | ICD-10-CM

## 2022-03-31 DIAGNOSIS — Z79.01 ANTICOAGULATED ON COUMADIN: ICD-10-CM

## 2022-03-31 DIAGNOSIS — I48.91 ATRIAL FIBRILLATION (H): Primary | ICD-10-CM

## 2022-03-31 LAB — INR BLD: 3.1 (ref 0.9–1.1)

## 2022-03-31 PROCEDURE — 85610 PROTHROMBIN TIME: CPT

## 2022-03-31 PROCEDURE — 36416 COLLJ CAPILLARY BLOOD SPEC: CPT

## 2022-03-31 RX ORDER — WARFARIN SODIUM 2 MG/1
TABLET ORAL
Qty: 190 TABLET | Refills: 1
Start: 2022-03-31 | End: 2022-05-05

## 2022-03-31 RX ORDER — WARFARIN SODIUM 2 MG/1
TABLET ORAL
Qty: 190 TABLET | Refills: 1 | Status: SHIPPED | OUTPATIENT
Start: 2022-03-31 | End: 2022-03-31

## 2022-03-31 NOTE — TELEPHONE ENCOUNTER
Patient is here today to let us know she has only enough of the Warfarin till Friday, then will be out. She is going to do the INR lab today here in [facility]. Patient would like this sent today.    Will send a message to the [facility] care team for review in refill request and pend the script.    No other questions.    LOKESH Ochoa

## 2022-03-31 NOTE — PROGRESS NOTES
ANTICOAGULATION MANAGEMENT     Pooja Swartz 89 year old female is on warfarin with supratherapeutic INR result. (Goal INR 2.0-3.0)    Recent labs: (last 7 days)     03/31/22  1335   INR 3.1*       ASSESSMENT     Source(s): Chart Review and Patient/Caregiver Call     Warfarin doses taken: Warfarin taken as instructed  Diet: pt went back to usual greens as discussed at last INR check  New illness, injury, or hospitalization: No  Medication/supplement changes: None noted  Signs or symptoms of bleeding or clotting: No  Previous INR: Supratherapeutic  Additional findings: wound healing nicely from MOHS. No pain, swelling or signs of infection       PLAN     Recommended plan for no diet, medication or health factor changes affecting INR     Dosing Instructions: decrease your warfarin dose (3.2% change) with next INR in 2 weeks       Summary  As of 3/31/2022    Full warfarin instructions:  5 mg every Mon, Fri; 4 mg all other days   Next INR check:  4/14/2022             Telephone call with Pooja who verbalizes understanding and agrees to plan    Lab visit scheduled    Education provided: Please call back if any changes to your diet, medications or how you've been taking warfarin and Contact [phone]  with any changes, questions or concerns.     Plan made per [facility] anticoagulation protocol    Arin Sullivan RN  Anticoagulation Clinic  3/31/2022    _______________________________________________________________________     Anticoagulation Episode Summary     Current INR goal:  2.0-3.0   TTR:  56.8 % (1 y)   Target end date:  Indefinite   Send INR reminders to:  [facility]    Indications    Atrial fibrillation (H) [I48.91]  Long-term (current) use of anticoagulants [Z79.01] [Z79.01]  Atrial fibrillation  unspecified type (H) [I48.91]           Comments:  *         Anticoagulation Care Providers     Provider Role Specialty Phone number    Amanda Renee MD Referring Family Medicine [phone]

## 2022-03-31 NOTE — TELEPHONE ENCOUNTER
Current warfarin dose:  Warfarin maintenance plan:  5 mg (2 mg x 2.5) every Mon, Thu, Sat; 4 mg (2 mg x 2) all other days   Weekly warfarin total:  31 mg         Last office visit: 12/13/2021    Last INR result:  INR   Date Value Ref Range Status   03/17/2022 3.3 (H) 0.9 - 1.1 Final   08/26/2021 2.00 (H) 0.85 - 1.15 Final     Comment:     Effective 7/11/2021, the reference range for this assay has changed.   06/24/2021 2.50 (H) 0.86 - 1.14 Final     Comment:     This test is intended for monitoring Coumadin therapy.  Results are not   accurate in patients with prolonged INR due to factor deficiency.         Refill authorized per [facility] protocol.    CHRISTIANNE Faith RN BSN

## 2022-03-31 NOTE — NURSING NOTE
Patient is here today to let us know she has only enough of the Warfarin till Friday, then will be out. She is going to do the INR lab today here in [facility]. Patient would like this sent today.    Will send a message to the [facility] care team for review in refill request and pend the script.    No other questions.    LOKESH Ochoa

## 2022-04-11 ENCOUNTER — OFFICE VISIT (OUTPATIENT)
Dept: DERMATOLOGY | Facility: CLINIC | Age: 87
End: 2022-04-11
Payer: COMMERCIAL

## 2022-04-11 VITALS — OXYGEN SATURATION: 96 % | DIASTOLIC BLOOD PRESSURE: 56 MMHG | HEART RATE: 71 BPM | SYSTOLIC BLOOD PRESSURE: 126 MMHG

## 2022-04-11 DIAGNOSIS — Z85.828 HISTORY OF SKIN CANCER: Primary | ICD-10-CM

## 2022-04-11 DIAGNOSIS — L81.4 LENTIGO: ICD-10-CM

## 2022-04-11 DIAGNOSIS — D23.9 DERMAL NEVUS: ICD-10-CM

## 2022-04-11 DIAGNOSIS — D18.01 ANGIOMA OF SKIN: ICD-10-CM

## 2022-04-11 DIAGNOSIS — C44.311 BASAL CELL CARCINOMA (BCC) OF LEFT NASAL TIP: ICD-10-CM

## 2022-04-11 DIAGNOSIS — L82.1 SEBORRHEIC KERATOSIS: ICD-10-CM

## 2022-04-11 DIAGNOSIS — C44.01 BASAL CELL CARCINOMA (BCC) OF SKIN OF LEFT UPPER LIP: ICD-10-CM

## 2022-04-11 PROCEDURE — 11102 TANGNTL BX SKIN SINGLE LES: CPT | Mod: 79 | Performed by: DERMATOLOGY

## 2022-04-11 PROCEDURE — 99213 OFFICE O/P EST LOW 20 MIN: CPT | Mod: 24 | Performed by: DERMATOLOGY

## 2022-04-11 PROCEDURE — 88331 PATH CONSLTJ SURG 1 BLK 1SPC: CPT | Performed by: DERMATOLOGY

## 2022-04-11 PROCEDURE — 11103 TANGNTL BX SKIN EA SEP/ADDL: CPT | Performed by: DERMATOLOGY

## 2022-04-11 NOTE — LETTER
4/11/2022         RE: Pooja Swartz  [id number]  [address] [place] [id number]        Dear Colleague,    Thank you for referring your patient, Pooja Swartz, to the [facility]. Please see a copy of my visit note below.    Pooja Swartz is an extremely pleasant 89 year old year old female patient here today for wound check r cheek all healed no issues.  She notes spot on left nose today.   .   Patient states this has been present for not sure.  Patient reports the following symptoms:  bleeding.  Patient reports the following previous treatments none.  These treatments did not work.  Patient reports the following modifying factors none.  Associated symptoms: none.  Patient has no other skin complaints today.  Remainder of the HPI, Meds, PMH, Allergies, FH, and SH was reviewed in chart.      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, subsequent encounter 1/9/2017     Renal insufficiency      Sinus node dysfunction (H)      Unspecified essential hypertension        Past Surgical History:   Procedure Laterality Date     ARTHROPLASTY REVISION HIP Left 8/19/2016    Procedure: ARTHROPLASTY REVISION HIP;  Surgeon: Kael Rojas MD;  Location:  OR      VASCULAR SURGERY PROCEDURE UNLIST       OPEN REDUCTION INTERNAL FIXATION FEMUR PROXIMAL Left 8/19/2016    Procedure: OPEN REDUCTION INTERNAL FIXATION FEMUR PROXIMAL;  Surgeon: Kael Rojas MD;  Location: [facility]     SURGICAL HISTORY OF -   11/1992    skin cancer, nose     SURGICAL HISTORY OF -   1978     right, vein stripping     SURGICAL HISTORY OF -   1968    breast biopsy     SURGICAL HISTORY OF -   1993    laparoscopic cholecystectomy     SURGICAL HISTORY OF -       tonsillectomy and adenoidectomy     SURGICAL HISTORY OF -   04/2004    left cataract     SURGICAL HISTORY OF -   08/09/2004    right total shoulder arthroplasty     ZZC PELVIS/HIP JOINT SURGERY UNLISTED       ZC SHOULDER SURG PROC UNLISTED       [facility] STOMACH SURGERY PROCEDURE UNLISTED          Family History   Problem Relation Age of Onset     Heart Disease Mother         CHF     Hypertension Mother      Hypertension Father      Hypertension Maternal Grandmother      Hypertension Maternal Grandfather      Hypertension Son      Heart Disease Son         MI       Social History     Socioeconomic History     Marital status:      Spouse name: Not on file     Number of children: Not on file     Years of education: Not on file     Highest education level: Not on file   Occupational History     Not on file   Tobacco Use     Smoking status: Never Smoker     Smokeless tobacco: Never Used   Vaping Use     Vaping Use: Never used   Substance and Sexual Activity     Alcohol use: Yes     Comment: rare     Drug use: No     Sexual activity: Not Currently     Birth control/protection: None   Other Topics Concern     Parent/sibling w/ CABG, MI or angioplasty before 65F 55M? Yes   Social History Narrative     Not on file     Social Determinants of Health     Financial Resource Strain: Not on file   Food Insecurity: Not on file   Transportation Needs: Not on file   Physical Activity: Not on file   Stress: Not on file   Social Connections: Not on file   Intimate Partner Violence: Not on file   Housing Stability: Not on file       Outpatient Encounter Medications as of 4/11/2022   Medication Sig Dispense Refill     allopurinol (ZYLOPRIM) 100 MG tablet Take 1 tablet (100 mg) by mouth daily 90 tablet 3     amLODIPine (NORVASC) 10 MG tablet Take 1 tablet (10 mg) by  mouth daily 90 tablet 3     budesonide (ENTOCORT EC) 3 MG EC capsule TAKE 1 CAPSULE BY MOUTH EVERY MORNING 30 capsule 6     Calcium-Vitamin D 600-200 MG-UNIT TABS Take 1 tablet by mouth 2 times daily       dorzolamide (TRUSOPT) 2 % ophthalmic solution instill 1 drop by ophthalmic route 3 times every day into both eyes       Fexofenadine HCl (ALLEGRA PO) Take 180 mg by mouth daily        latanoprost (XALATAN) 0.005 % ophthalmic solution Place 1 drop into both eyes At Bedtime 1.5 mL 0     lisinopril (ZESTRIL) 2.5 MG tablet Take 1 tablet (2.5 mg) by mouth every evening 90 tablet 3     ONE-A-DAY WOMENS OR TABS Take 1 tablet by mouth daily        warfarin ANTICOAGULANT (JANTOVEN ANTICOAGULANT) 2 MG tablet Take 5 mg every Mon, Fri; 4 mg all other days or as directed by the Anticoagulation Clinic 190 tablet 1     No facility-administered encounter medications on file as of 4/11/2022.             O:   NAD, WDWN, Alert & Oriented, Mood & Affect wnl, Vitals stable   Here today alone   General appearance normal   Vitals stable   Alert, oriented and in no acute distress      Following lymph nodes palpated: Occipital, Cervical, Supraclavicular no lad  R cheek / tragus all healed  L nasal tip 1cm ill-defined scaly papule   L upper cut lip 7mm shiny papule      Stuck on papules and brown macules on trunk and ext   Red papules on trunk  Flesh colored papules on trunk         Eyes: Conjunctivae/lids:Normal     ENT: Lips, buccal mucosa, tongue: normal    MSK:Normal    Cardiovascular: peripheral edema none    Pulm: Breathing Normal    Lymph Nodes: No Head and Neck Lymphadenopathy     Neuro/Psych: Orientation:Alert and Orientedx3 ; Mood/Affect:normal       MICRO:     L nasal tip (red):Orthokeratosis of epidermis with a proliferation of nests of basaloid cells, with peripheral palisading and a haphazard arrangement in the center extending into the dermis, .  The tumor cells have hyperchromatic nuclei. Poor cytoplasm and intercellular  bridging.    L cutaneous upper lip (blue):Orthokeratosis of epidermis with a proliferation of nests of basaloid cells, with peripheral palisading and a haphazard arrangement in the center extending into the dermis, forming nodules.  The tumor cells have hyperchromatic nuclei. Poor cytoplasm and intercellular bridging.    A/P:  1. Seborrheic keratosis, lentigo, angioma, dermal nevus, hx of non-melanoma skin cancer   Cheek well healed  2. R/o basal cell carcinoma   TANGENTIAL BIOPSY IN HOUSE:  After consent, anesthesia with LEC and prep, tangential excision performed and dx above confirmed with frozen section histology.  No complications and routine wound care.  Patient is on  anticoagulants and risk of bleeding discussed with patient.       I have personally reviewed all specimens and/or slides and used them with my medical judgement to determine or confirm the final diagnosis.     Patient told result   L nasal tip basal cell carcinoma   L upper cut lip basal cell carcinoma   Schedule excision  It was a pleasure speaking to Pooja Swartz today.  Previous clinic notes and pertinent laboratory tests were reviewed prior to Pooja Swartz's visit.  Nature and genetics of benign skin lesions dicussed with patient.  Signs and Symptoms of skin cancer discussed with patient.  Patient encouraged to perform monthly skin exams.  UV precautions reviewed with patient.  Risks of non-melanoma skin cancer discussed with patient   Return to clinic next appt        Again, thank you for allowing me to participate in the care of your patient.        Sincerely,        Myron Sheppard MD

## 2022-04-11 NOTE — NURSING NOTE
Initial /56 (BP Location: Right arm, Patient Position: Sitting, Cuff Size: Child)   Pulse 71   LMP 07/07/1960   SpO2 96%  Estimated body mass index is 24.22 kg/m  as calculated from the following:    Height as of 3/7/22: 1.524 m (5').    Weight as of 12/13/21: 56.2 kg (124 lb). .

## 2022-04-11 NOTE — PROGRESS NOTES
Pooja Swartz is an extremely pleasant 89 year old year old female patient here today for wound check r cheek all healed no issues.  She notes spot on left nose today.   .   Patient states this has been present for not sure.  Patient reports the following symptoms:  bleeding.  Patient reports the following previous treatments none.  These treatments did not work.  Patient reports the following modifying factors none.  Associated symptoms: none.  Patient has no other skin complaints today.  Remainder of the HPI, Meds, PMH, Allergies, FH, and SH was reviewed in chart.      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, subsequent encounter 1/9/2017     Renal insufficiency      Sinus node dysfunction (H)      Unspecified essential hypertension        Past Surgical History:   Procedure Laterality Date     ARTHROPLASTY REVISION HIP Left 8/19/2016    Procedure: ARTHROPLASTY REVISION HIP;  Surgeon: Kael Rojas MD;  [facility]      VASCULAR SURGERY PROCEDURE UNLIST       OPEN REDUCTION INTERNAL FIXATION FEMUR PROXIMAL Left 8/19/2016    Procedure: OPEN REDUCTION INTERNAL FIXATION FEMUR PROXIMAL;  Surgeon: Kael Rojas MD;  [facility]     SURGICAL HISTORY OF -   11/1992    skin cancer, nose     SURGICAL HISTORY OF -   1978    right, vein stripping     SURGICAL HISTORY OF -   1968    breast biopsy     SURGICAL HISTORY OF -   1993    laparoscopic cholecystectomy     SURGICAL HISTORY OF -       tonsillectomy and adenoidectomy     SURGICAL HISTORY OF -   04/2004    left cataract     SURGICAL  HISTORY OF -   08/09/2004    right total shoulder arthroplasty     ZZC PELVIS/HIP JOINT SURGERY UNLISTED       ZC SHOULDER SURG PROC UNLISTED       Z STOMACH SURGERY PROCEDURE UNLISTED          Family History   Problem Relation Age of Onset     Heart Disease Mother         CHF     Hypertension Mother      Hypertension Father      Hypertension Maternal Grandmother      Hypertension Maternal Grandfather      Hypertension Son      Heart Disease Son         MI       Social History     Socioeconomic History     Marital status:      Spouse name: Not on file     Number of children: Not on file     Years of education: Not on file     Highest education level: Not on file   Occupational History     Not on file   Tobacco Use     Smoking status: Never Smoker     Smokeless tobacco: Never Used   Vaping Use     Vaping Use: Never used   Substance and Sexual Activity     Alcohol use: Yes     Comment: rare     Drug use: No     Sexual activity: Not Currently     Birth control/protection: None   Other Topics Concern     Parent/sibling w/ CABG, MI or angioplasty before 65F 55M? Yes   Social History Narrative     Not on file     Social Determinants of Health     Financial Resource Strain: Not on file   Food Insecurity: Not on file   Transportation Needs: Not on file   Physical Activity: Not on file   Stress: Not on file   Social Connections: Not on file   Intimate Partner Violence: Not on file   Housing Stability: Not on file       Outpatient Encounter Medications as of 4/11/2022   Medication Sig Dispense Refill     allopurinol (ZYLOPRIM) 100 MG tablet Take 1 tablet (100 mg) by mouth daily 90 tablet 3     amLODIPine (NORVASC) 10 MG tablet Take 1 tablet (10 mg) by mouth daily 90 tablet 3     budesonide (ENTOCORT EC) 3 MG EC capsule TAKE 1 CAPSULE BY MOUTH EVERY MORNING 30 capsule 6     Calcium-Vitamin D 600-200 MG-UNIT TABS Take 1 tablet by mouth 2 times daily       dorzolamide (TRUSOPT) 2 % ophthalmic solution instill 1 drop by  ophthalmic route 3 times every day into both eyes       Fexofenadine HCl (ALLEGRA PO) Take 180 mg by mouth daily        latanoprost (XALATAN) 0.005 % ophthalmic solution Place 1 drop into both eyes At Bedtime 1.5 mL 0     lisinopril (ZESTRIL) 2.5 MG tablet Take 1 tablet (2.5 mg) by mouth every evening 90 tablet 3     ONE-A-DAY WOMENS OR TABS Take 1 tablet by mouth daily        warfarin ANTICOAGULANT (JANTOVEN ANTICOAGULANT) 2 MG tablet Take 5 mg every Mon, Fri; 4 mg all other days or as directed by the Anticoagulation Clinic 190 tablet 1     No facility-administered encounter medications on file as of 4/11/2022.             O:   NAD, WDWN, Alert & Oriented, Mood & Affect wnl, Vitals stable   Here today alone   General appearance normal   Vitals stable   Alert, oriented and in no acute distress      Following lymph nodes palpated: Occipital, Cervical, Supraclavicular no lad  R cheek / tragus all healed  L nasal tip 1cm ill-defined scaly papule   L upper cut lip 7mm shiny papule      Stuck on papules and brown macules on trunk and ext   Red papules on trunk  Flesh colored papules on trunk         Eyes: Conjunctivae/lids:Normal     ENT: Lips, buccal mucosa, tongue: normal    MSK:Normal    Cardiovascular: peripheral edema none    Pulm: Breathing Normal    Lymph Nodes: No Head and Neck Lymphadenopathy     Neuro/Psych: Orientation:Alert and Orientedx3 ; Mood/Affect:normal       MICRO:     L nasal tip (red):Orthokeratosis of epidermis with a proliferation of nests of basaloid cells, with peripheral palisading and a haphazard arrangement in the center extending into the dermis, .  The tumor cells have hyperchromatic nuclei. Poor cytoplasm and intercellular bridging.    L cutaneous upper lip (blue):Orthokeratosis of epidermis with a proliferation of nests of basaloid cells, with peripheral palisading and a haphazard arrangement in the center extending into the dermis, forming nodules.  The tumor cells have hyperchromatic  nuclei. Poor cytoplasm and intercellular bridging.    A/P:  1. Seborrheic keratosis, lentigo, angioma, dermal nevus, hx of non-melanoma skin cancer   Cheek well healed  2. R/o basal cell carcinoma   TANGENTIAL BIOPSY IN HOUSE:  After consent, anesthesia with LEC and prep, tangential excision performed and dx above confirmed with frozen section histology.  No complications and routine wound care.  Patient is on  anticoagulants and risk of bleeding discussed with patient.       I have personally reviewed all specimens and/or slides and used them with my medical judgement to determine or confirm the final diagnosis.     Patient told result   L nasal tip basal cell carcinoma   L upper cut lip basal cell carcinoma   Schedule excision  It was a pleasure speaking to Pooja Swartz today.  Previous clinic notes and pertinent laboratory tests were reviewed prior to Pooja Swartz's visit.  Nature and genetics of benign skin lesions dicussed with patient.  Signs and Symptoms of skin cancer discussed with patient.  Patient encouraged to perform monthly skin exams.  UV precautions reviewed with patient.  Risks of non-melanoma skin cancer discussed with patient   Return to clinic next appt

## 2022-04-14 ENCOUNTER — ANTICOAGULATION THERAPY VISIT (OUTPATIENT)
Dept: ANTICOAGULATION | Facility: CLINIC | Age: 87
End: 2022-04-14

## 2022-04-14 ENCOUNTER — LAB (OUTPATIENT)
Dept: LAB | Facility: CLINIC | Age: 87
End: 2022-04-14
Payer: COMMERCIAL

## 2022-04-14 DIAGNOSIS — Z79.01 LONG TERM CURRENT USE OF ANTICOAGULANT THERAPY: ICD-10-CM

## 2022-04-14 DIAGNOSIS — I48.91 ATRIAL FIBRILLATION, UNSPECIFIED TYPE (H): ICD-10-CM

## 2022-04-14 DIAGNOSIS — I48.91 ATRIAL FIBRILLATION (H): ICD-10-CM

## 2022-04-14 DIAGNOSIS — I48.91 ATRIAL FIBRILLATION (H): Primary | ICD-10-CM

## 2022-04-14 LAB — INR BLD: 2.4 (ref 0.9–1.1)

## 2022-04-14 PROCEDURE — 85610 PROTHROMBIN TIME: CPT

## 2022-04-14 PROCEDURE — 36416 COLLJ CAPILLARY BLOOD SPEC: CPT

## 2022-04-14 NOTE — PROGRESS NOTES
ANTICOAGULATION MANAGEMENT     Pooja Swartz 89 year old female is on warfarin with therapeutic INR result. (Goal INR 2.0-3.0)    Recent labs: (last 7 days)     04/14/22  1238   INR 2.4*       ASSESSMENT     Source(s): Chart Review and Patient/Caregiver Call     Warfarin doses taken: Warfarin taken as instructed  Diet: No new diet changes identified  New illness, injury, or hospitalization: No  Medication/supplement changes: None noted  Signs or symptoms of bleeding or clotting: No  Previous INR: Supratherapeutic  Additional findings: None       PLAN     Recommended plan for no diet, medication or health factor changes affecting INR     Dosing Instructions: continue your current warfarin dose with next INR in 3 weeks       Summary  As of 4/14/2022    Full warfarin instructions:  5 mg every Mon, Fri; 4 mg all other days   Next INR check:  5/5/2022             Telephone call with Pooja who verbalizes understanding and agrees to plan    Lab visit scheduled    Education provided: Please call back if any changes to your diet, medications or how you've been taking warfarin and Contact [phone]  with any changes, questions or concerns.     Plan made per [facility] anticoagulation protocol    Arin Sullivan RN  Anticoagulation Clinic  4/14/2022    _______________________________________________________________________     Anticoagulation Episode Summary     Current INR goal:  2.0-3.0   TTR:  56.2 % (1 y)   Target end date:  Indefinite   Send INR reminders to:  [facility]    Indications    Atrial fibrillation (H) [I48.91]  Long-term (current) use of anticoagulants [Z79.01] [Z79.01]  Atrial fibrillation  unspecified type (H) [I48.91]           Comments:  *         Anticoagulation Care Providers     Provider Role Specialty Phone number    Amanda Renee MD Referring Family Medicine [phone]

## 2022-04-18 ENCOUNTER — OFFICE VISIT (OUTPATIENT)
Dept: DERMATOLOGY | Facility: CLINIC | Age: 87
End: 2022-04-18
Payer: COMMERCIAL

## 2022-04-18 VITALS — SYSTOLIC BLOOD PRESSURE: 152 MMHG | OXYGEN SATURATION: 99 % | DIASTOLIC BLOOD PRESSURE: 67 MMHG | HEART RATE: 72 BPM

## 2022-04-18 DIAGNOSIS — C44.311 BASAL CELL CARCINOMA (BCC) OF LEFT NASAL TIP: Primary | ICD-10-CM

## 2022-04-18 PROCEDURE — 17312 MOHS ADDL STAGE: CPT | Performed by: DERMATOLOGY

## 2022-04-18 PROCEDURE — 17311 MOHS 1 STAGE H/N/HF/G: CPT | Mod: 79 | Performed by: DERMATOLOGY

## 2022-04-18 PROCEDURE — 15260 FTH/GFT FR N/E/E/L 20 SQCM/<: CPT | Mod: 79 | Performed by: DERMATOLOGY

## 2022-04-18 PROCEDURE — 99207 PR NO CHARGE LOS: CPT | Performed by: DERMATOLOGY

## 2022-04-18 NOTE — PATIENT INSTRUCTIONS
Skin Graft Wound Care     Dermatology Clinic [phone]     No strenuous activity for 48 hours. Resume moderate activity in 48 hours.  No heavy exercising until you are seen for follow up in one week.    Take Tylenol as needed for discomfort.                       No alcoholic beverages for 48 hours.    Leave the bandage in place until you come in for follow up in one week.  If the bandage becomes blood tinged or loose, reinforce it with gauze and tape.     Keep the bandage dry. Wash around it carefully.    If the tape becomes soiled or starts to come off, reinforce it with additional paper tape.    Do not smoke for 3 weeks; smoking is detrimental to wound healing and may cause the graft to die.    Avoid prolonged exposure to extremely cold temperatures for 3 weeks.    It is normal to have swelling and bruising around the surgical site. The bruising will fade in approximately 10-14 days. Elevate the area to reduce swelling.    Numbness, itchiness and sensitivity to temperature changes can occur after surgery and may take up to 18 months to normalize.    POSSIBLE COMPLICATIONS    BLEEDING:    Leave the bandage in place.  Use tightly rolled up gauze or a cloth to apply direct pressure over the bandage for 20 minutes.  Reapply pressure for an additional 20 minutes if necessary  Call the office or go to the nearest emergency room if pressure fails to stop the bleeding.  Use additional gauze and tape to maintain pressure once the bleeding has stopped.    PAIN:    Post operative pain should slowly get better, beginning the evening after surgery.  A sudden or severe increase in pain may indicate a problem. Call the office if this occurs.    In case of emergency phone: Dermatology Clinic: [phone]  or Dr Sheppard [phone]

## 2022-04-18 NOTE — LETTER
4/18/2022         RE: Pooja Swartz  [email or web address]  [address]  [facility] [address]        Dear Colleague,    Thank you for referring your patient, Pooja Swartz, to the [facility]. Please see a copy of my visit note below.    Surgical Office Location :   [facility] Dermatology  [facility] [place] [address]      Pooja Swartz is an extremely pleasant 89 year old year old female patient here today for evaluation and managment of basal cell carcinoma on L nasal tip.   Patient has no other skin complaints today.  Remainder of the HPI, Meds, PMH, Allergies, FH, and SH was reviewed in chart.      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, subsequent encounter 1/9/2017     Renal insufficiency      Sinus node dysfunction (H)      Unspecified essential hypertension        Past Surgical History:   Procedure Laterality Date     ARTHROPLASTY REVISION HIP Left 8/19/2016    Procedure: ARTHROPLASTY REVISION HIP;  Surgeon: Kael Rojas MD;  [facility]      VASCULAR SURGERY PROCEDURE UNLIST       OPEN REDUCTION INTERNAL FIXATION FEMUR PROXIMAL Left 8/19/2016    Procedure: OPEN REDUCTION INTERNAL FIXATION FEMUR PROXIMAL;  Surgeon: Kael Rojas MD;  [facility] OR     SURGICAL HISTORY OF -   11/1992    skin cancer, nose     SURGICAL HISTORY OF -   1978    right, vein stripping     SURGICAL HISTORY OF -   1968    breast biopsy     SURGICAL HISTORY OF -   1993    laparoscopic cholecystectomy     SURGICAL HISTORY OF -       tonsillectomy and  adenoidectomy     SURGICAL HISTORY OF -   04/2004    left cataract     SURGICAL HISTORY OF -   08/09/2004    right total shoulder arthroplasty     [facility] PELVIS/HIP JOINT SURGERY UNLISTED       [facility] SHOULDER SURG PROC UNLISTED       [facility] STOMACH SURGERY PROCEDURE UNLISTED          Family History   Problem Relation Age of Onset     Heart Disease Mother         CHF     Hypertension Mother      Hypertension Father      Hypertension Maternal Grandmother      Hypertension Maternal Grandfather      Hypertension Son      Heart Disease Son         MI       Social History     Socioeconomic History     Marital status:      Spouse name: Not on file     Number of children: Not on file     Years of education: Not on file     Highest education level: Not on file   Occupational History     Not on file   Tobacco Use     Smoking status: Never Smoker     Smokeless tobacco: Never Used   Vaping Use     Vaping Use: Never used   Substance and Sexual Activity     Alcohol use: Yes     Comment: rare     Drug use: No     Sexual activity: Not Currently     Birth control/protection: None   Other Topics Concern     Parent/sibling w/ CABG, MI or angioplasty before 65F 55M? Yes   Social History Narrative     Not on file     Social Determinants of Health     Financial Resource Strain: Not on file   Food Insecurity: Not on file   Transportation Needs: Not on file   Physical Activity: Not on file   Stress: Not on file   Social Connections: Not on file   Intimate Partner Violence: Not on file   Housing Stability: Not on file       Outpatient Encounter Medications as of 4/18/2022   Medication Sig Dispense Refill     allopurinol (ZYLOPRIM) 100 MG tablet Take 1 tablet (100 mg) by mouth daily 90 tablet 3     amLODIPine (NORVASC) 10 MG tablet Take 1 tablet (10 mg) by mouth daily 90 tablet 3     budesonide (ENTOCORT EC) 3 MG EC capsule TAKE 1 CAPSULE BY MOUTH EVERY MORNING 30 capsule 6     Calcium-Vitamin D 600-200 MG-UNIT TABS Take 1 tablet by mouth 2  times daily       dorzolamide (TRUSOPT) 2 % ophthalmic solution instill 1 drop by ophthalmic route 3 times every day into both eyes       Fexofenadine HCl (ALLEGRA PO) Take 180 mg by mouth daily        latanoprost (XALATAN) 0.005 % ophthalmic solution Place 1 drop into both eyes At Bedtime 1.5 mL 0     lisinopril (ZESTRIL) 2.5 MG tablet Take 1 tablet (2.5 mg) by mouth every evening 90 tablet 3     ONE-A-DAY WOMENS OR TABS Take 1 tablet by mouth daily        warfarin ANTICOAGULANT (JANTOVEN ANTICOAGULANT) 2 MG tablet Take 5 mg every Mon, Fri; 4 mg all other days or as directed by the Anticoagulation Clinic 190 tablet 1     No facility-administered encounter medications on file as of 4/18/2022.             O:   NAD, WDWN, Alert & Oriented, Mood & Affect wnl, Vitals stable   Here today alone   LMP 07/07/1960    General appearance normal   Vitals stable   Alert, oriented and in no acute distress     L nasal tip 1cm scaly papule at inferior edge of scar  Eyes: Conjunctivae/lids:Normal     ENT: Lips, buccal mucosa, tongue: normal    MSK:Normal    Cardiovascular: peripheral edema none    Pulm: Breathing Normal    Neuro/Psych: Orientation:Alert and Orientedx3 ; Mood/Affect:normal       A/P:  1. L nasal tip basal cell carcinoma ? Recurrent  MOHS:   Location    The rationale for Mohs surgery was discussed with the patient and consent was obtained.  The risks and benefits as well as alternatives to therapy were discussed, in detail.  Specifically, the risks of infection, scarring, bleeding, prolonged wound healing, incomplete removal, allergy to anesthesia, nerve injury and recurrence were addressed.  Indication for Mohs was Location. Prior to the procedure, the treatment site was clearly identified and, if available, confirmed with previous photos and confirmed by the patient   All components of the Universal Protocol/PAUSE rule were completed.  The Mohs surgeon operated in two distinct and integrated capacities as the  surgeon and pathologist.      The area was prepped with Betasept.  A rim of normal appearing skin was marked circumferentially around the lesion.  The area was infiltrated with local anesthesia.  The tumor was first debulked to remove all clinically apparent tumor.  An incision following the standard Mohs approach was done and the specimen was oriented,mapped and placed in 4 block(s).  Each specimen was then chromacoded and processed in the Mohs laboratory using standard Mohs technique and submitted for frozen section histology.  Frozen section analysis showed  residual tumor but CLEAR MARGINS.    1st, 2nd, 3rd stage:Orthokeratosis of epidermis with a proliferation of nests of basaloid cells, with peripheral palisading and a haphazard arrangement in the center extending into the dermis, .  The tumor cells have hyperchromatic nuclei. Poor cytoplasm and intercellular bridging.      The tumor was excised using standard Mohs technique in 4 stages(s).  CLEAR MARGINS OBTAINED and Final defect size was 2.5 cm.     We discussed the options for wound management in full with the patient including risks/benefits/ possible outcomes.      fhf discussed with patient she declines  REPAIR FTSG FROM SUPRACLAVICULAR: Because of the full thickness nature of the defect, a full-thickness skin graft was planned. After LEC anesthesia and prep, a template was made of the defect and the graft was harvested from the ipsilateral supraclavicular fossa. The graft was defatted and trimmed to fit the defect. It was sutured into place with Fast Absorbing Plain Gut and a tie over Bolster dressing was applied.    The donor site was converted to a fusiform defect, and after hemostasis, was closed with subcutaneous stitches using Vicryl.  EBL minimal; complications none; wound care routine.  The patient was discharged in good condition and will return on one week  for wound evaluation.    It was a pleasure speaking to Pooja Swartz today.  Previous  clinic notes and pertinent laboratory tests were reviewed prior to Pooja Swartz's visit.  Nature and genetics of benign skin lesions dicussed with patient.  Signs and Symptoms of skin cancer discussed with patient.  Patient encouraged to perform monthly skin exams.  UV precautions reviewed with patient.  Risks of non-melanoma skin cancer discussed with patient   Return to clinic next appt        Again, thank you for allowing me to participate in the care of your patient.        Sincerely,        Myron Sheppard MD

## 2022-04-18 NOTE — PROGRESS NOTES
Pooja Swartz is an extremely pleasant 89 year old year old female patient here today for evaluation and managment of basal cell carcinoma on L nasal tip.   Patient has no other skin complaints today.  Remainder of the HPI, Meds, PMH, Allergies, FH, and SH was reviewed in chart.      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, subsequent encounter 1/9/2017     Renal insufficiency      Sinus node dysfunction (H)      Unspecified essential hypertension        Past Surgical History:   Procedure Laterality Date     ARTHROPLASTY REVISION HIP Left 8/19/2016    Procedure: ARTHROPLASTY REVISION HIP;  Surgeon: Kael Rojas MD;  [facility]      VASCULAR SURGERY PROCEDURE UNLIST       OPEN REDUCTION INTERNAL FIXATION FEMUR PROXIMAL Left 8/19/2016    Procedure: OPEN REDUCTION INTERNAL FIXATION FEMUR PROXIMAL;  Surgeon: Kael Rojas MD;  [facility]     SURGICAL HISTORY OF -   11/1992    skin cancer, nose     SURGICAL HISTORY OF -   1978    right, vein stripping     SURGICAL HISTORY OF -   1968    breast biopsy     SURGICAL HISTORY OF -   1993    laparoscopic cholecystectomy     SURGICAL HISTORY OF -       tonsillectomy and adenoidectomy     SURGICAL HISTORY OF -   04/2004    left cataract     SURGICAL HISTORY OF -   08/09/2004    right total shoulder arthroplasty     [facility] PELVIS/HIP JOINT SURGERY UNLISTED       [facility] SHOULDER SURG PROC UNLISTED       [facility] STOMACH SURGERY PROCEDURE UNLISTED          Family History   Problem Relation Age of Onset     Heart Disease Mother         CHF      Hypertension Mother      Hypertension Father      Hypertension Maternal Grandmother      Hypertension Maternal Grandfather      Hypertension Son      Heart Disease Son         MI       Social History     Socioeconomic History     Marital status:      Spouse name: Not on file     Number of children: Not on file     Years of education: Not on file     Highest education level: Not on file   Occupational History     Not on file   Tobacco Use     Smoking status: Never Smoker     Smokeless tobacco: Never Used   Vaping Use     Vaping Use: Never used   Substance and Sexual Activity     Alcohol use: Yes     Comment: rare     Drug use: No     Sexual activity: Not Currently     Birth control/protection: None   Other Topics Concern     Parent/sibling w/ CABG, MI or angioplasty before 65F 55M? Yes   Social History Narrative     Not on file     Social Determinants of Health     Financial Resource Strain: Not on file   Food Insecurity: Not on file   Transportation Needs: Not on file   Physical Activity: Not on file   Stress: Not on file   Social Connections: Not on file   Intimate Partner Violence: Not on file   Housing Stability: Not on file       Outpatient Encounter Medications as of 4/18/2022   Medication Sig Dispense Refill     allopurinol (ZYLOPRIM) 100 MG tablet Take 1 tablet (100 mg) by mouth daily 90 tablet 3     amLODIPine (NORVASC) 10 MG tablet Take 1 tablet (10 mg) by mouth daily 90 tablet 3     budesonide (ENTOCORT EC) 3 MG EC capsule TAKE 1 CAPSULE BY MOUTH EVERY MORNING 30 capsule 6     Calcium-Vitamin D 600-200 MG-UNIT TABS Take 1 tablet by mouth 2 times daily       dorzolamide (TRUSOPT) 2 % ophthalmic solution instill 1 drop by ophthalmic route 3 times every day into both eyes       Fexofenadine HCl (ALLEGRA PO) Take 180 mg by mouth daily        latanoprost (XALATAN) 0.005 % ophthalmic solution Place 1 drop into both eyes At Bedtime 1.5 mL 0     lisinopril (ZESTRIL) 2.5 MG tablet Take 1 tablet (2.5 mg) by  mouth every evening 90 tablet 3     ONE-A-DAY WOMENS OR TABS Take 1 tablet by mouth daily        warfarin ANTICOAGULANT (JANTOVEN ANTICOAGULANT) 2 MG tablet Take 5 mg every Mon, Fri; 4 mg all other days or as directed by the Anticoagulation Clinic 190 tablet 1     No facility-administered encounter medications on file as of 4/18/2022.             O:   NAD, WDWN, Alert & Oriented, Mood & Affect wnl, Vitals stable   Here today alone   LMP 07/07/1960    General appearance normal   Vitals stable   Alert, oriented and in no acute distress     L nasal tip 1cm scaly papule at inferior edge of scar  Eyes: Conjunctivae/lids:Normal     ENT: Lips, buccal mucosa, tongue: normal    MSK:Normal    Cardiovascular: peripheral edema none    Pulm: Breathing Normal    Neuro/Psych: Orientation:Alert and Orientedx3 ; Mood/Affect:normal       A/P:  1. L nasal tip basal cell carcinoma ? Recurrent  MOHS:   Location    The rationale for Mohs surgery was discussed with the patient and consent was obtained.  The risks and benefits as well as alternatives to therapy were discussed, in detail.  Specifically, the risks of infection, scarring, bleeding, prolonged wound healing, incomplete removal, allergy to anesthesia, nerve injury and recurrence were addressed.  Indication for Mohs was Location. Prior to the procedure, the treatment site was clearly identified and, if available, confirmed with previous photos and confirmed by the patient   All components of the Universal Protocol/PAUSE rule were completed.  The Mohs surgeon operated in two distinct and integrated capacities as the surgeon and pathologist.      The area was prepped with Betasept.  A rim of normal appearing skin was marked circumferentially around the lesion.  The area was infiltrated with local anesthesia.  The tumor was first debulked to remove all clinically apparent tumor.  An incision following the standard Mohs approach was done and the specimen was oriented,mapped and  placed in 4 block(s).  Each specimen was then chromacoded and processed in the Mohs laboratory using standard Mohs technique and submitted for frozen section histology.  Frozen section analysis showed  residual tumor but CLEAR MARGINS.    1st, 2nd, 3rd stage:Orthokeratosis of epidermis with a proliferation of nests of basaloid cells, with peripheral palisading and a haphazard arrangement in the center extending into the dermis, .  The tumor cells have hyperchromatic nuclei. Poor cytoplasm and intercellular bridging.      The tumor was excised using standard Mohs technique in 4 stages(s).  CLEAR MARGINS OBTAINED and Final defect size was 2.5 cm.     We discussed the options for wound management in full with the patient including risks/benefits/ possible outcomes.      fhf discussed with patient she declines  REPAIR FTSG FROM SUPRACLAVICULAR: Because of the full thickness nature of the defect, a full-thickness skin graft was planned. After LEC anesthesia and prep, a template was made of the defect and the graft was harvested from the ipsilateral supraclavicular fossa. The graft was defatted and trimmed to fit the defect. It was sutured into place with Fast Absorbing Plain Gut and a tie over Bolster dressing was applied.    The donor site was converted to a fusiform defect, and after hemostasis, was closed with subcutaneous stitches using Vicryl.  EBL minimal; complications none; wound care routine.  The patient was discharged in good condition and will return on one week  for wound evaluation.    It was a pleasure speaking to Pooja Swartz today.  Previous clinic notes and pertinent laboratory tests were reviewed prior to Pooja Swartz's visit.  Nature and genetics of benign skin lesions dicussed with patient.  Signs and Symptoms of skin cancer discussed with patient.  Patient encouraged to perform monthly skin exams.  UV precautions reviewed with patient.  Risks of non-melanoma skin cancer discussed with patient    Return to clinic next appt

## 2022-04-18 NOTE — NURSING NOTE
Initial BP (!) 152/67   Pulse 72   LMP 07/07/1960   SpO2 99%  Estimated body mass index is 24.22 kg/m  as calculated from the following:    Height as of 3/7/22: 1.524 m (5').    Weight as of 12/13/21: 56.2 kg (124 lb). .

## 2022-04-25 ENCOUNTER — OFFICE VISIT (OUTPATIENT)
Dept: DERMATOLOGY | Facility: CLINIC | Age: 87
End: 2022-04-25
Payer: COMMERCIAL

## 2022-04-25 ENCOUNTER — OFFICE VISIT (OUTPATIENT)
Dept: DERMATOLOGY | Facility: CLINIC | Age: 87
End: 2022-04-25

## 2022-04-25 VITALS — OXYGEN SATURATION: 97 % | SYSTOLIC BLOOD PRESSURE: 160 MMHG | HEART RATE: 66 BPM | DIASTOLIC BLOOD PRESSURE: 69 MMHG

## 2022-04-25 DIAGNOSIS — C44.01 BASAL CELL CARCINOMA (BCC) OF SKIN OF LEFT UPPER LIP: Primary | ICD-10-CM

## 2022-04-25 DIAGNOSIS — Z48.01 ENCOUNTER FOR CHANGE OR REMOVAL OF SURGICAL WOUND DRESSING: Primary | ICD-10-CM

## 2022-04-25 PROCEDURE — 99207 PR NO CHARGE NURSE ONLY: CPT

## 2022-04-25 PROCEDURE — 17311 MOHS 1 STAGE H/N/HF/G: CPT | Mod: 79 | Performed by: DERMATOLOGY

## 2022-04-25 PROCEDURE — 99207 PR NO CHARGE LOS: CPT | Performed by: DERMATOLOGY

## 2022-04-25 PROCEDURE — 13152 CMPLX RPR E/N/E/L 2.6-7.5 CM: CPT | Mod: 79 | Performed by: DERMATOLOGY

## 2022-04-25 NOTE — PATIENT INSTRUCTIONS
ONE WEEK DRESSING CHANGE  for  SKIN GRAFTS  Nose  The following information has been compiled to offer you assistance with the dressing change or wound evaluation. Please feel free to call our office to speak with one of the nurses if you have any questions or concerns about the progress of the wound healing process especially if there are any signs of graft necrosis or infection. We will be happy to answer any questions you might have.                                                               AFTER 24 HOURS YOU SHOULD REMOVE THE BANDAGE AND BEGIN DAILY DRESSING CHANGES AS FOLLOWS:     1) Remove Dressing.     2) Clean and dry the area with tap water using a Q-tip or sterile gauze pad.     3) Apply Vaseline, Polysporin ointment, Aquaphor or Bacitracin ointment over entire wound.  Do NOT use Neosporin ointment.     4) Cover the wound with a band-aid, or a sterile non-stick gauze pad and micropore paper tape      REPEAT THESE INSTRUCTIONS AT LEAST ONCE A DAY UNTIL THE WOUND HAS COMPLETELY HEALED. DO NOT LET THE WOUND SCAB OVER.    It is an old wives tale that a wound heals better when it is exposed to air and allowed to dry out. The wound will heal faster with a better cosmetic result if it is kept moist with ointment and covered with a bandage.       Massaging the wound site hastens the healing process by softening the scar tissue and fading the scar. Begin massaging the area one month after surgery as often as possible. Continue to massage the area for 2-3 months or until you feel the scar tissue has softened. Moisturizers can be used during the massaging but are not necessary. Ultimately it takes six months for the graft to heal and blend into the surrounding skin.       Sutured Wound Care     [facility]: [phone]  [facility]: [phone]    Left Upper Lip    No strenuous activity for 48 hours. Resume moderate activity in 48 hours. No heavy exercising until you are seen for follow up in  one week.     Take Tylenol as needed for discomfort.                         Do not drink alcoholic beverages for 48 hours.     Keep the pressure bandage in place for 24 hours. If the bandage becomes blood tinged or loose, reinforce it with gauze and tape.        (Refer to the reverse side of this page for management of bleeding).    Remove pressure bandage in 24 hours     Leave the flat bandage in place until your follow up appointment.    Keep the bandage dry. Wash around it carefully.    If the tape becomes soiled or starts to come off, reinforce it with additional paper tape.    Do not smoke for 3 weeks; smoking is detrimental to wound healing.    It is normal to have swelling and bruising around the surgical site. The bruising will fade in approximately 10-14 days. Elevate the area to reduce swelling.    Numbness, itchiness and sensitivity to temperature changes can occur after surgery and may take up to 18 months to normalize.    POSSIBLE COMPLICATIONS    BLEEDING:    Leave the bandage in place.  Use tightly rolled up gauze or a cloth to apply direct pressure over the bandage for 20   minutes.  Reapply pressure for an additional 20 minutes if necessary  Call the office or go to the nearest emergency room if pressure fails to stop the bleeding.  Use additional gauze and tape to maintain pressure once the bleeding has stopped.    PAIN:    Post operative pain should slowly get better, never worse.  A severe increase in pain may indicate a problem. Call the office if this occurs.    In case of emergency phone:Dr Sheppard [phone]

## 2022-04-25 NOTE — NURSING NOTE
Chief Complaint   Patient presents with     Derm Problem     Mohs- L upper lip        Vitals:    04/25/22 0745   BP: (!) 160/69   BP Location: Left arm   Patient Position: Sitting   Cuff Size: Adult Regular   Pulse: 66   SpO2: 97%     Wt Readings from Last 1 Encounters:   12/13/21 56.2 kg (124 lb)       Carlene Gan LPN .................4/25/2022

## 2022-04-25 NOTE — PROGRESS NOTES
Pooja Swartz is an extremely pleasant 89 year old year old female patient here today for evaluation and managment of basal cell carcinoma on left upper lip.  Nose healing well.  Patient has no other skin complaints today.  Remainder of the HPI, Meds, PMH, Allergies, FH, and SH was reviewed in chart.      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, subsequent encounter 1/9/2017     Renal insufficiency      Sinus node dysfunction (H)      Unspecified essential hypertension        Past Surgical History:   Procedure Laterality Date     ARTHROPLASTY REVISION HIP Left 8/19/2016    Procedure: ARTHROPLASTY REVISION HIP;  Surgeon: Kael Rojas MD;  Location:  OR      VASCULAR SURGERY PROCEDURE UNLIST       OPEN REDUCTION INTERNAL FIXATION FEMUR PROXIMAL Left 8/19/2016    Procedure: OPEN REDUCTION INTERNAL FIXATION FEMUR PROXIMAL;  Surgeon: Kael Rojas MD;  [facility]: [facility]     SURGICAL HISTORY OF -   11/1992    skin cancer, nose     SURGICAL HISTORY OF -   1978    right, vein stripping     SURGICAL HISTORY OF -   1968    breast biopsy     SURGICAL HISTORY OF -   1993    laparoscopic cholecystectomy     SURGICAL HISTORY OF -       tonsillectomy and adenoidectomy     SURGICAL HISTORY OF -   04/2004    left cataract     SURGICAL HISTORY OF -   08/09/2004    right total shoulder arthroplasty     [facility] PELVIS/HIP JOINT SURGERY UNLISTED       [facility] SHOULDER SURG PROC UNLISTED       [facility] STOMACH SURGERY PROCEDURE UNLISTED          Family History   Problem Relation Age of Onset     Heart Disease Mother          CHF     Hypertension Mother      Hypertension Father      Hypertension Maternal Grandmother      Hypertension Maternal Grandfather      Hypertension Son      Heart Disease Son         MI       Social History     Socioeconomic History     Marital status:      Spouse name: Not on file     Number of children: Not on file     Years of education: Not on file     Highest education level: Not on file   Occupational History     Not on file   Tobacco Use     Smoking status: Never Smoker     Smokeless tobacco: Never Used   Vaping Use     Vaping Use: Never used   Substance and Sexual Activity     Alcohol use: Yes     Comment: rare     Drug use: No     Sexual activity: Not Currently     Birth control/protection: None   Other Topics Concern     Parent/sibling w/ CABG, MI or angioplasty before 65F 55M? Yes   Social History Narrative     Not on file     Social Determinants of Health     Financial Resource Strain: Not on file   Food Insecurity: Not on file   Transportation Needs: Not on file   Physical Activity: Not on file   Stress: Not on file   Social Connections: Not on file   Intimate Partner Violence: Not on file   Housing Stability: Not on file       Outpatient Encounter Medications as of 4/25/2022   Medication Sig Dispense Refill     allopurinol (ZYLOPRIM) 100 MG tablet Take 1 tablet (100 mg) by mouth daily 90 tablet 3     amLODIPine (NORVASC) 10 MG tablet Take 1 tablet (10 mg) by mouth daily 90 tablet 3     budesonide (ENTOCORT EC) 3 MG EC capsule TAKE 1 CAPSULE BY MOUTH EVERY MORNING 30 capsule 6     Calcium-Vitamin D 600-200 MG-UNIT TABS Take 1 tablet by mouth 2 times daily       dorzolamide (TRUSOPT) 2 % ophthalmic solution instill 1 drop by ophthalmic route 3 times every day into both eyes       Fexofenadine HCl (ALLEGRA PO) Take 180 mg by mouth daily        lisinopril (ZESTRIL) 2.5 MG tablet Take 1 tablet (2.5 mg) by mouth every evening 90 tablet 3     ONE-A-DAY WOMENS OR TABS Take 1 tablet by mouth daily         warfarin ANTICOAGULANT (JANTOVEN ANTICOAGULANT) 2 MG tablet Take 5 mg every Mon, Fri; 4 mg all other days or as directed by the Anticoagulation Clinic 190 tablet 1     latanoprost (XALATAN) 0.005 % ophthalmic solution Place 1 drop into both eyes At Bedtime 1.5 mL 0     No facility-administered encounter medications on file as of 4/25/2022.             O:   NAD, WDWN, Alert & Oriented, Mood & Affect wnl, Vitals stable   Here today alone   BP (!) 160/69 (BP Location: Left arm, Patient Position: Sitting, Cuff Size: Adult Regular)   Pulse 66   LMP 07/07/1960   SpO2 97%    General appearance normal   Vitals stable   Alert, oriented and in no acute distress     L upper cut lip 7mm pink pearly papule   Nose healing well      Eyes: Conjunctivae/lids:Normal     ENT: Lips, buccal mucosa, tongue: normal    MSK:Normal    Cardiovascular: peripheral edema none    Pulm: Breathing Normal    Neuro/Psych: Orientation:Alert and Orientedx3 ; Mood/Affect:normal       A/P:  1. l upper cut lip basal cell carcinoma   MOHS:   Location    The rationale for Mohs surgery was discussed with the patient and consent was obtained.  The risks and benefits as well as alternatives to therapy were discussed, in detail.  Specifically, the risks of infection, scarring, bleeding, prolonged wound healing, incomplete removal, allergy to anesthesia, nerve injury and recurrence were addressed.  Indication for Mohs was Location. Prior to the procedure, the treatment site was clearly identified and, if available, confirmed with previous photos and confirmed by the patient   All components of the Universal Protocol/PAUSE rule were completed.  The Mohs surgeon operated in two distinct and integrated capacities as the surgeon and pathologist.      The area was prepped with Betasept.  A rim of normal appearing skin was marked circumferentially around the lesion.  The area was infiltrated with local anesthesia.  The tumor was first debulked to remove all  clinically apparent tumor.  An incision following the standard Mohs approach was done and the specimen was oriented,mapped and placed in 1 block(s).  Each specimen was then chromacoded and processed in the Mohs laboratory using standard Mohs technique and submitted for frozen section histology.  Frozen section analysis showed no residual tumor but CLEAR MARGINS.      The tumor was excised using standard Mohs technique in 1 stages(s).  CLEAR MARGINS OBTAINED and Final defect size was 1.3 x 1 cm.     We discussed the options for wound management in full with the patient including risks/benefits/ possible outcomes.        REPAIR COMPLEX: Because of the tightness of the surrounding skin and Because of the size and full thickness nature of the defect, Because of the tightness of the surrounding skin, To maintain form and function and Because of the proximity to the vermilion, a complex closure was planned. After LE anesthesia and prep, Burow's triangles were excised in the relaxed skin tension lines. The wound edges were widely undermined greater than width of the defect on both sides by dissection in the subcutaneous plane until adequate tissue mobility was obtained. Hemostasis was obtained. The wound edges were closed in a layered fashion using Vicryl and Fast Absorbing Plain Gut sutures. Postoperative length was 3.4 cm.   EBL minimal; complications none; wound care routine.  The patient was discharged in good condition and will return in one week for wound evaluation.  It was a pleasure speaking to Pooja Swartz today.  Previous clinic notes and pertinent laboratory tests were reviewed prior to Pooja Swartz's visit.  Nature and genetics of benign skin lesions dicussed with patient.  Signs and Symptoms of skin cancer discussed with patient.  Patient encouraged to perform monthly skin exams.  UV precautions reviewed with patient.  Risks of non-melanoma skin cancer discussed with patient   Return to clinic 6 months

## 2022-04-25 NOTE — LETTER
4/25/2022         RE: Pooja Swartz  [id number]  [address]  [facility] [id number]        Dear Colleague,    Thank you for referring your patient, Pooja Swartz, to the [facility]. Please see a copy of my visit note below.    Surgical Office Location :   [facility]  [facility] [place] [address]      Pooja Swartz is an extremely pleasant 89 year old year old female patient here today for evaluation and managment of basal cell carcinoma on left upper lip.  Nose healing well.  Patient has no other skin complaints today.  Remainder of the HPI, Meds, PMH, Allergies, FH, and SH was reviewed in chart.      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, subsequent encounter 1/9/2017     Renal insufficiency      Sinus node dysfunction (H)      Unspecified essential hypertension        Past Surgical History:   Procedure Laterality Date     ARTHROPLASTY REVISION HIP Left 8/19/2016    Procedure: ARTHROPLASTY REVISION HIP;  Surgeon: Kael Rojas MD;  [facility]      VASCULAR SURGERY PROCEDURE UNLIST       OPEN REDUCTION INTERNAL FIXATION FEMUR PROXIMAL Left 8/19/2016    Procedure: OPEN REDUCTION INTERNAL FIXATION FEMUR PROXIMAL;  Surgeon: Kael Rojas MD;  [facility]     SURGICAL HISTORY OF -   11/1992    skin cancer, nose     SURGICAL HISTORY OF -   1978    right, vein stripping     SURGICAL HISTORY OF -   1968    breast biopsy     SURGICAL HISTORY OF -   1993    laparoscopic cholecystectomy     SURGICAL HISTORY OF -        tonsillectomy and adenoidectomy     SURGICAL HISTORY OF -   04/2004    left cataract     SURGICAL HISTORY OF -   08/09/2004    right total shoulder arthroplasty     [facility] PELVIS/HIP JOINT SURGERY UNLISTED       [facility] SHOULDER SURG PROC UNLISTED       [facility] STOMACH SURGERY PROCEDURE UNLISTED          Family History   Problem Relation Age of Onset     Heart Disease Mother         CHF     Hypertension Mother      Hypertension Father      Hypertension Maternal Grandmother      Hypertension Maternal Grandfather      Hypertension Son      Heart Disease Son         MI       Social History     Socioeconomic History     Marital status:      Spouse name: Not on file     Number of children: Not on file     Years of education: Not on file     Highest education level: Not on file   Occupational History     Not on file   Tobacco Use     Smoking status: Never Smoker     Smokeless tobacco: Never Used   Vaping Use     Vaping Use: Never used   Substance and Sexual Activity     Alcohol use: Yes     Comment: rare     Drug use: No     Sexual activity: Not Currently     Birth control/protection: None   Other Topics Concern     Parent/sibling w/ CABG, MI or angioplasty before 65F 55M? Yes   Social History Narrative     Not on file     Social Determinants of Health     Financial Resource Strain: Not on file   Food Insecurity: Not on file   Transportation Needs: Not on file   Physical Activity: Not on file   Stress: Not on file   Social Connections: Not on file   Intimate Partner Violence: Not on file   Housing Stability: Not on file       Outpatient Encounter Medications as of 4/25/2022   Medication Sig Dispense Refill     allopurinol (ZYLOPRIM) 100 MG tablet Take 1 tablet (100 mg) by mouth daily 90 tablet 3     amLODIPine (NORVASC) 10 MG tablet Take 1 tablet (10 mg) by mouth daily 90 tablet 3     budesonide (ENTOCORT EC) 3 MG EC capsule TAKE 1 CAPSULE BY MOUTH EVERY MORNING 30 capsule 6     Calcium-Vitamin D 600-200 MG-UNIT TABS Take 1  tablet by mouth 2 times daily       dorzolamide (TRUSOPT) 2 % ophthalmic solution instill 1 drop by ophthalmic route 3 times every day into both eyes       Fexofenadine HCl (ALLEGRA PO) Take 180 mg by mouth daily        lisinopril (ZESTRIL) 2.5 MG tablet Take 1 tablet (2.5 mg) by mouth every evening 90 tablet 3     ONE-A-DAY WOMENS OR TABS Take 1 tablet by mouth daily        warfarin ANTICOAGULANT (JANTOVEN ANTICOAGULANT) 2 MG tablet Take 5 mg every Mon, Fri; 4 mg all other days or as directed by the Anticoagulation Clinic 190 tablet 1     latanoprost (XALATAN) 0.005 % ophthalmic solution Place 1 drop into both eyes At Bedtime 1.5 mL 0     No facility-administered encounter medications on file as of 4/25/2022.             O:   NAD, WDWN, Alert & Oriented, Mood & Affect wnl, Vitals stable   Here today alone   BP (!) 160/69 (BP Location: Left arm, Patient Position: Sitting, Cuff Size: Adult Regular)   Pulse 66   LMP 07/07/1960   SpO2 97%    General appearance normal   Vitals stable   Alert, oriented and in no acute distress     L upper cut lip 7mm pink pearly papule   Nose healing well      Eyes: Conjunctivae/lids:Normal     ENT: Lips, buccal mucosa, tongue: normal    MSK:Normal    Cardiovascular: peripheral edema none    Pulm: Breathing Normal    Neuro/Psych: Orientation:Alert and Orientedx3 ; Mood/Affect:normal       A/P:  1. l upper cut lip basal cell carcinoma   MOHS:   Location    The rationale for Mohs surgery was discussed with the patient and consent was obtained.  The risks and benefits as well as alternatives to therapy were discussed, in detail.  Specifically, the risks of infection, scarring, bleeding, prolonged wound healing, incomplete removal, allergy to anesthesia, nerve injury and recurrence were addressed.  Indication for Mohs was Location. Prior to the procedure, the treatment site was clearly identified and, if available, confirmed with previous photos and confirmed by the patient   All  components of the Universal Protocol/PAUSE rule were completed.  The Mohs surgeon operated in two distinct and integrated capacities as the surgeon and pathologist.      The area was prepped with Betasept.  A rim of normal appearing skin was marked circumferentially around the lesion.  The area was infiltrated with local anesthesia.  The tumor was first debulked to remove all clinically apparent tumor.  An incision following the standard Mohs approach was done and the specimen was oriented,mapped and placed in 1 block(s).  Each specimen was then chromacoded and processed in the Mohs laboratory using standard Mohs technique and submitted for frozen section histology.  Frozen section analysis showed no residual tumor but CLEAR MARGINS.      The tumor was excised using standard Mohs technique in 1 stages(s).  CLEAR MARGINS OBTAINED and Final defect size was 1.3 x 1 cm.     We discussed the options for wound management in full with the patient including risks/benefits/ possible outcomes.        REPAIR COMPLEX: Because of the tightness of the surrounding skin and Because of the size and full thickness nature of the defect, Because of the tightness of the surrounding skin, To maintain form and function and Because of the proximity to the vermilion, a complex closure was planned. After LE anesthesia and prep, Burow's triangles were excised in the relaxed skin tension lines. The wound edges were widely undermined greater than width of the defect on both sides by dissection in the subcutaneous plane until adequate tissue mobility was obtained. Hemostasis was obtained. The wound edges were closed in a layered fashion using Vicryl and Fast Absorbing Plain Gut sutures. Postoperative length was 3.4 cm.   EBL minimal; complications none; wound care routine.  The patient was discharged in good condition and will return in one week for wound evaluation.  It was a pleasure speaking to Pooja Swartz today.  Previous clinic notes and  pertinent laboratory tests were reviewed prior to Pooja Swartz's visit.  Nature and genetics of benign skin lesions dicussed with patient.  Signs and Symptoms of skin cancer discussed with patient.  Patient encouraged to perform monthly skin exams.  UV precautions reviewed with patient.  Risks of non-melanoma skin cancer discussed with patient   Return to clinic 6 months        Again, thank you for allowing me to participate in the care of your patient.        Sincerely,        Myron Sheppard MD

## 2022-04-25 NOTE — PROGRESS NOTES
Pt returned to clinic for post surgery 1 week follow up bandage change. Pt stated that she had a lot of bleeding from left clavicle, but denies pain. Bandages removed left clavicle and left nasal tip, area cleansed with normal saline. Aquaphor and Telfa  was applied to graft site on left nasal tip. Aquaphor and Telfa was applied to left clavicle as well due. Advised to watch for signs/sx of infection; spreading redness, drainage, odor, fever. Call or report promptly to clinic. Pt given written instructions and informed to rtc as needed. Patient verbalized understanding.     Carlene Gan LPN   4/25/2022

## 2022-05-03 ENCOUNTER — OFFICE VISIT (OUTPATIENT)
Dept: DERMATOLOGY | Facility: CLINIC | Age: 87
End: 2022-05-03
Payer: COMMERCIAL

## 2022-05-03 DIAGNOSIS — Z48.01 ENCOUNTER FOR CHANGE OR REMOVAL OF SURGICAL WOUND DRESSING: Primary | ICD-10-CM

## 2022-05-03 PROCEDURE — 99024 POSTOP FOLLOW-UP VISIT: CPT | Performed by: DERMATOLOGY

## 2022-05-03 NOTE — PROGRESS NOTES
Pt returned to clinic for post surgery 1 week follow up bandage change. Pt has no complaints, denies pain. Bandage removed from left upper lip, area cleansed with normal saline. Site is healing and wound edges approximating well. Reapplied new steri strips and paper tape.    Advised to watch for signs/sx of infection; spreading redness, drainage, odor, fever. Call or report promptly to clinic. Pt given written instructions and informed to rtc as needed. Patient verbalized understanding.       Graft on left side of nose green/yellow in color, odor noted by MA. Dr. Sheppard examined and suggested a one month wound check and open wound care.     Area cleansed, Aquaphor applied, nonstick bandage placed with paper tape.      Lisa ANGLIN,  CMA

## 2022-05-03 NOTE — PROGRESS NOTES
Pooja Swartz is an extremely pleasant 89 year old year old female patient here today for wound check nose and cheek.  Healing well no issues.  Patient has no other skin complaints today.  Remainder of the HPI, Meds, PMH, Allergies, FH, and SH was reviewed in chart.      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, subsequent encounter 1/9/2017     Renal insufficiency      Sinus node dysfunction (H)      Unspecified essential hypertension        Past Surgical History:   Procedure Laterality Date     ARTHROPLASTY REVISION HIP Left 8/19/2016    Procedure: ARTHROPLASTY REVISION HIP;  Surgeon: Kael Rojas MD;  [facility]      VASCULAR SURGERY PROCEDURE UNLIST       OPEN REDUCTION INTERNAL FIXATION FEMUR PROXIMAL Left 8/19/2016    Procedure: OPEN REDUCTION INTERNAL FIXATION FEMUR PROXIMAL;  Surgeon: Kael Rojas MD;  [facility]     SURGICAL HISTORY OF -   11/1992    skin cancer, nose     SURGICAL HISTORY OF -   1978    right, vein stripping     SURGICAL HISTORY OF -   1968    breast biopsy     SURGICAL HISTORY OF -   1993    laparoscopic cholecystectomy     SURGICAL HISTORY OF -       tonsillectomy and adenoidectomy     SURGICAL HISTORY OF -   04/2004    left cataract     SURGICAL HISTORY OF -   08/09/2004    right total shoulder arthroplasty     [facility] PELVIS/HIP JOINT SURGERY UNLISTED       [facility] SHOULDER SURG PROC UNLISTED       [facility] STOMACH SURGERY PROCEDURE UNLISTED          Family History   Problem Relation Age of Onset     Heart Disease Mother         CHF     Hypertension Mother       Hypertension Father      Hypertension Maternal Grandmother      Hypertension Maternal Grandfather      Hypertension Son      Heart Disease Son         MI       Social History     Socioeconomic History     Marital status:      Spouse name: Not on file     Number of children: Not on file     Years of education: Not on file     Highest education level: Not on file   Occupational History     Not on file   Tobacco Use     Smoking status: Never Smoker     Smokeless tobacco: Never Used   Vaping Use     Vaping Use: Never used   Substance and Sexual Activity     Alcohol use: Yes     Comment: rare     Drug use: No     Sexual activity: Not Currently     Birth control/protection: None   Other Topics Concern     Parent/sibling w/ CABG, MI or angioplasty before 65F 55M? Yes   Social History Narrative     Not on file     Social Determinants of Health     Financial Resource Strain: Not on file   Food Insecurity: Not on file   Transportation Needs: Not on file   Physical Activity: Not on file   Stress: Not on file   Social Connections: Not on file   Intimate Partner Violence: Not on file   Housing Stability: Not on file       Outpatient Encounter Medications as of 5/3/2022   Medication Sig Dispense Refill     allopurinol (ZYLOPRIM) 100 MG tablet Take 1 tablet (100 mg) by mouth daily 90 tablet 3     amLODIPine (NORVASC) 10 MG tablet Take 1 tablet (10 mg) by mouth daily 90 tablet 3     budesonide (ENTOCORT EC) 3 MG EC capsule TAKE 1 CAPSULE BY MOUTH EVERY MORNING 30 capsule 6     Calcium-Vitamin D 600-200 MG-UNIT TABS Take 1 tablet by mouth 2 times daily       dorzolamide (TRUSOPT) 2 % ophthalmic solution instill 1 drop by ophthalmic route 3 times every day into both eyes       Fexofenadine HCl (ALLEGRA PO) Take 180 mg by mouth daily        latanoprost (XALATAN) 0.005 % ophthalmic solution Place 1 drop into both eyes At Bedtime 1.5 mL 0     lisinopril (ZESTRIL) 2.5 MG tablet Take 1 tablet (2.5 mg) by mouth every evening 90  tablet 3     ONE-A-DAY WOMENS OR TABS Take 1 tablet by mouth daily        warfarin ANTICOAGULANT (JANTOVEN ANTICOAGULANT) 2 MG tablet Take 5 mg every Mon, Fri; 4 mg all other days or as directed by the Anticoagulation Clinic 190 tablet 1     No facility-administered encounter medications on file as of 5/3/2022.             O:   NAD, WDWN, Alert & Oriented, Mood & Affect wnl, Vitals stable   Here today alone   LMP 07/07/1960    General appearance normal   Vitals stable   Alert, oriented and in no acute distress     Nose and cheek healing well      Eyes: Conjunctivae/lids:Normal     ENT: Lips, buccal mucosa, tongue: normal    MSK:Normal    Cardiovascular: peripheral edema none    Pulm: Breathing Normal    Neuro/Psych: Orientation:Alert and Orientedx3 ; Mood/Affect:normal       A/P:  1. Hx of non-melanoma skin cancer  Wound care discussed with patient   Re dressed  Return to clinic 1 month  It was a pleasure speaking to Pooja Swartz today.

## 2022-05-03 NOTE — PATIENT INSTRUCTIONS
WOUND CARE INSTRUCTIONS  for  ONE WEEK AFTER SURGERY          Leave flat bandage on your skin for one week after today s bandage change.  In one week when you remove the bandage, you may resume your regular skin care routine, including washing with mild soap and water, applying moisturizer, make-up and sunscreen.    If there are any open or bleeding areas at the incision/graft site you should begin to cover the area with a bandage daily as follows:    Clean and dry the area with plain tap water using a Q-tip or sterile gauze pad.  Apply Polysporin or Bacitracin ointment to the open area.  Cover the wound with a band-aid or a sterile non-stick gauze pad and micropore paper tape.         SIGNS OF INFECTION  - If you notice any of these signs of infection, call your doctor right away: expanding redness around the wound.  - Yellow or greenish-colored pus or cloudy wound drainage.    - Red streaking spreading from the wound.  - Increased swelling, tenderness, or pain around the wound.   - Fever.    Please remember that yellow and clear drainage from a wound can be normal and related to normal wound healing.  Isolated drainage from a wound without a combination of the above features does not indicate infection.       *Once the bandages are removed, the scar will be red and firm (especially in the lip/chin area). This is normal and will fade in time. It might take 6-12 months for this to happen.     *Massaging the area will help the scar soften and fade quicker. Begin to massage the area one month after the bandages have been removed. To massage apply pressure directly and firmly over the scar with the fingertips and move in a circular motion. Massage the area for a few minutes several times a day. Continue to massage the site for several months.    *Approximately 6-8 weeks after surgery it is not uncommon to see the formation of  tender pimple-like  bump along the scar. This is normal. As the scar continues to mature and  the stitches underneath the skin begin to dissolve, this might occur. Do not pick or squeeze, this will resolve on it s own. Should one break open producing a small amount of drainage, apply Polysporin or Bacitracin ointment a few times a day until the wound is completely healed.    *Numbness in the surgical area is expected. It might take 12-18 months for the feeling to return to normal. During this time sensations of itchiness, tingling and occasional sharp pains might be noted. These feelings are normal and will subside once the nerves have completely healed.         IN CASE OF EMERGENCY: Dr Sheppard [phone]     If you were seen in [place] call: [phone]    If you were seen in [place] call: [phone]

## 2022-05-03 NOTE — LETTER
5/3/2022         RE: Pooja Swartz  [address]  [address] [place] [id number]        Dear Colleague,    Thank you for referring your patient, Pooja Swartz, to the [facility]. Please see a copy of my visit note below.    Pt returned to clinic for post surgery 1 week follow up bandage change. Pt has no complaints, denies pain. Bandage removed from left upper lip, area cleansed with normal saline. Site is healing and wound edges approximating well. Reapplied new steri strips and paper tape.    Advised to watch for signs/sx of infection; spreading redness, drainage, odor, fever. Call or report promptly to clinic. Pt given written instructions and informed to rtc as needed. Patient verbalized understanding.       Graft on left side of nose green/yellow in color, odor noted by MA. Dr. Sheppard examined and suggested a one month wound check and open wound care.     Area cleansed, Aquaphor applied, nonstick bandage placed with paper tape.      Lisa B,  CMA      Pooja Swartz is an extremely pleasant 89 year old year old female patient here today for wound check nose and cheek.  Healing well no issues.  Patient has no other skin complaints today.  Remainder of the HPI, Meds, PMH, Allergies, FH, and SH was reviewed in chart.      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, subsequent encounter 1/9/2017     Renal insufficiency      Sinus node dysfunction (H)      Unspecified essential hypertension        Past Surgical History:   Procedure Laterality Date      ARTHROPLASTY REVISION HIP Left 8/19/2016    Procedure: ARTHROPLASTY REVISION HIP;  Surgeon: Kael Rojas MD;  [facility]     [facility] VASCULAR SURGERY PROCEDURE UNLIST       OPEN REDUCTION INTERNAL FIXATION FEMUR PROXIMAL Left 8/19/2016    Procedure: OPEN REDUCTION INTERNAL FIXATION FEMUR PROXIMAL;  Surgeon: Kael Rojas MD;  [facility]     SURGICAL HISTORY OF -   11/1992    skin cancer, nose     SURGICAL HISTORY OF -   1978    right, vein stripping     SURGICAL HISTORY OF -   1968    breast biopsy     SURGICAL HISTORY OF -   1993    laparoscopic cholecystectomy     SURGICAL HISTORY OF -       tonsillectomy and adenoidectomy     SURGICAL HISTORY OF -   04/2004    left cataract     SURGICAL HISTORY OF -   08/09/2004    right total shoulder arthroplasty     ZZC PELVIS/HIP JOINT SURGERY UNLISTED       ZZC SHOULDER SURG PROC UNLISTED       ZZC STOMACH SURGERY PROCEDURE UNLISTED          Family History   Problem Relation Age of Onset     Heart Disease Mother         CHF     Hypertension Mother      Hypertension Father      Hypertension Maternal Grandmother      Hypertension Maternal Grandfather      Hypertension Son      Heart Disease Son         MI       Social History     Socioeconomic History     Marital status:      Spouse name: Not on file     Number of children: Not on file     Years of education: Not on file     Highest education level: Not on file   Occupational History     Not on file   Tobacco Use     Smoking status: Never Smoker     Smokeless tobacco: Never Used   Vaping Use     Vaping Use: Never used   Substance and Sexual Activity     Alcohol use: Yes     Comment: rare     Drug use: No     Sexual activity: Not Currently     Birth control/protection: None   Other Topics Concern     Parent/sibling w/ CABG, MI or angioplasty before 65F 55M? Yes   Social History Narrative     Not on file     Social Determinants of Health     Financial Resource Strain: Not on file   Food Insecurity: Not on file    Transportation Needs: Not on file   Physical Activity: Not on file   Stress: Not on file   Social Connections: Not on file   Intimate Partner Violence: Not on file   Housing Stability: Not on file       Outpatient Encounter Medications as of 5/3/2022   Medication Sig Dispense Refill     allopurinol (ZYLOPRIM) 100 MG tablet Take 1 tablet (100 mg) by mouth daily 90 tablet 3     amLODIPine (NORVASC) 10 MG tablet Take 1 tablet (10 mg) by mouth daily 90 tablet 3     budesonide (ENTOCORT EC) 3 MG EC capsule TAKE 1 CAPSULE BY MOUTH EVERY MORNING 30 capsule 6     Calcium-Vitamin D 600-200 MG-UNIT TABS Take 1 tablet by mouth 2 times daily       dorzolamide (TRUSOPT) 2 % ophthalmic solution instill 1 drop by ophthalmic route 3 times every day into both eyes       Fexofenadine HCl (ALLEGRA PO) Take 180 mg by mouth daily        latanoprost (XALATAN) 0.005 % ophthalmic solution Place 1 drop into both eyes At Bedtime 1.5 mL 0     lisinopril (ZESTRIL) 2.5 MG tablet Take 1 tablet (2.5 mg) by mouth every evening 90 tablet 3     ONE-A-DAY WOMENS OR TABS Take 1 tablet by mouth daily        warfarin ANTICOAGULANT (JANTOVEN ANTICOAGULANT) 2 MG tablet Take 5 mg every Mon, Fri; 4 mg all other days or as directed by the Anticoagulation Clinic 190 tablet 1     No facility-administered encounter medications on file as of 5/3/2022.             O:   NAD, WDWN, Alert & Oriented, Mood & Affect wnl, Vitals stable   Here today alone   LMP 07/07/1960    General appearance normal   Vitals stable   Alert, oriented and in no acute distress     Nose and cheek healing well      Eyes: Conjunctivae/lids:Normal     ENT: Lips, buccal mucosa, tongue: normal    MSK:Normal    Cardiovascular: peripheral edema none    Pulm: Breathing Normal    Neuro/Psych: Orientation:Alert and Orientedx3 ; Mood/Affect:normal       A/P:  1. Hx of non-melanoma skin cancer  Wound care discussed with patient   Re dressed  Return to clinic 1 month  It was a pleasure speaking to  Pooja Swartz today.        Again, thank you for allowing me to participate in the care of your patient.        Sincerely,        Myron Sheppard MD

## 2022-05-05 ENCOUNTER — ANTICOAGULATION THERAPY VISIT (OUTPATIENT)
Dept: ANTICOAGULATION | Facility: CLINIC | Age: 87
End: 2022-05-05

## 2022-05-05 ENCOUNTER — LAB (OUTPATIENT)
Dept: LAB | Facility: CLINIC | Age: 87
End: 2022-05-05
Payer: COMMERCIAL

## 2022-05-05 DIAGNOSIS — I48.91 ATRIAL FIBRILLATION, UNSPECIFIED TYPE (H): ICD-10-CM

## 2022-05-05 DIAGNOSIS — Z79.01 LONG TERM CURRENT USE OF ANTICOAGULANT THERAPY: ICD-10-CM

## 2022-05-05 DIAGNOSIS — Z79.01 ANTICOAGULATED ON COUMADIN: ICD-10-CM

## 2022-05-05 DIAGNOSIS — I48.91 ATRIAL FIBRILLATION (H): Primary | ICD-10-CM

## 2022-05-05 DIAGNOSIS — N18.31 STAGE 3A CHRONIC KIDNEY DISEASE (H): ICD-10-CM

## 2022-05-05 DIAGNOSIS — I48.91 ATRIAL FIBRILLATION (H): ICD-10-CM

## 2022-05-05 LAB
CREAT UR-MCNC: 81 MG/DL
INR BLD: 3.3 (ref 0.9–1.1)
MICROALBUMIN UR-MCNC: 36 MG/L
MICROALBUMIN/CREAT UR: 44.44 MG/G CR (ref 0–25)

## 2022-05-05 PROCEDURE — 85610 PROTHROMBIN TIME: CPT

## 2022-05-05 PROCEDURE — 82043 UR ALBUMIN QUANTITATIVE: CPT

## 2022-05-05 PROCEDURE — 36416 COLLJ CAPILLARY BLOOD SPEC: CPT

## 2022-05-05 RX ORDER — WARFARIN SODIUM 2 MG/1
TABLET ORAL
Qty: 190 TABLET | Refills: 1 | COMMUNITY
Start: 2022-05-05 | End: 2022-09-19

## 2022-05-05 NOTE — PROGRESS NOTES
ANTICOAGULATION MANAGEMENT     Pooja Swartz 89 year old female is on warfarin with supratherapeutic INR result. (Goal INR 2.0-3.0)    Recent labs: (last 7 days)     05/05/22  1054   INR 3.3*       ASSESSMENT     Source(s): Chart Review and Patient/Caregiver Call     Warfarin doses taken: Warfarin taken as instructed  Diet: No new diet changes identified  New illness, injury, or hospitalization: No  Medication/supplement changes: None noted  Signs or symptoms of bleeding or clotting: No  Previous INR: Therapeutic last visit; previously outside of goal range  Additional findings: None     PLAN     Recommended plan for no diet, medication or health factor changes affecting INR     Dosing Instructions: decrease your warfarin dose (3.3% change) with next INR in 2 weeks       Summary  As of 5/5/2022    Full warfarin instructions:  5 mg every Mon; 4 mg all other days   Next INR check:  5/17/2022             Telephone call with Pooja who verbalizes understanding and agrees to plan    Lab visit scheduled    Education provided: Please call back if any changes to your diet, medications or how you've been taking warfarin and Monitoring for bleeding signs and symptoms    Plan made per [facility] anticoagulation protocol    Monica Mg RN  Anticoagulation Clinic  5/5/2022    _______________________________________________________________________     Anticoagulation Episode Summary     Current INR goal:  2.0-3.0   TTR:  55.4 % (1 y)   Target end date:  Indefinite   Send INR reminders to:  [facility]    Indications    Atrial fibrillation (H) [I48.91]  Long-term (current) use of anticoagulants [Z79.01] [Z79.01]  Atrial fibrillation  unspecified type (H) [I48.91]           Comments:           Anticoagulation Care Providers     Provider Role Specialty Phone number    Amanda Renee MD Referring Family Medicine [phone]

## 2022-05-17 ENCOUNTER — ANTICOAGULATION THERAPY VISIT (OUTPATIENT)
Dept: ANTICOAGULATION | Facility: CLINIC | Age: 87
End: 2022-05-17

## 2022-05-17 ENCOUNTER — LAB (OUTPATIENT)
Dept: LAB | Facility: CLINIC | Age: 87
End: 2022-05-17
Payer: COMMERCIAL

## 2022-05-17 DIAGNOSIS — I48.91 ATRIAL FIBRILLATION (H): ICD-10-CM

## 2022-05-17 DIAGNOSIS — Z13.220 SCREENING FOR HYPERLIPIDEMIA: ICD-10-CM

## 2022-05-17 DIAGNOSIS — Z79.01 LONG TERM CURRENT USE OF ANTICOAGULANT THERAPY: ICD-10-CM

## 2022-05-17 DIAGNOSIS — I48.91 ATRIAL FIBRILLATION, UNSPECIFIED TYPE (H): ICD-10-CM

## 2022-05-17 DIAGNOSIS — I48.91 ATRIAL FIBRILLATION (H): Primary | ICD-10-CM

## 2022-05-17 LAB
CHOLEST SERPL-MCNC: 163 MG/DL
FASTING STATUS PATIENT QL REPORTED: YES
HDLC SERPL-MCNC: 61 MG/DL
INR BLD: 3.3 (ref 0.9–1.1)
LDLC SERPL CALC-MCNC: 81 MG/DL
NONHDLC SERPL-MCNC: 102 MG/DL
TRIGL SERPL-MCNC: 103 MG/DL

## 2022-05-17 PROCEDURE — 80061 LIPID PANEL: CPT

## 2022-05-17 PROCEDURE — 36415 COLL VENOUS BLD VENIPUNCTURE: CPT

## 2022-05-17 PROCEDURE — 85610 PROTHROMBIN TIME: CPT

## 2022-05-17 NOTE — PROGRESS NOTES
ANTICOAGULATION MANAGEMENT     Pooja Swartz 89 year old female is on warfarin with supratherapeutic INR result. (Goal INR 2.0-3.0)    Recent labs: (last 7 days)     05/17/22  0911   INR 3.3*       ASSESSMENT     Source(s): Chart Review and Patient/Caregiver Call     Warfarin doses taken: Warfarin taken as instructed  Diet: No new diet changes identified  New illness, injury, or hospitalization: No  Medication/supplement changes: None noted  Signs or symptoms of bleeding or clotting: No  Previous INR: Supratherapeutic with dose reduction  Additional findings: None       PLAN     Recommended plan for no diet, medication or health factor changes affecting INR     Dosing Instructions: decrease your warfarin dose (10% change) with next INR in 2 weeks       Summary  As of 5/17/2022    Full warfarin instructions:  2 mg every Tue; 4 mg all other days   Next INR check:  5/31/2022             Telephone call with Pooja who verbalizes understanding and agrees to plan    Lab visit scheduled    Education provided: Please call back if any changes to your diet, medications or how you've been taking warfarin and Contact [phone]  with any changes, questions or concerns.     Plan made per [facility] anticoagulation protocol    Arin Sullivan RN  Anticoagulation Clinic  5/17/2022    _______________________________________________________________________     Anticoagulation Episode Summary     Current INR goal:  2.0-3.0   TTR:  55.4 % (1 y)   Target end date:  Indefinite   Send INR reminders to:  [facility]    Indications    Atrial fibrillation (H) [I48.91]  Long-term (current) use of anticoagulants [Z79.01] [Z79.01]  Atrial fibrillation  unspecified type (H) [I48.91]           Comments:           Anticoagulation Care Providers     Provider Role Specialty Phone number    Amanda Renee MD Referring Family Medicine [phone]

## 2022-05-31 ENCOUNTER — ANTICOAGULATION THERAPY VISIT (OUTPATIENT)
Dept: ANTICOAGULATION | Facility: CLINIC | Age: 87
End: 2022-05-31

## 2022-05-31 ENCOUNTER — OFFICE VISIT (OUTPATIENT)
Dept: DERMATOLOGY | Facility: CLINIC | Age: 87
End: 2022-05-31
Payer: COMMERCIAL

## 2022-05-31 ENCOUNTER — LAB (OUTPATIENT)
Dept: LAB | Facility: CLINIC | Age: 87
End: 2022-05-31
Payer: COMMERCIAL

## 2022-05-31 VITALS — HEART RATE: 72 BPM | OXYGEN SATURATION: 100 % | DIASTOLIC BLOOD PRESSURE: 65 MMHG | SYSTOLIC BLOOD PRESSURE: 158 MMHG

## 2022-05-31 DIAGNOSIS — L81.4 LENTIGO: ICD-10-CM

## 2022-05-31 DIAGNOSIS — Z79.01 LONG TERM CURRENT USE OF ANTICOAGULANT THERAPY: ICD-10-CM

## 2022-05-31 DIAGNOSIS — L82.1 SEBORRHEIC KERATOSIS: Primary | ICD-10-CM

## 2022-05-31 DIAGNOSIS — Z85.828 HISTORY OF SKIN CANCER: ICD-10-CM

## 2022-05-31 DIAGNOSIS — I48.91 ATRIAL FIBRILLATION (H): ICD-10-CM

## 2022-05-31 DIAGNOSIS — I48.91 ATRIAL FIBRILLATION, UNSPECIFIED TYPE (H): ICD-10-CM

## 2022-05-31 DIAGNOSIS — I48.91 ATRIAL FIBRILLATION (H): Primary | ICD-10-CM

## 2022-05-31 LAB — INR BLD: 2.1 (ref 0.9–1.1)

## 2022-05-31 PROCEDURE — 36416 COLLJ CAPILLARY BLOOD SPEC: CPT

## 2022-05-31 PROCEDURE — 99024 POSTOP FOLLOW-UP VISIT: CPT | Performed by: DERMATOLOGY

## 2022-05-31 PROCEDURE — 85610 PROTHROMBIN TIME: CPT

## 2022-05-31 NOTE — LETTER
5/31/2022         RE: Pooja Swartz  [address]  [address] [place] [phone]        Dear Colleague,    Thank you for referring your patient, Pooja Swartz, to the [facility]. Please see a copy of my visit note below.    Pooja Swartz is an extremely pleasant 89 year old year old female patient here today for wound check on nose.  Well healed no issues.   Patient has no other skin complaints today.  Remainder of the HPI, Meds, PMH, Allergies, FH, and SH was reviewed in chart.      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, subsequent encounter 1/9/2017     Renal insufficiency      Sinus node dysfunction (H)      Unspecified essential hypertension        Past Surgical History:   Procedure Laterality Date     ARTHROPLASTY REVISION HIP Left 8/19/2016    Procedure: ARTHROPLASTY REVISION HIP;  Surgeon: Kael Rojas MD;  [facility]      VASCULAR SURGERY PROCEDURE UNLIST       OPEN REDUCTION INTERNAL FIXATION FEMUR PROXIMAL Left 8/19/2016    Procedure: OPEN REDUCTION INTERNAL FIXATION FEMUR PROXIMAL;  Surgeon: Kael Rojas MD;  [facility]     SURGICAL HISTORY OF -   11/1992    skin cancer, nose     SURGICAL HISTORY OF -   1978    right, vein stripping     SURGICAL HISTORY OF -   1968    breast biopsy     SURGICAL HISTORY OF -   1993    laparoscopic cholecystectomy     SURGICAL HISTORY OF -       tonsillectomy and adenoidectomy     SURGICAL HISTORY OF -   04/2004    left cataract     SURGICAL HISTORY OF -   08/09/2004    right total shoulder  arthroplasty     ZZC PELVIS/HIP JOINT SURGERY UNLISTED       ZZC SHOULDER SURG PROC UNLISTED       ZZC STOMACH SURGERY PROCEDURE UNLISTED          Family History   Problem Relation Age of Onset     Heart Disease Mother         CHF     Hypertension Mother      Hypertension Father      Hypertension Maternal Grandmother      Hypertension Maternal Grandfather      Hypertension Son      Heart Disease Son         MI       Social History     Socioeconomic History     Marital status:      Spouse name: Not on file     Number of children: Not on file     Years of education: Not on file     Highest education level: Not on file   Occupational History     Not on file   Tobacco Use     Smoking status: Never Smoker     Smokeless tobacco: Never Used   Vaping Use     Vaping Use: Never used   Substance and Sexual Activity     Alcohol use: Yes     Comment: rare     Drug use: No     Sexual activity: Not Currently     Birth control/protection: None   Other Topics Concern     Parent/sibling w/ CABG, MI or angioplasty before 65F 55M? Yes   Social History Narrative     Not on file     Social Determinants of Health     Financial Resource Strain: Not on file   Food Insecurity: Not on file   Transportation Needs: Not on file   Physical Activity: Not on file   Stress: Not on file   Social Connections: Not on file   Intimate Partner Violence: Not on file   Housing Stability: Not on file       Outpatient Encounter Medications as of 5/31/2022   Medication Sig Dispense Refill     allopurinol (ZYLOPRIM) 100 MG tablet Take 1 tablet (100 mg) by mouth daily 90 tablet 3     amLODIPine (NORVASC) 10 MG tablet Take 1 tablet (10 mg) by mouth daily 90 tablet 3     budesonide (ENTOCORT EC) 3 MG EC capsule TAKE 1 CAPSULE BY MOUTH EVERY MORNING 30 capsule 6     Calcium-Vitamin D 600-200 MG-UNIT TABS Take 1 tablet by mouth 2 times daily       dorzolamide (TRUSOPT) 2 % ophthalmic solution instill 1 drop by ophthalmic route 3 times every day into both eyes        Fexofenadine HCl (ALLEGRA PO) Take 180 mg by mouth daily        latanoprost (XALATAN) 0.005 % ophthalmic solution Place 1 drop into both eyes At Bedtime 1.5 mL 0     lisinopril (ZESTRIL) 2.5 MG tablet Take 1 tablet (2.5 mg) by mouth every evening 90 tablet 3     ONE-A-DAY WOMENS OR TABS Take 1 tablet by mouth daily        warfarin ANTICOAGULANT (JANTOVEN ANTICOAGULANT) 2 MG tablet Take 5 mg every Mon; 4 mg all other days or as directed by the Anticoagulation Clinic 190 tablet 1     No facility-administered encounter medications on file as of 5/31/2022.             O:   NAD, WDWN, Alert & Oriented, Mood & Affect wnl, Vitals stable   Here today alone   LMP 07/07/1960    General appearance normal   Vitals stable   Alert, oriented and in no acute distress     Nose well healed   Stuck on papules and brown macules on trunk and ext       Eyes: Conjunctivae/lids:Normal     ENT: Lips, buccal mucosa, tongue: normal    MSK:Normal    Cardiovascular: peripheral edema none    Pulm: Breathing Normal    Neuro/Psych: Orientation:Alert and Orientedx3 ; Mood/Affect:normal       A/P:  1. Seborrheic keratosis, lentigo, hx of non-melanoma skin cancer cheek and nose well healed  It was a pleasure speaking to Pooja Swartz today.  Previous clinic notes and pertinent laboratory tests were reviewed prior to Pooja Swartz's visit.  Signs and Symptoms of skin cancer discussed with patient.  Patient encouraged to perform monthly skin exams.  UV precautions reviewed with patient.  Risks of non-melanoma skin cancer discussed with patient   Return to clinic 6 months        Again, thank you for allowing me to participate in the care of your patient.        Sincerely,        Myron Sheppard MD

## 2022-05-31 NOTE — PROGRESS NOTES
Pooja Swartz is an extremely pleasant 89 year old year old female patient here today for wound check on nose.  Well healed no issues.   Patient has no other skin complaints today.  Remainder of the HPI, Meds, PMH, Allergies, FH, and SH was reviewed in chart.      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, subsequent encounter 1/9/2017     Renal insufficiency      Sinus node dysfunction (H)      Unspecified essential hypertension        Past Surgical History:   Procedure Laterality Date     ARTHROPLASTY REVISION HIP Left 8/19/2016    Procedure: ARTHROPLASTY REVISION HIP;  Surgeon: Kael Rojas MD;  Location:  OR      VASCULAR SURGERY PROCEDURE UNLIST       OPEN REDUCTION INTERNAL FIXATION FEMUR PROXIMAL Left 8/19/2016    Procedure: OPEN REDUCTION INTERNAL FIXATION FEMUR PROXIMAL;  Surgeon: Kael Rojas MD;  [facility]     SURGICAL HISTORY OF -   11/1992    skin cancer, nose     SURGICAL HISTORY OF -   1978    right, vein stripping     SURGICAL HISTORY OF -   1968    breast biopsy     SURGICAL HISTORY OF -   1993    laparoscopic cholecystectomy     SURGICAL HISTORY OF -       tonsillectomy and adenoidectomy     SURGICAL HISTORY OF -   04/2004    left cataract     SURGICAL HISTORY OF -   08/09/2004    right total shoulder arthroplasty     [facility] PELVIS/HIP JOINT SURGERY UNLISTED       [facility] SHOULDER SURG PROC UNLISTED       [facility] STOMACH SURGERY PROCEDURE UNLISTED          Family History   Problem Relation Age of Onset     Heart Disease Mother         CHF     Hypertension Mother       Hypertension Father      Hypertension Maternal Grandmother      Hypertension Maternal Grandfather      Hypertension Son      Heart Disease Son         MI       Social History     Socioeconomic History     Marital status:      Spouse name: Not on file     Number of children: Not on file     Years of education: Not on file     Highest education level: Not on file   Occupational History     Not on file   Tobacco Use     Smoking status: Never Smoker     Smokeless tobacco: Never Used   Vaping Use     Vaping Use: Never used   Substance and Sexual Activity     Alcohol use: Yes     Comment: rare     Drug use: No     Sexual activity: Not Currently     Birth control/protection: None   Other Topics Concern     Parent/sibling w/ CABG, MI or angioplasty before 65F 55M? Yes   Social History Narrative     Not on file     Social Determinants of Health     Financial Resource Strain: Not on file   Food Insecurity: Not on file   Transportation Needs: Not on file   Physical Activity: Not on file   Stress: Not on file   Social Connections: Not on file   Intimate Partner Violence: Not on file   Housing Stability: Not on file       Outpatient Encounter Medications as of 5/31/2022   Medication Sig Dispense Refill     allopurinol (ZYLOPRIM) 100 MG tablet Take 1 tablet (100 mg) by mouth daily 90 tablet 3     amLODIPine (NORVASC) 10 MG tablet Take 1 tablet (10 mg) by mouth daily 90 tablet 3     budesonide (ENTOCORT EC) 3 MG EC capsule TAKE 1 CAPSULE BY MOUTH EVERY MORNING 30 capsule 6     Calcium-Vitamin D 600-200 MG-UNIT TABS Take 1 tablet by mouth 2 times daily       dorzolamide (TRUSOPT) 2 % ophthalmic solution instill 1 drop by ophthalmic route 3 times every day into both eyes       Fexofenadine HCl (ALLEGRA PO) Take 180 mg by mouth daily        latanoprost (XALATAN) 0.005 % ophthalmic solution Place 1 drop into both eyes At Bedtime 1.5 mL 0     lisinopril (ZESTRIL) 2.5 MG tablet Take 1 tablet (2.5 mg) by mouth every evening 90  tablet 3     ONE-A-DAY WOMENS OR TABS Take 1 tablet by mouth daily        warfarin ANTICOAGULANT (JANTOVEN ANTICOAGULANT) 2 MG tablet Take 5 mg every Mon; 4 mg all other days or as directed by the Anticoagulation Clinic 190 tablet 1     No facility-administered encounter medications on file as of 5/31/2022.             O:   NAD, WDWN, Alert & Oriented, Mood & Affect wnl, Vitals stable   Here today alone   LMP 07/07/1960    General appearance normal   Vitals stable   Alert, oriented and in no acute distress     Nose well healed   Stuck on papules and brown macules on trunk and ext       Eyes: Conjunctivae/lids:Normal     ENT: Lips, buccal mucosa, tongue: normal    MSK:Normal    Cardiovascular: peripheral edema none    Pulm: Breathing Normal    Neuro/Psych: Orientation:Alert and Orientedx3 ; Mood/Affect:normal       A/P:  1. Seborrheic keratosis, lentigo, hx of non-melanoma skin cancer cheek and nose well healed  It was a pleasure speaking to Pooja Swartz today.  Previous clinic notes and pertinent laboratory tests were reviewed prior to Pooja Swartz's visit.  Signs and Symptoms of skin cancer discussed with patient.  Patient encouraged to perform monthly skin exams.  UV precautions reviewed with patient.  Risks of non-melanoma skin cancer discussed with patient   Return to clinic 6 months

## 2022-05-31 NOTE — PROGRESS NOTES
ANTICOAGULATION MANAGEMENT     Pooja Swartz 89 year old female is on warfarin with therapeutic INR result. (Goal INR 2.0-3.0)    Recent labs: (last 7 days)     05/31/22  1000   INR 2.1*       ASSESSMENT     Source(s): Patient/Caregiver Call     Warfarin doses taken: Warfarin taken differently, but did not change total weekly dose  Diet: No new diet changes identified  New illness, injury, or hospitalization: No  Medication/supplement changes: None noted  Signs or symptoms of bleeding or clotting: No  Previous INR: Supratherapeutic  Additional findings: None       PLAN     Recommended plan for no diet, medication or health factor changes affecting INR     Dosing Instructions: continue your current warfarin dose with next INR in 3 weeks       Summary  As of 5/31/2022    Full warfarin instructions:  2 mg every Mon; 4 mg all other days   Next INR check:  6/21/2022             Telephone call with Pooja who verbalizes understanding and agrees to plan    Lab visit scheduled    Education provided: Please call back if any changes to your diet, medications or how you've been taking warfarin, Importance of notifying clinic for changes in medications; a sooner lab recheck maybe needed., Importance of notifying clinic for diarrhea, nausea/vomiting, reduced intake, and/or illness; a sooner lab recheck maybe needed., Importance of notifying clinic of upcoming surgeries and procedures 2 weeks in advance and Contact [phone]  with any changes, questions or concerns.     Plan made per [facility] anticoagulation protocol    Dillon Fox RN  Anticoagulation Clinic  5/31/2022    _______________________________________________________________________     Anticoagulation Episode Summary     Current INR goal:  2.0-3.0   TTR:  58.3 % (1 y)   Target end date:  Indefinite   Send INR reminders to:  [facility]    Indications    Atrial fibrillation (H) [I48.91]  Long-term (current) use of anticoagulants [Z79.01]  [Z79.01]  Atrial fibrillation  unspecified type (H) [I48.91]           Comments:           Anticoagulation Care Providers     Provider Role Specialty Phone number    Amanda Renee MD Referring Family Medicine [phone]

## 2022-06-21 ENCOUNTER — LAB (OUTPATIENT)
Dept: LAB | Facility: CLINIC | Age: 87
End: 2022-06-21
Payer: COMMERCIAL

## 2022-06-21 ENCOUNTER — ANTICOAGULATION THERAPY VISIT (OUTPATIENT)
Dept: ANTICOAGULATION | Facility: CLINIC | Age: 87
End: 2022-06-21

## 2022-06-21 DIAGNOSIS — I48.91 ATRIAL FIBRILLATION, UNSPECIFIED TYPE (H): ICD-10-CM

## 2022-06-21 DIAGNOSIS — Z79.01 LONG TERM CURRENT USE OF ANTICOAGULANT THERAPY: ICD-10-CM

## 2022-06-21 DIAGNOSIS — I48.91 ATRIAL FIBRILLATION (H): Primary | ICD-10-CM

## 2022-06-21 DIAGNOSIS — I48.91 ATRIAL FIBRILLATION (H): ICD-10-CM

## 2022-06-21 LAB — INR BLD: 2.1 (ref 0.9–1.1)

## 2022-06-21 PROCEDURE — 36416 COLLJ CAPILLARY BLOOD SPEC: CPT

## 2022-06-21 PROCEDURE — 85610 PROTHROMBIN TIME: CPT

## 2022-06-21 NOTE — PROGRESS NOTES
ANTICOAGULATION MANAGEMENT     Pooja Swartz 89 year old female is on warfarin with therapeutic INR result. (Goal INR 2.0-3.0)    Recent labs: (last 7 days)     06/21/22  0945   INR 2.1*       ASSESSMENT     Source(s): Chart Review and Patient/Caregiver Call     Warfarin doses taken: Warfarin taken as instructed  Diet: No new diet changes identified  New illness, injury, or hospitalization: No  Medication/supplement changes: None noted  Signs or symptoms of bleeding or clotting: No  Previous INR: Therapeutic last visit; previously outside of goal range  Additional findings: None       PLAN     Recommended plan for no diet, medication or health factor changes affecting INR     Dosing Instructions: continue your current warfarin dose with next INR in 4 weeks       Summary  As of 6/21/2022    Full warfarin instructions:  2 mg every Mon; 4 mg all other days   Next INR check:  7/19/2022             Telephone call with Pooja who verbalizes understanding and agrees to plan    Lab visit scheduled    Education provided: Please call back if any changes to your diet, medications or how you've been taking warfarin and Contact [phone]  with any changes, questions or concerns.     Plan made per [facility] anticoagulation protocol    Arin Sullivan RN  Anticoagulation Clinic  6/21/2022    _______________________________________________________________________     Anticoagulation Episode Summary     Current INR goal:  2.0-3.0   TTR:  62.5 % (1 y)   Target end date:  Indefinite   Send INR reminders to:  [facility]    Indications    Atrial fibrillation (H) [I48.91]  Long-term (current) use of anticoagulants [Z79.01] [Z79.01]  Atrial fibrillation  unspecified type (H) [I48.91]           Comments:           Anticoagulation Care Providers     Provider Role Specialty Phone number    Amanda Renee MD Referring Family Medicine [phone]

## 2022-07-19 ENCOUNTER — LAB (OUTPATIENT)
Dept: LAB | Facility: CLINIC | Age: 87
End: 2022-07-19
Payer: COMMERCIAL

## 2022-07-19 ENCOUNTER — ANTICOAGULATION THERAPY VISIT (OUTPATIENT)
Dept: ANTICOAGULATION | Facility: CLINIC | Age: 87
End: 2022-07-19

## 2022-07-19 DIAGNOSIS — N18.30 CHRONIC KIDNEY DISEASE, STAGE III (MODERATE) (H): Primary | ICD-10-CM

## 2022-07-19 DIAGNOSIS — I48.91 ATRIAL FIBRILLATION (H): Primary | ICD-10-CM

## 2022-07-19 DIAGNOSIS — I48.91 ATRIAL FIBRILLATION, UNSPECIFIED TYPE (H): ICD-10-CM

## 2022-07-19 DIAGNOSIS — Z79.01 LONG TERM CURRENT USE OF ANTICOAGULANT THERAPY: ICD-10-CM

## 2022-07-19 DIAGNOSIS — I48.91 ATRIAL FIBRILLATION (H): ICD-10-CM

## 2022-07-19 LAB — INR BLD: 2.2 (ref 0.9–1.1)

## 2022-07-19 PROCEDURE — 36416 COLLJ CAPILLARY BLOOD SPEC: CPT

## 2022-07-19 PROCEDURE — 85610 PROTHROMBIN TIME: CPT

## 2022-07-19 NOTE — PROGRESS NOTES
ANTICOAGULATION MANAGEMENT     Pooja Swartz 89 year old female is on warfarin with therapeutic INR result. (Goal INR 2.0-3.0)    Recent labs: (last 7 days)     07/19/22  0956   INR 2.2*       ASSESSMENT     Source(s): Patient/Caregiver Call     Warfarin doses taken: Warfarin taken as instructed  Diet: No new diet changes identified  New illness, injury, or hospitalization: No  Medication/supplement changes: None noted  Signs or symptoms of bleeding or clotting: No  Previous INR: Therapeutic last 2(+) visits  Additional findings: None       PLAN     Recommended plan for no diet, medication or health factor changes affecting INR     Dosing Instructions: continue your current warfarin dose with next INR in 5 weeks       Summary  As of 7/19/2022    Full warfarin instructions:  2 mg every Mon; 4 mg all other days   Next INR check:  8/23/2022             Telephone call with Pooja who verbalizes understanding and agrees to plan    Lab visit scheduled    Education provided: Please call back if any changes to your diet, medications or how you've been taking warfarin and Contact [phone]  with any changes, questions or concerns.     Plan made per [facility] anticoagulation protocol    Dillon Fox RN  Anticoagulation Clinic  7/19/2022    _______________________________________________________________________     Anticoagulation Episode Summary     Current INR goal:  2.0-3.0   TTR:  62.5 % (1 y)   Target end date:  Indefinite   Send INR reminders to:  [facility]    Indications    Atrial fibrillation (H) [I48.91]  Long-term (current) use of anticoagulants [Z79.01] [Z79.01]  Atrial fibrillation  unspecified type (H) [I48.91]           Comments:           Anticoagulation Care Providers     Provider Role Specialty Phone number    Amanda Renee MD Referring Family Medicine [phone]

## 2022-07-25 ENCOUNTER — OFFICE VISIT (OUTPATIENT)
Dept: DERMATOLOGY | Facility: CLINIC | Age: 87
End: 2022-07-25
Payer: COMMERCIAL

## 2022-07-25 DIAGNOSIS — Z85.828 HISTORY OF SKIN CANCER: Primary | ICD-10-CM

## 2022-07-25 PROCEDURE — 99213 OFFICE O/P EST LOW 20 MIN: CPT | Performed by: DERMATOLOGY

## 2022-07-25 ASSESSMENT — PAIN SCALES - GENERAL: PAINLEVEL: NO PAIN (0)

## 2022-07-25 NOTE — PROGRESS NOTES
Pooja Swartz is an extremely pleasant 89 year old year old female patient here today for hx of non-melanoma skin cancer nose check , well healed a little bumpy.   She notes laceration right hand slowly healing. Patient has no other skin complaints today.  Remainder of the HPI, Meds, PMH, Allergies, FH, and SH was reviewed in chart.      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, subsequent encounter 1/9/2017     Renal insufficiency      Sinus node dysfunction (H)      Unspecified essential hypertension        Past Surgical History:   Procedure Laterality Date     ARTHROPLASTY REVISION HIP Left 8/19/2016    Procedure: ARTHROPLASTY REVISION HIP;  Surgeon: Kael Rojas MD;  Location:  OR      VASCULAR SURGERY PROCEDURE UNLIST       OPEN REDUCTION INTERNAL FIXATION FEMUR PROXIMAL Left 8/19/2016    Procedure: OPEN REDUCTION INTERNAL FIXATION FEMUR PROXIMAL;  Surgeon: Kael Rojas MD;  Location: [facility] OR     SURGICAL HISTORY OF -   11/1992    skin cancer, nose     SURGICAL HISTORY OF -   1978    right, vein stripping     SURGICAL HISTORY OF -   1968    breast biopsy     SURGICAL HISTORY OF -   1993    laparoscopic cholecystectomy     SURGICAL HISTORY OF -       tonsillectomy and adenoidectomy     SURGICAL HISTORY OF -   04/2004    left cataract     SURGICAL HISTORY OF -   08/09/2004    right total shoulder arthroplasty     [facility] PELVIS/HIP JOINT SURGERY UNLISTED       [facility] SHOULDER SURG PROC UNLISTED       [facility] STOMACH SURGERY PROCEDURE UNLISTED          Family History   Problem Relation Age of  Onset     Heart Disease Mother         CHF     Hypertension Mother      Hypertension Father      Hypertension Maternal Grandmother      Hypertension Maternal Grandfather      Hypertension Son      Heart Disease Son         MI       Social History     Socioeconomic History     Marital status:      Spouse name: Not on file     Number of children: Not on file     Years of education: Not on file     Highest education level: Not on file   Occupational History     Not on file   Tobacco Use     Smoking status: Never Smoker     Smokeless tobacco: Never Used   Vaping Use     Vaping Use: Never used   Substance and Sexual Activity     Alcohol use: Yes     Comment: rare     Drug use: No     Sexual activity: Not Currently     Birth control/protection: None   Other Topics Concern     Parent/sibling w/ CABG, MI or angioplasty before 65F 55M? Yes   Social History Narrative     Not on file     Social Determinants of Health     Financial Resource Strain: Not on file   Food Insecurity: Not on file   Transportation Needs: Not on file   Physical Activity: Not on file   Stress: Not on file   Social Connections: Not on file   Intimate Partner Violence: Not on file   Housing Stability: Not on file       Outpatient Encounter Medications as of 7/25/2022   Medication Sig Dispense Refill     allopurinol (ZYLOPRIM) 100 MG tablet Take 1 tablet (100 mg) by mouth daily 90 tablet 3     amLODIPine (NORVASC) 10 MG tablet Take 1 tablet (10 mg) by mouth daily 90 tablet 3     budesonide (ENTOCORT EC) 3 MG EC capsule TAKE 1 CAPSULE BY MOUTH EVERY MORNING 30 capsule 6     Calcium-Vitamin D 600-200 MG-UNIT TABS Take 1 tablet by mouth 2 times daily       dorzolamide (TRUSOPT) 2 % ophthalmic solution instill 1 drop by ophthalmic route 3 times every day into both eyes       Fexofenadine HCl (ALLEGRA PO) Take 180 mg by mouth daily        lisinopril (ZESTRIL) 2.5 MG tablet Take 1 tablet (2.5 mg) by mouth every evening 90 tablet 3     ONE-A-DAY WOMENS OR  TABS Take 1 tablet by mouth daily        warfarin ANTICOAGULANT (COUMADIN) 2 MG tablet Take 5 mg every Mon; 4 mg all other days or as directed by the Anticoagulation Clinic 190 tablet 1     latanoprost (XALATAN) 0.005 % ophthalmic solution Place 1 drop into both eyes At Bedtime 1.5 mL 0     No facility-administered encounter medications on file as of 7/25/2022.             O:   NAD, WDWN, Alert & Oriented, Mood & Affect wnl, Vitals stable   Here today with friend   General appearance normal   Vitals stable   Alert, oriented and in no acute distress     Nose well healed with sebaceous hyperplasia   R hand laceration superficial healing      Eyes: Conjunctivae/lids:Normal     ENT: Lips, buccal mucosa, tongue: normal    MSK:Normal    Cardiovascular: peripheral edema none    Pulm: Breathing Normal    Neuro/Psych: Orientation:Alert and Orientedx3 ; Mood/Affect:normal       A/P:  1. Hx of non-melanoma skin cancer   Abrasion discussed with patient she will wait 4 months  2. lacartion superficial  Wound dressed and open wound discussed with patient   It was a pleasure speaking to Pooja Swartz today.  Previous clinic notes and pertinent laboratory tests were reviewed prior to Pooja Swartz's visit.

## 2022-07-25 NOTE — NURSING NOTE
Chief Complaint   Patient presents with     Derm Problem     3 month follow up, Nose        There were no vitals filed for this visit.  Wt Readings from Last 1 Encounters:   12/13/21 56.2 kg (124 lb)       Carlene Gan LPN .................7/25/2022

## 2022-07-25 NOTE — LETTER
7/25/2022         RE: Pooja Swartz  [id number]  [address]  [place] [address]        Dear Colleague,    Thank you for referring your patient, Pooja Swartz, to the [facility]. Please see a copy of my visit note below.    Pooja Swartz is an extremely pleasant 89 year old year old female patient here today for hx of non-melanoma skin cancer nose check , well healed a little bumpy.   She notes laceration right hand slowly healing. Patient has no other skin complaints today.  Remainder of the HPI, Meds, PMH, Allergies, FH, and SH was reviewed in chart.      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, subsequent encounter 1/9/2017     Renal insufficiency      Sinus node dysfunction (H)      Unspecified essential hypertension        Past Surgical History:   Procedure Laterality Date     ARTHROPLASTY REVISION HIP Left 8/19/2016    Procedure: ARTHROPLASTY REVISION HIP;  Surgeon: Kael Rojas MD;  [facility]      VASCULAR SURGERY PROCEDURE UNLIST       OPEN REDUCTION INTERNAL FIXATION FEMUR PROXIMAL Left 8/19/2016    Procedure: OPEN REDUCTION INTERNAL FIXATION FEMUR PROXIMAL;  Surgeon: Kael Rojas MD;  [facility] OR     SURGICAL HISTORY OF -   11/1992    skin cancer, nose     SURGICAL HISTORY OF -   1978    right, vein stripping     SURGICAL HISTORY OF -   1968    breast biopsy     SURGICAL HISTORY OF -   1993    laparoscopic cholecystectomy     SURGICAL HISTORY OF -       tonsillectomy and adenoidectomy     SURGICAL HISTORY OF -   04/2004     left cataract     SURGICAL HISTORY OF -   08/09/2004    right total shoulder arthroplasty     [facility] PELVIS/HIP JOINT SURGERY UNLISTED       [facility] SHOULDER SURG PROC UNLISTED       [facility] STOMACH SURGERY PROCEDURE UNLISTED          Family History   Problem Relation Age of Onset     Heart Disease Mother         CHF     Hypertension Mother      Hypertension Father      Hypertension Maternal Grandmother      Hypertension Maternal Grandfather      Hypertension Son      Heart Disease Son         MI       Social History     Socioeconomic History     Marital status:      Spouse name: Not on file     Number of children: Not on file     Years of education: Not on file     Highest education level: Not on file   Occupational History     Not on file   Tobacco Use     Smoking status: Never Smoker     Smokeless tobacco: Never Used   Vaping Use     Vaping Use: Never used   Substance and Sexual Activity     Alcohol use: Yes     Comment: rare     Drug use: No     Sexual activity: Not Currently     Birth control/protection: None   Other Topics Concern     Parent/sibling w/ CABG, MI or angioplasty before 65F 55M? Yes   Social History Narrative     Not on file     Social Determinants of Health     Financial Resource Strain: Not on file   Food Insecurity: Not on file   Transportation Needs: Not on file   Physical Activity: Not on file   Stress: Not on file   Social Connections: Not on file   Intimate Partner Violence: Not on file   Housing Stability: Not on file       Outpatient Encounter Medications as of 7/25/2022   Medication Sig Dispense Refill     allopurinol (ZYLOPRIM) 100 MG tablet Take 1 tablet (100 mg) by mouth daily 90 tablet 3     amLODIPine (NORVASC) 10 MG tablet Take 1 tablet (10 mg) by mouth daily 90 tablet 3     budesonide (ENTOCORT EC) 3 MG EC capsule TAKE 1 CAPSULE BY MOUTH EVERY MORNING 30 capsule 6     Calcium-Vitamin D 600-200 MG-UNIT TABS Take 1 tablet by mouth 2 times daily       dorzolamide (TRUSOPT) 2 % ophthalmic  solution instill 1 drop by ophthalmic route 3 times every day into both eyes       Fexofenadine HCl (ALLEGRA PO) Take 180 mg by mouth daily        lisinopril (ZESTRIL) 2.5 MG tablet Take 1 tablet (2.5 mg) by mouth every evening 90 tablet 3     ONE-A-DAY WOMENS OR TABS Take 1 tablet by mouth daily        warfarin ANTICOAGULANT (COUMADIN) 2 MG tablet Take 5 mg every Mon; 4 mg all other days or as directed by the Anticoagulation Clinic 190 tablet 1     latanoprost (XALATAN) 0.005 % ophthalmic solution Place 1 drop into both eyes At Bedtime 1.5 mL 0     No facility-administered encounter medications on file as of 7/25/2022.             O:   NAD, WDWN, Alert & Oriented, Mood & Affect wnl, Vitals stable   Here today with friend   General appearance normal   Vitals stable   Alert, oriented and in no acute distress     Nose well healed with sebaceous hyperplasia   R hand laceration superficial healing      Eyes: Conjunctivae/lids:Normal     ENT: Lips, buccal mucosa, tongue: normal    MSK:Normal    Cardiovascular: peripheral edema none    Pulm: Breathing Normal    Neuro/Psych: Orientation:Alert and Orientedx3 ; Mood/Affect:normal       A/P:  1. Hx of non-melanoma skin cancer   Abrasion discussed with patient she will wait 4 months  2. lacartion superficial  Wound dressed and open wound discussed with patient   It was a pleasure speaking to Pooja Swartz today.  Previous clinic notes and pertinent laboratory tests were reviewed prior to Pooja Swartz's visit.        Again, thank you for allowing me to participate in the care of your patient.        Sincerely,        Myron Sheppard MD

## 2022-07-26 ENCOUNTER — TELEPHONE (OUTPATIENT)
Dept: FAMILY MEDICINE | Facility: CLINIC | Age: 87
End: 2022-07-26

## 2022-07-26 DIAGNOSIS — R26.89 IMPAIRED GAIT AND MOBILITY: Primary | ICD-10-CM

## 2022-07-26 NOTE — TELEPHONE ENCOUNTER
Order signed and printed/placed in css inbox  Please call and inform patient (or caregiver)  Elisabeth Marte MD

## 2022-07-26 NOTE — TELEPHONE ENCOUNTER
Reason for call:    Symptom or request:     Patient called requesting an order for seated 4-wheeled walker with basket.     [facility] [place] location.     Patient using a borrow walker which is not the same. She uses daily, her recently broke.           Best Time:  any    Can we leave a detailed message on this number?  YES     Nichole FAUST  Station

## 2022-08-05 NOTE — TELEPHONE ENCOUNTER
Patient calls the clinic to inquire if the wheelchair order was completed, see below, patient is notified order is completed, she is  given [facility] to contact.      LOKESH Ochoa

## 2022-08-23 ENCOUNTER — ANTICOAGULATION THERAPY VISIT (OUTPATIENT)
Dept: ANTICOAGULATION | Facility: CLINIC | Age: 87
End: 2022-08-23

## 2022-08-23 ENCOUNTER — LAB (OUTPATIENT)
Dept: LAB | Facility: CLINIC | Age: 87
End: 2022-08-23
Payer: COMMERCIAL

## 2022-08-23 DIAGNOSIS — Z79.01 LONG TERM CURRENT USE OF ANTICOAGULANT THERAPY: Primary | ICD-10-CM

## 2022-08-23 DIAGNOSIS — I48.91 ATRIAL FIBRILLATION (H): ICD-10-CM

## 2022-08-23 DIAGNOSIS — I48.91 ATRIAL FIBRILLATION, UNSPECIFIED TYPE (H): ICD-10-CM

## 2022-08-23 DIAGNOSIS — Z79.01 LONG TERM CURRENT USE OF ANTICOAGULANT THERAPY: ICD-10-CM

## 2022-08-23 LAB — INR BLD: 2 (ref 0.9–1.1)

## 2022-08-23 PROCEDURE — 36416 COLLJ CAPILLARY BLOOD SPEC: CPT

## 2022-08-23 PROCEDURE — 85610 PROTHROMBIN TIME: CPT

## 2022-08-23 NOTE — PROGRESS NOTES
ANTICOAGULATION MANAGEMENT     Pooja Swartz 89 year old female is on warfarin with therapeutic INR result. (Goal INR 2.0-3.0)    Recent labs: (last 7 days)     08/23/22  0932   INR 2.0*       ASSESSMENT     Source(s): Chart Review and Patient/Caregiver Call     Warfarin doses taken: Warfarin taken as instructed  Diet: No new diet changes identified  New illness, injury, or hospitalization: No  Medication/supplement changes: None noted  Signs or symptoms of bleeding or clotting: No  Previous INR: Therapeutic last 2(+) visits  Additional findings: None       PLAN     Recommended plan for no diet, medication or health factor changes affecting INR     Dosing Instructions: Continue your current warfarin dose with next INR in 6 weeks       Summary  As of 8/23/2022    Full warfarin instructions:  2 mg every Mon; 4 mg all other days   Next INR check:  10/4/2022             Telephone call with Pooja who verbalizes understanding and agrees to plan    Lab visit scheduled    Education provided: Please call back if any changes to your diet, medications or how you've been taking warfarin    Plan made per [facility] anticoagulation protocol    Aleida Jenkins RN  Anticoagulation Clinic  8/23/2022    _______________________________________________________________________     Anticoagulation Episode Summary     Current INR goal:  2.0-3.0   TTR:  62.5 % (1 y)   Target end date:  Indefinite   Send INR reminders to:  [facility]    Indications    Atrial fibrillation (H) [I48.91]  Long-term (current) use of anticoagulants [Z79.01] [Z79.01]  Atrial fibrillation  unspecified type (H) [I48.91]           Comments:           Anticoagulation Care Providers     Provider Role Specialty Phone number    Amanda Renee MD Referring Family Medicine [phone]

## 2022-09-18 DIAGNOSIS — I48.91 ATRIAL FIBRILLATION, UNSPECIFIED TYPE (H): ICD-10-CM

## 2022-09-18 DIAGNOSIS — Z79.01 ANTICOAGULATED ON COUMADIN: ICD-10-CM

## 2022-09-18 DIAGNOSIS — I48.91 ATRIAL FIBRILLATION (H): ICD-10-CM

## 2022-09-18 DIAGNOSIS — Z79.01 LONG TERM CURRENT USE OF ANTICOAGULANT THERAPY: ICD-10-CM

## 2022-09-19 RX ORDER — WARFARIN SODIUM 2 MG/1
TABLET ORAL
Qty: 190 TABLET | Refills: 1 | Status: SHIPPED | OUTPATIENT
Start: 2022-09-19 | End: 2023-01-01

## 2022-09-22 ENCOUNTER — OFFICE VISIT (OUTPATIENT)
Dept: FAMILY MEDICINE | Facility: CLINIC | Age: 87
End: 2022-09-22
Payer: COMMERCIAL

## 2022-09-22 VITALS
WEIGHT: 118 LBS | DIASTOLIC BLOOD PRESSURE: 58 MMHG | SYSTOLIC BLOOD PRESSURE: 138 MMHG | HEIGHT: 60 IN | TEMPERATURE: 98.4 F | OXYGEN SATURATION: 99 % | BODY MASS INDEX: 23.16 KG/M2 | RESPIRATION RATE: 16 BRPM | HEART RATE: 69 BPM

## 2022-09-22 DIAGNOSIS — H61.21 IMPACTED CERUMEN OF RIGHT EAR: Primary | ICD-10-CM

## 2022-09-22 DIAGNOSIS — I49.5 SINUS NODE DYSFUNCTION (H): ICD-10-CM

## 2022-09-22 DIAGNOSIS — B18.1 HEPATITIS B CARRIER (H): ICD-10-CM

## 2022-09-22 DIAGNOSIS — Z23 NEED FOR PROPHYLACTIC VACCINATION AND INOCULATION AGAINST INFLUENZA: ICD-10-CM

## 2022-09-22 DIAGNOSIS — N18.31 STAGE 3A CHRONIC KIDNEY DISEASE (H): ICD-10-CM

## 2022-09-22 PROCEDURE — 99207 PR NO CHARGE LOS: CPT | Performed by: FAMILY MEDICINE

## 2022-09-22 PROCEDURE — 69209 REMOVE IMPACTED EAR WAX UNI: CPT | Mod: RT | Performed by: FAMILY MEDICINE

## 2022-09-22 PROCEDURE — G0008 ADMIN INFLUENZA VIRUS VAC: HCPCS | Performed by: FAMILY MEDICINE

## 2022-09-22 PROCEDURE — 90662 IIV NO PRSV INCREASED AG IM: CPT | Performed by: FAMILY MEDICINE

## 2022-09-22 RX ORDER — LATANOPROSTENE BUNOD 0.24 MG/ML
1 SOLUTION/ DROPS OPHTHALMIC AT BEDTIME
COMMUNITY
Start: 2022-08-17

## 2022-09-22 ASSESSMENT — ENCOUNTER SYMPTOMS
ENDOCRINE NEGATIVE: 1
MUSCULOSKELETAL NEGATIVE: 1
PSYCHIATRIC NEGATIVE: 1
HEMATOLOGIC/LYMPHATIC NEGATIVE: 1
GASTROINTESTINAL NEGATIVE: 1
CONSTITUTIONAL NEGATIVE: 1
RESPIRATORY NEGATIVE: 1
ALLERGIC/IMMUNOLOGIC NEGATIVE: 1
CARDIOVASCULAR NEGATIVE: 1
NEUROLOGICAL NEGATIVE: 1

## 2022-09-22 ASSESSMENT — PAIN SCALES - GENERAL: PAINLEVEL: NO PAIN (0)

## 2022-09-22 NOTE — PROGRESS NOTES
Assessment & Plan     Impacted cerumen of right ear  Ear irrigation performed successfully    Need for prophylactic vaccination and inoculation against influenza  - INFLUENZA, QUAD, HIGH DOSE, PF, 65YR + (FLUZONE HD)  - ADMIN INFLUENZA (For MEDICARE Patients ONLY) []    Sinus node dysfunction (H)  Stable     Hepatitis B carrier (H)  Stable     Stage 3a chronic kidney disease (H)  stable    [id number]}     FUTURE APPOINTMENTS:       - Follow-up visit in one month or sooner as needed.    Return in about 4 weeks (around 10/20/2022) for Follow up.    Marquise Contreras MD  [facility]    Daniella Patton is a 89 year old accompanied by her self, presenting for the following health issues:    89 yr old female here for ear impaction. She has had this for few weeks. She said she noticed that her right ear felt plugged.she also noticed that her hearing was a bit reduced. She has been using debrox for this   Ear Plugs (Has noticed the right ear plugging over the last few weeks.  Decrease in hearing also.  No pain or drainage.  She has been using Debrox the last two days.), Imm/Inj (Flu shot today./), and Imm/Inj (Flu Shot)      HPI     Chief Complaint   Patient presents with     Ear Plugs     Has noticed the right ear plugging over the last few weeks.  Decrease in hearing also.  No pain or drainage.  She has been using Debrox the last two days.     Imm/Inj     Flu shot today.       Imm/Inj     Flu Shot           Review of Systems   Constitutional: Negative.    HENT: Positive for ear pain.    Respiratory: Negative.    Cardiovascular: Negative.    Gastrointestinal: Negative.    Endocrine: Negative.    Breasts:  negative.    Genitourinary: Negative.    Musculoskeletal: Negative.    Skin: Negative.    Allergic/Immunologic: Negative.    Neurological: Negative.    Hematological: Negative.    Psychiatric/Behavioral: Negative.             Objective    /58   Pulse 69   Temp 98.4  F (36.9  C)  (Tympanic)   Resp 16   Ht 1.524 m (5')   Wt 53.5 kg (118 lb)   LMP 07/07/1960   SpO2 99%   BMI 23.05 kg/m    Body mass index is 23.05 kg/m .  Physical Exam  Constitutional:       Appearance: Normal appearance.   HENT:      Head: Normocephalic and atraumatic.      Right Ear: There is impacted cerumen.      Left Ear: Tympanic membrane, ear canal and external ear normal.   Musculoskeletal:         General: Normal range of motion.   Skin:     General: Skin is warm and dry.   Neurological:      Mental Status: She is alert and oriented to person, place, and time.   Psychiatric:         Mood and Affect: Mood normal.         Behavior: Behavior normal.

## 2022-10-04 ENCOUNTER — ANTICOAGULATION THERAPY VISIT (OUTPATIENT)
Dept: ANTICOAGULATION | Facility: CLINIC | Age: 87
End: 2022-10-04

## 2022-10-04 ENCOUNTER — LAB (OUTPATIENT)
Dept: LAB | Facility: CLINIC | Age: 87
End: 2022-10-04
Payer: COMMERCIAL

## 2022-10-04 DIAGNOSIS — I48.91 ATRIAL FIBRILLATION, UNSPECIFIED TYPE (H): ICD-10-CM

## 2022-10-04 DIAGNOSIS — Z79.01 LONG TERM CURRENT USE OF ANTICOAGULANT THERAPY: ICD-10-CM

## 2022-10-04 DIAGNOSIS — I48.91 ATRIAL FIBRILLATION (H): ICD-10-CM

## 2022-10-04 DIAGNOSIS — N18.30 CHRONIC KIDNEY DISEASE, STAGE III (MODERATE) (H): Primary | ICD-10-CM

## 2022-10-04 DIAGNOSIS — I48.91 ATRIAL FIBRILLATION (H): Primary | ICD-10-CM

## 2022-10-04 LAB — INR BLD: 1.9 (ref 0.9–1.1)

## 2022-10-04 PROCEDURE — 36416 COLLJ CAPILLARY BLOOD SPEC: CPT

## 2022-10-04 PROCEDURE — 85610 PROTHROMBIN TIME: CPT

## 2022-10-04 NOTE — PROGRESS NOTES
ANTICOAGULATION MANAGEMENT     Pooja Swartz 89 year old female is on warfarin with subtherapeutic INR result. (Goal INR 2.0-3.0)    Recent labs: (last 7 days)     10/04/22  1014   INR 1.9*       ASSESSMENT     Source(s): Chart Review and Patient/Caregiver Call     Warfarin doses taken: Warfarin taken as instructed  Diet: Increased greens/vitamin K in diet; plans to resume previous intake  New illness, injury, or hospitalization: No  Medication/supplement changes: None noted  Signs or symptoms of bleeding or clotting: No  Previous INR: Therapeutic last 2(+) visits  Additional findings: None       PLAN     Recommended plan for temporary change(s) affecting INR     Dosing Instructions: Continue your current warfarin dose with next INR in 2 weeks-to save another trip to the clinic will plan to check after provider office visit in about 10 days. Last INR was at bottom of range at 2.0, so may need to increase dose to allow for intake of greens if patient prefers to keep eating them.       Summary  As of 10/4/2022    Full warfarin instructions:  2 mg every Mon; 4 mg all other days   Next INR check:  10/13/2022             Telephone call with Pooja who verbalizes understanding and agrees to plan    Lab visit scheduled    Education provided: Importance of consistent vitamin K intake and Contact [phone]  with any changes, questions or concerns.     Plan made per [facility] anticoagulation protocol    Mary Faith RN  Anticoagulation Clinic  10/4/2022    _______________________________________________________________________     Anticoagulation Episode Summary     Current INR goal:  2.0-3.0   TTR:  61.6 % (1 y)   Target end date:  Indefinite   Send INR reminders to:  [facility]    Indications    Atrial fibrillation (H) [I48.91]  Long-term (current) use of anticoagulants [Z79.01] [Z79.01]  Atrial fibrillation  unspecified type (H) [I48.91]           Comments:           Anticoagulation Care Providers      Provider Role Specialty Phone number    Amanda Renee MD Referring Family Medicine [phone]

## 2022-10-13 ENCOUNTER — ANTICOAGULATION THERAPY VISIT (OUTPATIENT)
Dept: ANTICOAGULATION | Facility: CLINIC | Age: 87
End: 2022-10-13

## 2022-10-13 ENCOUNTER — OFFICE VISIT (OUTPATIENT)
Dept: FAMILY MEDICINE | Facility: CLINIC | Age: 87
End: 2022-10-13
Payer: COMMERCIAL

## 2022-10-13 ENCOUNTER — ANCILLARY PROCEDURE (OUTPATIENT)
Dept: GENERAL RADIOLOGY | Facility: CLINIC | Age: 87
End: 2022-10-13
Attending: NURSE PRACTITIONER
Payer: COMMERCIAL

## 2022-10-13 ENCOUNTER — LAB (OUTPATIENT)
Dept: LAB | Facility: CLINIC | Age: 87
End: 2022-10-13
Payer: COMMERCIAL

## 2022-10-13 VITALS
HEIGHT: 60 IN | SYSTOLIC BLOOD PRESSURE: 136 MMHG | BODY MASS INDEX: 23.56 KG/M2 | OXYGEN SATURATION: 99 % | DIASTOLIC BLOOD PRESSURE: 50 MMHG | WEIGHT: 120 LBS | RESPIRATION RATE: 16 BRPM | HEART RATE: 71 BPM | TEMPERATURE: 97.5 F

## 2022-10-13 DIAGNOSIS — M25.551 HIP PAIN, RIGHT: ICD-10-CM

## 2022-10-13 DIAGNOSIS — N18.30 CHRONIC KIDNEY DISEASE, STAGE III (MODERATE) (H): ICD-10-CM

## 2022-10-13 DIAGNOSIS — N18.31 STAGE 3A CHRONIC KIDNEY DISEASE (H): ICD-10-CM

## 2022-10-13 DIAGNOSIS — I48.91 ATRIAL FIBRILLATION (H): Primary | ICD-10-CM

## 2022-10-13 DIAGNOSIS — M1A.9XX1 CHRONIC TOPHACEOUS GOUT: ICD-10-CM

## 2022-10-13 DIAGNOSIS — Z79.01 LONG TERM CURRENT USE OF ANTICOAGULANT THERAPY: ICD-10-CM

## 2022-10-13 DIAGNOSIS — L03.113 CELLULITIS OF FOREARM, RIGHT: ICD-10-CM

## 2022-10-13 DIAGNOSIS — M70.61 TROCHANTERIC BURSITIS OF RIGHT HIP: ICD-10-CM

## 2022-10-13 DIAGNOSIS — I48.91 ATRIAL FIBRILLATION (H): ICD-10-CM

## 2022-10-13 DIAGNOSIS — I48.91 ATRIAL FIBRILLATION, UNSPECIFIED TYPE (H): ICD-10-CM

## 2022-10-13 DIAGNOSIS — I10 HYPERTENSION GOAL BP (BLOOD PRESSURE) < 140/90: Primary | ICD-10-CM

## 2022-10-13 DIAGNOSIS — S51.819A SKIN TEAR OF FOREARM WITHOUT COMPLICATION, INITIAL ENCOUNTER: ICD-10-CM

## 2022-10-13 DIAGNOSIS — Z23 HIGH PRIORITY FOR 2019-NCOV VACCINE: ICD-10-CM

## 2022-10-13 DIAGNOSIS — I49.5 SINUS NODE DYSFUNCTION (H): ICD-10-CM

## 2022-10-13 DIAGNOSIS — B18.1 HEPATITIS B CARRIER (H): ICD-10-CM

## 2022-10-13 LAB
ANION GAP SERPL CALCULATED.3IONS-SCNC: 13 MMOL/L (ref 7–15)
BUN SERPL-MCNC: 23.2 MG/DL (ref 8–23)
CALCIUM SERPL-MCNC: 9.8 MG/DL (ref 8.2–9.6)
CHLORIDE SERPL-SCNC: 102 MMOL/L (ref 98–107)
CREAT SERPL-MCNC: 0.82 MG/DL (ref 0.51–0.95)
DEPRECATED HCO3 PLAS-SCNC: 23 MMOL/L (ref 22–29)
GFR SERPL CREATININE-BSD FRML MDRD: 65 ML/MIN/1.73M2
GLUCOSE SERPL-MCNC: 126 MG/DL (ref 70–99)
HGB BLD-MCNC: 10.9 G/DL (ref 11.7–15.7)
INR BLD: 2.4 (ref 0.9–1.1)
POTASSIUM SERPL-SCNC: 4.1 MMOL/L (ref 3.4–5.3)
SODIUM SERPL-SCNC: 138 MMOL/L (ref 136–145)
URATE SERPL-MCNC: 4 MG/DL (ref 2.4–5.7)

## 2022-10-13 PROCEDURE — 73501 X-RAY EXAM HIP UNI 1 VIEW: CPT | Mod: TC | Performed by: RADIOLOGY

## 2022-10-13 PROCEDURE — 91312 COVID-19,PF,PFIZER BOOSTER BIVALENT: CPT | Performed by: NURSE PRACTITIONER

## 2022-10-13 PROCEDURE — 84550 ASSAY OF BLOOD/URIC ACID: CPT

## 2022-10-13 PROCEDURE — 36415 COLL VENOUS BLD VENIPUNCTURE: CPT

## 2022-10-13 PROCEDURE — 85610 PROTHROMBIN TIME: CPT

## 2022-10-13 PROCEDURE — 20610 DRAIN/INJ JOINT/BURSA W/O US: CPT | Mod: RT | Performed by: NURSE PRACTITIONER

## 2022-10-13 PROCEDURE — 0124A COVID-19,PF,PFIZER BOOSTER BIVALENT: CPT | Performed by: NURSE PRACTITIONER

## 2022-10-13 PROCEDURE — 85018 HEMOGLOBIN: CPT

## 2022-10-13 PROCEDURE — 99214 OFFICE O/P EST MOD 30 MIN: CPT | Mod: 25 | Performed by: NURSE PRACTITIONER

## 2022-10-13 PROCEDURE — 80048 BASIC METABOLIC PNL TOTAL CA: CPT

## 2022-10-13 RX ORDER — CEPHALEXIN 500 MG/1
500 CAPSULE ORAL 3 TIMES DAILY
Qty: 21 CAPSULE | Refills: 0 | Status: SHIPPED | OUTPATIENT
Start: 2022-10-13 | End: 2022-10-20

## 2022-10-13 RX ORDER — LIDOCAINE HYDROCHLORIDE 10 MG/ML
1 INJECTION, SOLUTION INFILTRATION; PERINEURAL
Status: DISCONTINUED | OUTPATIENT
Start: 2022-10-13 | End: 2022-10-24

## 2022-10-13 RX ORDER — ALLOPURINOL 100 MG/1
100 TABLET ORAL DAILY
Qty: 90 TABLET | Refills: 2 | Status: SHIPPED | OUTPATIENT
Start: 2022-10-13 | End: 2023-01-01

## 2022-10-13 RX ORDER — TRIAMCINOLONE ACETONIDE 40 MG/ML
20 INJECTION, SUSPENSION INTRA-ARTICULAR; INTRAMUSCULAR
Status: DISCONTINUED | OUTPATIENT
Start: 2022-10-13 | End: 2022-10-24

## 2022-10-13 RX ORDER — TRIAMCINOLONE ACETONIDE 40 MG/ML
40 INJECTION, SUSPENSION INTRA-ARTICULAR; INTRAMUSCULAR ONCE
Status: COMPLETED | OUTPATIENT
Start: 2022-10-13 | End: 2022-10-13

## 2022-10-13 RX ORDER — AMLODIPINE BESYLATE 10 MG/1
10 TABLET ORAL DAILY
Qty: 90 TABLET | Refills: 3 | Status: SHIPPED | OUTPATIENT
Start: 2022-10-13 | End: 2023-01-01

## 2022-10-13 RX ORDER — LISINOPRIL 2.5 MG/1
2.5 TABLET ORAL EVERY EVENING
Qty: 90 TABLET | Refills: 3 | Status: SHIPPED | OUTPATIENT
Start: 2022-10-13 | End: 2023-01-01

## 2022-10-13 RX ADMIN — TRIAMCINOLONE ACETONIDE 40 MG: 40 INJECTION, SUSPENSION INTRA-ARTICULAR; INTRAMUSCULAR at 15:36

## 2022-10-13 RX ADMIN — LIDOCAINE HYDROCHLORIDE 1 ML: 10 INJECTION, SOLUTION INFILTRATION; PERINEURAL at 16:01

## 2022-10-13 RX ADMIN — TRIAMCINOLONE ACETONIDE 20 MG: 40 INJECTION, SUSPENSION INTRA-ARTICULAR; INTRAMUSCULAR at 16:01

## 2022-10-13 ASSESSMENT — PAIN SCALES - GENERAL: PAINLEVEL: NO PAIN (0)

## 2022-10-13 NOTE — PROGRESS NOTES
Assessment & Plan     Hypertension goal BP (blood pressure) < 140/90  Blood pressure under good control on current regimen of amlodipine.  Refills provided today she is without side effects no lower extremity swelling.  - amLODIPine (NORVASC) 10 MG tablet; Take 1 tablet (10 mg) by mouth daily  - lisinopril (ZESTRIL) 2.5 MG tablet; Take 1 tablet (2.5 mg) by mouth every evening    Trochanteric bursitis of right hip  2ml total Trochanteric injection of Kenalog with lidocaine completed today in clinic.  Patient tolerated procedure well is without complaint.  - lidocaine 1 % 1 mL  - triamcinolone (KENALOG-40) injection 40 mg    Hip pain, right  Hip x-ray shows good alignment without acute abnormalities.  Suspect related to trochanteric bursitis.  - XR Hip Right 1 View; Future    Skin tear of forearm without complication, initial encounter  Acute skin tear of the right forearm.  Possible localized cellulitis present with warmth and mild swelling.  Wound itself was cleaned and read bandaged.  See pictures as below.    Cellulitis of forearm, right  Cephalexin prescribed today to cover for acute cellulitis of the right forearm.  Follow-up recommendations and cleansing of the wound discussed.  - cephALEXin (KEFLEX) 500 MG capsule; Take 1 capsule (500 mg) by mouth 3 times daily for 7 days    Sinus node dysfunction (H)  Stable no concerns.  Asymptomatic.    Hepatitis B carrier (H)  Stable no concerns.    Atrial fibrillation, unspecified type (H)  Doing well on warfarin has been with an INR goal.    Stage 3a chronic kidney disease (H)  Labs completed today to evaluate for CKD.  Has been stable historically.  - lisinopril (ZESTRIL) 2.5 MG tablet; Take 1 tablet (2.5 mg) by mouth every evening    Chronic tophaceous gout  Tolerating allopurinol well.  Will check uric acid level today.  - allopurinol (ZYLOPRIM) 100 MG tablet; Take 1 tablet (100 mg) by mouth daily  - Uric acid; Future    High priority for 2019-nCoV vaccine  Given  today  - COVID-19,PF,PFIZER BOOSTER BIVALENT 12+Yrs       Patient Instructions   Follow up if your hip pain is not improved in the next week.     Your annual wellness visit is due 12/13/22.     Keep your wound clean and covered until healing. Okay to use Vaseline. Antibiotic prescribed to treat local infection. Follow up if worsening or not improving.       Return in about 9 weeks (around 12/15/2022) for Routine preventive.    REGIS Hays CNP  [facility]    Daniella Patton is a 89 year old, presenting for the following health issues:  Recheck Medication and Imm/Inj (COVID-19 VACCINE)      HPI     Hypertension Follow-up      Do you check your blood pressure regularly outside of the clinic? Yes     Are you following a low salt diet? Yes    Are your blood pressures ever more than 140 on the top number (systolic) OR more   than 90 on the bottom number (diastolic), for example 140/90? Yes occasssionally      How many servings of fruits and vegetables do you eat daily?  2-3    On average, how many sweetened beverages do you drink each day (Examples: soda, juice, sweet tea, etc.  Do NOT count diet or artificially sweetened beverages)?   0    How many days per week do you exercise enough to make your heart beat faster? 3 or less    How many minutes a day do you exercise enough to make your heart beat faster? 9 or less    How many days per week do you miss taking your medication? 0      Skin tear right forearm after hitting it on a door.  She bandaged this at home and has not removed the bandage since it occurred on Tuesday (2 days ago).  She is on warfarin and has been in therapeutic range.  She denies any localized pain or tenderness at this time that she has noticed.    Right hip pain present, using tylenol for pain management. She reports the pain is around her right hip. She had a ROBYN in 2009. She feels unsteady on her feet due to the pain.     Review of Systems    Constitutional, HEENT, cardiovascular, pulmonary, gi and gu systems are negative, except as otherwise noted.      Objective    /50 (BP Location: Right arm, Patient Position: Chair, Cuff Size: Adult Regular)   Pulse 71   Temp 97.5  F (36.4  C) (Tympanic)   Resp 16   Ht 1.524 m (5')   Wt 54.4 kg (120 lb)   LMP 07/07/1960   SpO2 99%   Breastfeeding No   BMI 23.44 kg/m    Body mass index is 23.44 kg/m .  Physical Exam   GENERAL: healthy, alert and no distress  NECK: no adenopathy, no asymmetry, masses, or scars and thyroid normal to palpation  RESP: lungs clear to auscultation - no rales, rhonchi or wheezes  CV: regular rate and rhythm, normal S1 S2, no S3 or S4, no murmur, click or rub, no peripheral edema and peripheral pulses strong  ABDOMEN: soft, nontender, no hepatosplenomegaly, no masses and bowel sounds normal  MS: no gross musculoskeletal defects noted, no edema.  Positive for acute right hip pain over the trochanteric bursa.  SKIN: no suspicious lesions or rashes and skin tear present on right forearm.  See pictures below.  Congealed clot removed and wound cleansed.  PSYCH: mentation appears normal, affect normal/bright                MSK: Large Joint Injection/Arthocentesis: R greater trochanteric bursa    Date/Time: 10/13/2022 4:01 PM  Performed by: Leydi Parrish APRN CNP  Authorized by: Leydi Parrish APRN CNP     Indications:  Pain  Needle Size:  21 G  Guidance: landmark guided    Approach:  Posterolateral  Location:  Hip      Site:  R greater trochanteric bursa  Medications:  20 mg triamcinolone 40 MG/ML; 1 mL lidocaine 1 %  Consent Given by:  Patient  Timeout: timeout called immediately prior to procedure    Prep: patient was prepped and draped in usual sterile fashion     Patient tolerated procedure well was without complaint.  Cleansed area with alcohol prep wipe utilizing clean technique patient was provided with anesthetic into the greater trochanteric bursa.  No blood loss.   Band-Aid applied.

## 2022-10-13 NOTE — PATIENT INSTRUCTIONS
Follow up if your hip pain is not improved in the next week.     Your annual wellness visit is due 12/13/22.     Keep your wound clean and covered until healing. Okay to use Vaseline. Antibiotic prescribed to treat local infection. Follow up if worsening or not improving.

## 2022-10-14 NOTE — PROGRESS NOTES
ANTICOAGULATION MANAGEMENT     Pooja Swartz 89 year old female is on warfarin with therapeutic INR result. (Goal INR 2.0-3.0)    Recent labs: (last 7 days)     10/13/22  1414   INR 2.4*       ASSESSMENT     Source(s): Chart Review and Patient/Caregiver Call     Warfarin doses taken: Warfarin taken as instructed  Diet: No new diet changes identified  New illness, injury, or hospitalization: Yes: skin tear that is infected  Medication/supplement changes: keflex started on 10-13 for 7 days subsequent INRs may increased. Closer INR monitoring recommended.  Signs or symptoms of bleeding or clotting: No  Previous INR: Subtherapeutic  Additional findings: had hip steroid injection 10-13       PLAN     Recommended plan for temporary change(s) affecting INR     Dosing Instructions: Continue your current warfarin dose with next INR in 5 days       Summary  As of 10/13/2022    Full warfarin instructions:  2 mg every Mon; 4 mg all other days   Next INR check:  10/18/2022             Telephone call with Pooja who verbalizes understanding and agrees to plan    Lab visit scheduled    Education provided: Please call back if any changes to your diet, medications or how you've been taking warfarin, Potential interaction between warfarin and keflex and Contact [phone]  with any changes, questions or concerns.     Plan made per [facility] anticoagulation protocol    Arin Sullivan RN  Anticoagulation Clinic  10/14/2022    _______________________________________________________________________     Anticoagulation Episode Summary     Current INR goal:  2.0-3.0   TTR:  63.8 % (1 y)   Target end date:  Indefinite   Send INR reminders to:  [facility]    Indications    Atrial fibrillation (H) [I48.91]  Long-term (current) use of anticoagulants [Z79.01] [Z79.01]  Atrial fibrillation  unspecified type (H) [I48.91]           Comments:           Anticoagulation Care Providers     Provider Role Specialty Phone number    Amanda Renee  MD [facility] Medicine [phone]

## 2022-10-18 ENCOUNTER — TELEPHONE (OUTPATIENT)
Dept: FAMILY MEDICINE | Facility: CLINIC | Age: 87
End: 2022-10-18

## 2022-10-18 ENCOUNTER — ANTICOAGULATION THERAPY VISIT (OUTPATIENT)
Dept: ANTICOAGULATION | Facility: CLINIC | Age: 87
End: 2022-10-18

## 2022-10-18 ENCOUNTER — ALLIED HEALTH/NURSE VISIT (OUTPATIENT)
Dept: FAMILY MEDICINE | Facility: CLINIC | Age: 87
End: 2022-10-18
Payer: COMMERCIAL

## 2022-10-18 ENCOUNTER — LAB (OUTPATIENT)
Dept: LAB | Facility: CLINIC | Age: 87
End: 2022-10-18
Payer: COMMERCIAL

## 2022-10-18 DIAGNOSIS — Z79.01 LONG TERM CURRENT USE OF ANTICOAGULANT THERAPY: ICD-10-CM

## 2022-10-18 DIAGNOSIS — I48.91 ATRIAL FIBRILLATION, UNSPECIFIED TYPE (H): ICD-10-CM

## 2022-10-18 DIAGNOSIS — M1A.9XX1 CHRONIC TOPHACEOUS GOUT: Primary | ICD-10-CM

## 2022-10-18 DIAGNOSIS — I48.91 ATRIAL FIBRILLATION (H): ICD-10-CM

## 2022-10-18 DIAGNOSIS — I48.91 ATRIAL FIBRILLATION (H): Primary | ICD-10-CM

## 2022-10-18 LAB — INR BLD: 2.1 (ref 0.9–1.1)

## 2022-10-18 PROCEDURE — 85610 PROTHROMBIN TIME: CPT

## 2022-10-18 PROCEDURE — 99207 PR NO CHARGE NURSE ONLY: CPT

## 2022-10-18 PROCEDURE — 36416 COLLJ CAPILLARY BLOOD SPEC: CPT

## 2022-10-18 NOTE — PROGRESS NOTES
"Pt had nurse visit after her lab draw today to ask about AVS from 10/13  visit with Leydi Parrish CNP.  ABS states \"change how you take allopurinol\" but no change is evident in dose or in documentation of visit.  Pt also checked with pharmacy, they said nothing has changed, she would like clarification from Leydi Parrish as pt is concerned about missing some information.  Cydney Alvarez RN     "

## 2022-10-18 NOTE — PROGRESS NOTES
ANTICOAGULATION MANAGEMENT     Pooja Swartz 89 year old female is on warfarin with therapeutic INR result. (Goal INR 2.0-3.0)    Recent labs: (last 7 days)     10/18/22  1355   INR 2.1*       ASSESSMENT     Source(s): Chart Review and Patient/Caregiver Call     Warfarin doses taken: Warfarin taken as instructed  Diet: No new diet changes identified  New illness, injury, or hospitalization: Yes: cellulitis  Medication/supplement changes: keflex started on 10-13 for 7 days which may be increasing INR today  Signs or symptoms of bleeding or clotting: No  Previous INR: Therapeutic last visit; previously outside of goal range  Additional findings: None       PLAN     Recommended plan for temporary change(s) affecting INR     Dosing Instructions: Continue your current warfarin dose with next INR in 3 weeks       Summary  As of 10/18/2022    Full warfarin instructions:  2 mg every Mon; 4 mg all other days   Next INR check:  11/8/2022             Telephone call with Pooja who verbalizes understanding and agrees to plan    Lab visit scheduled    Education provided: Please call back if any changes to your diet, medications or how you've been taking warfarin, Importance of notifying clinic for changes in medications; a sooner lab recheck maybe needed. and Contact [phone]  with any changes, questions or concerns.     Plan made per [facility] anticoagulation protocol    Arin Sullivan RN  Anticoagulation Clinic  10/18/2022    _______________________________________________________________________     Anticoagulation Episode Summary     Current INR goal:  2.0-3.0   TTR:  65.0 % (1 y)   Target end date:  Indefinite   Send INR reminders to:  [facility]    Indications    Atrial fibrillation (H) [I48.91]  Long-term (current) use of anticoagulants [Z79.01] [Z79.01]  Atrial fibrillation  unspecified type (H) [I48.91]           Comments:           Anticoagulation Care Providers     Provider Role Specialty Phone number     Amanda Renee MD [facility] [phone]

## 2022-11-08 ENCOUNTER — LAB (OUTPATIENT)
Dept: LAB | Facility: CLINIC | Age: 87
End: 2022-11-08
Payer: COMMERCIAL

## 2022-11-08 ENCOUNTER — ANTICOAGULATION THERAPY VISIT (OUTPATIENT)
Dept: ANTICOAGULATION | Facility: CLINIC | Age: 87
End: 2022-11-08

## 2022-11-08 DIAGNOSIS — Z79.01 LONG TERM CURRENT USE OF ANTICOAGULANT THERAPY: ICD-10-CM

## 2022-11-08 DIAGNOSIS — I48.91 ATRIAL FIBRILLATION, UNSPECIFIED TYPE (H): ICD-10-CM

## 2022-11-08 DIAGNOSIS — I48.91 ATRIAL FIBRILLATION (H): ICD-10-CM

## 2022-11-08 DIAGNOSIS — I48.91 ATRIAL FIBRILLATION (H): Primary | ICD-10-CM

## 2022-11-08 LAB — INR BLD: 3.7 (ref 0.9–1.1)

## 2022-11-08 PROCEDURE — 85610 PROTHROMBIN TIME: CPT

## 2022-11-08 PROCEDURE — 36416 COLLJ CAPILLARY BLOOD SPEC: CPT

## 2022-11-08 NOTE — PROGRESS NOTES
ANTICOAGULATION MANAGEMENT     Pooja Swartz 89 year old female is on warfarin with supratherapeutic INR result. (Goal INR 2.0-3.0)    Recent labs: (last 7 days)     11/08/22  0944   INR 3.7*       ASSESSMENT     Source(s): Chart Review and Patient/Caregiver Call     Warfarin doses taken: Warfarin taken as instructed  Diet: recent fall/pain may be affecting diet and INR  New illness, injury, or hospitalization: Yes: fell on 10-21  Medication/supplement changes: tylenol started on 10-21 which may be increasing INR today  Signs or symptoms of bleeding or clotting: No  Previous INR: Therapeutic last 2(+) visits  Additional findings: has been taking 1500mg of tylenol twice daily for ongoing pain in hips/back after a fall. Her cellulitis on her arm has cleared up. She is still sore and stiff after her fall. She did not hit her head.       PLAN     Recommended plan for ongoing change(s) affecting INR     Dosing Instructions: hold dose then decrease your warfarin dose (7.7% change) with next INR in 2 weeks       Summary  As of 11/8/2022    Full warfarin instructions:  11/8: Hold; Otherwise 2 mg every Mon, Fri; 4 mg all other days; Starting 11/8/2022   Next INR check:  11/22/2022             Telephone call with Pooja who verbalizes understanding and agrees to plan    Lab visit scheduled    Education provided:   Please call back if any changes to your diet, medications or how you've been taking warfarin  Dietary considerations: importance of consistent vitamin K intake and Impact of protein intake on INR   Interaction IS anticipated between warfarin and long term tylenol use  Symptom monitoring: monitoring for bleeding signs and symptoms, when to seek medical attention/emergency care and if you hit your head or have a bad fall seek emergency care  Contact [phone]  with any changes, questions or concerns.     Plan made per [facility] anticoagulation protocol    Arin Sullivan RN  Anticoagulation  Clinic  11/8/2022    _______________________________________________________________________     Anticoagulation Episode Summary     Current INR goal:  2.0-3.0   TTR:  67.8 % (1 y)   Target end date:  Indefinite   Send INR reminders to:  [facility]    Indications    Atrial fibrillation (H) [I48.91]  Long-term (current) use of anticoagulants [Z79.01] [Z79.01]  Atrial fibrillation  unspecified type (H) [I48.91]           Comments:           Anticoagulation Care Providers     Provider Role Specialty Phone number    Amanda Renee MD Referring Family Medicine [phone]

## 2022-11-22 ENCOUNTER — ANTICOAGULATION THERAPY VISIT (OUTPATIENT)
Dept: ANTICOAGULATION | Facility: CLINIC | Age: 87
End: 2022-11-22

## 2022-11-22 ENCOUNTER — LAB (OUTPATIENT)
Dept: LAB | Facility: CLINIC | Age: 87
End: 2022-11-22
Payer: COMMERCIAL

## 2022-11-22 DIAGNOSIS — I48.91 ATRIAL FIBRILLATION, UNSPECIFIED TYPE (H): Primary | ICD-10-CM

## 2022-11-22 DIAGNOSIS — Z79.01 LONG TERM CURRENT USE OF ANTICOAGULANT THERAPY: ICD-10-CM

## 2022-11-22 DIAGNOSIS — I48.91 ATRIAL FIBRILLATION (H): ICD-10-CM

## 2022-11-22 DIAGNOSIS — I48.91 ATRIAL FIBRILLATION, UNSPECIFIED TYPE (H): ICD-10-CM

## 2022-11-22 LAB — INR BLD: 3.3 (ref 0.9–1.1)

## 2022-11-22 PROCEDURE — 85610 PROTHROMBIN TIME: CPT

## 2022-11-22 PROCEDURE — 36416 COLLJ CAPILLARY BLOOD SPEC: CPT

## 2022-11-22 NOTE — PROGRESS NOTES
ANTICOAGULATION MANAGEMENT     Pooja Swartz 89 year old female is on warfarin with supratherapeutic INR result. (Goal INR 2.0-3.0)    Recent labs: (last 7 days)     11/22/22  1240   INR 3.3*       ASSESSMENT     Source(s): Chart Review and Patient/Caregiver Call     Warfarin doses taken: Warfarin taken as instructed  Diet: No new diet changes identified  New illness, injury, or hospitalization: No-fell on 10/21   Medication/supplement changes: Tylenol BID for ongoing pain in hips/back after fall, is improving  Signs or symptoms of bleeding or clotting: No  Previous INR: Supratherapeutic  Additional findings: None       PLAN     Recommended plan for ongoing change(s) affecting INR     Dosing Instructions: decrease your warfarin dose (8% change) with next INR in 2 weeks       Summary  As of 11/22/2022    Full warfarin instructions:  2 mg every Mon, Wed, Fri; 4 mg all other days; Starting 11/22/2022   Next INR check:  12/6/2022             Telephone call with Pooja who verbalizes understanding and agrees to plan    Lab visit scheduled    Education provided:   Symptom monitoring: monitoring for bleeding signs and symptoms, when to seek medical attention/emergency care and if you hit your head or have a bad fall seek emergency care  Contact [phone]  with any changes, questions or concerns.     Plan made per [facility] anticoagulation protocol    Mary Faith RN  Anticoagulation Clinic  11/22/2022    _______________________________________________________________________     Anticoagulation Episode Summary     Current INR goal:  2.0-3.0   TTR:  63.9 % (1 y)   Target end date:  Indefinite   Send INR reminders to:  [facility]    Indications    Atrial fibrillation (H) [I48.91]  Long-term (current) use of anticoagulants [Z79.01] [Z79.01]  Atrial fibrillation  unspecified type (H) [I48.91]           Comments:           Anticoagulation Care Providers     Provider Role Specialty Phone number    Amanda Renee,  MD [facility] [phone]

## 2022-12-06 ENCOUNTER — DOCUMENTATION ONLY (OUTPATIENT)
Dept: ANTICOAGULATION | Facility: CLINIC | Age: 87
End: 2022-12-06

## 2022-12-06 ENCOUNTER — ANTICOAGULATION THERAPY VISIT (OUTPATIENT)
Dept: ANTICOAGULATION | Facility: CLINIC | Age: 87
End: 2022-12-06

## 2022-12-06 ENCOUNTER — LAB (OUTPATIENT)
Dept: LAB | Facility: CLINIC | Age: 87
End: 2022-12-06
Payer: COMMERCIAL

## 2022-12-06 DIAGNOSIS — I48.20 CHRONIC ATRIAL FIBRILLATION (H): Primary | ICD-10-CM

## 2022-12-06 DIAGNOSIS — I48.91 ATRIAL FIBRILLATION, UNSPECIFIED TYPE (H): ICD-10-CM

## 2022-12-06 DIAGNOSIS — I48.91 ATRIAL FIBRILLATION (H): ICD-10-CM

## 2022-12-06 DIAGNOSIS — Z79.01 LONG TERM CURRENT USE OF ANTICOAGULANT THERAPY: ICD-10-CM

## 2022-12-06 DIAGNOSIS — I48.91 ATRIAL FIBRILLATION (H): Primary | ICD-10-CM

## 2022-12-06 LAB — INR BLD: 2.9 (ref 0.9–1.1)

## 2022-12-06 PROCEDURE — 36416 COLLJ CAPILLARY BLOOD SPEC: CPT

## 2022-12-06 PROCEDURE — 85610 PROTHROMBIN TIME: CPT

## 2022-12-06 NOTE — PROGRESS NOTES
ANTICOAGULATION CLINIC REFERRAL RENEWAL REQUEST       An annual renewal order is required for all patients referred to [facility]tico[facility].?  Please review and sign the pended referral order for Pooja Swartz.       ANTICOAGULATION SUMMARY      Warfarin indication(s)   Atrial Fibrillation    Mechanical heart valve present?  NO       Current goal range   INR: 2.0-3.0     Goal appropriate for indication? Goal INR 2-3, standard for indication(s) above     Time in Therapeutic Range (TTR)  (Goal > 60%) 61%       Office visit with referring provider's group within last year yes on 10-13-22       Arin Sullivan RN  [facility]tico[facility]

## 2022-12-06 NOTE — PROGRESS NOTES
ANTICOAGULATION MANAGEMENT     Pooja Swartz 89 year old female is on warfarin with therapeutic INR result. (Goal INR 2.0-3.0)    Recent labs: (last 7 days)     12/06/22  1137   INR 2.9*       ASSESSMENT     Source(s): Chart Review and Patient/Caregiver Call     Warfarin doses taken: Warfarin taken as instructed  Diet: No new diet changes identified  New illness, injury, or hospitalization: No  Medication/supplement changes: None noted  Signs or symptoms of bleeding or clotting: No  Previous INR: Supratherapeutic resulting in a dose decrease  Additional findings: None       PLAN     Recommended plan for no diet, medication or health factor changes affecting INR     Dosing Instructions: Continue your current warfarin dose with next INR in 3 weeks       Summary  As of 12/6/2022    Full warfarin instructions:  2 mg every Mon, Wed, Fri; 4 mg all other days; Starting 12/6/2022   Next INR check:  12/27/2022             Telephone call with Pooja who verbalizes understanding and agrees to plan    Lab visit scheduled    Education provided:   Please call back if any changes to your diet, medications or how you've been taking warfarin  Contact [phone]  with any changes, questions or concerns.     Plan made per [facility] anticoagulation protocol    Arin Sullivan RN  Anticoagulation Clinic  12/6/2022    _______________________________________________________________________     Anticoagulation Episode Summary     Current INR goal:  2.0-3.0   TTR:  61.0 % (1 y)   Target end date:  Indefinite   Send INR reminders to:  [facility]    Indications    Atrial fibrillation (H) [I48.91]  Long-term (current) use of anticoagulants [Z79.01] [Z79.01]  Atrial fibrillation  unspecified type (H) [I48.91]           Comments:           Anticoagulation Care Providers     Provider Role Specialty Phone number    Amanda Renee MD Referring Family Medicine [phone]

## 2022-12-27 ENCOUNTER — LAB (OUTPATIENT)
Dept: LAB | Facility: CLINIC | Age: 87
End: 2022-12-27
Payer: COMMERCIAL

## 2022-12-27 ENCOUNTER — ANTICOAGULATION THERAPY VISIT (OUTPATIENT)
Dept: ANTICOAGULATION | Facility: CLINIC | Age: 87
End: 2022-12-27

## 2022-12-27 DIAGNOSIS — I48.20 CHRONIC ATRIAL FIBRILLATION (H): ICD-10-CM

## 2022-12-27 DIAGNOSIS — Z79.01 LONG TERM CURRENT USE OF ANTICOAGULANT THERAPY: ICD-10-CM

## 2022-12-27 DIAGNOSIS — I48.91 ATRIAL FIBRILLATION (H): Primary | ICD-10-CM

## 2022-12-27 DIAGNOSIS — I48.91 ATRIAL FIBRILLATION, UNSPECIFIED TYPE (H): ICD-10-CM

## 2022-12-27 LAB — INR BLD: 3.9 (ref 0.9–1.1)

## 2022-12-27 PROCEDURE — 36416 COLLJ CAPILLARY BLOOD SPEC: CPT

## 2022-12-27 PROCEDURE — 85610 PROTHROMBIN TIME: CPT

## 2022-12-27 NOTE — PROGRESS NOTES
ANTICOAGULATION MANAGEMENT     Pooja Swartz 89 year old female is on warfarin with supratherapeutic INR result. (Goal INR 2.0-3.0)    Recent labs: (last 7 days)     12/27/22  1042   INR 3.9*       ASSESSMENT     Source(s): Chart Review and Patient/Caregiver Call     Warfarin doses taken: Warfarin taken as instructed  Diet: No new diet changes identified  New illness, injury, or hospitalization: No  Medication/supplement changes: None noted  Signs or symptoms of bleeding or clotting: No  Previous INR: Therapeutic last visit; previously outside of goal range  Additional findings: patient states lab gave her the epic standing order today to bring with her to [place]. Writer had pt write down [facility] fax number to have the lab she uses fax result to       PLAN     Recommended plan for no diet, medication or health factor changes affecting INR     Dosing Instructions: hold dose then decrease your warfarin dose (9.1% change) with next INR in 10 days       Summary  As of 12/27/2022    Full warfarin instructions:  12/27: Hold; Otherwise 4 mg every Sun, Tue, Thu; 2 mg all other days   Next INR check:  1/6/2023             Telephone call with Pooja who verbalizes understanding and agrees to plan    Patient using outside facility for labs    Education provided:   Please call back if any changes to your diet, medications or how you've been taking warfarin  Contact [phone]  with any changes, questions or concerns.     Plan made per [facility] anticoagulation protocol    Arin Sullivan RN  Anticoagulation Clinic  12/27/2022    _______________________________________________________________________     Anticoagulation Episode Summary     Current INR goal:  2.0-3.0   TTR:  55.9 % (1 y)   Target end date:  Indefinite   Send INR reminders to:  [facility]    Indications    Atrial fibrillation (H) [I48.91]  Long-term (current) use of anticoagulants [Z79.01] [Z79.01]  Atrial fibrillation  unspecified type (H) [I48.91]  Chronic atrial  fibrillation (H) [I48.20]           Comments:           Anticoagulation Care Providers     Provider Role Specialty Phone number    Amanda Renee MD Referring Family Medicine [phone]

## 2023-01-01 ENCOUNTER — TELEPHONE (OUTPATIENT)
Dept: FAMILY MEDICINE | Facility: CLINIC | Age: 88
End: 2023-01-01
Payer: COMMERCIAL

## 2023-01-01 ENCOUNTER — ANTICOAGULATION THERAPY VISIT (OUTPATIENT)
Dept: ANTICOAGULATION | Facility: CLINIC | Age: 88
End: 2023-01-01

## 2023-01-01 ENCOUNTER — LAB (OUTPATIENT)
Dept: LAB | Facility: CLINIC | Age: 88
End: 2023-01-01
Payer: COMMERCIAL

## 2023-01-01 ENCOUNTER — HOSPITAL ENCOUNTER (OUTPATIENT)
Dept: CARDIOLOGY | Facility: CLINIC | Age: 88
Discharge: HOME | End: 2023-05-25
Attending: FAMILY MEDICINE | Admitting: FAMILY MEDICINE
Payer: COMMERCIAL

## 2023-01-01 ENCOUNTER — HOSPITAL ENCOUNTER (OUTPATIENT)
Dept: PET IMAGING | Facility: HOSPITAL | Age: 88
Discharge: HOME | End: 2023-05-17
Attending: INTERNAL MEDICINE | Admitting: INTERNAL MEDICINE
Payer: COMMERCIAL

## 2023-01-01 ENCOUNTER — HOSPITAL ENCOUNTER (OUTPATIENT)
Dept: CT IMAGING | Facility: HOSPITAL | Age: 88
Discharge: HOME | End: 2023-04-25
Attending: FAMILY MEDICINE | Admitting: RADIOLOGY
Payer: COMMERCIAL

## 2023-01-01 ENCOUNTER — ALLIED HEALTH/NURSE VISIT (OUTPATIENT)
Dept: FAMILY MEDICINE | Facility: CLINIC | Age: 88
End: 2023-01-01
Payer: COMMERCIAL

## 2023-01-01 ENCOUNTER — ONCOLOGY VISIT (OUTPATIENT)
Dept: ONCOLOGY | Facility: CLINIC | Age: 88
End: 2023-01-01
Attending: INTERNAL MEDICINE
Payer: COMMERCIAL

## 2023-01-01 ENCOUNTER — DOCUMENTATION ONLY (OUTPATIENT)
Dept: OTHER | Facility: CLINIC | Age: 88
End: 2023-01-01
Payer: COMMERCIAL

## 2023-01-01 ENCOUNTER — PATIENT OUTREACH (OUTPATIENT)
Dept: ONCOLOGY | Facility: CLINIC | Age: 88
End: 2023-01-01
Payer: COMMERCIAL

## 2023-01-01 ENCOUNTER — MEDICAL CORRESPONDENCE (OUTPATIENT)
Dept: HEALTH INFORMATION MANAGEMENT | Facility: CLINIC | Age: 88
End: 2023-01-01
Payer: COMMERCIAL

## 2023-01-01 ENCOUNTER — HOSPITAL ENCOUNTER (EMERGENCY)
Facility: CLINIC | Age: 88
Discharge: HOME | End: 2023-05-26
Attending: EMERGENCY MEDICINE | Admitting: EMERGENCY MEDICINE
Payer: COMMERCIAL

## 2023-01-01 ENCOUNTER — TELEPHONE (OUTPATIENT)
Dept: FAMILY MEDICINE | Facility: CLINIC | Age: 88
End: 2023-01-01

## 2023-01-01 ENCOUNTER — ONCOLOGY VISIT (OUTPATIENT)
Dept: ONCOLOGY | Facility: CLINIC | Age: 88
End: 2023-01-01
Attending: NURSE PRACTITIONER
Payer: COMMERCIAL

## 2023-01-01 ENCOUNTER — PATIENT OUTREACH (OUTPATIENT)
Dept: CARE COORDINATION | Facility: CLINIC | Age: 88
End: 2023-01-01

## 2023-01-01 ENCOUNTER — TELEPHONE (OUTPATIENT)
Dept: ANTICOAGULATION | Facility: CLINIC | Age: 88
End: 2023-01-01
Payer: COMMERCIAL

## 2023-01-01 ENCOUNTER — TELEPHONE (OUTPATIENT)
Dept: CARE COORDINATION | Facility: CLINIC | Age: 88
End: 2023-01-01

## 2023-01-01 ENCOUNTER — DOCUMENTATION ONLY (OUTPATIENT)
Dept: ANTICOAGULATION | Facility: CLINIC | Age: 88
End: 2023-01-01

## 2023-01-01 ENCOUNTER — TELEPHONE (OUTPATIENT)
Dept: INTERVENTIONAL RADIOLOGY/VASCULAR | Facility: CLINIC | Age: 88
End: 2023-01-01
Payer: COMMERCIAL

## 2023-01-01 ENCOUNTER — ONCOLOGY VISIT (OUTPATIENT)
Dept: ONCOLOGY | Facility: HOSPITAL | Age: 88
End: 2023-01-01
Attending: FAMILY MEDICINE
Payer: COMMERCIAL

## 2023-01-01 ENCOUNTER — HOSPITAL ENCOUNTER (EMERGENCY)
Facility: CLINIC | Age: 88
Discharge: HOME | End: 2023-06-23
Attending: EMERGENCY MEDICINE | Admitting: EMERGENCY MEDICINE
Payer: COMMERCIAL

## 2023-01-01 ENCOUNTER — OFFICE VISIT (OUTPATIENT)
Dept: FAMILY MEDICINE | Facility: CLINIC | Age: 88
End: 2023-01-01
Payer: COMMERCIAL

## 2023-01-01 ENCOUNTER — OFFICE VISIT (OUTPATIENT)
Dept: CARDIOLOGY | Facility: CLINIC | Age: 88
End: 2023-01-01
Attending: FAMILY MEDICINE
Payer: COMMERCIAL

## 2023-01-01 ENCOUNTER — INFUSION THERAPY VISIT (OUTPATIENT)
Dept: INFUSION THERAPY | Facility: CLINIC | Age: 88
End: 2023-01-01
Attending: INTERNAL MEDICINE
Payer: COMMERCIAL

## 2023-01-01 ENCOUNTER — HOSPITAL ENCOUNTER (OUTPATIENT)
Dept: CT IMAGING | Facility: CLINIC | Age: 88
Discharge: HOME | End: 2023-11-01
Attending: INTERNAL MEDICINE | Admitting: INTERNAL MEDICINE
Payer: COMMERCIAL

## 2023-01-01 ENCOUNTER — APPOINTMENT (OUTPATIENT)
Dept: GENERAL RADIOLOGY | Facility: CLINIC | Age: 88
End: 2023-01-01
Attending: EMERGENCY MEDICINE
Payer: COMMERCIAL

## 2023-01-01 ENCOUNTER — MEDICAL CORRESPONDENCE (OUTPATIENT)
Dept: HEALTH INFORMATION MANAGEMENT | Facility: CLINIC | Age: 88
End: 2023-01-01

## 2023-01-01 ENCOUNTER — APPOINTMENT (OUTPATIENT)
Dept: LAB | Facility: CLINIC | Age: 88
End: 2023-01-01
Payer: COMMERCIAL

## 2023-01-01 ENCOUNTER — TRANSFERRED RECORDS (OUTPATIENT)
Dept: HEALTH INFORMATION MANAGEMENT | Facility: CLINIC | Age: 88
End: 2023-01-01
Payer: COMMERCIAL

## 2023-01-01 ENCOUNTER — APPOINTMENT (OUTPATIENT)
Dept: CT IMAGING | Facility: CLINIC | Age: 88
End: 2023-01-01
Attending: EMERGENCY MEDICINE
Payer: COMMERCIAL

## 2023-01-01 VITALS
RESPIRATION RATE: 18 BRPM | SYSTOLIC BLOOD PRESSURE: 126 MMHG | DIASTOLIC BLOOD PRESSURE: 66 MMHG | TEMPERATURE: 98.2 F | BODY MASS INDEX: 19.88 KG/M2 | HEIGHT: 60 IN | WEIGHT: 101.25 LBS | OXYGEN SATURATION: 99 % | HEART RATE: 64 BPM

## 2023-01-01 VITALS
DIASTOLIC BLOOD PRESSURE: 77 MMHG | HEART RATE: 71 BPM | TEMPERATURE: 98.2 F | RESPIRATION RATE: 18 BRPM | HEIGHT: 60 IN | OXYGEN SATURATION: 99 % | SYSTOLIC BLOOD PRESSURE: 164 MMHG | WEIGHT: 106 LBS | BODY MASS INDEX: 20.81 KG/M2

## 2023-01-01 VITALS
RESPIRATION RATE: 20 BRPM | TEMPERATURE: 97.7 F | OXYGEN SATURATION: 99 % | SYSTOLIC BLOOD PRESSURE: 128 MMHG | DIASTOLIC BLOOD PRESSURE: 80 MMHG | HEART RATE: 65 BPM

## 2023-01-01 VITALS
WEIGHT: 100 LBS | TEMPERATURE: 97.6 F | BODY MASS INDEX: 19.63 KG/M2 | OXYGEN SATURATION: 100 % | HEART RATE: 63 BPM | RESPIRATION RATE: 12 BRPM | DIASTOLIC BLOOD PRESSURE: 61 MMHG | HEIGHT: 60 IN | SYSTOLIC BLOOD PRESSURE: 158 MMHG

## 2023-01-01 VITALS
WEIGHT: 106.5 LBS | HEART RATE: 72 BPM | BODY MASS INDEX: 20.91 KG/M2 | RESPIRATION RATE: 24 BRPM | TEMPERATURE: 98.3 F | HEIGHT: 60 IN | DIASTOLIC BLOOD PRESSURE: 69 MMHG | OXYGEN SATURATION: 100 % | SYSTOLIC BLOOD PRESSURE: 160 MMHG

## 2023-01-01 VITALS
SYSTOLIC BLOOD PRESSURE: 142 MMHG | HEART RATE: 63 BPM | OXYGEN SATURATION: 99 % | BODY MASS INDEX: 20.35 KG/M2 | WEIGHT: 104.2 LBS | DIASTOLIC BLOOD PRESSURE: 60 MMHG | TEMPERATURE: 98.4 F

## 2023-01-01 VITALS
HEART RATE: 66 BPM | WEIGHT: 96 LBS | RESPIRATION RATE: 12 BRPM | SYSTOLIC BLOOD PRESSURE: 126 MMHG | HEIGHT: 60 IN | BODY MASS INDEX: 18.85 KG/M2 | OXYGEN SATURATION: 100 % | DIASTOLIC BLOOD PRESSURE: 41 MMHG | TEMPERATURE: 97.7 F

## 2023-01-01 VITALS
OXYGEN SATURATION: 98 % | BODY MASS INDEX: 20.62 KG/M2 | HEIGHT: 60 IN | SYSTOLIC BLOOD PRESSURE: 132 MMHG | WEIGHT: 105 LBS | DIASTOLIC BLOOD PRESSURE: 64 MMHG | TEMPERATURE: 98 F | HEART RATE: 75 BPM | RESPIRATION RATE: 18 BRPM

## 2023-01-01 VITALS
TEMPERATURE: 97.8 F | SYSTOLIC BLOOD PRESSURE: 172 MMHG | OXYGEN SATURATION: 100 % | HEART RATE: 75 BPM | BODY MASS INDEX: 21.01 KG/M2 | DIASTOLIC BLOOD PRESSURE: 61 MMHG | HEIGHT: 60 IN | RESPIRATION RATE: 12 BRPM | WEIGHT: 107 LBS

## 2023-01-01 VITALS
DIASTOLIC BLOOD PRESSURE: 59 MMHG | WEIGHT: 101.6 LBS | SYSTOLIC BLOOD PRESSURE: 149 MMHG | HEART RATE: 55 BPM | BODY MASS INDEX: 19.84 KG/M2 | TEMPERATURE: 97.5 F

## 2023-01-01 VITALS — BODY MASS INDEX: 18.94 KG/M2 | WEIGHT: 97 LBS

## 2023-01-01 VITALS — BODY MASS INDEX: 20.03 KG/M2 | HEIGHT: 60 IN | WEIGHT: 102 LBS

## 2023-01-01 VITALS
OXYGEN SATURATION: 99 % | SYSTOLIC BLOOD PRESSURE: 130 MMHG | HEART RATE: 66 BPM | RESPIRATION RATE: 14 BRPM | DIASTOLIC BLOOD PRESSURE: 60 MMHG | TEMPERATURE: 97.8 F

## 2023-01-01 VITALS — HEART RATE: 73 BPM | SYSTOLIC BLOOD PRESSURE: 161 MMHG | TEMPERATURE: 97.8 F | DIASTOLIC BLOOD PRESSURE: 54 MMHG

## 2023-01-01 VITALS
SYSTOLIC BLOOD PRESSURE: 171 MMHG | HEART RATE: 67 BPM | OXYGEN SATURATION: 100 % | DIASTOLIC BLOOD PRESSURE: 59 MMHG | TEMPERATURE: 97.7 F | RESPIRATION RATE: 16 BRPM

## 2023-01-01 DIAGNOSIS — Z79.01 LONG TERM CURRENT USE OF ANTICOAGULANT THERAPY: ICD-10-CM

## 2023-01-01 DIAGNOSIS — I48.91 ATRIAL FIBRILLATION, UNSPECIFIED TYPE (H): ICD-10-CM

## 2023-01-01 DIAGNOSIS — I48.20 CHRONIC ATRIAL FIBRILLATION (H): ICD-10-CM

## 2023-01-01 DIAGNOSIS — M25.552 HIP PAIN, BILATERAL: ICD-10-CM

## 2023-01-01 DIAGNOSIS — D3A.8 NEUROENDOCRINE TUMOR (H): Primary | ICD-10-CM

## 2023-01-01 DIAGNOSIS — D3A.8 NEUROENDOCRINE TUMOR (H): ICD-10-CM

## 2023-01-01 DIAGNOSIS — I48.91 ATRIAL FIBRILLATION, UNSPECIFIED TYPE (H): Primary | ICD-10-CM

## 2023-01-01 DIAGNOSIS — S41.111A SKIN TEAR OF RIGHT UPPER EXTREMITY: ICD-10-CM

## 2023-01-01 DIAGNOSIS — E44.0 MODERATE PROTEIN-CALORIE MALNUTRITION (H): ICD-10-CM

## 2023-01-01 DIAGNOSIS — Z71.89 COUNSELING AND COORDINATION OF CARE: Primary | ICD-10-CM

## 2023-01-01 DIAGNOSIS — I35.0 AORTIC VALVE STENOSIS, ETIOLOGY OF CARDIAC VALVE DISEASE UNSPECIFIED: ICD-10-CM

## 2023-01-01 DIAGNOSIS — I48.91 ATRIAL FIBRILLATION (H): ICD-10-CM
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
## 2023-01-01 DIAGNOSIS — Z13.220 SCREENING FOR HYPERLIPIDEMIA: ICD-10-CM

## 2023-01-01 DIAGNOSIS — C7A.8 OTHER MALIGNANT NEUROENDOCRINE TUMORS (H): ICD-10-CM

## 2023-01-01 DIAGNOSIS — L30.4 INTERTRIGO: Primary | ICD-10-CM

## 2023-01-01 DIAGNOSIS — D64.9 ANEMIA, UNSPECIFIED TYPE: ICD-10-CM

## 2023-01-01 DIAGNOSIS — M25.551 HIP PAIN, BILATERAL: ICD-10-CM

## 2023-01-01 DIAGNOSIS — N18.31 STAGE 3A CHRONIC KIDNEY DISEASE (H): ICD-10-CM

## 2023-01-01 DIAGNOSIS — Z00.00 ENCOUNTER FOR MEDICARE ANNUAL WELLNESS EXAM: Primary | ICD-10-CM

## 2023-01-01 DIAGNOSIS — I05.0 MITRAL VALVE STENOSIS, UNSPECIFIED ETIOLOGY: ICD-10-CM

## 2023-01-01 DIAGNOSIS — Z53.9 DIAGNOSIS NOT YET DEFINED: Primary | ICD-10-CM

## 2023-01-01 DIAGNOSIS — N18.30 CHRONIC KIDNEY DISEASE, STAGE III (MODERATE) (H): ICD-10-CM

## 2023-01-01 DIAGNOSIS — D64.9 ANEMIA, UNSPECIFIED TYPE: Primary | ICD-10-CM

## 2023-01-01 DIAGNOSIS — I35.0 AORTIC VALVE STENOSIS, ETIOLOGY OF CARDIAC VALVE DISEASE UNSPECIFIED: Primary | ICD-10-CM

## 2023-01-01 DIAGNOSIS — R19.00 RETROPERITONEAL MASS: Primary | ICD-10-CM

## 2023-01-01 DIAGNOSIS — Z79.01 ANTICOAGULATED ON COUMADIN: ICD-10-CM

## 2023-01-01 DIAGNOSIS — D53.9 MACROCYTIC ANEMIA: ICD-10-CM

## 2023-01-01 DIAGNOSIS — I10 HYPERTENSION GOAL BP (BLOOD PRESSURE) < 140/90: ICD-10-CM

## 2023-01-01 DIAGNOSIS — S81.812A: Primary | ICD-10-CM

## 2023-01-01 DIAGNOSIS — S41.111D SKIN TEAR OF RIGHT UPPER ARM WITHOUT COMPLICATION, SUBSEQUENT ENCOUNTER: Primary | ICD-10-CM

## 2023-01-01 DIAGNOSIS — M1A.9XX1 CHRONIC TOPHACEOUS GOUT: ICD-10-CM

## 2023-01-01 DIAGNOSIS — I48.91 ATRIAL FIBRILLATION (H): Primary | ICD-10-CM

## 2023-01-01 DIAGNOSIS — Z48.00 CHANGE OF DRESSING: Primary | ICD-10-CM

## 2023-01-01 DIAGNOSIS — S81.801D TRAUMATIC OPEN WOUND OF RIGHT LOWER LEG WITH INFECTION, SUBSEQUENT ENCOUNTER: Primary | ICD-10-CM

## 2023-01-01 DIAGNOSIS — S81.811A NONINFECTED SKIN TEAR OF RIGHT LOWER EXTREMITY, INITIAL ENCOUNTER: ICD-10-CM

## 2023-01-01 DIAGNOSIS — L08.9 TRAUMATIC OPEN WOUND OF RIGHT LOWER LEG WITH INFECTION, SUBSEQUENT ENCOUNTER: Primary | ICD-10-CM

## 2023-01-01 DIAGNOSIS — S09.90XA CLOSED HEAD INJURY, INITIAL ENCOUNTER: ICD-10-CM

## 2023-01-01 DIAGNOSIS — D47.2 MGUS (MONOCLONAL GAMMOPATHY OF UNKNOWN SIGNIFICANCE): ICD-10-CM

## 2023-01-01 DIAGNOSIS — T14.8XXA OPEN WOUND: Primary | ICD-10-CM

## 2023-01-01 DIAGNOSIS — M79.89 LEG SWELLING: ICD-10-CM

## 2023-01-01 LAB
ALBUMIN SERPL BCG-MCNC: 4 G/DL (ref 3.5–5.2)
ALBUMIN SERPL BCG-MCNC: 4.1 G/DL (ref 3.5–5.2)
ALBUMIN SERPL BCG-MCNC: 4.2 G/DL (ref 3.5–5.2)
ALBUMIN SERPL BCG-MCNC: 4.3 G/DL (ref 3.5–5.2)
ALBUMIN SERPL ELPH-MCNC: 3.8 G/DL (ref 3.7–5.1)
ALP SERPL-CCNC: 143 U/L (ref 35–104)
ALP SERPL-CCNC: 368 U/L (ref 35–104)
ALP SERPL-CCNC: 71 U/L (ref 35–104)
ALP SERPL-CCNC: 84 U/L (ref 40–150)
ALPHA1 GLOB SERPL ELPH-MCNC: 0.4 G/DL (ref 0.2–0.4)
ALPHA2 GLOB SERPL ELPH-MCNC: 0.8 G/DL (ref 0.5–0.9)
ALT SERPL W P-5'-P-CCNC: 14 U/L (ref 0–50)
ALT SERPL W P-5'-P-CCNC: 19 U/L (ref 0–50)
ALT SERPL W P-5'-P-CCNC: 32 U/L (ref 0–50)
ALT SERPL W P-5'-P-CCNC: 41 U/L (ref 0–50)
ANION GAP SERPL CALCULATED.3IONS-SCNC: 11 MMOL/L (ref 7–15)
ANION GAP SERPL CALCULATED.3IONS-SCNC: 12 MMOL/L (ref 7–15)
ANION GAP SERPL CALCULATED.3IONS-SCNC: 8 MMOL/L (ref 7–15)
ANION GAP SERPL CALCULATED.3IONS-SCNC: 9 MMOL/L (ref 7–15)
APTT PPP: 29 SECONDS (ref 22–38)
AST SERPL W P-5'-P-CCNC: 22 U/L (ref 0–45)
AST SERPL W P-5'-P-CCNC: 23 U/L (ref 0–45)
AST SERPL W P-5'-P-CCNC: 29 U/L (ref 0–45)
AST SERPL W P-5'-P-CCNC: 41 U/L (ref 0–45)
B-GLOBULIN SERPL ELPH-MCNC: 1.1 G/DL (ref 0.6–1)
BACTERIA WND CULT: ABNORMAL
BASO+EOS+MONOS # BLD AUTO: ABNORMAL 10*3/UL
BASO+EOS+MONOS NFR BLD AUTO: ABNORMAL %
BASOPHILS # BLD AUTO: 0.1 10E3/UL (ref 0–0.2)
BASOPHILS NFR BLD AUTO: 1 %
BILIRUB SERPL-MCNC: 0.4 MG/DL
BILIRUB SERPL-MCNC: 0.4 MG/DL
BILIRUB SERPL-MCNC: 0.5 MG/DL
BILIRUB SERPL-MCNC: 0.5 MG/DL
BUN SERPL-MCNC: 29.7 MG/DL (ref 8–23)
BUN SERPL-MCNC: 30.7 MG/DL (ref 8–23)
BUN SERPL-MCNC: 32.2 MG/DL (ref 8–23)
BUN SERPL-MCNC: 33.7 MG/DL (ref 8–23)
CALCIUM SERPL-MCNC: 9.4 MG/DL (ref 8.2–9.6)
CALCIUM SERPL-MCNC: 9.5 MG/DL (ref 8.2–9.6)
CALCIUM SERPL-MCNC: 9.8 MG/DL (ref 8.2–9.6)
CALCIUM SERPL-MCNC: 9.9 MG/DL (ref 8.2–9.6)
CHLORIDE SERPL-SCNC: 102 MMOL/L (ref 98–107)
CHLORIDE SERPL-SCNC: 103 MMOL/L (ref 98–107)
CHLORIDE SERPL-SCNC: 103 MMOL/L (ref 98–107)
CHLORIDE SERPL-SCNC: 105 MMOL/L (ref 98–107)
CHOLEST SERPL-MCNC: 132 MG/DL
CREAT SERPL-MCNC: 0.84 MG/DL (ref 0.51–0.95)
CREAT SERPL-MCNC: 0.86 MG/DL (ref 0.51–0.95)
CREAT SERPL-MCNC: 0.88 MG/DL (ref 0.51–0.95)
CREAT SERPL-MCNC: 1.01 MG/DL (ref 0.51–0.95)
DEPRECATED HCO3 PLAS-SCNC: 23 MMOL/L (ref 22–29)
DEPRECATED HCO3 PLAS-SCNC: 24 MMOL/L (ref 22–29)
EGFRCR SERPLBLD CKD-EPI 2021: 50 ML/MIN/1.73M2
EGFRCR SERPLBLD CKD-EPI 2021: 59 ML/MIN/1.73M2
EGFRCR SERPLBLD CKD-EPI 2021: 61 ML/MIN/1.73M2
EOSINOPHIL # BLD AUTO: 0.1 10E3/UL (ref 0–0.7)
EOSINOPHIL # BLD AUTO: 0.1 10E3/UL (ref 0–0.7)
EOSINOPHIL # BLD AUTO: 0.2 10E3/UL (ref 0–0.7)
EOSINOPHIL # BLD AUTO: 0.3 10E3/UL (ref 0–0.7)
EOSINOPHIL # BLD AUTO: 0.3 10E3/UL (ref 0–0.7)
EOSINOPHIL NFR BLD AUTO: 2 %
EOSINOPHIL NFR BLD AUTO: 2 %
EOSINOPHIL NFR BLD AUTO: 3 %
ERYTHROCYTE [DISTWIDTH] IN BLOOD BY AUTOMATED COUNT: 15.6 % (ref 10–15)
ERYTHROCYTE [DISTWIDTH] IN BLOOD BY AUTOMATED COUNT: 15.7 % (ref 10–15)
ERYTHROCYTE [DISTWIDTH] IN BLOOD BY AUTOMATED COUNT: 15.8 % (ref 10–15)
ERYTHROCYTE [DISTWIDTH] IN BLOOD BY AUTOMATED COUNT: 16.7 % (ref 10–15)
ERYTHROCYTE [DISTWIDTH] IN BLOOD BY AUTOMATED COUNT: 17.1 % (ref 10–15)
FERRITIN SERPL-MCNC: 137 NG/ML (ref 11–328)
FOLATE SERPL-MCNC: 37.1 NG/ML (ref 4.6–34.8)
GAMMA GLOB SERPL ELPH-MCNC: 0.8 G/DL (ref 0.7–1.6)
GFR SERPL CREATININE-BSD FRML MDRD: 63 ML/MIN/1.73M2
GLUCOSE SERPL-MCNC: 106 MG/DL (ref 70–99)
GLUCOSE SERPL-MCNC: 119 MG/DL (ref 70–99)
GLUCOSE SERPL-MCNC: 133 MG/DL (ref 70–99)
GLUCOSE SERPL-MCNC: 159 MG/DL (ref 70–99)
GRAM STAIN RESULT: ABNORMAL
HCT VFR BLD AUTO: 29.9 % (ref 35–47)
HCT VFR BLD AUTO: 30.1 % (ref 35–47)
HCT VFR BLD AUTO: 30.9 % (ref 35–47)
HCT VFR BLD AUTO: 30.9 % (ref 35–47)
HCT VFR BLD AUTO: 31.1 % (ref 35–47)
HDLC SERPL-MCNC: 65 MG/DL
HGB BLD-MCNC: 10.1 G/DL (ref 11.7–15.7)
HGB BLD-MCNC: 9.6 G/DL (ref 11.7–15.7)
HGB BLD-MCNC: 9.9 G/DL (ref 11.7–15.7)
IMM GRANULOCYTES # BLD: 0 10E3/UL
IMM GRANULOCYTES # BLD: 0 10E3/UL
IMM GRANULOCYTES # BLD: 0.1 10E3/UL
IMM GRANULOCYTES NFR BLD: 0 %
IMM GRANULOCYTES NFR BLD: 0 %
IMM GRANULOCYTES NFR BLD: 1 %
INR BLD: 1.7 (ref 0.9–1.1)
INR BLD: 1.8 (ref 0.9–1.1)
INR BLD: 1.8 (ref 0.9–1.1)
INR BLD: 1.9 (ref 0.9–1.1)
INR BLD: 1.9 (ref 0.9–1.1)
INR BLD: 2.2 (ref 0.9–1.1)
INR BLD: 2.4 (ref 0.9–1.1)
INR BLD: 2.4 (ref 0.9–1.1)
INR BLD: 2.5 (ref 0.9–1.1)
INR BLD: 3.1 (ref 0.9–1.1)
INR BLD: 3.2 (ref 0.9–1.1)
INR BLD: 3.2 (ref 0.9–1.1)
INR BLD: 3.4 (ref 0.9–1.1)
INR BLD: 4 (ref 0.9–1.1)
INR BLD: 4 (ref 0.9–1.1)
INR BLD: 4.1 (ref 0.9–1.1)
INR PPP: 1.2 (ref 0.85–1.15)
INR PPP: 2.62 (ref 0.85–1.15)
INR PPP: 2.85 (ref 0.85–1.15)
IRON BINDING CAPACITY (ROCHE): 254 UG/DL (ref 240–430)
IRON SATN MFR SERPL: 20 % (ref 15–46)
IRON SERPL-MCNC: 51 UG/DL (ref 37–145)
KAPPA LC FREE SER-MCNC: 4.06 MG/DL (ref 0.33–1.94)
KAPPA LC FREE/LAMBDA FREE SER NEPH: 0.48 {RATIO} (ref 0.26–1.65)
LAMBDA LC FREE SERPL-MCNC: 8.49 MG/DL (ref 0.57–2.63)
LDLC SERPL CALC-MCNC: 51 MG/DL
LVEF ECHO: NORMAL
LYMPHOCYTES # BLD AUTO: 0.7 10E3/UL (ref 0.8–5.3)
LYMPHOCYTES # BLD AUTO: 0.7 10E3/UL (ref 0.8–5.3)
LYMPHOCYTES # BLD AUTO: 0.9 10E3/UL (ref 0.8–5.3)
LYMPHOCYTES # BLD AUTO: 1.2 10E3/UL (ref 0.8–5.3)
LYMPHOCYTES # BLD AUTO: 1.6 10E3/UL (ref 0.8–5.3)
LYMPHOCYTES NFR BLD AUTO: 10 %
LYMPHOCYTES NFR BLD AUTO: 11 %
LYMPHOCYTES NFR BLD AUTO: 13 %
LYMPHOCYTES NFR BLD AUTO: 15 %
LYMPHOCYTES NFR BLD AUTO: 18 %
M PROTEIN SERPL ELPH-MCNC: 0.2 G/DL
MCH RBC QN AUTO: 33.4 PG (ref 26.5–33)
MCH RBC QN AUTO: 33.4 PG (ref 26.5–33)
MCH RBC QN AUTO: 34.4 PG (ref 26.5–33)
MCH RBC QN AUTO: 34.5 PG (ref 26.5–33)
MCH RBC QN AUTO: 34.7 PG (ref 26.5–33)
MCHC RBC AUTO-ENTMCNC: 32 G/DL (ref 31.5–36.5)
MCHC RBC AUTO-ENTMCNC: 32.1 G/DL (ref 31.5–36.5)
MCHC RBC AUTO-ENTMCNC: 32.5 G/DL (ref 31.5–36.5)
MCHC RBC AUTO-ENTMCNC: 32.7 G/DL (ref 31.5–36.5)
MCHC RBC AUTO-ENTMCNC: 33.6 G/DL (ref 31.5–36.5)
MCV RBC AUTO: 102 FL (ref 78–100)
MCV RBC AUTO: 103 FL (ref 78–100)
MCV RBC AUTO: 104 FL (ref 78–100)
MCV RBC AUTO: 106 FL (ref 78–100)
MCV RBC AUTO: 108 FL (ref 78–100)
MONOCYTES # BLD AUTO: 0.4 10E3/UL (ref 0–1.3)
MONOCYTES # BLD AUTO: 0.4 10E3/UL (ref 0–1.3)
MONOCYTES # BLD AUTO: 0.5 10E3/UL (ref 0–1.3)
MONOCYTES # BLD AUTO: 0.7 10E3/UL (ref 0–1.3)
MONOCYTES # BLD AUTO: 0.8 10E3/UL (ref 0–1.3)
MONOCYTES NFR BLD AUTO: 5 %
MONOCYTES NFR BLD AUTO: 6 %
MONOCYTES NFR BLD AUTO: 8 %
MONOCYTES NFR BLD AUTO: 8 %
MONOCYTES NFR BLD AUTO: 9 %
NEUTROPHILS # BLD AUTO: 5 10E3/UL (ref 1.6–8.3)
NEUTROPHILS # BLD AUTO: 5.3 10E3/UL (ref 1.6–8.3)
NEUTROPHILS # BLD AUTO: 5.5 10E3/UL (ref 1.6–8.3)
NEUTROPHILS # BLD AUTO: 6 10E3/UL (ref 1.6–8.3)
NEUTROPHILS # BLD AUTO: 6 10E3/UL (ref 1.6–8.3)
NEUTROPHILS NFR BLD AUTO: 68 %
NEUTROPHILS NFR BLD AUTO: 72 %
NEUTROPHILS NFR BLD AUTO: 77 %
NEUTROPHILS NFR BLD AUTO: 79 %
NEUTROPHILS NFR BLD AUTO: 80 %
NONHDLC SERPL-MCNC: 67 MG/DL
NRBC # BLD AUTO: 0 10E3/UL
NRBC BLD AUTO-RTO: 0 /100
PATH REPORT.COMMENTS IMP SPEC: ABNORMAL
PATH REPORT.COMMENTS IMP SPEC: NORMAL
PATH REPORT.COMMENTS IMP SPEC: YES
PATH REPORT.FINAL DX SPEC: ABNORMAL
PATH REPORT.FINAL DX SPEC: NORMAL
PATH REPORT.GROSS SPEC: ABNORMAL
PATH REPORT.MICROSCOPIC SPEC OTHER STN: ABNORMAL
PATH REPORT.MICROSCOPIC SPEC OTHER STN: NORMAL
PATH REPORT.RELEVANT HX SPEC: ABNORMAL
PATH REPORT.RELEVANT HX SPEC: NORMAL
PHOTO IMAGE: ABNORMAL
PLATELET # BLD AUTO: 140 10E3/UL (ref 150–450)
PLATELET # BLD AUTO: 153 10E3/UL (ref 150–450)
PLATELET # BLD AUTO: 156 10E3/UL (ref 150–450)
PLATELET # BLD AUTO: 173 10E3/UL (ref 150–450)
PLATELET # BLD AUTO: 174 10E3/UL (ref 150–450)
PLATELET # BLD AUTO: 191 10E3/UL (ref 150–450)
POTASSIUM SERPL-SCNC: 4.7 MMOL/L (ref 3.4–5.3)
POTASSIUM SERPL-SCNC: 4.7 MMOL/L (ref 3.4–5.3)
POTASSIUM SERPL-SCNC: 4.8 MMOL/L (ref 3.4–5.3)
POTASSIUM SERPL-SCNC: 5 MMOL/L (ref 3.4–5.3)
PROT PATTERN SERPL ELPH-IMP: ABNORMAL
PROT PATTERN SERPL IFE-IMP: NORMAL
PROT SERPL-MCNC: 6.9 G/DL (ref 6.4–8.3)
PROT SERPL-MCNC: 7 G/DL (ref 6.4–8.3)
PROT SERPL-MCNC: 7.1 G/DL (ref 6.4–8.3)
PROT SERPL-MCNC: 7.1 G/DL (ref 6.4–8.3)
RBC # BLD AUTO: 2.87 10E6/UL (ref 3.8–5.2)
RBC # BLD AUTO: 2.87 10E6/UL (ref 3.8–5.2)
RBC # BLD AUTO: 2.91 10E6/UL (ref 3.8–5.2)
RBC # BLD AUTO: 2.94 10E6/UL (ref 3.8–5.2)
RBC # BLD AUTO: 3.02 10E6/UL (ref 3.8–5.2)
RETICS # AUTO: 0.04 10E6/UL (ref 0.03–0.1)
RETICS/RBC NFR AUTO: 1.3 % (ref 0.5–2)
SODIUM SERPL-SCNC: 136 MMOL/L (ref 135–145)
SODIUM SERPL-SCNC: 137 MMOL/L (ref 135–145)
SODIUM SERPL-SCNC: 137 MMOL/L (ref 135–145)
SODIUM SERPL-SCNC: 138 MMOL/L (ref 136–145)
TOTAL PROTEIN SERUM FOR ELP: 6.9 G/DL (ref 6.4–8.3)
TRIGL SERPL-MCNC: 81 MG/DL
VIT B12 SERPL-MCNC: 795 PG/ML (ref 232–1245)
WBC # BLD AUTO: 6.3 10E3/UL (ref 4–11)
WBC # BLD AUTO: 6.8 10E3/UL (ref 4–11)
WBC # BLD AUTO: 7.1 10E3/UL (ref 4–11)
WBC # BLD AUTO: 8.3 10E3/UL (ref 4–11)
WBC # BLD AUTO: 8.8 10E3/UL (ref 4–11)

## 2023-01-01 PROCEDURE — 36416 COLLJ CAPILLARY BLOOD SPEC: CPT

## 2023-01-01 PROCEDURE — 88184 FLOWCYTOMETRY/ TC 1 MARKER: CPT | Performed by: FAMILY MEDICINE

## 2023-01-01 PROCEDURE — 84155 ASSAY OF PROTEIN SERUM: CPT | Performed by: INTERNAL MEDICINE

## 2023-01-01 PROCEDURE — 85610 PROTHROMBIN TIME: CPT

## 2023-01-01 PROCEDURE — 99152 MOD SED SAME PHYS/QHP 5/>YRS: CPT

## 2023-01-01 PROCEDURE — 87205 SMEAR GRAM STAIN: CPT

## 2023-01-01 PROCEDURE — 88305 TISSUE EXAM BY PATHOLOGIST: CPT | Mod: 26 | Performed by: PATHOLOGY

## 2023-01-01 PROCEDURE — 87186 SC STD MICRODIL/AGAR DIL: CPT

## 2023-01-01 PROCEDURE — 36415 COLL VENOUS BLD VENIPUNCTURE: CPT

## 2023-01-01 PROCEDURE — 85025 COMPLETE CBC W/AUTO DIFF WBC: CPT

## 2023-01-01 PROCEDURE — 250N000011 HC RX IP 250 OP 636: Performed by: INTERNAL MEDICINE

## 2023-01-01 PROCEDURE — 49180 BIOPSY ABDOMINAL MASS: CPT

## 2023-01-01 PROCEDURE — 82728 ASSAY OF FERRITIN: CPT

## 2023-01-01 PROCEDURE — 84165 PROTEIN E-PHORESIS SERUM: CPT

## 2023-01-01 PROCEDURE — 90662 IIV NO PRSV INCREASED AG IM: CPT | Performed by: FAMILY MEDICINE

## 2023-01-01 PROCEDURE — 96372 THER/PROPH/DIAG INJ SC/IM: CPT | Performed by: INTERNAL MEDICINE

## 2023-01-01 PROCEDURE — 83521 IG LIGHT CHAINS FREE EACH: CPT | Performed by: INTERNAL MEDICINE

## 2023-01-01 PROCEDURE — 93306 TTE W/DOPPLER COMPLETE: CPT | Mod: 26 | Performed by: INTERNAL MEDICINE

## 2023-01-01 PROCEDURE — G0463 HOSPITAL OUTPT CLINIC VISIT: HCPCS | Mod: 25 | Performed by: INTERNAL MEDICINE

## 2023-01-01 PROCEDURE — A9592 HC RX IP 250 OP 636: HCPCS | Performed by: INTERNAL MEDICINE

## 2023-01-01 PROCEDURE — 88341 IMHCHEM/IMCYTCHM EA ADD ANTB: CPT | Mod: 26 | Performed by: PATHOLOGY

## 2023-01-01 PROCEDURE — 99207 PR NO CHARGE NURSE ONLY: CPT

## 2023-01-01 PROCEDURE — 99214 OFFICE O/P EST MOD 30 MIN: CPT | Performed by: INTERNAL MEDICINE

## 2023-01-01 PROCEDURE — 88185 FLOWCYTOMETRY/TC ADD-ON: CPT | Performed by: FAMILY MEDICINE

## 2023-01-01 PROCEDURE — 250N000013 HC RX MED GY IP 250 OP 250 PS 637: Performed by: FAMILY MEDICINE

## 2023-01-01 PROCEDURE — 88342 IMHCHEM/IMCYTCHM 1ST ANTB: CPT | Mod: 26 | Performed by: PATHOLOGY

## 2023-01-01 PROCEDURE — 72170 X-RAY EXAM OF PELVIS: CPT

## 2023-01-01 PROCEDURE — 99215 OFFICE O/P EST HI 40 MIN: CPT | Performed by: INTERNAL MEDICINE

## 2023-01-01 PROCEDURE — 99284 EMERGENCY DEPT VISIT MOD MDM: CPT | Performed by: EMERGENCY MEDICINE

## 2023-01-01 PROCEDURE — 88360 TUMOR IMMUNOHISTOCHEM/MANUAL: CPT | Mod: 26 | Performed by: PATHOLOGY

## 2023-01-01 PROCEDURE — 99205 OFFICE O/P NEW HI 60 MIN: CPT | Performed by: INTERNAL MEDICINE

## 2023-01-01 PROCEDURE — 87070 CULTURE OTHR SPECIMN AEROBIC: CPT

## 2023-01-01 PROCEDURE — 99283 EMERGENCY DEPT VISIT LOW MDM: CPT

## 2023-01-01 PROCEDURE — 85730 THROMBOPLASTIN TIME PARTIAL: CPT | Performed by: RADIOLOGY

## 2023-01-01 PROCEDURE — G0463 HOSPITAL OUTPT CLINIC VISIT: HCPCS | Performed by: INTERNAL MEDICINE

## 2023-01-01 PROCEDURE — 78816 PET IMAGE W/CT FULL BODY: CPT | Mod: PI

## 2023-01-01 PROCEDURE — 80053 COMPREHEN METABOLIC PANEL: CPT | Performed by: INTERNAL MEDICINE

## 2023-01-01 PROCEDURE — 80053 COMPREHEN METABOLIC PANEL: CPT

## 2023-01-01 PROCEDURE — 36415 COLL VENOUS BLD VENIPUNCTURE: CPT | Performed by: RADIOLOGY

## 2023-01-01 PROCEDURE — 82746 ASSAY OF FOLIC ACID SERUM: CPT | Performed by: INTERNAL MEDICINE

## 2023-01-01 PROCEDURE — 250N000009 HC RX 250: Performed by: RADIOLOGY

## 2023-01-01 PROCEDURE — 85025 COMPLETE CBC W/AUTO DIFF WBC: CPT | Performed by: INTERNAL MEDICINE

## 2023-01-01 PROCEDURE — G0008 ADMIN INFLUENZA VIRUS VAC: HCPCS | Performed by: FAMILY MEDICINE

## 2023-01-01 PROCEDURE — 71260 CT THORAX DX C+: CPT

## 2023-01-01 PROCEDURE — G0179 MD RECERTIFICATION HHA PT: HCPCS | Performed by: FAMILY MEDICINE

## 2023-01-01 PROCEDURE — 36415 COLL VENOUS BLD VENIPUNCTURE: CPT | Performed by: INTERNAL MEDICINE

## 2023-01-01 PROCEDURE — 85045 AUTOMATED RETICULOCYTE COUNT: CPT

## 2023-01-01 PROCEDURE — 77012 CT SCAN FOR NEEDLE BIOPSY: CPT

## 2023-01-01 PROCEDURE — 250N000011 HC RX IP 250 OP 636: Performed by: RADIOLOGY

## 2023-01-01 PROCEDURE — 80061 LIPID PANEL: CPT

## 2023-01-01 PROCEDURE — 83550 IRON BINDING TEST: CPT

## 2023-01-01 PROCEDURE — 93306 TTE W/DOPPLER COMPLETE: CPT

## 2023-01-01 PROCEDURE — G0439 PPPS, SUBSEQ VISIT: HCPCS | Performed by: FAMILY MEDICINE

## 2023-01-01 PROCEDURE — 99285 EMERGENCY DEPT VISIT HI MDM: CPT | Mod: 25 | Performed by: EMERGENCY MEDICINE

## 2023-01-01 PROCEDURE — 86334 IMMUNOFIX E-PHORESIS SERUM: CPT

## 2023-01-01 PROCEDURE — 99213 OFFICE O/P EST LOW 20 MIN: CPT | Mod: 25 | Performed by: FAMILY MEDICINE

## 2023-01-01 PROCEDURE — 99207 PR MD CERTIFICATION HHA PATIENT: CPT | Performed by: FAMILY MEDICINE

## 2023-01-01 PROCEDURE — 88342 IMHCHEM/IMCYTCHM 1ST ANTB: CPT | Mod: TC | Performed by: FAMILY MEDICINE

## 2023-01-01 PROCEDURE — 99215 OFFICE O/P EST HI 40 MIN: CPT | Mod: VID | Performed by: INTERNAL MEDICINE

## 2023-01-01 PROCEDURE — 85049 AUTOMATED PLATELET COUNT: CPT | Performed by: RADIOLOGY

## 2023-01-01 PROCEDURE — 88189 FLOWCYTOMETRY/READ 16 & >: CPT | Performed by: PATHOLOGY

## 2023-01-01 PROCEDURE — 73590 X-RAY EXAM OF LOWER LEG: CPT | Mod: RT

## 2023-01-01 PROCEDURE — G0463 HOSPITAL OUTPT CLINIC VISIT: HCPCS | Performed by: NURSE PRACTITIONER

## 2023-01-01 PROCEDURE — 250N000009 HC RX 250: Performed by: INTERNAL MEDICINE

## 2023-01-01 PROCEDURE — 99214 OFFICE O/P EST MOD 30 MIN: CPT | Performed by: NURSE PRACTITIONER

## 2023-01-01 PROCEDURE — 85610 PROTHROMBIN TIME: CPT | Performed by: EMERGENCY MEDICINE

## 2023-01-01 PROCEDURE — 88333 PATH CONSLTJ SURG CYTO XM 1: CPT | Mod: 26 | Performed by: PATHOLOGY

## 2023-01-01 PROCEDURE — 99214 OFFICE O/P EST MOD 30 MIN: CPT | Performed by: FAMILY MEDICINE

## 2023-01-01 PROCEDURE — 99213 OFFICE O/P EST LOW 20 MIN: CPT | Performed by: FAMILY MEDICINE

## 2023-01-01 PROCEDURE — 36415 COLL VENOUS BLD VENIPUNCTURE: CPT | Performed by: EMERGENCY MEDICINE

## 2023-01-01 PROCEDURE — 85610 PROTHROMBIN TIME: CPT | Performed by: RADIOLOGY

## 2023-01-01 PROCEDURE — 82607 VITAMIN B-12: CPT

## 2023-01-01 PROCEDURE — 85025 COMPLETE CBC W/AUTO DIFF WBC: CPT | Performed by: EMERGENCY MEDICINE

## 2023-01-01 PROCEDURE — 70450 CT HEAD/BRAIN W/O DYE: CPT

## 2023-01-01 PROCEDURE — 87077 CULTURE AEROBIC IDENTIFY: CPT | Mod: 59

## 2023-01-01 RX ORDER — MEPERIDINE HYDROCHLORIDE 25 MG/ML
25 INJECTION INTRAMUSCULAR; INTRAVENOUS; SUBCUTANEOUS EVERY 30 MIN PRN
Status: CANCELLED | OUTPATIENT
Start: 2023-01-01

## 2023-01-01 RX ORDER — LIDOCAINE 40 MG/G
CREAM TOPICAL
Status: DISCONTINUED | OUTPATIENT
Start: 2023-01-01 | End: 2023-01-01 | Stop reason: HOSPADM

## 2023-01-01 RX ORDER — LANREOTIDE ACETATE 120 MG/.5ML
120 INJECTION SUBCUTANEOUS ONCE
Status: CANCELLED
Start: 2023-01-01 | End: 2023-01-01

## 2023-01-01 RX ORDER — FENTANYL CITRATE 50 UG/ML
25-50 INJECTION, SOLUTION INTRAMUSCULAR; INTRAVENOUS EVERY 5 MIN PRN
Status: DISCONTINUED | OUTPATIENT
Start: 2023-01-01 | End: 2023-01-01 | Stop reason: HOSPADM

## 2023-01-01 RX ORDER — CEPHALEXIN 500 MG/1
500 CAPSULE ORAL 3 TIMES DAILY
Qty: 21 CAPSULE | Refills: 0 | Status: SHIPPED | OUTPATIENT
Start: 2023-01-01 | End: 2023-01-01

## 2023-01-01 RX ORDER — NALOXONE HYDROCHLORIDE 0.4 MG/ML
0.4 INJECTION, SOLUTION INTRAMUSCULAR; INTRAVENOUS; SUBCUTANEOUS
Status: DISCONTINUED | OUTPATIENT
Start: 2023-01-01 | End: 2023-01-01 | Stop reason: HOSPADM

## 2023-01-01 RX ORDER — LANREOTIDE ACETATE 120 MG/.5ML
120 INJECTION SUBCUTANEOUS ONCE
Status: COMPLETED | OUTPATIENT
Start: 2023-01-01 | End: 2023-01-01

## 2023-01-01 RX ORDER — EPINEPHRINE 1 MG/ML
0.3 INJECTION, SOLUTION INTRAMUSCULAR; SUBCUTANEOUS EVERY 5 MIN PRN
Status: CANCELLED | OUTPATIENT
Start: 2023-01-01

## 2023-01-01 RX ORDER — METHYLPREDNISOLONE SODIUM SUCCINATE 125 MG/2ML
125 INJECTION, POWDER, LYOPHILIZED, FOR SOLUTION INTRAMUSCULAR; INTRAVENOUS
Status: CANCELLED
Start: 2023-01-01

## 2023-01-01 RX ORDER — EPINEPHRINE 1 MG/ML
0.3 INJECTION, SOLUTION, CONCENTRATE INTRAVENOUS EVERY 5 MIN PRN
Status: CANCELLED | OUTPATIENT
Start: 2023-01-01

## 2023-01-01 RX ORDER — LANREOTIDE ACETATE 120 MG/.5ML
120 INJECTION SUBCUTANEOUS ONCE
Qty: 120 MG | Refills: 0 | Status: COMPLETED | OUTPATIENT
Start: 2023-01-01 | End: 2023-01-01

## 2023-01-01 RX ORDER — DIPHENHYDRAMINE HYDROCHLORIDE 50 MG/ML
50 INJECTION INTRAMUSCULAR; INTRAVENOUS
Status: CANCELLED
Start: 2023-01-01

## 2023-01-01 RX ORDER — ALBUTEROL SULFATE 0.83 MG/ML
2.5 SOLUTION RESPIRATORY (INHALATION)
Status: CANCELLED | OUTPATIENT
Start: 2023-01-01

## 2023-01-01 RX ORDER — NYSTATIN 100000 [USP'U]/G
POWDER TOPICAL 2 TIMES DAILY
Qty: 60 G | Refills: 3 | Status: SHIPPED | OUTPATIENT
Start: 2023-01-01

## 2023-01-01 RX ORDER — FLUMAZENIL 0.1 MG/ML
0.2 INJECTION, SOLUTION INTRAVENOUS
Status: DISCONTINUED | OUTPATIENT
Start: 2023-01-01 | End: 2023-01-01 | Stop reason: HOSPADM

## 2023-01-01 RX ORDER — ALBUTEROL SULFATE 90 UG/1
1-2 AEROSOL, METERED RESPIRATORY (INHALATION)
Status: CANCELLED
Start: 2023-01-01

## 2023-01-01 RX ORDER — IOPAMIDOL 755 MG/ML
53 INJECTION, SOLUTION INTRAVASCULAR ONCE
Status: COMPLETED | OUTPATIENT
Start: 2023-01-01 | End: 2023-01-01

## 2023-01-01 RX ORDER — LISINOPRIL 2.5 MG/1
2.5 TABLET ORAL EVERY EVENING
Qty: 90 TABLET | Refills: 3 | Status: SHIPPED | OUTPATIENT
Start: 2023-01-01

## 2023-01-01 RX ORDER — FUROSEMIDE 20 MG
10-20 TABLET ORAL DAILY PRN
Qty: 30 TABLET | Refills: 0 | Status: SHIPPED | OUTPATIENT
Start: 2023-01-01

## 2023-01-01 RX ORDER — ACETAMINOPHEN 500 MG
1000 TABLET ORAL ONCE
Status: COMPLETED | OUTPATIENT
Start: 2023-01-01 | End: 2023-01-01

## 2023-01-01 RX ORDER — ALLOPURINOL 100 MG/1
100 TABLET ORAL DAILY
Qty: 90 TABLET | Refills: 2 | Status: SHIPPED | OUTPATIENT
Start: 2023-01-01

## 2023-01-01 RX ORDER — NETARSUDIL 0.2 MG/ML
SOLUTION/ DROPS OPHTHALMIC; TOPICAL
COMMUNITY
Start: 2023-02-15

## 2023-01-01 RX ORDER — MUPIROCIN 20 MG/G
OINTMENT TOPICAL 3 TIMES DAILY
Qty: 30 G | Refills: 0 | Status: SHIPPED | OUTPATIENT
Start: 2023-01-01

## 2023-01-01 RX ORDER — NALOXONE HYDROCHLORIDE 0.4 MG/ML
0.2 INJECTION, SOLUTION INTRAMUSCULAR; INTRAVENOUS; SUBCUTANEOUS
Status: DISCONTINUED | OUTPATIENT
Start: 2023-01-01 | End: 2023-01-01 | Stop reason: HOSPADM

## 2023-01-01 RX ORDER — AMLODIPINE BESYLATE 10 MG/1
10 TABLET ORAL DAILY
Qty: 90 TABLET | Refills: 3 | Status: SHIPPED | OUTPATIENT
Start: 2023-01-01

## 2023-01-01 RX ADMIN — LANREOTIDE ACETATE 120 MG: 120 INJECTION SUBCUTANEOUS at 14:06

## 2023-01-01 RX ADMIN — IOPAMIDOL 53 ML: 755 INJECTION, SOLUTION INTRAVENOUS at 10:21

## 2023-01-01 RX ADMIN — LIDOCAINE HYDROCHLORIDE 10 ML: 10 INJECTION, SOLUTION INFILTRATION; PERINEURAL at 11:10

## 2023-01-01 RX ADMIN — ACETAMINOPHEN 1000 MG: 500 TABLET, FILM COATED ORAL at 07:03

## 2023-01-01 RX ADMIN — LIDOCAINE HYDROCHLORIDE 10 ML: 10 INJECTION, SOLUTION INFILTRATION; PERINEURAL at 11:01

## 2023-01-01 RX ADMIN — COPPER CU 64 DOTATATE 4.1 MILLICURIE: 1 INJECTION, SOLUTION INTRAVENOUS at 12:27

## 2023-01-01 RX ADMIN — LANREOTIDE ACETATE 120 MG: 120 INJECTION SUBCUTANEOUS at 11:14

## 2023-01-01 RX ADMIN — MIDAZOLAM 0.5 MG: 1 INJECTION INTRAMUSCULAR; INTRAVENOUS at 10:58

## 2023-01-01 RX ADMIN — LANREOTIDE ACETATE 120 MG: 120 INJECTION SUBCUTANEOUS at 14:15

## 2023-01-01 RX ADMIN — LANREOTIDE ACETATE 120 MG: 120 INJECTION SUBCUTANEOUS at 09:40

## 2023-01-01 RX ADMIN — SODIUM CHLORIDE 53 ML: 9 INJECTION, SOLUTION INTRAVENOUS at 10:21

## 2023-01-01 RX ADMIN — LANREOTIDE ACETATE 120 MG: 120 INJECTION SUBCUTANEOUS at 14:02

## 2023-01-01 RX ADMIN — FENTANYL CITRATE 25 MCG: 50 INJECTION, SOLUTION INTRAMUSCULAR; INTRAVENOUS at 11:00

## 2023-01-01 ASSESSMENT — ENCOUNTER SYMPTOMS
SHORTNESS OF BREATH: 1
FREQUENCY: 1
COUGH: 0
BREAST MASS: 0
HEADACHES: 0
JOINT SWELLING: 0
DIARRHEA: 0
SORE THROAT: 0
EYE PAIN: 0
DYSURIA: 0
FEVER: 0
PARESTHESIAS: 1
DIZZINESS: 0
HEARTBURN: 0
ABDOMINAL PAIN: 1
CHILLS: 0
HEMATOCHEZIA: 0
NAUSEA: 0
ARTHRALGIAS: 1
HEMATURIA: 0
CONSTIPATION: 1
WEAKNESS: 1
PALPITATIONS: 0
MYALGIAS: 1
NERVOUS/ANXIOUS: 0

## 2023-01-01 ASSESSMENT — ACTIVITIES OF DAILY LIVING (ADL)
CURRENT_FUNCTION: HOUSEWORK REQUIRES ASSISTANCE
ADLS_ACUITY_SCORE: 37
CURRENT_FUNCTION: TRANSPORTATION REQUIRES ASSISTANCE
CURRENT_FUNCTION: NO ASSISTANCE NEEDED
CURRENT_FUNCTION: SHOPPING REQUIRES ASSISTANCE

## 2023-01-01 ASSESSMENT — PAIN SCALES - GENERAL
PAINLEVEL: MODERATE PAIN (5)
PAINLEVEL: NO PAIN (0)

## 2023-01-06 ENCOUNTER — TELEPHONE (OUTPATIENT)
Dept: ANTICOAGULATION | Facility: CLINIC | Age: 88
End: 2023-01-06

## 2023-01-06 NOTE — TELEPHONE ENCOUNTER
ANTICOAGULATION     Pooja Swartz is overdue for INR check.      Left message for patient to call and schedule lab appointment as soon as possible. If returning call, please schedule.     Monica Mg RN

## 2023-01-07 ENCOUNTER — TRANSFERRED RECORDS (OUTPATIENT)
Dept: HEALTH INFORMATION MANAGEMENT | Facility: CLINIC | Age: 88
End: 2023-01-07

## 2023-01-07 ENCOUNTER — TELEPHONE (OUTPATIENT)
Dept: NURSING | Facility: CLINIC | Age: 88
End: 2023-01-07

## 2023-01-07 DIAGNOSIS — Z79.01 LONG TERM CURRENT USE OF ANTICOAGULANT THERAPY: ICD-10-CM

## 2023-01-07 DIAGNOSIS — I48.20 CHRONIC ATRIAL FIBRILLATION (H): ICD-10-CM

## 2023-01-07 DIAGNOSIS — I48.91 ATRIAL FIBRILLATION, UNSPECIFIED TYPE (H): Primary | ICD-10-CM

## 2023-01-07 LAB — INR (EXTERNAL): 1.57 (ref 0.9–1.1)

## 2023-01-07 NOTE — TELEPHONE ENCOUNTER
Called and spoke with Vera.  Current INR 1.57.  Previous dosing was 4mg Sat/Sun/Tue/Thur and 2mg MWF.  Dose was reduced after elevated INR on 12/27 to 4mg Sun/Tue/Thur and 2mg all other days.    No significant change in diet, no missed doses.    Plan to have pt take 4mg today and 4mg on Sunday and then follow up with [facility] for ongoing dosing and next scheduled INR on Monday.    Pt and her daughter Vera verbalized understanding.    Dr. Lesly Clinton, DO

## 2023-01-07 NOTE — TELEPHONE ENCOUNTER
Pt's daughter calling to request;    INR dosing for low 1.57 INR per ED in [place] at or about 12 PM today.    ED would not dose but advised to call PCP.    INR on 12/27/22 was 3.9    Page sent to [facility] provider, Dr. Lesly Clinton who advised;    I will call Pt since she is in [place].    Evita Mc RN, Nurse Advisor 5:55 PM 1/7/2023

## 2023-01-09 ENCOUNTER — TELEPHONE (OUTPATIENT)
Dept: FAMILY MEDICINE | Facility: CLINIC | Age: 88
End: 2023-01-09

## 2023-01-09 NOTE — TELEPHONE ENCOUNTER
ANTICOAGULATION MANAGEMENT     Pooja Swartz 89 year old female is on warfarin with subtherapeutic INR result. (Goal INR )    Recent labs: (last 7 days)     01/07/23  1029   INR 1.57*       ASSESSMENT       Source(s): Chart Review and Patient/Caregiver Call       Warfarin doses taken: Warfarin taken as instructed     Pt advised to take booster dose 1/7 after INR was checked, pt confirmed she did take 4 mg    Diet: Increased greens/vitamin K in diet; plans to resume previous intake    New illness, injury, or hospitalization: Yes: patient did bump her leg and had a skin tear. Pt did go to the ED on 1/3 for the skin tear    Medication/supplement changes: None noted    Signs or symptoms of bleeding or clotting: No    Previous INR: Supratherapeutic    Additional findings: None     PLAN     Recommended plan for temporary change(s) affecting INR     Dosing Instructions: (pt already took a booster dose on 1/7) Continue your current warfarin dose with next INR in 1 week       Resume normal intake of greens (vitamin K)    Summary  As of 1/7/2023    Full warfarin instructions:  1/7: 4 mg; Otherwise 4 mg every Sun, Tue, Thu; 2 mg all other days   Next INR check:  1/13/2023             Telephone call with  pt and Ade who verbalizes understanding and agrees to plan     Gave verbal ok to discuss with Ade    Patient using outside facility for labs     Pt or Ade will return call with the fax number for the lab they would like a lab order sent to.    Education provided:     Please call back if any changes to your diet, medications or how you've been taking warfarin    Symptom monitoring: monitoring for clotting signs and symptoms    Plan made per [facility] anticoagulation protocol    Monica Mg RN  Anticoagulation Clinic  1/9/2023    _______________________________________________________________________     Anticoagulation Episode Summary     Current INR goal:  2.0-3.0   TTR:  53.9 % (1 y)   Target end date:  Indefinite   Send  INR reminders to:  [facility]    Indications    Atrial fibrillation (H) [I48.91]  Long-term (current) use of anticoagulants [Z79.01] [Z79.01]  Atrial fibrillation  unspecified type (H) [I48.91]  Chronic atrial fibrillation (H) [I48.20]           Comments:           Anticoagulation Care Providers     Provider Role Specialty Phone number    Amanda Renee MD Referring Family Medicine [phone]

## 2023-01-09 NOTE — TELEPHONE ENCOUNTER
Received confirmatory fax from outside lab (facility not identified on fax).    Result date: 01/07/2023 1029 MST  INR: 1.57

## 2023-01-09 NOTE — TELEPHONE ENCOUNTER
Reason for Call:  Reporting INR from 1/7/23 which was 1.57 in [facility], and needing further instructions/please contact daughter Ade for next route of care/patient requested that Arin from [place] return the call    Detailed comments: return call for next route of care    Phone Number Patient can be reached at: Home number on file [phone] (home)    Best Time: any    Can we leave a detailed message on this number? NO    Call taken on 1/9/2023 at 8:29 AM by Blanca Navarro

## 2023-01-13 ENCOUNTER — ANTICOAGULATION THERAPY VISIT (OUTPATIENT)
Dept: ANTICOAGULATION | Facility: CLINIC | Age: 88
End: 2023-01-13

## 2023-01-13 ENCOUNTER — TRANSFERRED RECORDS (OUTPATIENT)
Dept: HEALTH INFORMATION MANAGEMENT | Facility: CLINIC | Age: 88
End: 2023-01-13
Payer: COMMERCIAL

## 2023-01-13 DIAGNOSIS — I48.91 ATRIAL FIBRILLATION, UNSPECIFIED TYPE (H): ICD-10-CM

## 2023-01-13 DIAGNOSIS — Z79.01 LONG TERM CURRENT USE OF ANTICOAGULANT THERAPY: Primary | ICD-10-CM

## 2023-01-13 DIAGNOSIS — I48.20 CHRONIC ATRIAL FIBRILLATION (H): ICD-10-CM

## 2023-01-13 LAB — INR (EXTERNAL): 2.24 (ref 0.9–1.1)

## 2023-01-13 NOTE — PROGRESS NOTES
ANTICOAGULATION MANAGEMENT     Pooja Swartz 89 year old female is on warfarin with therapeutic INR result. (Goal INR 2.0-3.0)    Recent labs: (last 7 days)     01/13/23  0800   INR 2.24*       ASSESSMENT       Source(s): Chart Review and Patient/Caregiver Call       Warfarin doses taken: Booster dose(s) recently taken which may be affecting INR    Diet: No new diet changes identified    New illness, injury, or hospitalization: No    Medication/supplement changes: None noted    Signs or symptoms of bleeding or clotting: No    Previous INR: Subtherapeutic    Additional findings: None       PLAN     Recommended plan for no diet, medication or health factor changes affecting INR     Dosing Instructions: Continue your current warfarin dose with next INR in 2 weeks       Summary  As of 1/13/2023    Full warfarin instructions:  4 mg every Sun, Tue, Thu; 2 mg all other days   Next INR check:  1/27/2023             Telephone call with Pooja who verbalizes understanding and agrees to plan    Patient using outside facility for labs    Education provided:     Please call back if any changes to your diet, medications or how you've been taking warfarin    Contact [phone]  with any changes, questions or concerns.     Plan made per [facility] anticoagulation protocol    Dillon VAZQUEZ RN  Anticoagulation Clinic  1/13/2023    _______________________________________________________________________     Anticoagulation Episode Summary     Current INR goal:  2.0-3.0   TTR:  53.1 % (1 y)   Target end date:  Indefinite   Send INR reminders to:  [facility]    Indications    Atrial fibrillation (H) [I48.91]  Long-term (current) use of anticoagulants [Z79.01] [Z79.01]  Atrial fibrillation  unspecified type (H) [I48.91]  Chronic atrial fibrillation (H) [I48.20]           Comments:           Anticoagulation Care Providers     Provider Role Specialty Phone number    Amanda Renee MD Referring Family Medicine [phone]

## 2023-01-31 ENCOUNTER — TELEPHONE (OUTPATIENT)
Dept: ANTICOAGULATION | Facility: CLINIC | Age: 88
End: 2023-01-31
Payer: COMMERCIAL

## 2023-01-31 NOTE — TELEPHONE ENCOUNTER
ANTICOAGULATION     Pooja Swartz is overdue for INR check.      spoke with Pooja and she will have INR done at outside lab within the next week    Arin Sullivan RN

## 2023-02-01 ENCOUNTER — TRANSFERRED RECORDS (OUTPATIENT)
Dept: HEALTH INFORMATION MANAGEMENT | Facility: CLINIC | Age: 88
End: 2023-02-01
Payer: COMMERCIAL

## 2023-02-01 LAB — INR (EXTERNAL): 1.9 (ref 2–3)

## 2023-02-02 ENCOUNTER — ANTICOAGULATION THERAPY VISIT (OUTPATIENT)
Dept: ANTICOAGULATION | Facility: CLINIC | Age: 88
End: 2023-02-02
Payer: COMMERCIAL

## 2023-02-02 ENCOUNTER — TELEPHONE (OUTPATIENT)
Dept: FAMILY MEDICINE | Facility: CLINIC | Age: 88
End: 2023-02-02
Payer: COMMERCIAL

## 2023-02-02 DIAGNOSIS — I48.91 ATRIAL FIBRILLATION, UNSPECIFIED TYPE (H): ICD-10-CM

## 2023-02-02 DIAGNOSIS — I48.20 CHRONIC ATRIAL FIBRILLATION (H): ICD-10-CM

## 2023-02-02 DIAGNOSIS — Z79.01 LONG TERM CURRENT USE OF ANTICOAGULANT THERAPY: Primary | ICD-10-CM

## 2023-02-02 NOTE — TELEPHONE ENCOUNTER
Patient had a 1.9 INR on 2-1-23 at [facility]. Daughter has the physical result and will go to [facility] store and fax it later today. [facility] will call patient back once we get result.    Arin Sullivan RN, BSN, PHN

## 2023-02-02 NOTE — PROGRESS NOTES
ANTICOAGULATION MANAGEMENT     Pooja Swartz 89 year old female is on warfarin with subtherapeutic INR result. (Goal INR 2.0-3.0)    Recent labs: (last 7 days)     02/01/23  1648   INR 1.9*       ASSESSMENT       Source(s): Chart Review and Patient/Caregiver Call       Warfarin doses taken: Warfarin taken as instructed    Diet: No new diet changes identified    New illness, injury, or hospitalization: No    Medication/supplement changes: None noted    Signs or symptoms of bleeding or clotting: No    Previous INR: Therapeutic last visit; previously outside of goal range    Additional findings: None       PLAN     Recommended plan for no diet, medication or health factor changes affecting INR     Dosing Instructions: Continue your current warfarin dose with next INR in 2 weeks       Summary  As of 2/2/2023    Full warfarin instructions:  4 mg every Sun, Tue, Thu; 2 mg all other days   Next INR check:  2/15/2023             Telephone call with Pooja who verbalizes understanding and agrees to plan    Patient using outside facility for labs    Education provided:     Goal range and lab monitoring: goal range and significance of current result    Plan made per [facility] anticoagulation protocol    Effie Coates RN  Anticoagulation Clinic  2/2/2023    _______________________________________________________________________     Anticoagulation Episode Summary     Current INR goal:  2.0-3.0   TTR:  51.4 % (1 y)   Target end date:  Indefinite   Send INR reminders to:  [facility]    Indications    Atrial fibrillation (H) [I48.91]  Long-term (current) use of anticoagulants [Z79.01] [Z79.01]  Atrial fibrillation  unspecified type (H) [I48.91]  Chronic atrial fibrillation (H) [I48.20]           Comments:           Anticoagulation Care Providers     Provider Role Specialty Phone number    Amanda Renee MD Referring Family Medicine [phone]

## 2023-02-02 NOTE — TELEPHONE ENCOUNTER
See anticoag encounter for 2/2/23    Effie Coates RN - [facility] Anticoagulation Clinic

## 2023-02-02 NOTE — TELEPHONE ENCOUNTER
Patient Returning Call    Reason for call:  pts daughter would like call back from pts inr nurse to discuss recent inr result.    Information relayed to patient:  na    Patient has additional questions:  Yes    What are your questions/concerns:  na    Okay to leave a detailed message?: No at Other phone number:     [phone]

## 2023-02-14 ENCOUNTER — TRANSFERRED RECORDS (OUTPATIENT)
Dept: HEALTH INFORMATION MANAGEMENT | Facility: CLINIC | Age: 88
End: 2023-02-14
Payer: COMMERCIAL

## 2023-02-15 ENCOUNTER — ANTICOAGULATION THERAPY VISIT (OUTPATIENT)
Dept: ANTICOAGULATION | Facility: CLINIC | Age: 88
End: 2023-02-15
Payer: COMMERCIAL

## 2023-02-15 ENCOUNTER — TELEPHONE (OUTPATIENT)
Dept: ANTICOAGULATION | Facility: CLINIC | Age: 88
End: 2023-02-15
Payer: COMMERCIAL

## 2023-02-15 DIAGNOSIS — Z79.01 LONG TERM CURRENT USE OF ANTICOAGULANT THERAPY: ICD-10-CM

## 2023-02-15 DIAGNOSIS — I48.91 ATRIAL FIBRILLATION, UNSPECIFIED TYPE (H): Primary | ICD-10-CM

## 2023-02-15 DIAGNOSIS — I48.91 ATRIAL FIBRILLATION, UNSPECIFIED TYPE (H): ICD-10-CM

## 2023-02-15 DIAGNOSIS — I48.20 CHRONIC ATRIAL FIBRILLATION (H): ICD-10-CM

## 2023-02-15 DIAGNOSIS — I48.91 ATRIAL FIBRILLATION (H): Primary | ICD-10-CM

## 2023-02-15 LAB — INR (EXTERNAL): 1.5 (ref 0.9–1.1)

## 2023-02-15 NOTE — TELEPHONE ENCOUNTER
ANTICOAGULATION     Pooja Swartz is overdue for INR check.      Left message for patient to call and schedule lab appointment as soon as possible. If returning call, please schedule.     Linsey Abbott RN

## 2023-02-15 NOTE — PROGRESS NOTES
ANTICOAGULATION MANAGEMENT     Pooja Swartz 89 year old female is on warfarin with subtherapeutic INR result. (Goal INR 2.0-3.0)    Recent labs: (last 7 days)     02/14/23  1354   INR 1.5*       ASSESSMENT       Source(s): Chart Review    Previous INR was Subtherapeutic    Medication, diet, health changes since last INR chart reviewed; none identified           PLAN     Recommended plan for no diet, medication or health factor changes affecting INR     Dosing Instructions: booster dose then Increase your warfarin dose (10% change) with next INR in 2 weeks       Summary  As of 2/15/2023    Full warfarin instructions:  2/15: 4 mg; Otherwise 2 mg every Mon, Wed, Fri; 4 mg all other days   Next INR check:  3/1/2023             Detailed voice message left for Pooja with dosing instructions and follow up date.     Patient using outside facility for labs    Education provided:     Please call back if any changes to your diet, medications or how you've been taking warfarin    Contact [phone] with any changes, questions or concerns.     Plan made per [facility] anticoagulation protocol    Linsey Abbott RN  Anticoagulation Clinic  2/15/2023    _______________________________________________________________________     Anticoagulation Episode Summary     Current INR goal:  2.0-3.0   TTR:  47.9 % (1 y)   Target end date:  Indefinite   Send INR reminders to:  [facility]    Indications    Atrial fibrillation (H) [I48.91]  Long-term (current) use of anticoagulants [Z79.01] [Z79.01]  Atrial fibrillation  unspecified type (H) [I48.91]  Chronic atrial fibrillation (H) [I48.20]           Comments:           Anticoagulation Care Providers     Provider Role Specialty Phone number    Amanda Renee MD Referring Family Medicine [phone]

## 2023-02-15 NOTE — PROGRESS NOTES
Lab result entered and encounter routed to [facility] team.    Arin Joseph RN    [facility]

## 2023-02-16 ENCOUNTER — TELEPHONE (OUTPATIENT)
Dept: FAMILY MEDICINE | Facility: CLINIC | Age: 88
End: 2023-02-16
Payer: COMMERCIAL

## 2023-02-16 DIAGNOSIS — Z79.01 LONG TERM CURRENT USE OF ANTICOAGULANT THERAPY: ICD-10-CM

## 2023-02-16 DIAGNOSIS — I48.91 ATRIAL FIBRILLATION, UNSPECIFIED TYPE (H): Primary | ICD-10-CM

## 2023-02-16 DIAGNOSIS — I48.20 CHRONIC ATRIAL FIBRILLATION (H): ICD-10-CM

## 2023-02-16 NOTE — TELEPHONE ENCOUNTER
Patient Returning Call    Reason for call:  Patent called and had her INR done yesterday on 2/15/23  INR was 1.5    Information relayed to patient:  Nurse will call her back     Patient has additional questions:  Yes    What are your questions/concerns:  results     Okay to leave a detailed message?: Yes at Cell number on file:    Telephone Information:   Mobile [phone]

## 2023-02-16 NOTE — TELEPHONE ENCOUNTER
Talked with Ellie who did not listen to her msg last night. Reviewed plan, booster tonight, inr scheduled for 3/6 when she will be back in [place]

## 2023-03-06 ENCOUNTER — NURSE TRIAGE (OUTPATIENT)
Dept: FAMILY MEDICINE | Facility: CLINIC | Age: 88
End: 2023-03-06

## 2023-03-06 ENCOUNTER — ALLIED HEALTH/NURSE VISIT (OUTPATIENT)
Dept: FAMILY MEDICINE | Facility: CLINIC | Age: 88
End: 2023-03-06
Payer: COMMERCIAL

## 2023-03-06 ENCOUNTER — ANTICOAGULATION THERAPY VISIT (OUTPATIENT)
Dept: ANTICOAGULATION | Facility: CLINIC | Age: 88
End: 2023-03-06

## 2023-03-06 ENCOUNTER — LAB (OUTPATIENT)
Dept: LAB | Facility: CLINIC | Age: 88
End: 2023-03-06
Payer: COMMERCIAL

## 2023-03-06 ENCOUNTER — TELEPHONE (OUTPATIENT)
Dept: FAMILY MEDICINE | Facility: CLINIC | Age: 88
End: 2023-03-06

## 2023-03-06 DIAGNOSIS — Z79.01 LONG TERM CURRENT USE OF ANTICOAGULANT THERAPY: ICD-10-CM

## 2023-03-06 DIAGNOSIS — S51.819A SKIN TEAR OF FOREARM WITHOUT COMPLICATION: Primary | ICD-10-CM

## 2023-03-06 DIAGNOSIS — I48.91 ATRIAL FIBRILLATION, UNSPECIFIED TYPE (H): Primary | ICD-10-CM

## 2023-03-06 DIAGNOSIS — I48.20 CHRONIC ATRIAL FIBRILLATION (H): ICD-10-CM

## 2023-03-06 LAB — INR BLD: 2.5 (ref 0.9–1.1)

## 2023-03-06 PROCEDURE — 99207 PR NO CHARGE NURSE ONLY: CPT

## 2023-03-06 PROCEDURE — 85610 PROTHROMBIN TIME: CPT

## 2023-03-06 PROCEDURE — 36416 COLLJ CAPILLARY BLOOD SPEC: CPT

## 2023-03-06 NOTE — TELEPHONE ENCOUNTER
Scheduled on RN schedule to assess skin tear  Patient notified    Camryn Mccray, RN on 3/6/2023 at 9:59 AM

## 2023-03-06 NOTE — TELEPHONE ENCOUNTER
Reason for call:  Patient reporting a symptom    Symptom or request: Pt has had a skin tear on her right arm, after bumping her arm 2 days ago. Pt has very thin skin due to being 89 years old.  Pt is coming in for Lab appt today at 10:30am and wants her arm looked at by a nurse.  Please call patient and advise.      Duration (how long have symptoms been present): 2 days    Have you been treated for this before? Yes    Additional comments:     Phone Number patient can be reached at:  Home number on file [phone] (home)    Best Time:  any    Can we leave a detailed message on this number:  YES    Call taken on 3/6/2023 at 8:05 AM by Ashly Gray

## 2023-03-06 NOTE — PROGRESS NOTES
ANTICOAGULATION MANAGEMENT     Pooja Swartz 89 year old female is on warfarin with therapeutic INR result. (Goal INR 2.0-3.0)    Recent labs: (last 7 days)     03/06/23  1026   INR 2.5*       ASSESSMENT       Source(s): Chart Review and Patient/Caregiver Call       Warfarin doses taken: Warfarin taken as instructed    Diet: No new diet changes identified    New illness, injury, or hospitalization: No    Medication/supplement changes: None noted    Signs or symptoms of bleeding or clotting: No    Previous INR: Subtherapeutic    Additional findings: None         PLAN     Recommended plan for no diet, medication or health factor changes affecting INR     Dosing Instructions: Continue your current warfarin dose with next INR in 2 weeks       Summary  As of 3/6/2023    Full warfarin instructions:  2 mg every Mon, Wed, Fri; 4 mg all other days   Next INR check:  4/3/2023             Telephone call with Pooja who verbalizes understanding and agrees to plan    Patient elected to schedule next visit 4/3    Education provided:     Contact [phone] with any changes, questions or concerns.     Plan made per [facility] anticoagulation protocol    Linsey Abbott RN  Anticoagulation Clinic  3/6/2023    _______________________________________________________________________     Anticoagulation Episode Summary     Current INR goal:  2.0-3.0   TTR:  49.4 % (1 y)   Target end date:  Indefinite   Send INR reminders to:  [facility]    Indications    Atrial fibrillation (H) [I48.91]  Long-term (current) use of anticoagulants [Z79.01] [Z79.01]  Atrial fibrillation  unspecified type (H) [I48.91]  Chronic atrial fibrillation (H) [I48.20]           Comments:           Anticoagulation Care Providers     Provider Role Specialty Phone number    Amanda Renee MD Referring Family Medicine [phone]

## 2023-03-06 NOTE — TELEPHONE ENCOUNTER
"Patient presents with 2 daughters for assessment and dressing of skin tear on right arm. Bumped the arm on her vanity when getting up to rest room in the night Sunday.  Patient had taped over a gauze. Bruised skin from warfarin use. Dressing removed carefully. 1\" skin tear. No drainage or bleeding present.  Applied bacitracin and telfa non-adherent dressing. Wrapped with Kerlix. Advised not to place adherent dressings to her skin.  Advised to see provider if signs of infection. Will return to clinic on Wednesday for re-dressing.     Camryn Mccray RN on 3/6/2023 at 11:15 AM      Reason for Disposition    [1] Minor skin tear AND [2] from accidental self-injury (e.g., bumping into objects) AND [3] recurrent problem    Additional Information    Negative: [1] After 14 days AND [2] wound isn't healed    Negative: [1] Weak immune system (e.g., HIV positive, cancer chemo, splenectomy, organ transplant, chronic steroids) AND [2] minor skin tear    Negative: [1] Diabetes mellitus AND [2] minor skin tear on foot    Negative: [1] Last tetanus shot > 5 years ago AND [2] DIRTY skin tear    Negative: [1] Last tetanus shot > 10 years ago AND [2] CLEAN skin tear    Negative: A quarter (25%) or more of the flap is missing (completely torn off)    Negative: Skin tear flap looks very dark (e.g., black or purple)    Negative: [1] Looks infected (spreading redness, pus) AND [2] no fever    Negative: No prior tetanus shots (or is not fully vaccinated)    Negative: [1] HIV positive or severe immunodeficiency (severely weak immune system) AND [2] DIRTY skin tear    Negative: [1] SEVERE pain AND [2] not improved 2 hours after pain medicine    Negative: [1] Looks infected AND [2] large red area (> 2 inches or 5 cm) or streak    Negative: [1] Fever AND [2] spreading red area or streak    Negative: Suspicious history for the injury    Negative: [1] Dirt in the skin tear AND [2] not removed with 15 minutes of flushing with water    " Negative: Skin tear is > 1 inch (25 mm) or > 1/4 inch (6 mm) on the face    Negative: Patient unable or uncomfortable caring for skin tear at home    Negative: [1] Bleeding AND [2] won't stop after 10 minutes of direct pressure (using correct technique)    Negative: [1] Deep skin tear AND [2] can see bone or tendons    Negative: Sensation of something in the wound (i.e., retained object in wound)    Negative: Cut or laceration    Negative: Scrape or abrasion    Negative: Skin tear is from a fall    Negative: Human bite    Negative: Animal bite    Negative: Injury other than just a skin tear is suspected    Negative: [1] Major bleeding (e.g., actively dripping or spurting) AND [2] can't be stopped    Negative: Sounds like a life-threatening emergency to the triager    Protocols used: SKIN TEAR-A-AH

## 2023-03-08 ENCOUNTER — TELEPHONE (OUTPATIENT)
Dept: FAMILY MEDICINE | Facility: CLINIC | Age: 88
End: 2023-03-08

## 2023-03-08 ENCOUNTER — ALLIED HEALTH/NURSE VISIT (OUTPATIENT)
Dept: FAMILY MEDICINE | Facility: CLINIC | Age: 88
End: 2023-03-08
Payer: COMMERCIAL

## 2023-03-08 DIAGNOSIS — N90.89 LABIAL IRRITATION: Primary | ICD-10-CM

## 2023-03-08 DIAGNOSIS — Z48.00 CHANGE OF DRESSING: Primary | ICD-10-CM

## 2023-03-08 PROCEDURE — 99207 PR NO CHARGE NURSE ONLY: CPT

## 2023-03-08 RX ORDER — ESTRADIOL 0.1 MG/G
1 CREAM VAGINAL
Qty: 42.5 G | Refills: 0 | Status: SHIPPED | OUTPATIENT
Start: 2023-03-09

## 2023-03-08 NOTE — TELEPHONE ENCOUNTER
Pooja notified and expressed understanding. She will keep her appointment with Dr Renee for 3/13/23.    Gema To RN

## 2023-03-08 NOTE — PROGRESS NOTES
"Patient presents with 2 daughters for dressing change to right arm skin tear. 1\" skin tear appears the same as 3/6.  wound is starting to approximate. No signs of infection ie pus, pain, red streaks, fever. Bacitracin and telfa applied. Wrapped with kerlix and taped. Advised no tape to skin as patient has very thin skin from warfarin use.  Patient scheduled on 3/10 for dressing change and assessment.     Discussed vaginal/labial skin irritation. See telephone encounter. Declined appointment for this today as daughters need to leave on a flight.   Scheduled for 3/13.     Camryn Mccray RN on 3/8/2023 at 7:57 AM    "

## 2023-03-08 NOTE — TELEPHONE ENCOUNTER
I will send small script, but will need follow up with dr. Renee to ensure this is appropriate going forward. I would also focus on barrier protection-desitin or aquaphor.     SD IRIZARRY, DO

## 2023-03-08 NOTE — TELEPHONE ENCOUNTER
Patient and daughters in clinic today for dressing change to right arm for skin tear.     Patient reports labial irritation from incontinence, mainly at night. Daughters state labia is very red,  inflamed and occasionally bleeds.     Daughter uses estradiol for the same concern and states it works well for her.     Requesting for patient to have RX for this to [facility].     Has tried Desitin, Vaseline, [place] with no relief.     Unable to see provider today, daughters are flying home at 1030 am.     Patient scheduled 3/13 with Dr. Renee.     Camryn Mccray RN on 3/8/2023 at 8:04 AM

## 2023-03-10 ENCOUNTER — TELEPHONE (OUTPATIENT)
Dept: FAMILY MEDICINE | Facility: CLINIC | Age: 88
End: 2023-03-10

## 2023-03-10 NOTE — TELEPHONE ENCOUNTER
Contacted patient as she did not show for appointment for dressing change today.     States the skin tear looks good and she is going to wrap it herself today. Denies redness, swelling, fever, red streaks.     Advised no adhesives to skin on the arm.   Patient agrees.     Will see Dr. Renee Monday.     Camryn Mccray RN on 3/10/2023 at 2:46 PM

## 2023-03-13 ENCOUNTER — OFFICE VISIT (OUTPATIENT)
Dept: FAMILY MEDICINE | Facility: CLINIC | Age: 88
End: 2023-03-13
Payer: COMMERCIAL

## 2023-03-13 VITALS
OXYGEN SATURATION: 100 % | RESPIRATION RATE: 18 BRPM | HEIGHT: 60 IN | WEIGHT: 108 LBS | TEMPERATURE: 98.6 F | DIASTOLIC BLOOD PRESSURE: 66 MMHG | SYSTOLIC BLOOD PRESSURE: 139 MMHG | HEART RATE: 79 BPM | BODY MASS INDEX: 21.2 KG/M2

## 2023-03-13 DIAGNOSIS — K52.9 CHRONIC DIARRHEA: ICD-10-CM

## 2023-03-13 DIAGNOSIS — B18.1 HEPATITIS B CARRIER (H): ICD-10-CM

## 2023-03-13 DIAGNOSIS — D69.2 SOLAR PURPURA (H): ICD-10-CM

## 2023-03-13 DIAGNOSIS — I49.5 SINUS NODE DYSFUNCTION (H): ICD-10-CM

## 2023-03-13 DIAGNOSIS — E44.1 MILD PROTEIN-CALORIE MALNUTRITION (H): ICD-10-CM

## 2023-03-13 DIAGNOSIS — N18.31 STAGE 3A CHRONIC KIDNEY DISEASE (H): ICD-10-CM

## 2023-03-13 DIAGNOSIS — N95.2 ATROPHIC VAGINITIS: ICD-10-CM

## 2023-03-13 DIAGNOSIS — R63.4 UNINTENTIONAL WEIGHT LOSS: Primary | ICD-10-CM

## 2023-03-13 DIAGNOSIS — I35.0 AORTIC VALVE STENOSIS, ETIOLOGY OF CARDIAC VALVE DISEASE UNSPECIFIED: ICD-10-CM

## 2023-03-13 DIAGNOSIS — S51.811D SKIN TEAR OF RIGHT FOREARM WITHOUT COMPLICATION, SUBSEQUENT ENCOUNTER: ICD-10-CM

## 2023-03-13 PROBLEM — E46 PROTEIN-CALORIE MALNUTRITION (H): Status: ACTIVE | Noted: 2023-03-13

## 2023-03-13 LAB
ALBUMIN SERPL BCG-MCNC: 4.4 G/DL (ref 3.5–5.2)
ALP SERPL-CCNC: 55 U/L (ref 35–104)
ALT SERPL W P-5'-P-CCNC: 20 U/L (ref 10–35)
ANION GAP SERPL CALCULATED.3IONS-SCNC: 12 MMOL/L (ref 7–15)
AST SERPL W P-5'-P-CCNC: 25 U/L (ref 10–35)
BASOPHILS # BLD AUTO: 0.1 10E3/UL (ref 0–0.2)
BASOPHILS NFR BLD AUTO: 1 %
BILIRUB SERPL-MCNC: 0.4 MG/DL
BUN SERPL-MCNC: 35.3 MG/DL (ref 8–23)
CALCIUM SERPL-MCNC: 10.2 MG/DL (ref 8.2–9.6)
CHLORIDE SERPL-SCNC: 101 MMOL/L (ref 98–107)
CREAT SERPL-MCNC: 1.01 MG/DL (ref 0.51–0.95)
DEPRECATED HCO3 PLAS-SCNC: 25 MMOL/L (ref 22–29)
EOSINOPHIL # BLD AUTO: 0.1 10E3/UL (ref 0–0.7)
EOSINOPHIL NFR BLD AUTO: 1 %
ERYTHROCYTE [DISTWIDTH] IN BLOOD BY AUTOMATED COUNT: 14.8 % (ref 10–15)
GFR SERPL CREATININE-BSD FRML MDRD: 50 ML/MIN/1.73M2
GLUCOSE SERPL-MCNC: 134 MG/DL (ref 70–99)
HCT VFR BLD AUTO: 34.4 % (ref 35–47)
HGB BLD-MCNC: 10.9 G/DL (ref 11.7–15.7)
IMM GRANULOCYTES # BLD: 0 10E3/UL
IMM GRANULOCYTES NFR BLD: 0 %
LYMPHOCYTES # BLD AUTO: 1.3 10E3/UL (ref 0.8–5.3)
LYMPHOCYTES NFR BLD AUTO: 15 %
MCH RBC QN AUTO: 33.9 PG (ref 26.5–33)
MCHC RBC AUTO-ENTMCNC: 31.7 G/DL (ref 31.5–36.5)
MCV RBC AUTO: 107 FL (ref 78–100)
MONOCYTES # BLD AUTO: 0.7 10E3/UL (ref 0–1.3)
MONOCYTES NFR BLD AUTO: 8 %
NEUTROPHILS # BLD AUTO: 6.5 10E3/UL (ref 1.6–8.3)
NEUTROPHILS NFR BLD AUTO: 75 %
PLATELET # BLD AUTO: 210 10E3/UL (ref 150–450)
POTASSIUM SERPL-SCNC: 4.6 MMOL/L (ref 3.4–5.3)
PROT SERPL-MCNC: 7.5 G/DL (ref 6.4–8.3)
RBC # BLD AUTO: 3.22 10E6/UL (ref 3.8–5.2)
SODIUM SERPL-SCNC: 138 MMOL/L (ref 136–145)
TSH SERPL DL<=0.005 MIU/L-ACNC: 0.79 UIU/ML (ref 0.3–4.2)
WBC # BLD AUTO: 8.7 10E3/UL (ref 4–11)

## 2023-03-13 PROCEDURE — 36415 COLL VENOUS BLD VENIPUNCTURE: CPT | Performed by: FAMILY MEDICINE

## 2023-03-13 PROCEDURE — 84134 ASSAY OF PREALBUMIN: CPT | Performed by: FAMILY MEDICINE

## 2023-03-13 PROCEDURE — 85025 COMPLETE CBC W/AUTO DIFF WBC: CPT | Performed by: FAMILY MEDICINE

## 2023-03-13 PROCEDURE — 84443 ASSAY THYROID STIM HORMONE: CPT | Performed by: FAMILY MEDICINE

## 2023-03-13 PROCEDURE — 99214 OFFICE O/P EST MOD 30 MIN: CPT | Performed by: FAMILY MEDICINE

## 2023-03-13 PROCEDURE — 80053 COMPREHEN METABOLIC PANEL: CPT | Performed by: FAMILY MEDICINE

## 2023-03-13 RX ORDER — BUDESONIDE 3 MG/1
3 CAPSULE, COATED PELLETS ORAL EVERY MORNING
Qty: 90 CAPSULE | Refills: 3 | Status: SHIPPED | OUTPATIENT
Start: 2023-03-13

## 2023-03-13 ASSESSMENT — PAIN SCALES - GENERAL: PAINLEVEL: NO PAIN (0)

## 2023-03-13 NOTE — PATIENT INSTRUCTIONS
"Schedule echocardiogram  [phone]    CT chest/abdomen/pelvis to further evaluate the weight loss  Call to schedule imaging in [phone]        Thank you for choosing [facility].      When you are out of refills or the refills say \"zero\", it is time to schedule your next appointment in clinic!    Labs are released to you almost immediately and sometimes before I have had a chance to review them.  I review labs regularly and once they are all in, you will be sent a letter with your results and/or if you are signed up for on-line services, you will be e-mailed the results with my interpretation. If there are serious findings, you typically will be called.    If you have any questions about your visit, your symptoms, your medication, your test results or it is not clear what your diagnosis or treatment plan is please contact me (via [employer]) or call the care team at [phone] option #2      Our Clinic hours are:      7:20 am - 7 pm  Tues -  Fri  7:20 am - 5 pm      The clinic lab opens at 7:30 am Mon - Fri and appointments are required.    [facility] [place]  Ph. [phone]  Monday-Thursday 8 am - 7pm  Tues/ 8 am - 5:30 pm       "

## 2023-03-13 NOTE — PROGRESS NOTES
Assessment & Plan     Unintentional weight loss  Mild protein/calorie malnutrition  Basic labs will be done  CT C/A/P to r/o malignancy  Encourage boost/ensure once a day  - CBC with platelets and differential; Future  - Comprehensive metabolic panel (BMP + Alb, Alk Phos, ALT, AST, Total. Bili, TP); Future  - TSH with free T4 reflex; Future  - Prealbumin; Future  - CT Chest/Abdomen/Pelvis w Contrast; Future  - CBC with platelets and differential  - Comprehensive metabolic panel (BMP + Alb, Alk Phos, ALT, AST, Total. Bili, TP)  - TSH with free T4 reflex  - Prealbumin    Solar purpura (H)  Stable, monitor    Skin tear of right forearm without complication, subsequent encounter  Redressed  No sign of infection    Sinus node dysfunction (H)  Stable, resolved with change in her eye drops    Hepatitis B carrier (H)  Stable  Asymptomatic      Stage 3a chronic kidney disease (H)   continue to monitor    Chronic diarrhea     - budesonide (ENTOCORT EC) 3 MG EC capsule; Take 1 capsule (3 mg) by mouth every morning    Aortic valve stenosis, etiology of cardiac valve disease unspecified   update echo, last done 1.5 years ago  - Echocardiogram Complete; Future                 No follow-ups on file.    Amanda Renee MD  [facility]    Daniella Patton is a 89 year old, presenting for the following health issues:  Vaginal Problem      HPI     - Tear of skin on right forearm.    - Easy bruising. She is taking warfarin now    - Wanda loss. She notes that she is eating a regular diet and not trying to loss weight.    Wt Readings from Last 4 Encounters:   03/13/23 49 kg (108 lb)   10/13/22 54.4 kg (120 lb)   09/22/22 53.5 kg (118 lb)   12/13/21 56.2 kg (124 lb)     - Incontinence. She notes that this is causing pain on bottom. She is using estrace cream with improvement,        Review of Systems   Constitutional, HEENT, cardiovascular, pulmonary, gi and gu systems are negative, except as otherwise  noted.    Vulvar/vaginal pain is much improved on the topical estrogen  Also recommend barrier cream    Wt Readings from Last 5 Encounters:   03/13/23 49 kg (108 lb)   10/13/22 54.4 kg (120 lb)   09/22/22 53.5 kg (118 lb)   12/13/21 56.2 kg (124 lb)   10/19/21 57.6 kg (127 lb)     Weight loss over time - she is eating/drinking well  No night sweats        Objective    /66   Pulse 79   Temp 98.6  F (37  C) (Tympanic)   Resp 18   Ht 1.524 m (5')   Wt 49 kg (108 lb)   LMP 07/07/1960   SpO2 100%   BMI 21.09 kg/m    Body mass index is 21.09 kg/m .  Physical Exam   GENERAL: healthy, alert and no distress  NECK: no adenopathy, no asymmetry, masses, or scars and thyroid normal to palpation  RESP: lungs clear to auscultation - no rales, rhonchi or wheezes  CV: regular rates and rhythm, normal S1 S2, no S3 or S4, grade 3/6 holosystolic murmur heard best over the lusb, peripheral pulses strong and no peripheral edema  ABDOMEN: soft, nontender, no hepatosplenomegaly, no masses and bowel sounds normal  MS: no gross musculoskeletal defects noted, no edema    Results for orders placed or performed in visit on 03/13/23   CBC with platelets and differential     Status: None (In process)    Narrative    The following orders were created for panel order CBC with platelets and differential.  Procedure                               Abnormality         Status                     ---------                               -----------         ------                     CBC with platelets and d...[[id number]]                      In process                   Please view results for these tests on the individual orders.

## 2023-03-14 LAB — PREALB SERPL IA-MCNC: 24 MG/DL (ref 15–45)

## 2023-03-21 ENCOUNTER — HOSPITAL ENCOUNTER (OUTPATIENT)
Dept: CT IMAGING | Facility: CLINIC | Age: 88
Discharge: HOME | End: 2023-03-21
Attending: FAMILY MEDICINE | Admitting: FAMILY MEDICINE
Payer: COMMERCIAL

## 2023-03-21 DIAGNOSIS — R63.4 UNINTENTIONAL WEIGHT LOSS: ICD-10-CM

## 2023-03-21 PROCEDURE — 250N000009 HC RX 250: Performed by: FAMILY MEDICINE

## 2023-03-21 PROCEDURE — 250N000011 HC RX IP 250 OP 636: Performed by: FAMILY MEDICINE

## 2023-03-21 PROCEDURE — 74177 CT ABD & PELVIS W/CONTRAST: CPT

## 2023-03-21 RX ORDER — IOPAMIDOL 755 MG/ML
48 INJECTION, SOLUTION INTRAVASCULAR ONCE
Status: COMPLETED | OUTPATIENT
Start: 2023-03-21 | End: 2023-03-21

## 2023-03-21 RX ADMIN — IOPAMIDOL 48 ML: 755 INJECTION, SOLUTION INTRAVENOUS at 10:10

## 2023-03-21 RX ADMIN — SODIUM CHLORIDE 53 ML: 9 INJECTION, SOLUTION INTRAVENOUS at 10:10

## 2023-03-22 NOTE — TELEPHONE ENCOUNTER
Routing refill request to provider for review/approval because:  Labs out of range:  inr    Surekha NOE Rn           .

## 2023-03-29 ENCOUNTER — ANTICOAGULATION THERAPY VISIT (OUTPATIENT)
Dept: ANTICOAGULATION | Facility: CLINIC | Age: 88
End: 2023-03-29

## 2023-03-29 ENCOUNTER — OFFICE VISIT (OUTPATIENT)
Dept: FAMILY MEDICINE | Facility: CLINIC | Age: 88
End: 2023-03-29
Payer: COMMERCIAL

## 2023-03-29 VITALS
SYSTOLIC BLOOD PRESSURE: 146 MMHG | TEMPERATURE: 97.8 F | BODY MASS INDEX: 21.47 KG/M2 | HEART RATE: 73 BPM | WEIGHT: 109.38 LBS | DIASTOLIC BLOOD PRESSURE: 60 MMHG | HEIGHT: 60 IN | OXYGEN SATURATION: 99 % | RESPIRATION RATE: 18 BRPM

## 2023-03-29 DIAGNOSIS — N39.0 URINARY TRACT INFECTION, ACUTE: ICD-10-CM

## 2023-03-29 DIAGNOSIS — K86.89 DILATION OF PANCREATIC DUCT: ICD-10-CM

## 2023-03-29 DIAGNOSIS — I48.20 CHRONIC ATRIAL FIBRILLATION (H): ICD-10-CM

## 2023-03-29 DIAGNOSIS — Z79.01 LONG TERM CURRENT USE OF ANTICOAGULANT THERAPY: ICD-10-CM

## 2023-03-29 DIAGNOSIS — R32 URINARY INCONTINENCE, UNSPECIFIED TYPE: ICD-10-CM

## 2023-03-29 DIAGNOSIS — R19.00 RETROPERITONEAL MASS: Primary | ICD-10-CM

## 2023-03-29 DIAGNOSIS — R35.0 URINARY FREQUENCY: ICD-10-CM

## 2023-03-29 DIAGNOSIS — I48.91 ATRIAL FIBRILLATION, UNSPECIFIED TYPE (H): Primary | ICD-10-CM

## 2023-03-29 LAB
ALBUMIN UR-MCNC: 100 MG/DL
APPEARANCE UR: CLEAR
BACTERIA #/AREA URNS HPF: ABNORMAL /HPF
BILIRUB UR QL STRIP: NEGATIVE
COLOR UR AUTO: YELLOW
GLUCOSE UR STRIP-MCNC: NEGATIVE MG/DL
HGB UR QL STRIP: ABNORMAL
INR BLD: 2.9 (ref 0.9–1.1)
KETONES UR STRIP-MCNC: NEGATIVE MG/DL
LEUKOCYTE ESTERASE UR QL STRIP: ABNORMAL
NITRATE UR QL: NEGATIVE
PH UR STRIP: 8.5 [PH] (ref 5–7)
RBC #/AREA URNS AUTO: ABNORMAL /HPF
SP GR UR STRIP: 1.01 (ref 1–1.03)
SQUAMOUS #/AREA URNS AUTO: ABNORMAL /LPF
UROBILINOGEN UR STRIP-ACNC: 0.2 E.U./DL
WBC #/AREA URNS AUTO: ABNORMAL /HPF

## 2023-03-29 PROCEDURE — 36416 COLLJ CAPILLARY BLOOD SPEC: CPT | Performed by: FAMILY MEDICINE

## 2023-03-29 PROCEDURE — 85610 PROTHROMBIN TIME: CPT | Performed by: FAMILY MEDICINE

## 2023-03-29 PROCEDURE — 81001 URINALYSIS AUTO W/SCOPE: CPT | Performed by: FAMILY MEDICINE

## 2023-03-29 PROCEDURE — 99214 OFFICE O/P EST MOD 30 MIN: CPT | Performed by: FAMILY MEDICINE

## 2023-03-29 PROCEDURE — 87086 URINE CULTURE/COLONY COUNT: CPT | Performed by: FAMILY MEDICINE

## 2023-03-29 RX ORDER — CEPHALEXIN 500 MG/1
500 CAPSULE ORAL 2 TIMES DAILY
Qty: 14 CAPSULE | Refills: 0 | Status: SHIPPED | OUTPATIENT
Start: 2023-03-29 | End: 2023-04-05

## 2023-03-29 ASSESSMENT — PAIN SCALES - GENERAL: PAINLEVEL: NO PAIN (0)

## 2023-03-29 NOTE — PROGRESS NOTES
Assessment & Plan     Retroperitoneal mass  Patient would like to pursue CT guided biopsy.  IR referral placed.   She understands she will need to hold her warfarin for 5 days or so before the biopsy.     - IR Referral; Future    Dilation of pancreatic duct  Will wait on further imaging until she knows more about the retroperitoneal mass.     Urinary frequency   possible UTI  - UA with Microscopic reflex to Culture - lab collect; Future  - UA with Microscopic reflex to Culture - lab collect  - UA Microscopic with Reflex to Culture  - Urine Culture    Chronic atrial fibrillation (H)  On warfarin regularly  - INR point of care    Urinary incontinence, unspecified type  Possible UTI  Also think she would benefit from pelvic floor therapy  - Physical Therapy Referral; Future    Urinary tract infection, acute   will notify INR clinic about the antibiotic  Culture pending  - cephALEXin (KEFLEX) 500 MG capsule; Take 1 capsule (500 mg) by mouth 2 times daily for 7 days                 Amanda Renee MD  [facility]    Daniella Patton is a 89 year old, presenting for the following health issues:  Hypertension    History of Present Illness       Reason for visit:  Office visit    She eats 2-3 servings of fruits and vegetables daily. She is missing 7 dose(s) of medications per week.       - Review CT scan completed 3/21/2023    - Urinary incontinence. She notes having to wake about q2 hours during the night to use bathroom. No burning or pain.    - INR today.    Hypertension Follow-up  Amlodipine 10mg every day, lisinopril 2.5mg qd    Do you check your blood pressure regularly outside of the clinic? Yes     Are you following a low salt diet? Yes    Are your blood pressures ever more than 140 on the top number (systolic) OR more   than 90 on the bottom number (diastolic), for example 140/90? No    BP Readings from Last 6 Encounters:   03/29/23 (!) 146/60   03/13/23 139/66   10/13/22 136/50    09/22/22 138/58   05/31/22 (!) 158/65   04/25/22 (!) 160/69       Atrial Fibrillation Follow-up      Symptoms: no recent chest pain, significant palpitations, dizziness/lightheadedness, dyspnea, or increased peripheral edema.    Stroke prevention: Coumadin (Warfarin)    Date 5/31/2022 9/22/2022 10/13/2022 3/13/2023 3/29/2023   Pulse 72 69 71 79 73     Current [id number] Score: 4 points, which represents a 4.8% annual risk of major embolic event, without anti-coagulation or an LAAO device.     Chronic Kidney Disease Follow-up      Do you take any over the counter pain medicine?: No    Medication Followup of Atrial fibrillation  J[place]oven - as followed per coumadin clinic    Taking Medication as prescribed: yes    Side Effects:  None    Medication Helping Symptoms:  yes        Review of Systems   Constitutional, HEENT, cardiovascular, pulmonary, gi and gu systems are negative, except as otherwise noted.      Objective    BP (!) 146/60   Pulse 73   Temp 97.8  F (36.6  C) (Tympanic)   Resp 18   Ht 1.524 m (5')   Wt 49.6 kg (109 lb 6 oz)   LMP 07/07/1960   SpO2 99%   BMI 21.36 kg/m    Body mass index is 21.36 kg/m .  Physical Exam   GENERAL APPEARANCE: healthy, alert and no distress    Results for orders placed or performed in visit on 03/29/23   UA with Microscopic reflex to Culture - lab collect     Status: Abnormal    Specimen: Urine, Midstream   Result Value Ref Range    Color Urine Yellow Colorless, Straw, Light Yellow, Yellow    Appearance Urine Clear Clear    Glucose Urine Negative Negative mg/dL    Bilirubin Urine Negative Negative    Ketones Urine Negative Negative mg/dL    Specific Gravity Urine 1.015 1.003 - 1.035    Blood Urine Moderate (A) Negative    pH Urine 8.5 (H) 5.0 - 7.0    Protein Albumin Urine 100 (A) Negative mg/dL    Urobilinogen Urine 0.2 0.2, 1.0 E.U./dL    Nitrite Urine Negative Negative    Leukocyte Esterase Urine Large (A) Negative    Narrative    Microscopic exam performed on  unspun urine.    INR point of care     Status: Abnormal   Result Value Ref Range    INR 2.9 (H) 0.9 - 1.1    Narrative    This test is intended for monitoring Coumadin therapy. Results are not accurate in patients with prolonged INR due to factor deficiency.   UA Microscopic with Reflex to Culture     Status: Abnormal   Result Value Ref Range    Bacteria Urine Moderate (A) None Seen /HPF    RBC Urine 5-10 (A) 0-2 /HPF /HPF    WBC Urine 10-25 (A) 0-5 /HPF /HPF    Squamous Epithelials Urine Moderate (A) None Seen /LPF    Narrative    Microscopic exam performed on unspun urine.

## 2023-03-29 NOTE — PROGRESS NOTES
ANTICOAGULATION MANAGEMENT     Pooja Swartz 89 year old female is on warfarin with therapeutic INR result. (Goal INR 2.0-3.0)    Recent labs: (last 7 days)     03/29/23  1421   INR 2.9*       ASSESSMENT       Source(s): Chart Review and Patient/Caregiver Call       Warfarin doses taken: Warfarin taken as instructed    Diet: No new diet changes identified    New illness, injury, or hospitalization: No    Medication/supplement changes: starting cephalexin for uti today until 4/5    Signs or symptoms of bleeding or clotting: No    Previous INR: Therapeutic last visit; previously outside of goal range    Additional findings: None         PLAN     Recommended plan for no diet, medication or health factor changes affecting INR     Dosing Instructions: Continue your current warfarin dose with next INR in 1 week       Summary  As of 3/29/2023    Full warfarin instructions:  2 mg every Mon, Wed, Fri; 4 mg all other days   Next INR check:  4/3/2023             Telephone call with Pooja who verbalizes understanding and agrees to plan    Lab visit scheduled    Education provided:     Contact [phone] with any changes, questions or concerns.     Plan made per [facility] anticoagulation protocol    Linsey Abbott RN  Anticoagulation Clinic  3/29/2023    _______________________________________________________________________     Anticoagulation Episode Summary     Current INR goal:  2.0-3.0   TTR:  55.7 % (1 y)   Target end date:  Indefinite   Send INR reminders to:  [facility]    Indications    Atrial fibrillation (H) [I48.91]  Long-term (current) use of anticoagulants [Z79.01] [Z79.01]  Atrial fibrillation  unspecified type (H) [I48.91]  Chronic atrial fibrillation (H) [I48.20]           Comments:           Anticoagulation Care Providers     Provider Role Specialty Phone number    Amanda Renee MD Referring Family Medicine [phone]

## 2023-03-31 LAB — BACTERIA UR CULT: NORMAL

## 2023-04-06 ENCOUNTER — LAB (OUTPATIENT)
Dept: LAB | Facility: CLINIC | Age: 88
End: 2023-04-06
Payer: COMMERCIAL

## 2023-04-06 ENCOUNTER — ANTICOAGULATION THERAPY VISIT (OUTPATIENT)
Dept: ANTICOAGULATION | Facility: CLINIC | Age: 88
End: 2023-04-06

## 2023-04-06 DIAGNOSIS — I48.20 CHRONIC ATRIAL FIBRILLATION (H): ICD-10-CM

## 2023-04-06 DIAGNOSIS — I48.91 ATRIAL FIBRILLATION, UNSPECIFIED TYPE (H): Primary | ICD-10-CM

## 2023-04-06 DIAGNOSIS — Z79.01 LONG TERM CURRENT USE OF ANTICOAGULANT THERAPY: ICD-10-CM

## 2023-04-06 LAB — INR BLD: 2.2 (ref 0.9–1.1)

## 2023-04-06 PROCEDURE — 36416 COLLJ CAPILLARY BLOOD SPEC: CPT

## 2023-04-06 PROCEDURE — 85610 PROTHROMBIN TIME: CPT

## 2023-04-06 NOTE — PROGRESS NOTES
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  She has been on a low dose of immunosuppression because of ongoing issues with EBV viremia and because of the residual effects of her Wilms tumor chemotherapy on her bone marrow. She denies lymphadenopathy, low grade fevers, fatigue or weight loss.     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      She is an A student at school and brilliant at horseback riding. She has had some recent anxiety due to the gradual increase in her serum creatinine and this has manifested as decreased adherence with labs.      REVIEW OF SYSTEMS TODAY:  Other than that mentioned above is entirely negative with no complaints of headaches, visual problems, hearing issues, no lumps or bumps.  Gingival hypertrophy improved. She has no complaints of chest pain or shortness of breath.  She has no palpitations or exercise limitations.  She has no nausea, vomiting, diarrhea or constipation.  She has no CNS symptomatology.        PHYSICAL EXAMINATION:     /87 (BP Location: Right arm, Patient Position: Sitting, Cuff Size: Adult Regular)  Pulse 101  Temp 98.2  F (36.8  C)  Ht 4' 10.47\" (148.5 cm)  Wt 121 lb 7.6 oz (55.1 kg)  BMI 24.99 kg/m2   Blood pressure percentiles are 94 % systolic and 98 % diastolic based on NHBPEP's 4th Report. Blood pressure percentile targets: 90: 122/79, 95: 126/83, 99 + 5 mmH/95.  HEAD, EARS, EYES, NOSE AND THROAT:  Negative other than mild gingival hypertrophy and braces. " ANTICOAGULATION MANAGEMENT     Pooja Swartz 89 year old female is on warfarin with therapeutic INR result. (Goal INR 2.0-3.0)    Recent labs: (last 7 days)     04/06/23  0927   INR 2.2*       ASSESSMENT       Source(s): Chart Review and Patient/Caregiver Call       Warfarin doses taken: Warfarin taken as instructed    Diet: No new diet changes identified    New illness, injury, or hospitalization: No    Medication/supplement changes: None noted    Signs or symptoms of bleeding or clotting: No    Previous INR: Therapeutic last visit; previously outside of goal range    Additional findings: None         PLAN     Recommended plan for no diet, medication or health factor changes affecting INR     Dosing Instructions: Continue your current warfarin dose with next INR in 4 weeks       Summary  As of 4/6/2023    Full warfarin instructions:  2 mg every Mon, Wed, Fri; 4 mg all other days   Next INR check:  5/4/2023             Telephone call with Pooja who verbalizes understanding and agrees to plan    Lab visit scheduled    Education provided:     Contact [phone] with any changes, questions or concerns.     Plan made per [facility] anticoagulation protocol    Linsey Abbott RN  Anticoagulation Clinic  4/6/2023    _______________________________________________________________________     Anticoagulation Episode Summary     Current INR goal:  2.0-3.0   TTR:  56.9 % (1 y)   Target end date:  Indefinite   Send INR reminders to:  [facility]    Indications    Atrial fibrillation (H) [I48.91]  Long-term (current) use of anticoagulants [Z79.01] [Z79.01]  Atrial fibrillation  unspecified type (H) [I48.91]  Chronic atrial fibrillation (H) [I48.20]           Comments:           Anticoagulation Care Providers     Provider Role Specialty Phone number    Amanda Renee MD Referring Family Medicine [phone]               NECK: She has no lymphadenopathy  RS:  Good AE bilaterally. No creps/wheezes.    CVS: S1S2. RRR. No m.  ABDOMEN:  Shows scars of previous surgery, and her kidney transplant by palpation appears normal.  She has no organomegaly. On standing she has a small bump in her midline scar which is non-tender and non-reducible although when she lies down it is disappears.  SKIN:  Negative.     Her CNS exam is grossly intact. Bilateral patellar DTR+.     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      Immunosuppression: She is on very low dose IS and continues to have intermittent low level DSA against DR51. Continue AZA 25mg daily and aim for goal FK level 4-6 with prednisone 5mg every other day. Since on regular prednisone, should have formal eye exam annually. For better compliance will change her to extended release tacrolimus. While this is being done, she needs labs twice a week.    Renal: serum creatinine gradually increasing.  She has been UTI free for year but recommend continuing nitrofurantoin 50mg qHS for prophylaxis. No indication for dialysis.    ID: CMV and BKV PCR have been consistently negative but EBV is positive consistently.Continue low dose immunosuppression. Will discontinue valAcyclovir since it is not affecting her EBV PCR noticeably.    Hypertension: Increase amlodipine 10mg bid and continue enalapril-HCTZ since home BPs 120s/80-90s. Will continue to monitor and titrate therapy for effect. ECHO annually.    Anemia of CKD: Stable hemoglobin. Continue iron.    Return to clinic in 6 months. Reiterated importance of medication and lab adherence. Recommended she see a gynecologist for irregular periods and dysmenorhea and preparation for transition to adult nephrology.    Shaina Ruiz MD    Copy to parents and Dr. Rao, and Dr. Alatorre.    Time spent in face to face counselin hour

## 2023-04-13 NOTE — TELEPHONE ENCOUNTER
KATE-PROCEDURAL ANTICOAGULATION  MANAGEMENT    ASSESSMENT     Warfarin interruption plan for retroperitoneal biopsy on 23.    Indication for Anticoagulation: Atrial Fibrillation      [id number] = 4 (Hypertension, Age >= 75 and Female)      Kate-Procedure Risk stratification for thromboembolism: low ( Chest guidelines)    AFIB:  CHEST Perioperative Management guidelines recommends against bridging for patients with atrial fibrillation except in high risk stratification patients.     RECOMMENDATION       Pre-Procedure:  o Hold warfarin for 5 days, until after procedure startin23   o No Bridge      Post-Procedure:  o Resume warfarin dose if okay with provider doing procedure on night of procedure, 23 PM: 6 mg x 2, then resume home dosing  o Recheck INR ~ 7 days after resuming warfarin       Plan routed to referring provider for approval  ?   Humble Puentes    SUBJECTIVE/OBJECTIVE     Pooja Swartz, a 89 year old female    Goal INR Range: 2.0-3.0       Wt Readings from Last 3 Encounters:   23 49.6 kg (109 lb 6 oz)   23 49 kg (108 lb)   10/13/22 54.4 kg (120 lb)      Ideal body weight: 45.5 kg (100 lb 4.9 oz)  Adjusted ideal body weight: 47.1 kg (103 lb 15 oz)     Estimated body mass index is 21.36 kg/m  as calculated from the following:    Height as of 3/29/23: 1.524 m (5').    Weight as of 3/29/23: 49.6 kg (109 lb 6 oz).    Lab Results   Component Value Date    INR 2.2 (H) 2023    INR 2.9 (H) 2023    INR 2.5 (H) 2023     Lab Results   Component Value Date    HGB 10.9 2023    HGB 12.1 2020    HCT 34.4 2023    HCT 36.9 2020     2023     2020     Lab Results   Component Value Date    CR 1.01 (H) 2023    CR 0.82 10/13/2022    CR 0.83 2021     CrCl cannot be calculated (Patient's most recent lab result is older than the maximum 30 days allowed.).

## 2023-04-13 NOTE — TELEPHONE ENCOUNTER
INTERVENTIONAL RADIOLOGY INSTRUCTIONS     You are scheduled for an upcoming procedure in the   Interventional Radiology Department at [facility].     Date:  Tuesday April 25     Procedure: Biopsy     Address: [facility] [address] [place], [place] [address]     Free parking is available for patients & visitors in Lot A or B.  Lot A is closest to the main entrance of hospital.    Check in at main entrance WELCOME DESK.        Check into the Radiology Department at: 09:30 am    Do not eat any food or drink any fluids after: 01:30 am         Two visitors may accompany you to your procedure.      You will need to have someone available to drive you home.      We recommend that you have a responsible adult stay with you for 24 hours after you get home.      Please bring list of current medications to your appointment.      Please take all of your medications as prescribed with a small sip of water, unless you are contacted by a nurse from our department to hold them:      Pooja:  I will contact your Anti-coagulation Clinic   and confirm that they are aware of this procedure on 4/25/2023,  and our team's requirement for your INR value.      They will notify you of changes that you may need to take   in the dosing of your WARFARIN.      This requirement has been fulfilled.  No further action needed!       Please confirm that you have received these instructions by replying to this 2nd Watch message, or if you have any questions, please call the Interventional Radiology team at [phone].       Thank you!    Misa RUCKER  Interventional Radiology Intake Nurse Coordinator  [phone]

## 2023-04-13 NOTE — TELEPHONE ENCOUNTER
----- Message from Misa Stephens RN sent at 4/13/2023  8:17 AM CDT -----  Regarding: procedure  Patient has procedure scheduled 4/25/2023.      The Interventional Radiology team requires Warfarin hold of 5 days prior to this procedure and and INR less than or equal to 1.8.    Please confirm that you have received this message!    Thank you-    Misa RUCKER  Interventional Radiology RN   [phone]

## 2023-04-13 NOTE — TELEPHONE ENCOUNTER
Joie Peralta, [facility],     - scheduled with Interventional Radiology @ [facility] for CT abdomen retroperitoneal biopsy on 4/25/23 d/t peritoneal mass.     - needs 5 day warfarin hold.     - please review warfarin hold and potential bridge.

## 2023-04-13 NOTE — TELEPHONE ENCOUNTER
Talked with Ellie and reviewed plan. Also scheduled post procedure inr. She will call if questions arise.

## 2023-04-14 NOTE — TELEPHONE ENCOUNTER
Writer has spoken with Pooja regarding planned procedure with IR via telephone.      Pooja acknowledges understanding of pre-procedure instructions.         I have provided Pooja with IR number ([phone]) for questions or concerns.    A documented H&P exam is noted in patient EMR with the date 3/29/2023.    Misa RUCKER  Interventional Radiology RN   [phone]

## 2023-04-25 NOTE — PRE-PROCEDURE
GENERAL PRE-PROCEDURE:   Procedure:  CT guided retroperitoneal mass biopsy with moderate sedation  Date/Time:  4/25/2023 10:30 AM    Written consent obtained?: Yes    Risks and benefits: Risks, benefits and alternatives were discussed    Consent given by:  Patient  Patient states understanding of procedure being performed: Yes    Patient's understanding of procedure matches consent: Yes    Procedure consent matches procedure scheduled: Yes    Expected level of sedation:  Moderate  Appropriately NPO:  Yes  Mallampati  :  Grade 1- soft palate, uvula, tonsillar pillars, and posterior pharyngeal wall visible  Lungs:  Lungs clear with good breath sounds bilaterally  Heart:  Systolic murmur and a-fib  History & Physical reviewed:  History and physical reviewed and no updates needed  Statement of review:  I have reviewed the lab findings, diagnostic data, medications, and the plan for sedation

## 2023-04-25 NOTE — IP AVS SNAPSHOT
[facility]  [address] [address]  Phone: [phone]  Fax: [phone]                              After Visit Summary   4/25/2023    Pooja Swartz   MRN: [id number]           After Visit Summary Signature Page    I have received my discharge instructions, and my questions have been answered. I have discussed any challenges I see with this plan with the nurse or doctor.    ..........................................................................................................................................  Patient/Patient Representative Signature      ..........................................................................................................................................  Patient Representative Print Name and Relationship to Patient    ..................................................               ................................................  Date                                   Time    ..........................................................................................................................................  Reviewed by Signature/Title    ...................................................              ..............................................  Date                                               Time    22EPIC Rev 08/18

## 2023-04-25 NOTE — PROGRESS NOTES
[facility]  POST PROCEDURE NOTE    Date of Procedure: 04/25/23    Time of Procedure: 11:46 AM     Pre-Procedure Diagnosis: Retroperitoneal mass     Post-Procedure Diagnosis: Same    Procedure: CT guided retroperitoneal biopsy    Physician: Gio Hope MD    Assistants: None    Type of Sedation: Sedation    Estimated Blood Loss: Minimal, Replacements: n/a    Specimen/Tissues Removed: Yes    Complications/Reactions: None    Assessment/Plan: Retroperitoneal mass biopsy. Patient tolerated well. Will have bedrest x2 hours. If no issues can discharge home. Discussed resumption of Coumadin which can be resumed tomorrow per procedural guidelines.     Gio Hope MD   4/25/2023, 11:46 AM

## 2023-04-28 NOTE — PROGRESS NOTES
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  Large circumscribed solid mass in the right  abdomen/retroperitoneum. Differential would include duodenal gist or  retroperitoneal tumor such as a sarcoma. This may be approachable by  endoscopy through the duodenum or possibly percutaneously posteriorly.  No evidence for distant metastatic disease.  2.  Mild decrease in intra and extrahepatic biliary dilation since the  previous exam.  3.  New focal dilation of the pancreatic duct within the pancreatic  tail that could represent IPMN or other cystic neoplasm. If indicated  MRI with contrast could better evaluate.  4.  Much of the pelvis is obscured by metallic artifact from the  prosthetic hips.      4/25/2023 CT biopsy retroperitoneal mass  Final Diagnosis   RIGHT RETROPERITONEAL MASS, CT-GUIDED CORE BIOPSIES:        - WELL DIFFERENTIATED NEUROENDOCRINE TUMOR, GRADE 2 (SEE MICROSCOPIC DESCRIPTION)      Electronically signed by Nichole Rocha MD on 4/27/2023 at 11:22 AM           Clinical Assessment / Barriers to Care (Per Nurse):    Pt lives in [place], 89yr old female. Will need med onc consult to discuss options for treatment. Will offer 5/15 Mon with Dr Edwards in [place], or Jerry at [place] 5/5, or other Onc options per pt preference.        Records Location: [facility]     Records Needed:     None    Additional testing needed prior to consult:     RODRIGO Holder, RN, BSN, OCN  Oncology New Patient Nurse Navigator   [facility]  [phone]

## 2023-05-04 NOTE — PROGRESS NOTES
ANTICOAGULATION MANAGEMENT     Pooja Swartz 89 year old female is on warfarin with subtherapeutic INR result. (Goal INR 2.0-3.0)    Recent labs: (last 7 days)     05/04/23  0930   INR 1.7*       ASSESSMENT       Source(s): Chart Review    Previous INR was Subtherapeutic    Medication, diet, health changes since last INR chart reviewed; none identified             PLAN     Recommended plan for no diet, medication or health factor changes affecting INR     Dosing Instructions: Increase your warfarin dose (9.1% change) with next INR in 2 weeks       Summary  As of 5/4/2023    Full warfarin instructions:  2 mg every Mon, Wed; 4 mg all other days   Next INR check:  5/18/2023             Detailed voice message left for Pooaj with dosing instructions and follow up date.     Contact [phone] to schedule and with any changes, questions or concerns.     Education provided:     Please call back if any changes to your diet, medications or how you've been taking warfarin    Plan made per [facility] anticoagulation protocol    Linsey Abbott RN  Anticoagulation Clinic  5/4/2023    _______________________________________________________________________     Anticoagulation Episode Summary     Current INR goal:  2.0-3.0   TTR:  51.8 % (1 y)   Target end date:  Indefinite   Send INR reminders to:  [facility]    Indications    Atrial fibrillation (H) [I48.91]  Long-term (current) use of anticoagulants [Z79.01] [Z79.01]  Atrial fibrillation  unspecified type (H) [I48.91]  Chronic atrial fibrillation (H) [I48.20]           Comments:           Anticoagulation Care Providers     Provider Role Specialty Phone number    Amanda Renee MD Referring Family Medicine [phone]

## 2023-05-04 NOTE — TELEPHONE ENCOUNTER
Patient Returning Call    Reason for call:  INR    Information relayed to patient:  Will call her back    Patient has additional questions:  Yes    What are your questions/concerns:  Pooja just wanted to check back in with INR as she missed their call. No number for call back listed in Anticoag encounter. PT was scheduled for follow up INR appointment on the 18th as suggested.     Who does the patient want to speak with:  RN    Is an  needed?:  No      Okay to leave a detailed message?: Yes at Home number on file [phone] (home)

## 2023-05-05 NOTE — LETTER
5/5/2023         RE: Pooja Swartz  [address]  [address] [place] [id number]        Dear Colleague,    Thank you for referring your patient, Pooja Swartz, to the [facility] [place]. Please see a copy of my visit note below.    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  CT scan is showing an 8.8 cm well circumscribed mass in the retroperitoneum which seems to be abutting duodenum.  Biopsy shows well-differentiated neuroendocrine tumor.  Grade 2.  Ki-67 of 5%.  I reviewed the diagnosis of well-differentiated neuroendocrine tumor in detail today.  I explained to her that it is unlikely that this is a primary retroperitoneal neuroendocrine tumor and probably represents metastatic disease from upper GI tract.  CT scan does not appear to show any evidence of distant mets.  However this may not be a very sensitive test.  I reviewed further management options with her.  I am not sure if this is amenable for resection especially if this is metastatic disease.  Recommend further evaluation with gallium dotatate scan to look for evidence of distant metastasis or primary tumor in the GI tract.  May also need upper endoscopy with EUS.  Ultimately treatment will depend upon how symptomatic she is.  If it is not surgically resectable then options include observation versus  octreotide.    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, including hip     Hemorrhage of gastrointestinal tract     Generalized osteoarthrosis, unspecified site     HEPATITIS B in past     Impaired fasting glucose     Disorder of bone and cartilage     Chronic kidney disease, stage III (moderate) (H)     Chronic tophaceous gout     CARDIOVASCULAR SCREENING; LDL GOAL LESS THAN 100     Sensorineural hearing loss, bilateral     Sinus node dysfunction (H)     Atrial fibrillation (H)     Health Care Home     Long-term (current) use of anticoagulants [Z79.01]     IBD (inflammatory bowel disease)     Atrial fibrillation, unspecified type (H)     Sinus arrest     Other specified glaucoma     Junctional bradycardia     Anticoagulated on Coumadin     Glaucoma suspect, bilateral     Impaired gait and mobility     Chronic atrial fibrillation (H)     Protein-calorie malnutrition (H)     Neuroendocrine tumor     ______________________________________________________________________________    Staging History     Cancer Staging   No matching staging information was found for the patient.      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    She had presented with unintentional weight loss which has been going on for the last few months.  A CT chest abdomen pelvis was obtained which showed a well-circumscribed retroperitoneal mass near the ampulla which seem to be abutting duodenum.  It measured 8.3 x 7.6 x 8.8 cm.  Initially it was thought to be a GIST  versus sarcoma.  Underwent IR guided needle biopsy of the mass which came back showing well-differentiated, grade 2 neuroendocrine tumor with a Ki-67 of 5%.  Prior CT scan in 2018 did not show any mass in that area.  Denies any significant abdominal pain.  Has any bright red blood per rectum.  No excessive sweating or flushing.  No palpitations.  No significant diarrhea.    She is a never smoker.  Very rare alcohol use.    Past History    Past Medical History:   Diagnosis Date     Atrial fibrillation (H)      Basal cell carcinoma      Chronic kidney disease      Colitis      Disorders of bursae and tendons in shoulder region, unspecified     right shoulder     Femur fracture, left (H) 08/17/2016     Hemorrhage of gastrointestinal tract, unspecified      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, including hip      Other specified glaucoma      Periprosthetic fracture around internal prosthetic left hip joint, subsequent encounter 01/09/2017     Renal insufficiency      Sinus node dysfunction (H)      Unspecified essential hypertension     Family History   Problem Relation Age of Onset     Heart Disease Mother         CHF     Hypertension Mother      Hypertension Father      Hypertension Maternal Grandmother      Hypertension Maternal Grandfather      Hypertension Son      Heart Disease Son         MI      Past Surgical History:   Procedure Laterality Date     ARTHROPLASTY REVISION HIP Left 8/19/2016    Procedure: ARTHROPLASTY REVISION HIP;  Surgeon: Kael Rojas MD;  Location: [facility] OR      VASCULAR SURGERY PROCEDURE UNLIST       OPEN REDUCTION INTERNAL FIXATION FEMUR PROXIMAL Left 8/19/2016    Procedure: OPEN REDUCTION INTERNAL FIXATION FEMUR PROXIMAL;  Surgeon: Kael Rojas MD;  Location: [facility] OR     SURGICAL HISTORY OF -   11/1992    skin cancer, nose     SURGICAL HISTORY OF -   1978    right, vein stripping     SURGICAL HISTORY OF -   1968    breast biopsy     SURGICAL HISTORY OF -   1993     laparoscopic cholecystectomy     SURGICAL HISTORY OF -       tonsillectomy and adenoidectomy     SURGICAL HISTORY OF -   04/2004    left cataract     SURGICAL HISTORY OF -   08/09/2004    right total shoulder arthroplasty     ZZC PELVIS/HIP JOINT SURGERY UNLISTED       [facility] SHOULDER SURG PROC UNLISTED       [facility] STOMACH SURGERY PROCEDURE UNLISTED      Social History     Socioeconomic History     Marital status:      Spouse name: Not on file     Number of children: Not on file     Years of education: Not on file     Highest education level: Not on file   Occupational History     Not on file   Tobacco Use     Smoking status: Never     Smokeless tobacco: Never   Vaping Use     Vaping status: Never Used   Substance and Sexual Activity     Alcohol use: Yes     Comment: rare     Drug use: No     Sexual activity: Not Currently     Birth control/protection: None   Other Topics Concern     Parent/sibling w/ CABG, MI or angioplasty before 65F 55M? Yes   Social History Narrative     Not on file     Social Determinants of Health     Financial Resource Strain: Not on file   Food Insecurity: Not on file   Transportation Needs: Not on file   Physical Activity: Not on file   Stress: Not on file   Social Connections: Not on file   Intimate Partner Violence: Not on file   Housing Stability: Not on file        Allergies    Allergies   Allergen Reactions     Pcn [Penicillins] Hives       Review of Systems    Pertinent items are noted in HPI.      Physical Exam        5/31/2023    11:24 AM   Oncology Vitals   Height 152 cm   Weight 47.628 kg   BSA (m2) 1.42 m2   /64   Pulse 75   Temp 98  F (36.7  C)   Temp src Tympanic   SpO2 98 %   Pain Score 0 (None)       General: alert and cooperative  HEENT: Head: Normal, normocephalic, atraumatic.  Eye: Normal external eye, conjunctiva, lids cornea, FEI.  Chest: Clear to auscultation bilaterally  Cardiac: Irregular rhythm  Abdomen: Abdomen is soft, rounded, irregular mass palpable in  the right upper quadrant upon deep palpation.  Extremities: atraumatic, no peripheral edema  Skin: No rashes or bruises  CNS: Alert and oriented x3, neurologic exam grossly normal.  Lymphatics: No bilateral cervical, axillary, supraclavicular or inguinal adenopathy noted      Lab Results    Recent Results (from the past 168 hour(s))   Echocardiogram Complete   Result Value Ref Range    LVEF  60-65%    INR   Result Value Ref Range    INR 2.85 (H) 0.85 - 1.15   CBC with platelets and differential   Result Value Ref Range    WBC Count 8.8 4.0 - 11.0 10e3/uL    RBC Count 2.91 (L) 3.80 - 5.20 10e6/uL    Hemoglobin 10.1 (L) 11.7 - 15.7 g/dL    Hematocrit 30.1 (L) 35.0 - 47.0 %     (H) 78 - 100 fL    MCH 34.7 (H) 26.5 - 33.0 pg    MCHC 33.6 31.5 - 36.5 g/dL    RDW 15.8 (H) 10.0 - 15.0 %    Platelet Count 153 150 - 450 10e3/uL    % Neutrophils 68 %    % Lymphocytes 18 %    % Monocytes 9 %    % Eosinophils 3 %    % Basophils 1 %    % Immature Granulocytes 1 %    NRBCs per 100 WBC 0 <1 /100    Absolute Neutrophils 6.0 1.6 - 8.3 10e3/uL    Absolute Lymphocytes 1.6 0.8 - 5.3 10e3/uL    Absolute Monocytes 0.8 0.0 - 1.3 10e3/uL    Absolute Eosinophils 0.3 0.0 - 0.7 10e3/uL    Absolute Basophils 0.1 0.0 - 0.2 10e3/uL    Absolute Immature Granulocytes 0.1 <=0.4 10e3/uL    Absolute NRBCs 0.0 10e3/uL   INR point of care   Result Value Ref Range    INR 3.2 (H) 0.9 - 1.1       Imaging Results    XR Pelvis 1/2 Views    Result Date: 5/26/2023  EXAM: XR PELVIS 1/2 VIEWS LOCATION: [facility] DATE/TIME: 5/26/2023 4:28 AM CDT INDICATION: Fall with bilateral hip and pelvic pain. COMPARISON: X-ray pelvis and right hip 2 views 10/13/2022.     IMPRESSION: Demineralization of the visualized bones. Bilateral hip arthroplasty, with custom longstem left femoral component and cerclage wires about the proximal left femur. Hardware is well seated with good alignment. No fracture or dislocation. Prominent amount of  formed stool material in the visualized colon. Vascular calcifications.         Head CT w/o contrast    Result Date: 2023  EXAM: CT HEAD W/O CONTRAST LOCATION: [facility] DATE/TIME: 2023 4:27 AM CDT INDICATION: fall on warfarin COMPARISON: None. TECHNIQUE: Routine CT Head without IV contrast. Multiplanar reformats. Dose reduction techniques were used. FINDINGS: INTRACRANIAL CONTENTS: No intracranial hemorrhage, extraaxial collection, or mass effect.  No CT evidence of acute infarct. Mild to moderate volume loss and mild burden presumed chronic small vessel ischemia. VISUALIZED ORBITS/SINUSES/MASTOIDS: No intraorbital abnormality. No paranasal sinus mucosal disease. No middle ear or mastoid effusion. BONES/SOFT TISSUES: Left parietal scalp swelling. No calvarial fracture.     . IMPRESSION: 1.  Left parietal scalp swelling. No acute intracranial hemorrhage or calvarial fracture. 2.  Age-related change.    XR Tibia and Fibula Right 2 Views    Result Date: 2023  EXAM: RIGHT TIBIA AND FIBULA 4 VIEWS LOCATION: [facility] DATE/TIME: 2023 4:28 AM CDT INDICATION: Fall. Skin tear. Pain. COMPARISON: None.     IMPRESSION: 1. No visualized acute fracture or malalignment of the right tibia or fibula. 2. No radiopaque foreign object in the right lower leg. 3. Mild to moderate degenerative changes in the right knee joint.     Echocardiogram Complete    Result Date: 2023  [id number]^LEONILA^MITUL^HAO  [facility] Echocardiography Laboratory [facility] [place] [address]  Name: ISRAEL RANGEL MRN: [id number] : 1925 Study Date: 2023 12:55 PM Age: 89 yrs Gender: Female Patient Location: [facility] Reason For Study: Aortic valve stenosis Ordering Physician: MITUL KEEN Referring Physician: MITUL KEEN Performed By: Any Limon RDCS  BSA: 1.4 m2 Height: 60 in Weight: 106 lb HR: 64 BP: 160/69  mmHg ______________________________________________________________________________ Procedure Complete Echo Adult. ______________________________________________________________________________ Interpretation Summary  The left ventricle is normal in size. Left ventricular systolic function is normal. The visual ejection fraction is 60-65%. Normal left ventricular wall motion There is severe mitral annular calcification. There is mild mitral stenosis. The mean mitral valve gradient is 7mmHg at HR 61 bpm. Moderate valvular aortic stenosis. The calculated aortic valve area is 1.0cm2. The mean AoV pressure gradient is 19mmHg. The peak AoV pressure gradient is 37mmHg. There is mild (1+) tricuspid regurgitation. Right ventricular systolic pressure is elevated, consistent with moderate pulmonary hypertension. Small posterior pericardial effusion There are no echocardiographic indications of cardiac tamponade.  Compared to the previous study, the AS and MS have become more severe. ______________________________________________________________________________ Left Ventricle The left ventricle is normal in size. There is normal left ventricular wall thickness. Left ventricular systolic function is normal. The visual ejection fraction is 60-65%. Normal left ventricular wall motion.  Right Ventricle The right ventricle is normal size. The right ventricular systolic function is normal.  Atria The left atrium is moderately dilated. The right atrium is moderately dilated. A patent foramen ovale is present. Left to right atrial shunt, trivial.  Mitral Valve There is severe mitral annular calcification. There is trace mitral regurgitation. There is mild mitral stenosis. The mean mitral valve gradient is 7mmHg.  Tricuspid Valve There is mild (1+) tricuspid regurgitation. The right ventricular systolic pressure is approximated at 37.0 mmHg plus the right atrial pressure. Right ventricular systolic pressure is elevated, consistent with  moderate pulmonary hypertension.  Aortic Valve There is trace aortic regurgitation. Moderate valvular aortic stenosis. The calculated aortic valve area is 1.0cm2. The mean AoV pressure gradient is 19mmHg. The peak AoV pressure gradient is 37mmHg.  Pulmonic Valve The pulmonic valve is not well seen, but is grossly normal. There is trace pulmonic valvular regurgitation.  Vessels The aortic root is normal size.  Pericardium Small posterior pericardial effusion. There are no echocardiographic indications of cardiac tamponade.  Rhythm Sinus rhythm was noted. ______________________________________________________________________________ MMode/2D Measurements & Calculations IVSd: 0.98 cm  LVIDd: 4.5 cm LVIDs: 2.1 cm LVPWd: 0.80 cm FS: 52.7 % LV mass(C)d: 129.2 grams LV mass(C)dI: 90.7 grams/m2 Ao root diam: 2.9 cm LA dimension: 3.7 cm asc Aorta Diam: 3.2 cm LA/Ao: 1.3 LVOT diam: 1.8 cm LVOT area: 2.5 cm2 LA Volume (BP): 67.0 ml LA Volume Index (BP): 46.9 ml/m2 RWT: 0.36  Doppler Measurements & Calculations MV E max mehdi: 212.0 cm/sec MV A max mehdi: 204.0 cm/sec MV E/A: 1.0 MV max P.6 mmHg MV mean P.0 mmHg MV V2 VTI: 74.4 cm MVA(VTI): 0.94 cm2 MV P1/2t max mehdi: 195.0 cm/sec MV P1/2t: 169.0 msec MVA(P1/2t): 1.3 cm2 MV dec slope: 338.0 cm/sec2 Ao V2 max: 305.6 cm/sec Ao max P.3 mmHg Ao V2 mean: 204.4 cm/sec Ao mean P.3 mmHg Ao V2 VTI: 69.8 cm PORTILLO(I,D): 1.00 cm2 PORTILLO(V,D): 0.91 cm2 LV V1 max P.8 mmHg LV V1 max: 109.6 cm/sec LV V1 VTI: 27.4 cm SV(LVOT): 69.7 ml SI(LVOT): 48.9 ml/m2 PA acc time: 0.11 sec TR max mehdi: 304.0 cm/sec TR max P.0 mmHg AV Mehdi Ratio (DI): 0.36 PORTILLO Index (cm2/m2): 0.70  ______________________________________________________________________________ Report approved by: Chance Rice 2023 04:51 PM       PET Dotatate    Result Date: 2023  EXAM: PET DOTATATE LOCATION: [facility] DATE/TIME: 2023 2:01 PM CDT INDICATION: Well differentiated  neuroendocrine tumor of retroperitoneum.. Other malignant neuroendocrine tumors. COMPARISON: CT chest abdomen pelvis 03/21/2023. CONTRAST: None. TECHNIQUE: 4.1 mCi Cu 64 dotatate was administered intravenously to the patient. One hour post intravenous administration, PET imaging was performed from the skull vertex to mid thigh, utilizing attenuation correction with concurrent axial CT and PET/CT image fusion. Dose reduction techniques were used. PET/CT FINDINGS: There is a somatostatin receptor positive 8.1 x 8.1 cm mass in the right retroperitoneum, suspicious for biopsy-proven neuroendocrine neoplasm. No additional somatostatin receptor positive lesions identified to suggest a primary tumor site or metastases. CT FINDINGS: Moderate senescent intracranial changes. Moderate to severe carotid artery bifurcation calcification. Enlarged heterogeneous thyroid gland. Severe coronary artery calcification versus prior intervention. Calcified mediastinal/hilar lymph nodes, likely sequelae of prior granulomatous disease. Strands of scar/atelectasis in the lungs. Hepatic and splenic granulomas. Intra and extrahepatic biliary dilatation as seen previously. Cholecystectomy. 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 Moderate to severe scattered atherosclerotic calcifications. Bilateral hip arthroplasties. Multilevel degenerative changes of the spine with multiple superior endplate compression deformities in the lumbar spine. Right shoulder arthroplasty. Severe degenerative changes of the left glenohumeral joint with large effusion/bursal distention.     IMPRESSION: Large somatostatin receptor positive mass in the right retroperitoneum, suspicious for biopsy-proven neuroendocrine neoplasm. No additional somatostatin receptor positive lesions to suggest a primary site of tumor or metastasis.      A total of 60 minutes was spent today on  this visit including face to face conversation with the patient, EMR review (labs, imaging studies, pathology reports and outside records), counseling and care co-ordination and documentation.    Signed by: Pedro Luis Edwards MD      Oncology Rooming Note    May 5, 2023 11:13 AM   Pooja Swartz is a 89 year old female who presents for:    Chief Complaint   Patient presents with     Oncology Clinic Visit     New Patient - Neuroendocrine tumor     Initial Vitals: BP (!) 160/69 (BP Location: Right arm, Patient Position: Sitting, Cuff Size: Adult Small)   Pulse 72   Temp 98.3  F (36.8  C) (Oral)   Resp 24   Ht 1.524 m (5')   Wt 48.3 kg (106 lb 8 oz)   LMP 07/07/1960   SpO2 100%   BMI 20.80 kg/m   Estimated body mass index is 20.8 kg/m  as calculated from the following:    Height as of this encounter: 1.524 m (5').    Weight as of this encounter: 48.3 kg (106 lb 8 oz). Body surface area is 1.43 meters squared.  Moderate Pain (5) Comment: Data Unavailable   Patient's last menstrual period was 07/07/1960.  Allergies reviewed: Yes  Medications reviewed: Yes    Medications: Medication refills not needed today.  Pharmacy name entered into [facility]:      [facility] - [facility] [address] [facility]    Clinical concerns:New Patient - Neuroendocrine tumor    Razia Alexandra CMA              Again, thank you for allowing me to participate in the care of your patient.        Sincerely,        Pedro Luis Edwards MD

## 2023-05-05 NOTE — PROGRESS NOTES
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  CT scan is showing an 8.8 cm well circumscribed mass in the retroperitoneum which seems to be abutting duodenum.  Biopsy shows well-differentiated neuroendocrine tumor.  Grade 2.  Ki-67 of 5%.  I reviewed the diagnosis of well-differentiated neuroendocrine tumor in detail today.  I explained to her that it is unlikely that this is a primary retroperitoneal neuroendocrine tumor and probably represents metastatic disease from upper GI tract.  CT scan does not appear to show any evidence of distant mets.  However this may not be a very sensitive test.  I reviewed further management options with her.  I am not sure if this is amenable for resection especially if this is metastatic disease.  Recommend further evaluation with gallium dotatate scan to look for evidence of distant metastasis or primary tumor in the GI tract.  May also need upper endoscopy with EUS.  Ultimately treatment will depend upon how symptomatic she is.  If it is not surgically resectable then options include observation versus octreotide.    ECOG Performance    1 - Can't do physically strenuous work, but fully ambyulatory and can do light sedentary work    Problem List    Patient Active Problem List   Diagnosis     Hypertension goal BP (blood pressure) < 140/90     Other specified malignant  neoplasm of skin of lower limb, including hip     Hemorrhage of gastrointestinal tract     Generalized osteoarthrosis, unspecified site     HEPATITIS B in past     Impaired fasting glucose     Disorder of bone and cartilage     Chronic kidney disease, stage III (moderate) (H)     Chronic tophaceous gout     CARDIOVASCULAR SCREENING; LDL GOAL LESS THAN 100     Sensorineural hearing loss, bilateral     Sinus node dysfunction (H)     Atrial fibrillation (H)     Health Care Home     Long-term (current) use of anticoagulants [Z79.01]     IBD (inflammatory bowel disease)     Atrial fibrillation, unspecified type (H)     Sinus arrest     Other specified glaucoma     Junctional bradycardia     Anticoagulated on Coumadin     Glaucoma suspect, bilateral     Impaired gait and mobility     Chronic atrial fibrillation (H)     Protein-calorie malnutrition (H)     Neuroendocrine tumor     ______________________________________________________________________________    Staging History     Cancer Staging   No matching staging information was found for the patient.      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    She had presented with unintentional weight loss which has been going on for the last few months.  A CT chest abdomen pelvis was obtained which showed a well-circumscribed retroperitoneal mass near the ampulla which seem to be abutting duodenum.  It measured 8.3 x 7.6 x 8.8 cm.  Initially it was thought to be a GIST versus sarcoma.  Underwent IR guided needle biopsy of the mass which came back showing well-differentiated, grade 2 neuroendocrine tumor with a Ki-67 of 5%.  Prior CT scan in 2018 did not show any mass in that area.  Denies any significant abdominal pain.  Has any bright  red blood per rectum.  No excessive sweating or flushing.  No palpitations.  No significant diarrhea.    She is a never smoker.  Very rare alcohol use.    Past History    Past Medical History:   Diagnosis Date     Atrial fibrillation (H)      Basal cell carcinoma      Chronic kidney disease      Colitis      Disorders of bursae and tendons in shoulder region, unspecified     right shoulder     Femur fracture, left (H) 08/17/2016     Hemorrhage of gastrointestinal tract, unspecified      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, including hip      Other specified glaucoma      Periprosthetic fracture around internal prosthetic left hip joint, subsequent encounter 01/09/2017     Renal insufficiency      Sinus node dysfunction (H)      Unspecified essential hypertension     Family History   Problem Relation Age of Onset     Heart Disease Mother         CHF     Hypertension Mother      Hypertension Father      Hypertension Maternal Grandmother      Hypertension Maternal Grandfather      Hypertension Son      Heart Disease Son         MI      Past Surgical History:   Procedure Laterality Date     ARTHROPLASTY REVISION HIP Left 8/19/2016    Procedure: ARTHROPLASTY REVISION HIP;  Surgeon: Kael Rojas MD;  Location:  OR      VASCULAR SURGERY PROCEDURE UNLIST       OPEN REDUCTION INTERNAL FIXATION FEMUR PROXIMAL Left 8/19/2016    Procedure: OPEN REDUCTION INTERNAL FIXATION FEMUR PROXIMAL;  Surgeon: Kael Rojas MD;  Location: [facility] OR     SURGICAL HISTORY OF -   11/1992    skin cancer, nose     SURGICAL HISTORY OF -   1978    right, vein stripping     SURGICAL HISTORY OF -   1968    breast biopsy     SURGICAL HISTORY OF -   1993    laparoscopic cholecystectomy     SURGICAL HISTORY OF -       tonsillectomy and adenoidectomy     SURGICAL HISTORY OF -   04/2004    left cataract     SURGICAL HISTORY OF -   08/09/2004    right total shoulder arthroplasty     [facility] PELVIS/HIP JOINT SURGERY UNLISTED       [facility] SHOULDER  SURG PROC UNLISTED       [facility] STOMACH SURGERY PROCEDURE UNLISTED      Social History     Socioeconomic History     Marital status:      Spouse name: Not on file     Number of children: Not on file     Years of education: Not on file     Highest education level: Not on file   Occupational History     Not on file   Tobacco Use     Smoking status: Never     Smokeless tobacco: Never   Vaping Use     Vaping status: Never Used   Substance and Sexual Activity     Alcohol use: Yes     Comment: rare     Drug use: No     Sexual activity: Not Currently     Birth control/protection: None   Other Topics Concern     Parent/sibling w/ CABG, MI or angioplasty before 65F 55M? Yes   Social History Narrative     Not on file     Social Determinants of Health     Financial Resource Strain: Not on file   Food Insecurity: Not on file   Transportation Needs: Not on file   Physical Activity: Not on file   Stress: Not on file   Social Connections: Not on file   Intimate Partner Violence: Not on file   Housing Stability: Not on file        Allergies    Allergies   Allergen Reactions     Pcn [Penicillins] Hives       Review of Systems    Pertinent items are noted in HPI.      Physical Exam        5/31/2023    11:24 AM   Oncology Vitals   Height 152 cm   Weight 47.628 kg   BSA (m2) 1.42 m2   /64   Pulse 75   Temp 98  F (36.7  C)   Temp src Tympanic   SpO2 98 %   Pain Score 0 (None)       General: alert and cooperative  HEENT: Head: Normal, normocephalic, atraumatic.  Eye: Normal external eye, conjunctiva, lids cornea, FEI.  Chest: Clear to auscultation bilaterally  Cardiac: Irregular rhythm  Abdomen: Abdomen is soft, rounded, irregular mass palpable in the right upper quadrant upon deep palpation.  Extremities: atraumatic, no peripheral edema  Skin: No rashes or bruises  CNS: Alert and oriented x3, neurologic exam grossly normal.  Lymphatics: No bilateral cervical, axillary, supraclavicular or inguinal adenopathy noted      Lab  Results    Recent Results (from the past 168 hour(s))   Echocardiogram Complete   Result Value Ref Range    LVEF  60-65%    INR   Result Value Ref Range    INR 2.85 (H) 0.85 - 1.15   CBC with platelets and differential   Result Value Ref Range    WBC Count 8.8 4.0 - 11.0 10e3/uL    RBC Count 2.91 (L) 3.80 - 5.20 10e6/uL    Hemoglobin 10.1 (L) 11.7 - 15.7 g/dL    Hematocrit 30.1 (L) 35.0 - 47.0 %     (H) 78 - 100 fL    MCH 34.7 (H) 26.5 - 33.0 pg    MCHC 33.6 31.5 - 36.5 g/dL    RDW 15.8 (H) 10.0 - 15.0 %    Platelet Count 153 150 - 450 10e3/uL    % Neutrophils 68 %    % Lymphocytes 18 %    % Monocytes 9 %    % Eosinophils 3 %    % Basophils 1 %    % Immature Granulocytes 1 %    NRBCs per 100 WBC 0 <1 /100    Absolute Neutrophils 6.0 1.6 - 8.3 10e3/uL    Absolute Lymphocytes 1.6 0.8 - 5.3 10e3/uL    Absolute Monocytes 0.8 0.0 - 1.3 10e3/uL    Absolute Eosinophils 0.3 0.0 - 0.7 10e3/uL    Absolute Basophils 0.1 0.0 - 0.2 10e3/uL    Absolute Immature Granulocytes 0.1 <=0.4 10e3/uL    Absolute NRBCs 0.0 10e3/uL   INR point of care   Result Value Ref Range    INR 3.2 (H) 0.9 - 1.1       Imaging Results    XR Pelvis 1/2 Views    Result Date: 5/26/2023  EXAM: XR PELVIS 1/2 VIEWS LOCATION: [facility] DATE/TIME: 5/26/2023 4:28 AM CDT INDICATION: Fall with bilateral hip and pelvic pain. COMPARISON: X-ray pelvis and right hip 2 views 10/13/2022.     IMPRESSION: Demineralization of the visualized bones. Bilateral hip arthroplasty, with custom longstem left femoral component and cerclage wires about the proximal left femur. Hardware is well seated with good alignment. No fracture or dislocation. Prominent amount of formed stool material in the visualized colon. Vascular calcifications.         Head CT w/o contrast    Result Date: 5/26/2023  EXAM: CT HEAD W/O CONTRAST LOCATION: [facility] DATE/TIME: 5/26/2023 4:27 AM CDT INDICATION: fall on warfarin COMPARISON: None.  TECHNIQUE: Routine CT Head without IV contrast. Multiplanar reformats. Dose reduction techniques were used. FINDINGS: INTRACRANIAL CONTENTS: No intracranial hemorrhage, extraaxial collection, or mass effect.  No CT evidence of acute infarct. Mild to moderate volume loss and mild burden presumed chronic small vessel ischemia. VISUALIZED ORBITS/SINUSES/MASTOIDS: No intraorbital abnormality. No paranasal sinus mucosal disease. No middle ear or mastoid effusion. BONES/SOFT TISSUES: Left parietal scalp swelling. No calvarial fracture.     . IMPRESSION: 1.  Left parietal scalp swelling. No acute intracranial hemorrhage or calvarial fracture. 2.  Age-related change.    XR Tibia and Fibula Right 2 Views    Result Date: 2023  EXAM: RIGHT TIBIA AND FIBULA 4 VIEWS LOCATION: [facility] DATE/TIME: 2023 4:28 AM CDT INDICATION: Fall. Skin tear. Pain. COMPARISON: None.     IMPRESSION: 1. No visualized acute fracture or malalignment of the right tibia or fibula. 2. No radiopaque foreign object in the right lower leg. 3. Mild to moderate degenerative changes in the right knee joint.     Echocardiogram Complete    Result Date: 2023  [id number]^LEONILA^MITUL^HAO  [facility] [address] [place] [address]  Name: ISRAEL RANGEL MRN: [id number] : 1925 Study Date: 2023 12:55 PM Age: 89 yrs Gender: Female Patient Location: [facility] Reason For Study: Aortic valve stenosis Ordering Physician: MITUL KEEN Referring Physician: MITUL KEEN Performed By: Any Limon RDCS  BSA: 1.4 m2 Height: 60 in Weight: 106 lb HR: 64 BP: 160/69 mmHg ______________________________________________________________________________ Procedure Complete Echo Adult. ______________________________________________________________________________ Interpretation Summary  The left ventricle is normal in size. Left ventricular systolic  function is normal. The visual ejection fraction is 60-65%. Normal left ventricular wall motion There is severe mitral annular calcification. There is mild mitral stenosis. The mean mitral valve gradient is 7mmHg at HR 61 bpm. Moderate valvular aortic stenosis. The calculated aortic valve area is 1.0cm2. The mean AoV pressure gradient is 19mmHg. The peak AoV pressure gradient is 37mmHg. There is mild (1+) tricuspid regurgitation. Right ventricular systolic pressure is elevated, consistent with moderate pulmonary hypertension. Small posterior pericardial effusion There are no echocardiographic indications of cardiac tamponade.  Compared to the previous study, the AS and MS have become more severe. ______________________________________________________________________________ Left Ventricle The left ventricle is normal in size. There is normal left ventricular wall thickness. Left ventricular systolic function is normal. The visual ejection fraction is 60-65%. Normal left ventricular wall motion.  Right Ventricle The right ventricle is normal size. The right ventricular systolic function is normal.  Atria The left atrium is moderately dilated. The right atrium is moderately dilated. A patent foramen ovale is present. Left to right atrial shunt, trivial.  Mitral Valve There is severe mitral annular calcification. There is trace mitral regurgitation. There is mild mitral stenosis. The mean mitral valve gradient is 7mmHg.  Tricuspid Valve There is mild (1+) tricuspid regurgitation. The right ventricular systolic pressure is approximated at 37.0 mmHg plus the right atrial pressure. Right ventricular systolic pressure is elevated, consistent with moderate pulmonary hypertension.  Aortic Valve There is trace aortic regurgitation. Moderate valvular aortic stenosis. The calculated aortic valve area is 1.0cm2. The mean AoV pressure gradient is 19mmHg. The peak AoV pressure gradient is 37mmHg.  Pulmonic Valve The pulmonic valve  is not well seen, but is grossly normal. There is trace pulmonic valvular regurgitation.  Vessels The aortic root is normal size.  Pericardium Small posterior pericardial effusion. There are no echocardiographic indications of cardiac tamponade.  Rhythm Sinus rhythm was noted. ______________________________________________________________________________ MMode/2D Measurements & Calculations IVSd: 0.98 cm  LVIDd: 4.5 cm LVIDs: 2.1 cm LVPWd: 0.80 cm FS: 52.7 % LV mass(C)d: 129.2 grams LV mass(C)dI: 90.7 grams/m2 Ao root diam: 2.9 cm LA dimension: 3.7 cm asc Aorta Diam: 3.2 cm LA/Ao: 1.3 LVOT diam: 1.8 cm LVOT area: 2.5 cm2 LA Volume (BP): 67.0 ml LA Volume Index (BP): 46.9 ml/m2 RWT: 0.36  Doppler Measurements & Calculations MV E max mehdi: 212.0 cm/sec MV A max mehdi: 204.0 cm/sec MV E/A: 1.0 MV max P.6 mmHg MV mean P.0 mmHg MV V2 VTI: 74.4 cm MVA(VTI): 0.94 cm2 MV P1/2t max mehdi: 195.0 cm/sec MV P1/2t: 169.0 msec MVA(P1/2t): 1.3 cm2 MV dec slope: 338.0 cm/sec2 Ao V2 max: 305.6 cm/sec Ao max P.3 mmHg Ao V2 mean: 204.4 cm/sec Ao mean P.3 mmHg Ao V2 VTI: 69.8 cm PORTILLO(I,D): 1.00 cm2 PORTILLO(V,D): 0.91 cm2 LV V1 max P.8 mmHg LV V1 max: 109.6 cm/sec LV V1 VTI: 27.4 cm SV(LVOT): 69.7 ml SI(LVOT): 48.9 ml/m2 PA acc time: 0.11 sec TR max mehdi: 304.0 cm/sec TR max P.0 mmHg AV Mehdi Ratio (DI): 0.36 PORTILLO Index (cm2/m2): 0.70  ______________________________________________________________________________ Report approved by: Chance Rice 2023 04:51 PM       PET Dotatate    Result Date: 2023  EXAM: PET DOTATATE LOCATION: [facility] DATE/TIME: 2023 2:01 PM CDT INDICATION: Well differentiated neuroendocrine tumor of retroperitoneum.. Other malignant neuroendocrine tumors. COMPARISON: CT chest abdomen pelvis 2023. CONTRAST: None. TECHNIQUE: 4.1 mCi Cu 64 dotatate was administered intravenously to the patient. One hour post intravenous administration, PET imaging was  performed from the skull vertex to mid thigh, utilizing attenuation correction with concurrent axial CT and PET/CT image fusion. Dose reduction techniques were used. PET/CT FINDINGS: There is a somatostatin receptor positive 8.1 x 8.1 cm mass in the right retroperitoneum, suspicious for biopsy-proven neuroendocrine neoplasm. No additional somatostatin receptor positive lesions identified to suggest a primary tumor site or metastases. CT FINDINGS: Moderate senescent intracranial changes. Moderate to severe carotid artery bifurcation calcification. Enlarged heterogeneous thyroid gland. Severe coronary artery calcification versus prior intervention. Calcified mediastinal/hilar lymph nodes, likely sequelae of prior granulomatous disease. Strands of scar/atelectasis in the lungs. Hepatic and splenic granulomas. Intra and extrahepatic biliary dilatation as seen previously. Cholecystectomy. 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 Moderate to severe scattered atherosclerotic calcifications. Bilateral hip arthroplasties. Multilevel degenerative changes of the spine with multiple superior endplate compression deformities in the lumbar spine. Right shoulder arthroplasty. Severe degenerative changes of the left glenohumeral joint with large effusion/bursal distention.     IMPRESSION: Large somatostatin receptor positive mass in the right retroperitoneum, suspicious for biopsy-proven neuroendocrine neoplasm. No additional somatostatin receptor positive lesions to suggest a primary site of tumor or metastasis.      A total of 60 minutes was spent today on this visit including face to face conversation with the patient, EMR review (labs, imaging studies, pathology reports and outside records), counseling and care co-ordination and documentation.    Signed by: Pedro Luis Edwards MD

## 2023-05-05 NOTE — PROGRESS NOTES
Oncology Rooming Note    May 5, 2023 11:13 AM   Pooja Swartz is a 89 year old female who presents for:    Chief Complaint   Patient presents with     Oncology Clinic Visit     New Patient - Neuroendocrine tumor     Initial Vitals: BP (!) 160/69 (BP Location: Right arm, Patient Position: Sitting, Cuff Size: Adult Small)   Pulse 72   Temp 98.3  F (36.8  C) (Oral)   Resp 24   Ht 1.524 m (5')   Wt 48.3 kg (106 lb 8 oz)   LMP 07/07/1960   SpO2 100%   BMI 20.80 kg/m   Estimated body mass index is 20.8 kg/m  as calculated from the following:    Height as of this encounter: 1.524 m (5').    Weight as of this encounter: 48.3 kg (106 lb 8 oz). Body surface area is 1.43 meters squared.  Moderate Pain (5) Comment: Data Unavailable   Patient's last menstrual period was 07/07/1960.  Allergies reviewed: Yes  Medications reviewed: Yes    Medications: Medication refills not needed today.  Pharmacy name entered into [facility]:      [facility] - [place] - [id number] [facility]    Clinical concerns:New Patient - Neuroendocrine tumor    Razia Alexandra, RACHELLE

## 2023-05-18 NOTE — PROGRESS NOTES
ANTICOAGULATION MANAGEMENT     Pooja Swartz 89 year old female is on warfarin with therapeutic INR result. (Goal INR 2.0-3.0)    Recent labs: (last 7 days)     05/18/23  0928   INR 2.4*       ASSESSMENT       Source(s): Chart Review and Patient/Caregiver Call       Warfarin doses taken: Warfarin taken as instructed    Diet: No new diet changes identified    Medication/supplement changes: None noted    New illness, injury, or hospitalization: No    Signs or symptoms of bleeding or clotting: No    Previous result: Subtherapeutic    Additional findings: None         PLAN     Recommended plan for no diet, medication or health factor changes affecting INR     Dosing Instructions: Continue your current warfarin dose with next INR in 2 weeks       Summary  As of 5/18/2023    Full warfarin instructions:  2 mg every Mon, Wed; 4 mg all other days   Next INR check:  6/1/2023             Telephone call with Pooja who verbalizes understanding and agrees to plan    Lab visit scheduled    Education provided:     Contact [phone] with any changes, questions or concerns.     Plan made per [facility] anticoagulation protocol    Linsey Abbott RN  Anticoagulation Clinic  5/18/2023    _______________________________________________________________________     Anticoagulation Episode Summary     Current INR goal:  2.0-3.0   TTR:  54.0 % (1 y)   Target end date:  Indefinite   Send INR reminders to:  [facility]    Indications    Atrial fibrillation (H) [I48.91]  Long-term (current) use of anticoagulants [Z79.01] [Z79.01]  Atrial fibrillation  unspecified type (H) [I48.91]  Chronic atrial fibrillation (H) [I48.20]           Comments:           Anticoagulation Care Providers     Provider Role Specialty Phone number    Amanda Renee MD Referring Family Medicine [phone]

## 2023-05-26 NOTE — ED PROVIDER NOTES
History     Chief Complaint   Patient presents with     Fall     HPI  Pooja Swartz is a 89 year old female who has medical history significant for chronic atrial fibrillation anticoagulated on warfarin, history of neuroendocrine tumor, presenting the emergency department with EMS after the patient had a fall.  Initial history obtained from EMS, with additional history information obtained from patient.  Patient was getting out of bed early in the morning, attempting to change her checks, and subsequently tripped, falling over the edge of her walker.  She scraped the right side of the leg, causing skin tear, and also at the posterior aspect of the right arm.  Patient was able to crawl around to the bathroom, pull a cord, and summon help.  Patient did bump her head.  There was no loss of consciousness.  No severe pains currently, however he does have skin tears behind the right arm, and right lateral leg.  Also has slight hip pain bilaterally.  She did take Tylenol around midnight, and is declining any additional medications at this time.    Allergies:  Allergies   Allergen Reactions     Pcn [Penicillins] Hives       Problem List:    Patient Active Problem List    Diagnosis Date Noted     Protein-calorie malnutrition (H) 03/13/2023     Priority: Medium     Chronic atrial fibrillation (H) 12/06/2022     Priority: Medium     Impaired gait and mobility 07/26/2022     Priority: Medium     Junctional bradycardia 08/27/2021     Priority: Medium     Anticoagulated on Coumadin 08/27/2021     Priority: Medium     Glaucoma suspect, bilateral 08/27/2021     Priority: Medium     Other specified glaucoma      Priority: Medium     Sinus arrest 08/26/2021     Priority: Medium     Atrial fibrillation, unspecified type (H) 01/28/2020     Priority: Medium     IBD (inflammatory bowel disease) 07/11/2019     Priority: Medium     NOT biopsy proven, though diarrhea responds to budesonide orally and diarrhea returns if this is stopped.   Maintained on 3 mg budesonide daily.  IBD vs microscopic colitis likely.   Vastly improved quality of life.         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 Provider to review       Impaired fasting glucose 12/05/2007     Priority: Medium     HEPATITIS B in past 02/06/2007     Priority: Medium     Serology suggests past infection but not current carrier state       Hypertension goal BP (blood pressure) < 140/90 07/07/2005     Priority: Medium     Other specified malignant neoplasm of skin of lower limb, including hip 07/07/2005     Priority: Medium     basal cell of nose  (Problem list name updated by automated process. Provider to review and confirm.)       Hemorrhage of gastrointestinal tract 07/07/2005     Priority: Medium     gastric ulcer 01-helicobacter pos  Problem list name updated by automated process. Provider to review       Generalized osteoarthrosis, unspecified site 07/07/2005     Priority: Medium     right shoulder replacement          Past Medical History:    Past Medical History:   Diagnosis Date     Atrial fibrillation (H)      Basal cell carcinoma       Chronic kidney disease      Colitis      Disorders of bursae and tendons in shoulder region, unspecified      Femur fracture, left (H) 08/17/2016     Hemorrhage of gastrointestinal tract, unspecified      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, including hip      Other specified glaucoma      Periprosthetic fracture around internal prosthetic left hip joint, subsequent encounter 01/09/2017     Renal insufficiency      Sinus node dysfunction (H)      Unspecified essential hypertension        Past Surgical History:    Past Surgical History:   Procedure Laterality Date     ARTHROPLASTY REVISION HIP Left 8/19/2016    Procedure: ARTHROPLASTY REVISION HIP;  Surgeon: Kael Rojas MD;  Location: [facility] OR      VASCULAR SURGERY PROCEDURE UNLIST       OPEN REDUCTION INTERNAL FIXATION FEMUR PROXIMAL Left 8/19/2016    Procedure: OPEN REDUCTION INTERNAL FIXATION FEMUR PROXIMAL;  Surgeon: Kael Rojas MD;  Location: [facility] OR     SURGICAL HISTORY OF -   11/1992    skin cancer, nose     SURGICAL HISTORY OF -   1978    right, vein stripping     SURGICAL HISTORY OF -   1968    breast biopsy     SURGICAL HISTORY OF -   1993    laparoscopic cholecystectomy     SURGICAL HISTORY OF -       tonsillectomy and adenoidectomy     SURGICAL HISTORY OF -   04/2004    left cataract     SURGICAL HISTORY OF -   08/09/2004    right total shoulder arthroplasty     [facility] PELVIS/HIP JOINT SURGERY UNLISTED       [facility] SHOULDER SURG PROC UNLISTED       [facility] STOMACH SURGERY PROCEDURE UNLISTED         Family History:    Family History   Problem Relation Age of Onset     Heart Disease Mother         CHF     Hypertension Mother      Hypertension Father      Hypertension Maternal Grandmother      Hypertension Maternal Grandfather      Hypertension Son      Heart Disease Son         MI       Social History:  Marital Status:   [5]  Social History     Tobacco Use     Smoking status: Never     Smokeless tobacco: Never   Vaping Use     Vaping  status: Never Used   Substance Use Topics     Alcohol use: Yes     Comment: rare     Drug use: No        Medications:    allopurinol (ZYLOPRIM) 100 MG tablet  amLODIPine (NORVASC) 10 MG tablet  budesonide (ENTOCORT EC) 3 MG EC capsule  Calcium-Vitamin D 600-200 MG-UNIT TABS  dorzolamide (TRUSOPT) 2 % ophthalmic solution  estradiol (ESTRACE) 0.1 MG/GM vaginal cream  Fexofenadine HCl (ALLEGRA PO)  latanoprost (XALATAN) 0.005 % ophthalmic solution  lisinopril (ZESTRIL) 2.5 MG tablet  Omega-3 Fatty Acids (FISH OIL PO)  ONE-A-DAY WOMENS OR TABS  RHOPRESSA 0.02 % ophthalmic solution  VYZULTA 0.024 % SOLN ophthalmic solution  warfarin ANTICOAGULANT (JANTOVEN ANTICOAGULANT) 2 MG tablet          Review of Systems  See HPI  Physical Exam   BP: (!) 181/73  Pulse: 69  Temp: 98.2  F (36.8  C)  Resp: 16  Height: 152.4 cm (5')  Weight: 48.1 kg (106 lb)  SpO2: 99 %      Physical Exam  BP (!) 181/73   Pulse 69   Temp 98.2  F (36.8  C) (Oral)   Resp 16   Ht 1.524 m (5')   Wt 48.1 kg (106 lb)   LMP 07/07/1960   SpO2 99%   BMI 20.70 kg/m    General: alert and in no acute distress  Head: atraumatic, normocephalic.  No appreciable external signs of trauma  Abd: nondistended  Musculoskel/Extremities: Skin tear behind the right arm.  Also skin tear of the right lateral leg.  No specific underlying bony tenderness to palpation of these locations.    Neuro: Patient awake, alert, oriented, speech is fluent, gait is normal  Psychiatric: affect/mood normal, cooperative, normal judgement/insight and memory intact      ED Course                 Procedures              Critical Care time:  none               Results for orders placed or performed during the hospital encounter of 05/26/23 (from the past 24 hour(s))   CBC with platelets differential    Narrative    The following orders were created for panel order CBC with platelets differential.  Procedure                               Abnormality         Status                      ---------                               -----------         ------                     CBC with platelets and d...[[id number]]  Abnormal            Final result                 Please view results for these tests on the individual orders.   CBC with platelets and differential   Result Value Ref Range    WBC Count 8.8 4.0 - 11.0 10e3/uL    RBC Count 2.91 (L) 3.80 - 5.20 10e6/uL    Hemoglobin 10.1 (L) 11.7 - 15.7 g/dL    Hematocrit 30.1 (L) 35.0 - 47.0 %     (H) 78 - 100 fL    MCH 34.7 (H) 26.5 - 33.0 pg    MCHC 33.6 31.5 - 36.5 g/dL    RDW 15.8 (H) 10.0 - 15.0 %    Platelet Count 153 150 - 450 10e3/uL    % Neutrophils 68 %    % Lymphocytes 18 %    % Monocytes 9 %    % Eosinophils 3 %    % Basophils 1 %    % Immature Granulocytes 1 %    NRBCs per 100 WBC 0 <1 /100    Absolute Neutrophils 6.0 1.6 - 8.3 10e3/uL    Absolute Lymphocytes 1.6 0.8 - 5.3 10e3/uL    Absolute Monocytes 0.8 0.0 - 1.3 10e3/uL    Absolute Eosinophils 0.3 0.0 - 0.7 10e3/uL    Absolute Basophils 0.1 0.0 - 0.2 10e3/uL    Absolute Immature Granulocytes 0.1 <=0.4 10e3/uL    Absolute NRBCs 0.0 10e3/uL   Head CT w/o contrast    Narrative    EXAM: CT HEAD W/O CONTRAST  LOCATION: [facility]  DATE/TIME: 5/26/2023 4:27 AM CDT    INDICATION: fall on warfarin  COMPARISON: None.  TECHNIQUE: Routine CT Head without IV contrast. Multiplanar reformats. Dose reduction techniques were used.    FINDINGS:  INTRACRANIAL CONTENTS: No intracranial hemorrhage, extraaxial collection, or mass effect.  No CT evidence of acute infarct. Mild to moderate volume loss and mild burden presumed chronic small vessel ischemia.    VISUALIZED ORBITS/SINUSES/MASTOIDS: No intraorbital abnormality. No paranasal sinus mucosal disease. No middle ear or mastoid effusion.    BONES/SOFT TISSUES: Left parietal scalp swelling. No calvarial fracture.      Impression    . IMPRESSION:  1.  Left parietal scalp swelling. No acute intracranial hemorrhage or  calvarial fracture.    2.  Age-related change.   XR Tibia and Fibula Right 2 Views    Narrative    EXAM: RIGHT TIBIA AND FIBULA 4 VIEWS  LOCATION: [facility]  DATE/TIME: 5/26/2023 4:28 AM CDT    INDICATION: Fall. Skin tear. Pain.  COMPARISON: None.      Impression    IMPRESSION:   1. No visualized acute fracture or malalignment of the right tibia or fibula.  2. No radiopaque foreign object in the right lower leg.  3. Mild to moderate degenerative changes in the right knee joint.            XR Pelvis 1/2 Views    Narrative    EXAM: XR PELVIS 1/2 VIEWS  LOCATION: [facility]  DATE/TIME: 5/26/2023 4:28 AM CDT    INDICATION: Fall with bilateral hip and pelvic pain.  COMPARISON: X-ray pelvis and right hip 2 views 10/13/2022.      Impression    IMPRESSION: Demineralization of the visualized bones. Bilateral hip arthroplasty, with custom longstem left femoral component and cerclage wires about the proximal left femur. Hardware is well seated with good alignment. No fracture or dislocation.   Prominent amount of formed stool material in the visualized colon. Vascular calcifications.                      Medications - No data to display    Assessments & Plan (with Medical Decision Making)  89 year old female presenting with concerns regarding fall as patient was attempting to get up to use the restroom.  Given her anticoagulation status, risk for more severe injury including potential for intracranial hemorrhage.    Multiple images are performed including CT scan of the head, x-rays of the pelvis, and right lower leg.  CT and x-rays personally reviewed.  I do not visualize any evidence of acute posttraumatic injury/fracture, or intracranial hemorrhage.  Radiology report also reviewed.    Patient declined any analgesic medications.    Patient with wounds which were cleansed, dressed, and ultimately patient will be discharged back home as she is currently at her baseline  status.  Follow-up as needed in clinic.     I have reviewed the nursing notes.    I have reviewed the findings, diagnosis, plan and need for follow up with the patient.               New Prescriptions    No medications on file       Final diagnoses:   Fall, initial encounter   Closed head injury, initial encounter   Long-term (current) use of anticoagulants [Z79.01]   Hip pain, bilateral   Skin tear of right upper extremity   Noninfected skin tear of right lower extremity, initial encounter       5/26/2023   [facility] EMERGENCY DEPT     Jeffrey Esparza MD  05/26/23 0503

## 2023-05-26 NOTE — ED TRIAGE NOTES
Patient tripped over walker and fell. Right lower leg wound, right posterior upper arm wound. Denies hitting her head, denies loss of consciousness.      Triage Assessment     Row Name 05/26/23 0351       Triage Assessment (Adult)    Airway WDL WDL       Respiratory WDL    Respiratory WDL WDL       Skin Circulation/Temperature WDL    Skin Circulation/Temperature WDL X       Cardiac WDL    Cardiac WDL WDL       Peripheral/Neurovascular WDL    Peripheral Neurovascular WDL WDL       Cognitive/Neuro/Behavioral WDL    Cognitive/Neuro/Behavioral WDL WDL

## 2023-05-26 NOTE — TELEPHONE ENCOUNTER
Patient Contact    Attempt # 1    Was call answered?  No.  Left message on voicemail with information to call  back.      Camryn Mccray RN on 5/26/2023 at 11:20 AM

## 2023-05-26 NOTE — ED PROVIDER NOTES
Emergency Department Patient Sign-out       Brief HPI:  This is a 89 year old female signed out to me by Dr. Esparza .  See initial ED Provider note for details of the presentation.            Significant Events prior to my assuming care: The patient was seen and evaluated by the original provider of record and imaging obtained as well as lab diagnostics which were reviewed in the entirety of the note below.      Exam:   Patient Vitals for the past 24 hrs:   BP Temp Temp src Pulse Resp SpO2 Height Weight   05/26/23 0430 (!) 171/64 -- -- 68 -- 100 % -- --   05/26/23 0349 (!) 181/73 98.2  F (36.8  C) Oral 69 16 99 % 1.524 m (5') 48.1 kg (106 lb)           ED RESULTS:   Results for orders placed or performed during the hospital encounter of 05/26/23 (from the past 24 hour(s))   CBC with platelets differential     Status: Abnormal    Collection Time: 05/26/23  3:56 AM    Narrative    The following orders were created for panel order CBC with platelets differential.  Procedure                               Abnormality         Status                     ---------                               -----------         ------                     CBC with platelets and d...[[id number]]  Abnormal            Final result                 Please view results for these tests on the individual orders.   INR     Status: Abnormal    Collection Time: 05/26/23  3:56 AM   Result Value Ref Range    INR 2.85 (H) 0.85 - 1.15   CBC with platelets and differential     Status: Abnormal    Collection Time: 05/26/23  3:56 AM   Result Value Ref Range    WBC Count 8.8 4.0 - 11.0 10e3/uL    RBC Count 2.91 (L) 3.80 - 5.20 10e6/uL    Hemoglobin 10.1 (L) 11.7 - 15.7 g/dL    Hematocrit 30.1 (L) 35.0 - 47.0 %     (H) 78 - 100 fL    MCH 34.7 (H) 26.5 - 33.0 pg    MCHC 33.6 31.5 - 36.5 g/dL    RDW 15.8 (H) 10.0 - 15.0 %    Platelet Count 153 150 - 450 10e3/uL    % Neutrophils 68 %    % Lymphocytes 18 %    % Monocytes 9 %    % Eosinophils 3 %    %  Basophils 1 %    % Immature Granulocytes 1 %    NRBCs per 100 WBC 0 <1 /100    Absolute Neutrophils 6.0 1.6 - 8.3 10e3/uL    Absolute Lymphocytes 1.6 0.8 - 5.3 10e3/uL    Absolute Monocytes 0.8 0.0 - 1.3 10e3/uL    Absolute Eosinophils 0.3 0.0 - 0.7 10e3/uL    Absolute Basophils 0.1 0.0 - 0.2 10e3/uL    Absolute Immature Granulocytes 0.1 <=0.4 10e3/uL    Absolute NRBCs 0.0 10e3/uL   Head CT w/o contrast     Status: None    Collection Time: 05/26/23  4:27 AM    Narrative    EXAM: CT HEAD W/O CONTRAST  LOCATION: [facility]  DATE/TIME: 5/26/2023 4:27 AM CDT    INDICATION: fall on warfarin  COMPARISON: None.  TECHNIQUE: Routine CT Head without IV contrast. Multiplanar reformats. Dose reduction techniques were used.    FINDINGS:  INTRACRANIAL CONTENTS: No intracranial hemorrhage, extraaxial collection, or mass effect.  No CT evidence of acute infarct. Mild to moderate volume loss and mild burden presumed chronic small vessel ischemia.    VISUALIZED ORBITS/SINUSES/MASTOIDS: No intraorbital abnormality. No paranasal sinus mucosal disease. No middle ear or mastoid effusion.    BONES/SOFT TISSUES: Left parietal scalp swelling. No calvarial fracture.      Impression    . IMPRESSION:  1.  Left parietal scalp swelling. No acute intracranial hemorrhage or calvarial fracture.    2.  Age-related change.   XR Tibia and Fibula Right 2 Views     Status: None    Collection Time: 05/26/23  4:28 AM    Narrative    EXAM: RIGHT TIBIA AND FIBULA 4 VIEWS  LOCATION: [facility]  DATE/TIME: 5/26/2023 4:28 AM CDT    INDICATION: Fall. Skin tear. Pain.  COMPARISON: None.      Impression    IMPRESSION:   1. No visualized acute fracture or malalignment of the right tibia or fibula.  2. No radiopaque foreign object in the right lower leg.  3. Mild to moderate degenerative changes in the right knee joint.            XR Pelvis 1/2 Views     Status: None    Collection Time: 05/26/23  4:28 AM     Narrative    EXAM: XR PELVIS 1/2 VIEWS  LOCATION: [facility]  DATE/TIME: 5/26/2023 4:28 AM CDT    INDICATION: Fall with bilateral hip and pelvic pain.  COMPARISON: X-ray pelvis and right hip 2 views 10/13/2022.      Impression    IMPRESSION: Demineralization of the visualized bones. Bilateral hip arthroplasty, with custom longstem left femoral component and cerclage wires about the proximal left femur. Hardware is well seated with good alignment. No fracture or dislocation.   Prominent amount of formed stool material in the visualized colon. Vascular calcifications.                      ED MEDICATIONS:   Medications - No data to display      Impression:    ICD-10-CM    1. Fall, initial encounter  W19.XXXA       2. Closed head injury, initial encounter  S09.90XA       3. Long-term (current) use of anticoagulants [Z79.01]  Z79.01       4. Hip pain, bilateral  M25.551     M25.552       5. Skin tear of right upper extremity  S41.111A       6. Noninfected skin tear of right lower extremity, initial encounter  S81.811A           Plan:    Pending studies include no further pending studies awaiting disposition to home.      MD James Pugh Kevin Ronald, MD  06/08/23 3697

## 2023-05-26 NOTE — TELEPHONE ENCOUNTER
Reason for Call:  Appointment Request    Patient requesting this type of appt:  Office Visit/hosp f/u    Requested provider: Amanda Renee    Reason patient unable to be scheduled: Needs to be scheduled by clinic    When does patient want to be seen/preferred time: Tuesday, 5/30    Comments: Pt had a fall yesterday and was taken to ER, has a couple wounds that will need to be looked at/bandages changed. Pt's friend is calling and is with pt and will be taking pt to future appts, looking to get an appt set up on Tuesday.    Okay to leave a detailed message?: Yes at Other phone number:  [phone]  Virgi Johnson    Call taken on 5/26/2023 at 9:40 AM by Catia Calderon

## 2023-05-26 NOTE — DISCHARGE INSTRUCTIONS
Leave the current dressing in place for 24 hours    The skin tears, especially that of the right leg should have bandage change daily.  You can place 4 x 4 gauze pad over the cut, and wrap with a bandage or Ace wrap over the top.    Let the Steri-Strips fall off on their own over time.    Be seen if any signs of infection develop.  Change bandages daily.  Tylenol as needed for pain.    X-ray images looked okay

## 2023-05-30 NOTE — PROGRESS NOTES
ANTICOAGULATION MANAGEMENT     Pooja Swartz 89 year old female is on warfarin with supratherapeutic INR result. (Goal INR 2.0-3.0)    Recent labs: (last 7 days)     05/30/23  1152   INR 3.2*       ASSESSMENT       Source(s): Chart Review and Patient/Caregiver Call       Warfarin doses taken: Warfarin taken as instructed    Diet: No new diet changes identified    Medication/supplement changes: None noted    New illness, injury, or hospitalization: Yes: ed last week for fall. 5/26  Did have skin tears and did hit her head, but no fx or intracranial bleeding    Signs or symptoms of bleeding or clotting: No    Previous result: Therapeutic last 2(+) visits    Additional findings: Sees Dr Renee tomorrow for follow up         PLAN     Recommended plan for no diet, medication or health factor changes affecting INR     Dosing Instructions: hold dose then continue your current warfarin dose with next INR in 2 weeks       Summary  As of 5/30/2023    Full warfarin instructions:  5/30: Hold; Otherwise 2 mg every Mon, Wed; 4 mg all other days   Next INR check:  6/13/2023             Telephone call with Pooja who verbalizes understanding and agrees to plan    Lab visit scheduled    Education provided:     Contact [phone] with any changes, questions or concerns.     Plan made per [facility] anticoagulation protocol    Linsey Abbott RN  Anticoagulation Clinic  5/30/2023    _______________________________________________________________________     Anticoagulation Episode Summary     Current INR goal:  2.0-3.0   TTR:  54.1 % (1 y)   Target end date:  Indefinite   Send INR reminders to:  [facility]    Indications    Atrial fibrillation (H) [I48.91]  Long-term (current) use of anticoagulants [Z79.01] [Z79.01]  Atrial fibrillation  unspecified type (H) [I48.91]  Chronic atrial fibrillation (H) [I48.20]           Comments:           Anticoagulation Care Providers     Provider Role Specialty Phone number    Amanda Renee,  MD [facility] [phone]

## 2023-05-31 PROBLEM — D3A.8 NEUROENDOCRINE TUMOR (H): Status: ACTIVE | Noted: 2023-01-01

## 2023-05-31 NOTE — PROGRESS NOTES
Assessment & Plan     Skin tear of right upper arm without complication, subsequent encounter  Dressing changes done  Okay to change every 2-3 days at home.     Newly diagnosed neuroendocrine tumor  Needs follow up with oncology   Not interested in surgery  Octreotide may be a reasonable next step.       Amanda Renee MD  [facility]        Daniella Patton is a 89 year old, presenting for the following health issues:  Fall        History of Present Illness       Reason for visit:  Check fall injury    She eats 2-3 servings of fruits and vegetables daily.She consumes 0 sweetened beverage(s) daily.She exercises with enough effort to increase her heart rate 9 or less minutes per day.  She exercises with enough effort to increase her heart rate 3 or less days per week.   She is taking medications regularly.     ED/UC Followup:    Facility:  [facility]  Date of visit: 5/26/2023  Reason for visit: Fall  Current Status: Today she notes that she feels sore. No other falls. She will need dressings changed on right arm and thigh        Review of Systems   Constitutional, HEENT, cardiovascular, pulmonary, gi and gu systems are negative, except as otherwise noted.      Objective    /64   Pulse 75   Temp 98  F (36.7  C) (Tympanic)   Resp 18   Ht 1.524 m (5')   Wt 47.6 kg (105 lb)   LMP 07/07/1960   SpO2 98%   BMI 20.51 kg/m    Body mass index is 20.51 kg/m .  Physical Exam   GENERAL APPEARANCE: healthy, alert and no distress  SKIN: see photos below

## 2023-06-13 NOTE — TELEPHONE ENCOUNTER
ANTICOAGULATION MANAGEMENT:  Medication Refill    Anticoagulation Summary  As of 5/30/2023    Warfarin maintenance plan:  2 mg (2 mg x 1) every Mon, Wed; 4 mg (2 mg x 2) all other days   Next INR check:  6/13/2023   Target end date:  Indefinite    Indications    Atrial fibrillation (H) [I48.91]  Long-term (current) use of anticoagulants [Z79.01] [Z79.01]  Atrial fibrillation  unspecified type (H) [I48.91]  Chronic atrial fibrillation (H) [I48.20]             Anticoagulation Care Providers     Provider Role Specialty Phone number    Amanda Renee MD Referring Family Medicine [phone]          Visit with referring provider/group within last year: Yes    [facility] referral signed last signed: 12/06/2022; within last year: Yes    Pooja meets all criteria for refill (current [facility] referral, office visit with referring provider/group in last year, lab monitoring up to date or not exceeding 2 weeks overdue). Rx instructions and quantity supplied updated to match patient's current dosing plan. Warfarin 90 day supply with 1 refill granted per [facility] protocol     Linsey Abbott RN  Anticoagulation Clinic

## 2023-06-14 NOTE — TELEPHONE ENCOUNTER
General Call      Reason for Call:  Nurse appt    What are your questions or concerns:  Pt calling because she has a lab appt tomorrow morning in [facility], and pt is wondering if an RN can change the dressing on her right leg around the same time in the morning.      Okay to leave a detailed message?: Yes at Home number on file [phone] (home)

## 2023-06-15 NOTE — PROGRESS NOTES
Patient presents with daughter for wound care.     Right bicep skin tear. Old dressing removed. Applied bacitracin and telfa non adherent dressing and wrapped with Kerlix. No evidence of infection, appears to be healing well.     Right LE wound. Old dressing removed. Odorous with necrotic lesion in center of wound. Consulted with Dr. Renee. Irrigated with saline, bacitracin and Vaseline dressing applied. Wrapped with Kerlix.     Patient advised to follow up if fever, pus like drainage, worsening odor, pain or red streaks.     Antibiotic ordered see Dr. Renee's encounter. Patient requesting home care and wound care. Ordered by Dr. Renee.     Follow up PRN.     Camryn Mccray RN on 6/15/2023 at 10:33 AM

## 2023-06-15 NOTE — PROGRESS NOTES
ANTICOAGULATION MANAGEMENT     Pooja Swartz 89 year old female is on warfarin with therapeutic INR result. (Goal INR 2.0-3.0)    Recent labs: (last 7 days)     06/15/23  0950   INR 2.4*       ASSESSMENT       Source(s): Chart Review and Patient/Caregiver Call       Warfarin doses taken: Warfarin taken as instructed    Diet: No new diet changes identified    Medication/supplement changes: None noted    New illness, injury, or hospitalization: No    Signs or symptoms of bleeding or clotting: No    Previous result: Therapeutic last 2(+) visits    Additional findings: None         PLAN     Recommended plan for no diet, medication or health factor changes affecting INR     Dosing Instructions: Continue your current warfarin dose with next INR in 2 weeks       Summary  As of 6/15/2023    Full warfarin instructions:  2 mg every Mon, Wed; 4 mg all other days   Next INR check:  6/29/2023             Telephone call with Pooja who verbalizes understanding and agrees to plan    Lab visit scheduled    Education provided:     Contact [phone] with any changes, questions or concerns.     Plan made per [facility] anticoagulation protocol    Linsey Abbott RN  Anticoagulation Clinic  6/15/2023    _______________________________________________________________________     Anticoagulation Episode Summary     Current INR goal:  2.0-3.0   TTR:  53.1 % (1 y)   Target end date:  Indefinite   Send INR reminders to:  [facility]    Indications    Atrial fibrillation (H) [I48.91]  Long-term (current) use of anticoagulants [Z79.01] [Z79.01]  Atrial fibrillation  unspecified type (H) [I48.91]  Chronic atrial fibrillation (H) [I48.20]           Comments:           Anticoagulation Care Providers     Provider Role Specialty Phone number    Amanda Renee MD Referring Family Medicine [phone]

## 2023-06-15 NOTE — PROGRESS NOTES
Clinic Care Coordination Contact    Patient saw PCP today for Traumatic open wound of right lower leg with infection. [facility] care ordered[facility]  CC order placed to help secure alternative agency.     CC RN contacted [facility]Mel[facility]. Cell: [phone].   Melany asking for FACE sheet, order, and OV notes. Based on verbal, they are more than likely able to take patient. CC RN ensured that Melany did receive the secure email.  Melany will review with intake and run insurance.     Called patient and she gave verbal permission to talk with her dtr regarding her PHI. Dtr, Ade is in town through Saturday. Cell phone is .  Without knowing if home care can take patient, dtr willing to bring patient to clinic tomorrow for a nurse only dressing change visit at 1:30.     Yoana Chavez RN, BSN, PHN Care Coordinator  [facility]   Phone: [phone]

## 2023-06-15 NOTE — TELEPHONE ENCOUNTER
[facility] declined recent home care referral.      Pt- Pooja Kent  25  Doc- Amanda Renee   Declined due to full capacity of [facility] insurance.   Thank you, Trina Schmitt <[email or web address]>     Care Coordination team to help find alternative agency. CC order entered.      Kristan Piper, ILIANAW   Social Work Primary Care Clinic Care Coordinator   [facility]

## 2023-06-15 NOTE — PROGRESS NOTES
Assessment & Plan     Traumatic open wound of right lower leg with infection, subsequent encounter  Injury on 5/26.   I saw her on 5/31 and steri strips were intact  Steri strips are no longer in place and there is an odor to the wound today.    No pain, no fever.   Patient walked in to see the nurse today who got me involved as well.    Start oral and topical antibiotic with dressing changes.   Wound care/home care ordered.  Patient is homebound.    - cephALEXin (KEFLEX) 500 MG capsule; Take 1 capsule (500 mg) by mouth 3 times daily for 7 days  - Home Care Referral  - mupirocin (BACTROBAN) 2 % external ointment; Apply topically 3 times daily        Amanda Renee MD  [facility]    Daniella Patton is a 89 year old, presenting for the following health issues:  No chief complaint on file.        5/31/2023    11:25 AM   Additional Questions   Roomed by Cecile ISRAEL CMA   Accompanied by Friend     HPI       Fall with skin tears late may.  Now there is an odor with dressing changes and an area of necrosis of the center of the wound.     Walked in to see the nurse today who got me involved.     Patient feels unsteady on her feet and is worried about safety when bathing, etc.   Daughter in town for only a few more days.      Review of Systems   Constitutional, HEENT, cardiovascular, pulmonary, gi and gu systems are negative, except as otherwise noted.      Objective    LMP 07[birth year]07/1960   There is no height or weight on file to calculate BMI.  Physical Exam   GENERAL APPEARANCE: healthy, alert and no distress  SKIN: open wound right lower lateral leg with some central eschar/necrosis   There is a foul odor.  No surrounding erythema or tenderness

## 2023-06-16 NOTE — PROGRESS NOTES
Patient presents for wound care, dressing change right lower extremity. Dressing removed and irrigated Vaseline gauze with saline. Gauze removed. Borders appear more reddened today. Had Dr. Muller visualize and advise. Wound culture obtained by Dr. Muller.   Redressed with Vaseline gauze. Wrapped with Kerlix. Patient given home supplies for 2 days as daughter and friend will perform changes tomorrow and Sunday and plan to have home care address Monday. Advised ER if red streaks, redness is spreading, pain, fever, pus like discharge, lethargy or fever. Patient states understanding. Daughter and friend were present and observed process and state they feel comfortable with the dressing changes at home.     Camryn Mccray RN on 6/16/2023 at 2:57 PM

## 2023-06-16 NOTE — PROGRESS NOTES
"Clinic Care Coordination Contact    CC RN contacted [facility] intake, they CAN take patient on as a client!    CC RN communicated this information to the patient and patient handed phone to her daughter Ade. CC RN provided patient/family [facility]'s general line [phone]. Ade verbalized understanding of the plans.     Patient has nurse only visit today at 1:30 for dressing change. Dtr will do dressing change tomorrow and neighbor Katlin Mccray will do dressing change on Sunday. Anticipated start of care will be Monday, 6/19. Katlin will also be doing dressing changes on the days that home care nurse is not out. Home care required a \"teachable party\" this is [facility]. Nurse will be out 2-3x per week depending on clinical course.     Plan: CC RN will forward to PCP, clinic pool and Neda Ornelas as FYI.     Thank you,   Yoana Chavez RN, BSN, PHN Care Coordinator  [facility] [place]   Phone: [phone]   (Covering for Neda Ornelas, [place] [facility])               "

## 2023-06-23 NOTE — DISCHARGE INSTRUCTIONS
Take lasix in the morning as needed for leg swelling.   Follow up in clinic if ongoing swelling that isn't improving.

## 2023-06-23 NOTE — ED PROVIDER NOTES
History     Chief Complaint   Patient presents with     Leg Swelling     Left leg swollen. Home healthcare worker concerned of blood clot.     HPI  Pooja Swartz is a 89 year old female who has medical history significant for atrial fibrillation anticoagulated on warfarin, presenting to urgent care with concerns regarding primarily left lower extremity swelling.  Per patient, home health care worker was concerned regarding potential of blood clot.  Patient tells me that she was on her feet for increased amounts of time yesterday, and since then has had increased amounts of lower extremity swelling.  Prior to yesterday did not have significant amounts of swelling.  Has had recent fall, and therefore has had right lower extremity swelling, and did have recent infection that has been treated.  Not on diuretic type medications.  Patient is anticoagulated on warfarin, and her INRs have been therapeutic.  Her most recent INR was 2.4 which was taken 8 days ago.  Over the past 1 month her INR has always been therapeutic, and typically has always been therapeutic between the 2-3 value.     Allergies:  Allergies   Allergen Reactions     Pcn [Penicillins] Hives       Problem List:    Patient Active Problem List    Diagnosis Date Noted     Neuroendocrine tumor 05/31/2023     Priority: Medium     Protein-calorie malnutrition (H) 03/13/2023     Priority: Medium     Chronic atrial fibrillation (H) 12/06/2022     Priority: Medium     Impaired gait and mobility 07/26/2022     Priority: Medium     Junctional bradycardia 08/27/2021     Priority: Medium     Anticoagulated on Coumadin 08/27/2021     Priority: Medium     Glaucoma suspect, bilateral 08/27/2021     Priority: Medium     Other specified glaucoma      Priority: Medium     Sinus arrest 08/26/2021     Priority: Medium     Atrial fibrillation, unspecified type (H) 01/28/2020     Priority: Medium     IBD (inflammatory bowel disease) 07/11/2019     Priority: Medium     NOT  biopsy proven, though diarrhea responds to budesonide orally and diarrhea returns if this is stopped.  Maintained on 3 mg budesonide daily.  IBD vs microscopic colitis likely.   Vastly improved quality of life.         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 Provider to review       Impaired fasting glucose 12/05/2007     Priority: Medium     HEPATITIS B in past 02/06/2007     Priority: Medium     Serology suggests past infection but not current carrier state       Hypertension goal BP (blood pressure) < 140/90 07/07/2005     Priority: Medium     Other specified malignant neoplasm of skin of lower limb, including hip 07/07/2005     Priority: Medium     basal cell of nose  (Problem list name updated by automated process. Provider to review and confirm.)       Hemorrhage of gastrointestinal tract 07/07/2005     Priority: Medium     gastric ulcer 01-helicobacter pos  Problem list name updated by automated process. Provider to review       Generalized osteoarthrosis, unspecified site 07/07/2005     Priority: Medium     right shoulder replacement          Past Medical  History:    Past Medical History:   Diagnosis Date     Atrial fibrillation (H)      Basal cell carcinoma      Chronic kidney disease      Colitis      Disorders of bursae and tendons in shoulder region, unspecified      Femur fracture, left (H) 08/17/2016     Hemorrhage of gastrointestinal tract, unspecified      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, including hip      Other specified glaucoma      Periprosthetic fracture around internal prosthetic left hip joint, subsequent encounter 01/09/2017     Renal insufficiency      Sinus node dysfunction (H)      Unspecified essential hypertension        Past Surgical History:    Past Surgical History:   Procedure Laterality Date     ARTHROPLASTY REVISION HIP Left 8/19/2016    Procedure: ARTHROPLASTY REVISION HIP;  Surgeon: Kael Rojas MD;  Location: [facility] OR      VASCULAR SURGERY PROCEDURE UNLIST       OPEN REDUCTION INTERNAL FIXATION FEMUR PROXIMAL Left 8/19/2016    Procedure: OPEN REDUCTION INTERNAL FIXATION FEMUR PROXIMAL;  Surgeon: Kael Rojas MD;  Location: [facility] OR     SURGICAL HISTORY OF -   11/1992    skin cancer, nose     SURGICAL HISTORY OF -   1978    right, vein stripping     SURGICAL HISTORY OF -   1968    breast biopsy     SURGICAL HISTORY OF -   1993    laparoscopic cholecystectomy     SURGICAL HISTORY OF -       tonsillectomy and adenoidectomy     SURGICAL HISTORY OF -   04/2004    left cataract     SURGICAL HISTORY OF -   08/09/2004    right total shoulder arthroplasty     [facility] PELVIS/HIP JOINT SURGERY UNLISTED       [facility] SHOULDER SURG PROC UNLISTED       [facility] STOMACH SURGERY PROCEDURE UNLISTED         Family History:    Family History   Problem Relation Age of Onset     Heart Disease Mother         CHF     Hypertension Mother      Hypertension Father      Hypertension Maternal Grandmother      Hypertension Maternal Grandfather      Hypertension Son      Heart Disease Son         MI       Social History:  Marital Status:   [5]  Social  History     Tobacco Use     Smoking status: Never     Smokeless tobacco: Never   Vaping Use     Vaping Use: Never used   Substance Use Topics     Alcohol use: Yes     Comment: rare     Drug use: No        Medications:    furosemide (LASIX) 20 MG tablet  allopurinol (ZYLOPRIM) 100 MG tablet  amLODIPine (NORVASC) 10 MG tablet  budesonide (ENTOCORT EC) 3 MG EC capsule  Calcium-Vitamin D 600-200 MG-UNIT TABS  dorzolamide (TRUSOPT) 2 % ophthalmic solution  estradiol (ESTRACE) 0.1 MG/GM vaginal cream  Fexofenadine HCl (ALLEGRA PO)  latanoprost (XALATAN) 0.005 % ophthalmic solution  lisinopril (ZESTRIL) 2.5 MG tablet  mupirocin (BACTROBAN) 2 % external ointment  Omega-3 Fatty Acids (FISH OIL PO)  ONE-A-DAY WOMENS OR TABS  RHOPRESSA 0.02 % ophthalmic solution  VYZULTA 0.024 % SOLN ophthalmic solution  warfarin ANTICOAGULANT (JANTOVEN ANTICOAGULANT) 2 MG tablet          Review of Systems  See HPI  Physical Exam   BP: (!) 171/59  Pulse: 67  Temp: 97.7  F (36.5  C)  Resp: 16  SpO2: 100 %      Physical Exam  BP (!) 171/59   Pulse 67   Temp 97.7  F (36.5  C) (Tympanic)   Resp 16   LMP 07/07/1960   SpO2 100%   General: alert and in no acute distress  Head: atraumatic, normocephalic  Abd: nondistended  Musculoskel/Extremities: Bilateral lower extremities with some small amounts of pitting edema.  There is some clear fluid with edematous changes of the left lower extremity.  Bilateral legs appear equal and symmetric.  No erythema noted.  Healed scab at the right lower extremity.  Skin: no rashes, no diaphoresis and skin color normal  Neuro: Patient awake, alert, oriented,        ED Course                 Procedures              Critical Care time:  none               No results found for this or any previous visit (from the past 24 hour(s)).    Medications - No data to display    Assessments & Plan (with Medical Decision Making)  89 year old female presenting to the urgent care with concerns regarding leg swelling.  Patient  was on her feet for increased duration of time yesterday and has had increased amounts of leg swelling ever since then.  Patient without any significant erythema, or warmth.  Do not feel that cellulitis is likely.  Does have bilateral lower extremity swelling, and therefore I feel that this is likely secondary to her increased activity level yesterday with slight amounts of decreased venous drainage.  Patient asked specifically about Lasix.  I will prescribe 10 to 20 mg dose to be taken as needed for leg swelling.  Patient has been therapeutic on her warfarin, and even if there is a DVT, treatment would be anticoagulation on warfarin.  Regardless, I do feel that DVT is much less likely.  Patient reassured, and instructed follow-up in clinic as needed.  Return instructions discussed if new or worsening symptoms develop.     I have reviewed the nursing notes.    I have reviewed the findings, diagnosis, plan and need for follow up with the patient.           Discharge Medication List as of 6/23/2023  3:21 PM      START taking these medications    Details   furosemide (LASIX) 20 MG tablet Take 0.5-1 tablets (10-20 mg) by mouth daily as needed (leg swelling), Disp-30 tablet, R-0, E-Prescribe             Final diagnoses:   Leg swelling       6/23/2023   [facility] EMERGENCY DEPT     Jeffrey Esparza MD  06/23/23 2705

## 2023-06-26 NOTE — TELEPHONE ENCOUNTER
ANTICOAGULATION  MANAGEMENT: Discharge Review    Pooja HOUSTON Maxime chart reviewed for anticoagulation continuity of care    Emergency room visit on 6/23 for leg swelling.    Discharge disposition: Home    Results:    No results for input(s): INR, [id number], F2, ALMWH, AAUFH in the last 168 hours.  Anticoagulation inpatient management:     not applicable     Anticoagulation discharge instructions:     Warfarin dosing: home regimen continued   Bridging: No   INR goal change: No      Medication changes affecting anticoagulation: No    Additional factors affecting anticoagulation: No     PLAN     No adjustment to anticoagulation plan needed    Spoke with Pooja    No adjustment to Anticoagulation Calendar was required    Linsey Abbott RN

## 2023-06-29 NOTE — PROGRESS NOTES
ANTICOAGULATION MANAGEMENT     Pooja Swartz 89 year old female is on warfarin with therapeutic INR result. (Goal INR 2.0-3.0)    Recent labs: (last 7 days)     06/29/23  0919   INR 2.5*       ASSESSMENT       Source(s): Chart Review and Patient/Caregiver Call       Warfarin doses taken: Warfarin taken as instructed    Diet: No new diet changes identified    Medication/supplement changes: None noted    New illness, injury, or hospitalization: No    Signs or symptoms of bleeding or clotting: No    Previous result: Therapeutic last 2(+) visits    Additional findings: None         PLAN     Recommended plan for no diet, medication or health factor changes affecting INR     Dosing Instructions: Continue your current warfarin dose with next INR in 2 weeks       Summary  As of 6/29/2023    Full warfarin instructions:  2 mg every Mon, Wed; 4 mg all other days   Next INR check:  7/11/2023             Telephone call with Pooja who verbalizes understanding and agrees to plan    Lab visit scheduled    Education provided:     Please call back if any changes to your diet, medications or how you've been taking warfarin    Contact [phone] with any changes, questions or concerns.     Plan made per [facility] anticoagulation protocol    Linsey Abbott RN  Anticoagulation Clinic  6/29/2023    _______________________________________________________________________     Anticoagulation Episode Summary     Current INR goal:  2.0-3.0   TTR:  53.0 % (1 y)   Target end date:  Indefinite   Send INR reminders to:  [facility]    Indications    Atrial fibrillation (H) [I48.91]  Long-term (current) use of anticoagulants [Z79.01] [Z79.01]  Atrial fibrillation  unspecified type (H) [I48.91]  Chronic atrial fibrillation (H) [I48.20]           Comments:           Anticoagulation Care Providers     Provider Role Specialty Phone number    Amanda Renee MD Referring Family Medicine [phone]

## 2023-07-03 NOTE — TELEPHONE ENCOUNTER
Attempted to call [facility] [phone]; mailbox full and not able to leave a message.     Called Pooja and informed her of the medication being sent to the pharmacy and provided education about the medication and vaginal cares and red flag symptoms that need to be urgently medically evaluated.     Patient expressed understanding.     Amber Yanes, RN on 7/3/2023 at 12:43 PM

## 2023-07-03 NOTE — TELEPHONE ENCOUNTER
New Medication Request      What medication are you requesting?: Nystatin Powder    Reason for medication request: Skin break down in groin area from moisture build-up, irritated.    Have you taken this medication before?: No    Controlled Substance Agreement on file:   CSA -- Patient Level:    CSA: None found at the patient level.         Patient offered an appointment? No    Preferred Pharmacy:    [facility] [address] [address] [address]  Phone: [phone] Fax: [phone]      Okay to leave a detailed message?: Yes at Other phone number:  [phone]

## 2023-07-03 NOTE — TELEPHONE ENCOUNTER
Attempted to call Misa RN - [facility] (HOME CARE) [phone]     Not able to leave a message due to voice mail box is full. No information was available on Misa's greeting to know what her role is.   ___________________________________________________    Called and spoke to Pooja and she has labia and groin soreness, irritation, redness and skin breakdown and it is painful for Pooja to sit down.    Patient has a lot of services coming in to see her for home care. Misa is Skilled Nursing, RN who reported: Skin break down in groin area from moisture build-up, irritated. RN requested Nystatin Powder.    Pooja is okay to schedule an appointment with Dr. Renee but would be difficult to come in due to all of the home care services and up coming appointments with Cardiology and Oncology. If Dr. Renee would like a virtual appointment it would need to be by phone.     Please call Pooja back to let her know what the outcome is from Dr. Renee.    [phone] (Home)            Preferred Pharmacy:     [facility] - [facility] [address] [address] [address]  Phone: [phone] Fax: [phone]        Routing to provider due to Nystantin Powder has not been previously prescribed.       Amber Yanes, RN on 7/3/2023 at 12:22 PM       [Normal Appearance] : normal appearance [General Appearance - Well Developed] : well developed [Well Groomed] : well groomed [No Deformities] : no deformities [General Appearance - Well Nourished] : well nourished [General Appearance - In No Acute Distress] : no acute distress [Normal Conjunctiva] : the conjunctiva exhibited no abnormalities [Normal Oral Mucosa] : normal oral mucosa [Eyelids - No Xanthelasma] : the eyelids demonstrated no xanthelasmas [Normal Jugular Venous A Waves Present] : normal jugular venous A waves present [No Oral Pallor] : no oral pallor [No Oral Cyanosis] : no oral cyanosis [No Jugular Venous Salas A Waves] : no jugular venous salas A waves [Normal Jugular Venous V Waves Present] : normal jugular venous V waves present [Exaggerated Use Of Accessory Muscles For Inspiration] : no accessory muscle use [Respiration, Rhythm And Depth] : normal respiratory rhythm and effort [Heart Sounds] : normal S1 and S2 [Auscultation Breath Sounds / Voice Sounds] : lungs were clear to auscultation bilaterally [Heart Rate And Rhythm] : heart rate and rhythm were normal [Abdomen Soft] : soft [Abdomen Tenderness] : non-tender [Abdomen Mass (___ Cm)] : no abdominal mass palpated [Abnormal Walk] : normal gait [Nail Clubbing] : no clubbing of the fingernails [Gait - Sufficient For Exercise Testing] : the gait was sufficient for exercise testing [Cyanosis, Localized] : no localized cyanosis [Petechial Hemorrhages (___cm)] : no petechial hemorrhages [] : no ischemic changes [FreeTextEntry1] : apical systolic murmur

## 2023-07-07 NOTE — PROGRESS NOTES
Cardiology Consultation     Assessment & Plan       1.  Moderate aortic stenosis with a mean gradient of 19 mmHg  2.  Mitral annular calcification with mild mitral stenosis with a mean gradient of 7 mmHg  3.  Preserved LV systolic function  4.  Hypertension  5.  Paroxysmal atrial fibrillation on anticoagulation  6.  Gout  7.  Retroperitoneal neuroendocrine tumor, has seen oncology and has an upcoming appointment    Recommendations    1.  Valvular heart disease: Patient is entirely asymptomatic.  We would recommend conservative management at this time.  Follow-up echocardiogram has been ordered for next year.  We did broach the subject of any therapies directed toward the valvular heart disease and patient is unsure that she would be willing to proceed which is certainly reasonable given her background history with her age and her recent diagnosis of her neuroendocrine tumor.    2.  She will follow-up with her oncology team next week    3.  Return to clinic in cardiology in 1 years time with pre-clinic echocardiogram          Antolin Bassett MD, MD            HPI:    Patient is a very pleasant 89-year-old woman who has periodically followed with our cardiology team at [facility].  More recently she was seen by our electrophysiology team at which point there was consideration for pacemaker placement however it was felt that her bradycardia was due to her eyedrops.  This has since stabilized.  She carries a diagnosis of paranoid fibrillation for which she is on anticoagulation.  She has had on prior studies suggestion of mild to moderate aortic stenosis.  A recent echocardiogram demonstrated some progression and she is referred to cardiology clinic for evaluation.  She reports of no chest pain PND orthopnea or other cardiac related concerns.  She is relatively normotensive on her current regimen.  In addition of this she also has a new diagnosis of a neuroendocrine tumor and has seen GI.  Biopsies  have been performed.  Here for evaluation after recent echocardiogram.        Echo: May 2023  The left ventricle is normal in size.  Left ventricular systolic function is normal.  The visual ejection fraction is 60-65%.  Normal left ventricular wall motion  There is severe mitral annular calcification.  There is mild mitral stenosis.  The mean mitral valve gradient is 7mmHg at HR 61 bpm.  Moderate valvular aortic stenosis. The calculated aortic valve area is 1.0cm2.  The mean AoV pressure gradient is 19mmHg. The peak AoV pressure gradient is  37mmHg.  There is mild (1+) tricuspid regurgitation.  Right ventricular systolic pressure is elevated, consistent with moderate  pulmonary hypertension.  Small posterior pericardial effusion There are no echocardiographic  indications of cardiac tamponade.     Compared to the previous study, the AS and MS have become more severe    Primary Care Physician   Amanda Renee      Patient Active Problem List   Diagnosis     Hypertension goal BP (blood pressure) < 140/90     Other specified malignant neoplasm of skin of lower limb, including hip     Hemorrhage of gastrointestinal tract     Generalized osteoarthrosis, unspecified site     HEPATITIS B in past     Impaired fasting glucose     Disorder of bone and cartilage     Chronic kidney disease, stage III (moderate) (H)     Chronic tophaceous gout     CARDIOVASCULAR SCREENING; LDL GOAL LESS THAN 100     Sensorineural hearing loss, bilateral     Sinus node dysfunction (H)     Atrial fibrillation (H)     Health Care Home     Long-term (current) use of anticoagulants [Z79.01]     IBD (inflammatory bowel disease)     Atrial fibrillation, unspecified type (H)     Sinus arrest     Other specified glaucoma     Junctional bradycardia     Anticoagulated on Coumadin     Glaucoma suspect, bilateral     Impaired gait and mobility     Chronic atrial fibrillation (H)     Protein-calorie malnutrition (H)     Neuroendocrine tumor       Past  Medical History   I have reviewed this patient's medical history and updated it with pertinent information if needed.   Past Medical History:   Diagnosis Date     Atrial fibrillation (H)      Basal cell carcinoma      Chronic kidney disease      Colitis      Disorders of bursae and tendons in shoulder region, unspecified     right shoulder     Femur fracture, left (H) 08/17/2016     Hemorrhage of gastrointestinal tract, unspecified      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, including hip      Other specified glaucoma      Periprosthetic fracture around internal prosthetic left hip joint, subsequent encounter 01/09/2017     Renal insufficiency      Sinus node dysfunction (H)      Unspecified essential hypertension        Past Surgical History   I have reviewed this patient's surgical history and updated it with pertinent information if needed.  Past Surgical History:   Procedure Laterality Date     ARTHROPLASTY REVISION HIP Left 8/19/2016    Procedure: ARTHROPLASTY REVISION HIP;  Surgeon: Kael Rojas MD;  Location:  OR      VASCULAR SURGERY PROCEDURE UNLIST       OPEN REDUCTION INTERNAL FIXATION FEMUR PROXIMAL Left 8/19/2016    Procedure: OPEN REDUCTION INTERNAL FIXATION FEMUR PROXIMAL;  Surgeon: Kael Rojas MD;  Location: [facility] OR     SURGICAL HISTORY OF -   11/1992    skin cancer, nose     SURGICAL HISTORY OF -   1978    right, vein stripping     SURGICAL HISTORY OF -   1968    breast biopsy     SURGICAL HISTORY OF -   1993    laparoscopic cholecystectomy     SURGICAL HISTORY OF -       tonsillectomy and adenoidectomy     SURGICAL HISTORY OF -   04/2004    left cataract     SURGICAL HISTORY OF -   08/09/2004    right total shoulder arthroplasty     [facility] PELVIS/HIP JOINT SURGERY UNLISTED       [facility] SHOULDER SURG PROC UNLISTED       [facility] STOMACH SURGERY PROCEDURE UNLISTED         Prior to Admission Medications   Cannot display prior to admission medications because the patient has not been admitted  in this contact.     [unfilled]  [unfilled]  Allergies   Allergies   Allergen Reactions     Pcn [Penicillins] Hives       Social History    reports that she has never smoked. She has never used smokeless tobacco. She reports current alcohol use. She reports that she does not use drugs.    Family History   Family History   Problem Relation Age of Onset     Heart Disease Mother         CHF     Hypertension Mother      Hypertension Father      Hypertension Maternal Grandmother      Hypertension Maternal Grandfather      Hypertension Son      Heart Disease Son         MI       Review of Systems   The comprehensive 10 point Review of Systems is negative other than noted in the HPI or here.     Physical Exam   Vital Signs with Ranges     Wt Readings from Last 4 Encounters:   05/31/23 47.6 kg (105 lb)   05/26/23 48.1 kg (106 lb)   05/05/23 48.3 kg (106 lb 8 oz)   03/29/23 49.6 kg (109 lb 6 oz)     [unfilled]      Vitals: LMP 07/07/1960     GENERAL: Healthy, alert and no distress  EYES: Eyes grossly normal to inspection.  No discharge or erythema, or obvious scleral/conjunctival abnormalities.  RESP: No audible wheeze, cough, or visible cyanosis.  No visible retractions or increased work of breathing.    CV:  RRR  Lungs: CTA B  Abdomen: + BS soft NT/ND  Ext: no edema  SKIN: Visible skin clear. No significant rash, abnormal pigmentation or lesions.  NEURO: Cranial nerves grossly intact.  Mentation and speech appropriate for age.  PSYCH: Mentation appears normal, affect normal/bright, judgement and insight intact, normal speech and appearance well-groomed.

## 2023-07-07 NOTE — LETTER
7/7/2023    Amanda Renee MD  [id number]  [facility] [address]    RE: Pooja CELSO Maxime       Dear Colleague,     I had the pleasure of seeing Pooja Swartz in the [facility].    Cardiology Consultation     Assessment & Plan       1.  Moderate aortic stenosis with a mean gradient of 19 mmHg  2.  Mitral annular calcification with mild mitral stenosis with a mean gradient of 7 mmHg  3.  Preserved LV systolic function  4.  Hypertension  5.  Paroxysmal atrial fibrillation on anticoagulation  6.  Gout  7.  Retroperitoneal neuroendocrine tumor, has seen oncology and has an upcoming appointment    Recommendations    1.  Valvular heart disease: Patient is entirely asymptomatic.  We would recommend conservative management at this time.  Follow-up echocardiogram has been ordered for next year.  We did broach the subject of any therapies directed toward the valvular heart disease and patient is unsure that she would be willing to proceed which is certainly reasonable given her background history with her age and her recent diagnosis of her neuroendocrine tumor.    2.  She will follow-up with her oncology team next week    3.  Return to clinic in cardiology in 1 years time with pre-clinic echocardiogram          Antolin Bassett MD, MD            HPI:    Patient is a very pleasant 89-year-old woman who has periodically followed with our cardiology team at [facility].  More recently she was seen by our electrophysiology team at which point there was consideration for pacemaker placement however it was felt that her bradycardia was due to her eyedrops.  This has since stabilized.  She carries a diagnosis of paranoid fibrillation for which she is on anticoagulation.  She has had on prior studies suggestion of mild to moderate aortic stenosis.  A recent echocardiogram demonstrated some progression and she is referred to cardiology clinic for evaluation.  She reports of no chest pain PND  orthopnea or other cardiac related concerns.  She is relatively normotensive on her current regimen.  In addition of this she also has a new diagnosis of a neuroendocrine tumor and has seen GI.  Biopsies have been performed.  Here for evaluation after recent echocardiogram.        Echo: May 2023  The left ventricle is normal in size.  Left ventricular systolic function is normal.  The visual ejection fraction is 60-65%.  Normal left ventricular wall motion  There is severe mitral annular calcification.  There is mild mitral stenosis.  The mean mitral valve gradient is 7mmHg at HR 61 bpm.  Moderate valvular aortic stenosis. The calculated aortic valve area is 1.0cm2.  The mean AoV pressure gradient is 19mmHg. The peak AoV pressure gradient is  37mmHg.  There is mild (1+) tricuspid regurgitation.  Right ventricular systolic pressure is elevated, consistent with moderate  pulmonary hypertension.  Small posterior pericardial effusion There are no echocardiographic  indications of cardiac tamponade.     Compared to the previous study, the AS and MS have become more severe    Primary Care Physician   Amanda Renee      Patient Active Problem List   Diagnosis    Hypertension goal BP (blood pressure) < 140/90    Other specified malignant neoplasm of skin of lower limb, including hip    Hemorrhage of gastrointestinal tract    Generalized osteoarthrosis, unspecified site    HEPATITIS B in past    Impaired fasting glucose    Disorder of bone and cartilage    Chronic kidney disease, stage III (moderate) (H)    Chronic tophaceous gout    CARDIOVASCULAR SCREENING; LDL GOAL LESS THAN 100    Sensorineural hearing loss, bilateral    Sinus node dysfunction (H)    Atrial fibrillation (H)    Health Care Home    Long-term (current) use of anticoagulants [Z79.01]    IBD (inflammatory bowel disease)    Atrial fibrillation, unspecified type (H)    Sinus arrest    Other specified glaucoma    Junctional bradycardia    Anticoagulated on  Coumadin    Glaucoma suspect, bilateral    Impaired gait and mobility    Chronic atrial fibrillation (H)    Protein-calorie malnutrition (H)    Neuroendocrine tumor       Past Medical History   I have reviewed this patient's medical history and updated it with pertinent information if needed.   Past Medical History:   Diagnosis Date    Atrial fibrillation (H)     Basal cell carcinoma     Chronic kidney disease     Colitis     Disorders of bursae and tendons in shoulder region, unspecified     right shoulder    Femur fracture, left (H) 08/17/2016    Hemorrhage of gastrointestinal tract, unspecified     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, including hip     Other specified glaucoma     Periprosthetic fracture around internal prosthetic left hip joint, subsequent encounter 01/09/2017    Renal insufficiency     Sinus node dysfunction (H)     Unspecified essential hypertension        Past Surgical History   I have reviewed this patient's surgical history and updated it with pertinent information if needed.  Past Surgical History:   Procedure Laterality Date    ARTHROPLASTY REVISION HIP Left 8/19/2016    Procedure: ARTHROPLASTY REVISION HIP;  Surgeon: Kael Rojas MD;  Location:  OR     VASCULAR SURGERY PROCEDURE UNLIST      OPEN REDUCTION INTERNAL FIXATION FEMUR PROXIMAL Left 8/19/2016    Procedure: OPEN REDUCTION INTERNAL FIXATION FEMUR PROXIMAL;  Surgeon: Kael Rojas MD;  Location: [facility] OR    SURGICAL HISTORY OF -   11/1992    skin cancer, nose    SURGICAL HISTORY OF -   1978    right, vein stripping    SURGICAL HISTORY OF -   1968    breast biopsy    SURGICAL HISTORY OF -   1993    laparoscopic cholecystectomy    SURGICAL HISTORY OF -       tonsillectomy and adenoidectomy    SURGICAL HISTORY OF -   04/2004    left cataract    SURGICAL HISTORY OF -   08/09/2004    right total shoulder arthroplasty    [facility] PELVIS/HIP JOINT SURGERY UNLISTED      [facility] SHOULDER SURG PROC UNLISTED      [facility] STOMACH  SURGERY PROCEDURE UNLISTED         Prior to Admission Medications   Cannot display prior to admission medications because the patient has not been admitted in this contact.     [unfilled]  [unfilled]  Allergies   Allergies   Allergen Reactions    Pcn [Penicillins] Hives       Social History    reports that she has never smoked. She has never used smokeless tobacco. She reports current alcohol use. She reports that she does not use drugs.    Family History   Family History   Problem Relation Age of Onset    Heart Disease Mother         CHF    Hypertension Mother     Hypertension Father     Hypertension Maternal Grandmother     Hypertension Maternal Grandfather     Hypertension Son     Heart Disease Son         MI       Review of Systems   The comprehensive 10 point Review of Systems is negative other than noted in the HPI or here.     Physical Exam   Vital Signs with Ranges     Wt Readings from Last 4 Encounters:   05/31/23 47.6 kg (105 lb)   05/26/23 48.1 kg (106 lb)   05/05/23 48.3 kg (106 lb 8 oz)   03/29/23 49.6 kg (109 lb 6 oz)     [unfilled]      Vitals: LMP 07/07/1960     GENERAL: Healthy, alert and no distress  EYES: Eyes grossly normal to inspection.  No discharge or erythema, or obvious scleral/conjunctival abnormalities.  RESP: No audible wheeze, cough, or visible cyanosis.  No visible retractions or increased work of breathing.    CV:  RRR  Lungs: CTA B  Abdomen: + BS soft NT/ND  Ext: no edema  SKIN: Visible skin clear. No significant rash, abnormal pigmentation or lesions.  NEURO: Cranial nerves grossly intact.  Mentation and speech appropriate for age.  PSYCH: Mentation appears normal, affect normal/bright, judgement and insight intact, normal speech and appearance well-groomed.          Thank you for allowing me to participate in the care of your patient.      Sincerely,     Antolin Bassett MD     [facility]  cc:   Amanda BUI  MD V[address]ctor  [address] [place] [address]

## 2023-07-11 NOTE — PROGRESS NOTES
ANTICOAGULATION MANAGEMENT     Pooja Swartz 89 year old female is on warfarin with supratherapeutic INR result. (Goal INR 2.0-3.0)    Recent labs: (last 7 days)     07/11/23  1505   INR 4.1*       ASSESSMENT     Source(s): Chart Review  Previous INR was Therapeutic last 2(+) visits  Medication, diet, health changes since last INR chart reviewed; none identified         PLAN     Unable to reach Pooja today.    Left message to hold warfarin tonight. Request call back for assessment.    Follow up required to confirm warfarin dose taken and assess for changes and discuss out of range result     Linsey Abbott RN  Anticoagulation Clinic  7/11/2023

## 2023-07-11 NOTE — PROGRESS NOTES
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  The retroperitoneal mass is positive for somatostatin receptor uptake no additional somatostatin receptor positive lesions noted elsewhere.  No involvement of the liver.  It is still not clear as to where her primary started at.  It is possible that it could have been a small bowel lesion which has now regressed and has left a metastatic site in the retroperitoneum.  Previously I had discussed her case in the tumor board and considering her age and the large tumor, surgery was not deemed to be an appropriate treatment in this setting.  She is not overtly symptomatic other than some bloating, some weight loss and early satiety.  Does not appear to have any hormonal hypersecretion.  But I do think she meets the criteria to start treatment.  Since is a low-grade tumor I recommended starting with long-acting octreotide.  Reviewed the rationale behind this.  Explained to her that the long-acting octreotide has shown to slow the growth of the tumor but not necessarily shrink it or make it go away.  Since the location of the tumor is such that it could potentially cause other problems if it grows in the future, I think it is very reasonable to start treatment right now.  I reviewed potential side effects and complications associated with octreotide.  She is agreeable to the plan.  I will put the plan in and get prior authorization process going.  We will start it soon.    She would not to tolerate octreotide or have growth despite it, then the next option  would be PRRT.     Cancer Staging   No matching staging information was found for the patient.    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, including hip     Hemorrhage of gastrointestinal tract     Generalized osteoarthrosis, unspecified site     HEPATITIS B in past     Impaired fasting glucose     Disorder of bone and cartilage     Chronic kidney disease, stage III (moderate) (H)     Chronic tophaceous gout     CARDIOVASCULAR SCREENING; LDL GOAL LESS THAN 100     Sensorineural hearing loss, bilateral     Sinus node dysfunction (H)     Atrial fibrillation (H)     Health Care Home     Long-term (current) use of anticoagulants [Z79.01]     IBD (inflammatory bowel disease)     Atrial fibrillation, unspecified type (H)     Sinus arrest     Other specified glaucoma     Junctional bradycardia     Anticoagulated on Coumadin     Glaucoma suspect, bilateral     Impaired gait and mobility     Chronic atrial fibrillation (H)     Protein-calorie malnutrition (H)     Neuroendocrine tumor          Interval History   Pooja Swartz is a 89 year old female with recent diagnosis of well-differentiated neuroendocrine tumor peritoneum who is here to discuss dotatate scan and make further management plans.    I saw her in May for large retroperitoneal mass.  It was biopsy-proven to be low-grade well-differentiated neuroendocrine tumor.  KI67 of 5%.  I recommended getting a dotatate PET scan to look for the extent of the disease.  She had it done last month.  Is here to review the results and make further follow-up plans.  Apart from some bloating and some early satiety no other major symptoms.  No flushing or diarrhea.  No constipation.  No heart palpitations.        Review of Systems  A comprehensive  review of systems was negative except for what is noted in the interval history    Current Outpatient Medications   Medication     allopurinol (ZYLOPRIM) 100 MG tablet     amLODIPine (NORVASC) 10 MG tablet     budesonide (ENTOCORT EC) 3 MG EC capsule     Calcium-Vitamin D 600-200 MG-UNIT TABS     dorzolamide (TRUSOPT) 2 % ophthalmic solution     estradiol (ESTRACE) 0.1 MG/GM vaginal cream     Fexofenadine HCl (ALLEGRA PO)     furosemide (LASIX) 20 MG tablet     lisinopril (ZESTRIL) 2.5 MG tablet     mupirocin (BACTROBAN) 2 % external ointment     nystatin (MYCOSTATIN) 849129 UNIT/GM external powder     Omega-3 Fatty Acids (FISH OIL PO)     ONE-A-DAY WOMENS OR TABS     RHOPRESSA 0.02 % ophthalmic solution     VYZULTA 0.024 % SOLN ophthalmic solution     warfarin ANTICOAGULANT (JANTOVEN ANTICOAGULANT) 2 MG tablet     latanoprost (XALATAN) 0.005 % ophthalmic solution     No current facility-administered medications for this visit.        Physical Exam    No flowsheet data found.    General: alert and cooperative  HEENT: Head: Normal, normocephalic, atraumatic.  Eye: Normal external eye, conjunctiva, lids cornea, FEI.  CNS: Alert and oriented x3, neurologic exam grossly normal.      Lab Results    Recent Results (from the past 168 hour(s))   INR point of care   Result Value Ref Range    INR 3.1 (H) 0.9 - 1.1       Imaging    No results found.    A total of 45 min were spent today on this visit which included face to face conversation with the patient, EMR review, counseling and co-ordination of care and medical documentation.    Signed by: Pedro Luis Edwards MD

## 2023-07-11 NOTE — LETTER
7/11/2023         RE: Pooja Swartz  [id number]  [place] [id number]        Dear Colleague,    Thank you for referring your patient, Pooja Swartz, to the [facility]. Please see a copy of my visit note below.    Oncology Rooming Note    July 11, 2023 3:47 PM   Pooja Swartz is a 89 year old female who presents for:    Chief Complaint   Patient presents with     Oncology Clinic Visit     Neuroendocrine tumor - Provider visit only     Initial Vitals: BP (!) 172/61 (BP Location: Right arm, Patient Position: Sitting, Cuff Size: Adult Small)   Pulse 75   Temp 97.8  F (36.6  C) (Oral)   Resp 12   Ht 1.524 m (5')   Wt 48.5 kg (107 lb)   LMP 07/07/1960   SpO2 100%   BMI 20.90 kg/m   Estimated body mass index is 20.9 kg/m  as calculated from the following:    Height as of this encounter: 1.524 m (5').    Weight as of this encounter: 48.5 kg (107 lb). Body surface area is 1.43 meters squared.  Data Unavailable Comment: Data Unavailable   Patient's last menstrual period was 07/07/1960.  Allergies reviewed: Yes  Medications reviewed: Yes    Medications: Medication refills not needed today.  Pharmacy name entered into [employer]:    [facility] - [place], [place] - [address] [address] - [facility] [address] [address]    Clinical concerns:  None      Nelida Plasencia, [facility] Hematology and Oncology Progress Note    Patient: Pooja Swartz  MRN: [id number]  7/11/23        Reason for Visit    Chief Complaint   Patient presents with     Oncology Clinic Visit     Neuroendocrine tumor - Provider visit only         Problem List Items Addressed This Visit        Other    Neuroendocrine tumor - Primary         Assessment and Plan  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  The retroperitoneal mass is positive for somatostatin  receptor uptake no additional somatostatin receptor positive lesions noted elsewhere.  No involvement of the liver.  It is still not clear as to where her primary started at.  It is possible that it could have been a small bowel lesion which has now regressed and has left a metastatic site in the retroperitoneum.  Previously I had discussed her case in the tumor board and considering her age and the large tumor, surgery was not deemed to be an appropriate treatment in this setting.  She is not overtly symptomatic other than some bloating, some weight loss and early satiety.  Does not appear to have any hormonal hypersecretion.  But I do think she meets the criteria to start treatment.  Since is a low-grade tumor I recommended starting with long-acting octreotide.  Reviewed the rationale behind this.  Explained to her that the long-acting octreotide has shown to slow the growth of the tumor but not necessarily shrink it or make it go away.  Since the location of the tumor is such that it could potentially cause other problems if it grows in the future, I think it is very reasonable to start treatment right now.  I reviewed potential side effects and complications associated with octreotide.  She is agreeable to the plan.  I will put the plan in and get prior authorization process going.  We will start it soon.    She would not to tolerate octreotide or have growth despite it, then the next option would be PRRT.     Cancer Staging   No matching staging information was found for the patient.    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, including hip     Hemorrhage of gastrointestinal tract     Generalized osteoarthrosis, unspecified site     HEPATITIS B in past     Impaired fasting glucose      Disorder of bone and cartilage     Chronic kidney disease, stage III (moderate) (H)     Chronic tophaceous gout     CARDIOVASCULAR SCREENING; LDL GOAL LESS THAN 100     Sensorineural hearing loss, bilateral     Sinus node dysfunction (H)     Atrial fibrillation (H)     Health Care Home     Long-term (current) use of anticoagulants [Z79.01]     IBD (inflammatory bowel disease)     Atrial fibrillation, unspecified type (H)     Sinus arrest     Other specified glaucoma     Junctional bradycardia     Anticoagulated on Coumadin     Glaucoma suspect, bilateral     Impaired gait and mobility     Chronic atrial fibrillation (H)     Protein-calorie malnutrition (H)     Neuroendocrine tumor          Interval History   Pooja Swartz is a 89 year old female with recent diagnosis of well-differentiated neuroendocrine tumor peritoneum who is here to discuss dotatate scan and make further management plans.    I saw her in May for large retroperitoneal mass.  It was biopsy-proven to be low-grade well-differentiated neuroendocrine tumor.  KI67 of 5%.  I recommended getting a dotatate PET scan to look for the extent of the disease.  She had it done last month.  Is here to review the results and make further follow-up plans.  Apart from some bloating and some early satiety no other major symptoms.  No flushing or diarrhea.  No constipation.  No heart palpitations.        Review of Systems  A comprehensive review of systems was negative except for what is noted in the interval history    Current Outpatient Medications   Medication     allopurinol (ZYLOPRIM) 100 MG tablet     amLODIPine (NORVASC) 10 MG tablet     budesonide (ENTOCORT EC) 3 MG EC capsule     Calcium-Vitamin D 600-200 MG-UNIT TABS     dorzolamide (TRUSOPT) 2 % ophthalmic solution     estradiol (ESTRACE) 0.1 MG/GM vaginal cream     Fexofenadine HCl (ALLEGRA PO)     furosemide (LASIX) 20 MG tablet     lisinopril (ZESTRIL) 2.5 MG tablet     mupirocin (BACTROBAN) 2 %  external ointment     nystatin (MYCOSTATIN) 885998 UNIT/GM external powder     Omega-3 Fatty Acids (FISH OIL PO)     ONE-A-DAY WOMENS OR TABS     RHOPRESSA 0.02 % ophthalmic solution     VYZULTA 0.024 % SOLN ophthalmic solution     warfarin ANTICOAGULANT (JANTOVEN ANTICOAGULANT) 2 MG tablet     latanoprost (XALATAN) 0.005 % ophthalmic solution     No current facility-administered medications for this visit.        Physical Exam    No flowsheet data found.    General: alert and cooperative  HEENT: Head: Normal, normocephalic, atraumatic.  Eye: Normal external eye, conjunctiva, lids cornea, FEI.  CNS: Alert and oriented x3, neurologic exam grossly normal.      Lab Results    Recent Results (from the past 168 hour(s))   INR point of care   Result Value Ref Range    INR 3.1 (H) 0.9 - 1.1       Imaging    No results found.    A total of 45 min were spent today on this visit which included face to face conversation with the patient, EMR review, counseling and co-ordination of care and medical documentation.    Signed by: Pedro Luis Edwards MD        Again, thank you for allowing me to participate in the care of your patient.        Sincerely,        Pedro Luis Edwards MD

## 2023-07-11 NOTE — PROGRESS NOTES
Oncology Rooming Note    July 11, 2023 3:47 PM   Pooja Swartz is a 89 year old female who presents for:    Chief Complaint   Patient presents with     Oncology Clinic Visit     Neuroendocrine tumor - Provider visit only     Initial Vitals: BP (!) 172/61 (BP Location: Right arm, Patient Position: Sitting, Cuff Size: Adult Small)   Pulse 75   Temp 97.8  F (36.6  C) (Oral)   Resp 12   Ht 1.524 m (5')   Wt 48.5 kg (107 lb)   LMP 07/07/1960   SpO2 100%   BMI 20.90 kg/m   Estimated body mass index is 20.9 kg/m  as calculated from the following:    Height as of this encounter: 1.524 m (5').    Weight as of this encounter: 48.5 kg (107 lb). Body surface area is 1.43 meters squared.  Data Unavailable Comment: Data Unavailable   Patient's last menstrual period was 07/07/1960.  Allergies reviewed: Yes  Medications reviewed: Yes    Medications: Medication refills not needed today.  Pharmacy name entered into [facility]:    [facility] - [place], [place] - [address] AT [facility] - [place], [place] - [address] [facility]    Clinical concerns:  None      Nelida Plasencia, RACHELLE

## 2023-07-12 NOTE — PROGRESS NOTES
ANTICOAGULATION MANAGEMENT     Pooja Swartz 89 year old female is on warfarin with supratherapeutic INR result. (Goal INR 2.0-3.0)    Recent labs: (last 7 days)     07/11/23  1505   INR 4.1*       ASSESSMENT     Source(s): Chart Review and Patient/Caregiver Call     Warfarin doses taken: Warfarin taken as instructed  Diet: No new diet changes identified  Medication/supplement changes: None noted  New illness, injury, or hospitalization: No  Signs or symptoms of bleeding or clotting: No  Previous result: Therapeutic last 2(+) visits  Additional findings: None       PLAN     Recommended plan for no diet, medication or health factor changes affecting INR     Dosing Instructions: hold dose then decrease your warfarin dose (8.3% change) with next INR in 1 week       Summary  As of 7/11/2023      Full warfarin instructions:  7/11: Hold; Otherwise 2 mg every Mon, Wed, Fri; 4 mg all other days   Next INR check:  7/19/2023               Telephone call with Pooja who verbalizes understanding and agrees to plan    Lab visit scheduled    Education provided:   Contact [phone] with any changes, questions or concerns.     Plan made per [facility] anticoagulation protocol    Linsey Abbott RN  Anticoagulation Clinic  7/12/2023    _______________________________________________________________________     Anticoagulation Episode Summary       Current INR goal:  2.0-3.0   TTR:  50.6 % (1 y)   Target end date:  Indefinite   Send INR reminders to:  [facility]    Indications    Atrial fibrillation (H) [I48.91]  Long-term (current) use of anticoagulants [Z79.01] [Z79.01]  Atrial fibrillation  unspecified type (H) [I48.91]  Chronic atrial fibrillation (H) [I48.20]             Comments:               Anticoagulation Care Providers       Provider Role Specialty Phone number    Amanda Renee MD Referring Family Medicine [phone]

## 2023-07-19 NOTE — PROGRESS NOTES
ANTICOAGULATION MANAGEMENT     Pooja Swartz 89 year old female is on warfarin with supratherapeutic INR result. (Goal INR 2.0-3.0)    Recent labs: (last 7 days)     07/19/23  1244   INR 3.1*       ASSESSMENT     Source(s): Chart Review and Patient/Caregiver Call     Warfarin doses taken: Warfarin taken as instructed  Diet: No new diet changes identified  Medication/supplement changes: None noted  New illness, injury, or hospitalization: No  Signs or symptoms of bleeding or clotting: No  Previous result: Supratherapeutic  Additional findings: None       PLAN     Recommended plan for no diet, medication or health factor changes affecting INR     Dosing Instructions: decrease your warfarin dose (8.3% change) with next INR in 2 weeks       Summary  As of 7/19/2023      Full warfarin instructions:  2 mg every Mon, Wed, Fri; 4 mg all other days   Next INR check:  8/2/2023               Telephone call with Pooja who verbalizes understanding and agrees to plan    Lab visit scheduled    Education provided:   Contact [phone] with any changes, questions or concerns.     Plan made per [facility] anticoagulation protocol    Linsey Abbott RN  Anticoagulation Clinic  7/19/2023    _______________________________________________________________________     Anticoagulation Episode Summary       Current INR goal:  2.0-3.0   TTR:  48.6 % (1 y)   Target end date:  Indefinite   Send INR reminders to:  [facility]    Indications    Atrial fibrillation (H) [I48.91]  Long-term (current) use of anticoagulants [Z79.01] [Z79.01]  Atrial fibrillation  unspecified type (H) [I48.91]  Chronic atrial fibrillation (H) [I48.20]             Comments:               Anticoagulation Care Providers       Provider Role Specialty Phone number    Amanda Renee MD Referring Family Medicine [phone]

## 2023-08-02 NOTE — PROGRESS NOTES
ANTICOAGULATION MANAGEMENT     Pooja Swartz 89 year old female is on warfarin with supratherapeutic INR result. (Goal INR 2.0-3.0)    Recent labs: (last 7 days)     08/02/23  1312   INR 4.0*       ASSESSMENT     Source(s): Chart Review and Patient/Caregiver Call     Warfarin doses taken: Warfarin taken as instructed  Diet: No new diet changes identified  Medication/supplement changes: None noted, will be starting a Q4 week injection next week for her neuroendocrine tumor. Injection is octreotide, no interaction expected per micromedex  New illness, injury, or hospitalization: No  Signs or symptoms of bleeding or clotting: No  Previous result: Supratherapeutic  Additional findings: None       PLAN     Recommended plan for no diet, medication or health factor changes affecting INR     Dosing Instructions: hold dose then decrease your warfarin dose (9.1% change) with next INR in 1 week       Summary  As of 8/2/2023      Full warfarin instructions:  8/2: Hold; Otherwise 4 mg every Sun, Tue, Thu; 2 mg all other days   Next INR check:  8/9/2023               Telephone call with Pooja who verbalizes understanding and agrees to plan    Lab visit scheduled    Education provided:   Goal range and lab monitoring: goal range and significance of current result  Symptom monitoring: monitoring for bleeding signs and symptoms    Plan made per [facility] anticoagulation protocol    Deepti Washington RN  Anticoagulation Clinic  8/2/2023    _______________________________________________________________________     Anticoagulation Episode Summary       Current INR goal:  2.0-3.0   TTR:  44.7 % (1 y)   Target end date:  Indefinite   Send INR reminders to:  [facility]    Indications    Atrial fibrillation (H) [I48.91]  Long-term (current) use of anticoagulants [Z79.01] [Z79.01]  Atrial fibrillation  unspecified type (H) [I48.91]  Chronic atrial fibrillation (H) [I48.20]             Comments:               Anticoagulation Care  Providers       Provider Role Specialty Phone number    Amanda Renee MD Referring Family Medicine [phone]

## 2023-08-09 NOTE — LETTER
August 9, 2023      Pooja Swartz  [id number]  [address] [place] [id number]        Dear Pooja,       You are due to schedule your annual wellness visit (physical). Please come to your appointment fasting for labs, this means nothing to eat or drink for 8-10 hours prior to the appointment, you can have water and medications.      Sincerely,        Your [facility] Team

## 2023-08-09 NOTE — TELEPHONE ENCOUNTER
Patient Quality Outreach    Patient is due for the following:   None    Next Steps:   - Schedule annual wellness visit with fasting labs  - Update vaccines    Type of outreach:    Sent letter.      Questions for provider review:    None           Cecile Girard, CMA

## 2023-08-09 NOTE — PROGRESS NOTES
ANTICOAGULATION MANAGEMENT     Pooja Swartz 89 year old female is on warfarin with supratherapeutic INR result. (Goal INR 2.0-3.0)    Recent labs: (last 7 days)     08/09/23  1309   INR 3.4*       ASSESSMENT     Source(s): Chart Review and Patient/Caregiver Call     Warfarin doses taken: Warfarin taken as instructed  Diet: No new diet changes identified  Medication/supplement changes: None noted  New illness, injury, or hospitalization: No  Signs or symptoms of bleeding or clotting: No  Previous result: Supratherapeutic  Additional findings: None       PLAN     Recommended plan for no diet, medication or health factor changes affecting INR     Dosing Instructions: hold dose then decrease your warfarin dose (10% change) with next INR in 2 weeks       Summary  As of 8/9/2023      Full warfarin instructions:  8/9: Hold; Otherwise 4 mg every Sun, Tue; 2 mg all other days   Next INR check:  8/24/2023               Telephone call with Pooja who verbalizes understanding and agrees to plan    Lab visit scheduled    Education provided:   Contact [phone] with any changes, questions or concerns.     Plan made per [facility] anticoagulation protocol    Linsey Abbott RN  Anticoagulation Clinic  8/9/2023    _______________________________________________________________________     Anticoagulation Episode Summary       Current INR goal:  2.0-3.0   TTR:  42.8 % (1 y)   Target end date:  Indefinite   Send INR reminders to:  [facility]    Indications    Atrial fibrillation (H) [I48.91]  Long-term (current) use of anticoagulants [Z79.01] [Z79.01]  Atrial fibrillation  unspecified type (H) [I48.91]  Chronic atrial fibrillation (H) [I48.20]             Comments:               Anticoagulation Care Providers       Provider Role Specialty Phone number    Amanda Renee MD Referring Family Medicine [phone]

## 2023-08-09 NOTE — TELEPHONE ENCOUNTER
Reason for Call:  Other call back and returning call    Detailed comments: Pt as further questions regarding care plan, wants to go over it with an INR nurse.    Phone Number Patient can be reached at: Home number on file [phone] (home)    Best Time: Anytime    Can we leave a detailed message on this number? YES    Call taken on 8/9/2023 at 3:47 PM by Mariana Cotton

## 2023-08-09 NOTE — PROGRESS NOTES
Infusion Nursing Note:  Pooja Swartz presents today for Somatuline.    Patient seen by provider today: No   present during visit today: Not Applicable.    Note: N/A.      Intravenous Access:  No Intravenous access/labs at this visit.    Treatment Conditions:  Not Applicable.      Post Infusion Assessment:  Patient tolerated injection without incident.  Site patent and intact, free from redness, edema or discomfort.  No evidence of extravasations.       Discharge Plan:   Discharge instructions reviewed with: Patient.  Patient discharged in stable condition accompanied by: self and friend.  Departure Mode: Wheelchair.      Teresa Abraham RN

## 2023-08-24 NOTE — PROGRESS NOTES
ANTICOAGULATION MANAGEMENT     Pooja Swartz 89 year old female is on warfarin with therapeutic INR result. (Goal INR 2.0-3.0)    Recent labs: (last 7 days)     08/24/23  1014   INR 2.62*       ASSESSMENT     Source(s): Chart Review and Patient/Caregiver Call     Warfarin doses taken: Warfarin taken as instructed  Diet: No new diet changes identified  Medication/supplement changes: None noted  New illness, injury, or hospitalization: No  Signs or symptoms of bleeding or clotting: No  Previous result: Therapeutic last 2(+) visits  Additional findings: None       PLAN     Recommended plan for no diet, medication or health factor changes affecting INR     Dosing Instructions: Continue your current warfarin dose with next INR in 2 weeks       Summary  As of 8/24/2023      Full warfarin instructions:  4 mg every Sun, Tue; 2 mg all other days   Next INR check:  9/6/2023               Telephone call with Pooja who verbalizes understanding and agrees to plan    Lab visit scheduled    Education provided:   Contact [phone] with any changes, questions or concerns.     Plan made per [facility] anticoagulation protocol    Linsey Abbott RN  Anticoagulation Clinic  8/24/2023    _______________________________________________________________________     Anticoagulation Episode Summary       Current INR goal:  2.0-3.0   TTR:  40.9 % (1 y)   Target end date:  Indefinite   Send INR reminders to:  [facility]    Indications    Atrial fibrillation (H) [I48.91]  Long-term (current) use of anticoagulants [Z79.01] [Z79.01]  Atrial fibrillation  unspecified type (H) [I48.91]  Chronic atrial fibrillation (H) [I48.20]             Comments:               Anticoagulation Care Providers       Provider Role Specialty Phone number    Amanda Renee MD Referring Family Medicine [phone]

## 2023-08-24 NOTE — LETTER
8/24/2023         RE: Pooja Swartz  [address]  [address] [place] [id number]        Dear Colleague,    Thank you for referring your patient, Pooja Swartz, to the [facility]. Please see a copy of my visit note below.    Video-Visit Details    Type of service:  Video Visit    Video Start Time:  10:27 AM  Video End Time:  10:43 AM    Originating Location (pt. Location): Other [place] [facility]  in [place]    Distant Location (provider location): Off-site/[place]    Platform used for Video Visit: [facility] Hematology and Oncology Progress Note    Patient: Pooja Swartz  MRN: [id number]  8/24/23        Reason for Visit    Chief Complaint   Patient presents with     Oncology Clinic Visit     Neuroendocrine tumor - Labs and provider visit         Problem List Items Addressed This Visit    None  Visit Diagnoses       Anemia, unspecified type    -  Primary    Relevant Orders    Vitamin B12    Iron & Iron Binding Capacity    Ferritin    Folate    Reticulocyte count (Completed)    Protein electrophoresis    Kappa and lambda light chain    Protein Immunofixation Serum    Other malignant neuroendocrine tumors (H)        Relevant Orders    CT Chest/Abdomen/Pelvis w Contrast                Assessment and Plan  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  Has done fairly well with that.  No major side effects.  No diarrhea, flushing or sweating.  No clinical evidence of hypoglycemic episodes.    Here with repeat labs.  Plan is to continue octreotide once a month injections.  We will repeat a scan 3 months after the first injection.  I again explained to her that the idea is to prevent any further growth of the tumor.  Typically we do not expect any significant tumor shrinkage with octreotide.  She is agreeable to the plan.    She is scheduled for next octreotide  injection on September 6.  Following that she will get 1 more on October 4 with labs prior.  I will see her back on November 7 with labs and a CT scan a week before followed by octreotide on the same day.    If she were not to tolerate octreotide or have growth despite it, then the next option would be PRRT.    Macrocytic anemia  Etiology is not clear.  She has had longstanding anemia.  Hemoglobin today is at 10.1.  Normal platelet count and white counts.  Slightly elevated RDW.  I have added on iron studies and ferritin.  We will also check reticulocyte count along with a peripheral smear.  She had mild hypercalcemia so we will also check an SPEP and kappa lambda free light chains.  B12 in the past was normal but we will recheck it again along with folic acid.  Her TSH earlier this year was normal.     Cancer Staging   No matching staging information was found for the patient.    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, including hip     Hemorrhage of gastrointestinal tract     Generalized osteoarthrosis, unspecified site     HEPATITIS B in past     Impaired fasting glucose     Disorder of bone and cartilage     Chronic kidney disease, stage III (moderate) (H)     Chronic tophaceous gout     CARDIOVASCULAR SCREENING; LDL GOAL LESS THAN 100     Sensorineural hearing loss, bilateral     Sinus node dysfunction (H)     Atrial fibrillation (H)     Health Care Home     Long-term (current) use of anticoagulants [Z79.01]     IBD (inflammatory bowel disease)     Atrial fibrillation, unspecified type (H)     Sinus arrest     Other specified glaucoma     Junctional bradycardia     Anticoagulated on Coumadin     Glaucoma suspect, bilateral     Impaired gait and mobility     Chronic atrial  fibrillation (H)     Protein-calorie malnutrition (H)     Neuroendocrine tumor          Interval History   Pooja Swartz is a 89 year old female with recent diagnosis of well-differentiated neuroendocrine tumor peritoneum who is here to discuss dotatate scan and make further management plans.    I saw her in May for large retroperitoneal mass.  It was biopsy-proven to be low-grade well-differentiated neuroendocrine tumor.  KI67 of 5%.  I recommended getting a dotatate PET scan to look for the extent of the disease.  On the PET scan her retroperitoneal mass was positive for somatostatin receptor uptake no additional somatostatin receptor positive lesions noted elsewhere.  No involvement of the liver.  Due to the size and location of the tumor we decided to start treatment with long-acting octreotide.  She has received 1 dose.  Has done fairly well with it.  No significant side effects.  No diarrhea.  No abdominal pain.  No nausea or vomiting.  Here with repeat labs.  She does complain of right hip pain which sometimes goes down the legs.  Takes Tylenol.      Review of Systems  A comprehensive review of systems was negative except for what is noted in the interval history    Current Outpatient Medications   Medication     allopurinol (ZYLOPRIM) 100 MG tablet     amLODIPine (NORVASC) 10 MG tablet     budesonide (ENTOCORT EC) 3 MG EC capsule     Calcium-Vitamin D 600-200 MG-UNIT TABS     dorzolamide (TRUSOPT) 2 % ophthalmic solution     estradiol (ESTRACE) 0.1 MG/GM vaginal cream     Fexofenadine HCl (ALLEGRA PO)     furosemide (LASIX) 20 MG tablet     lisinopril (ZESTRIL) 2.5 MG tablet     mupirocin (BACTROBAN) 2 % external ointment     nystatin (MYCOSTATIN) 329438 UNIT/GM external powder     Omega-3 Fatty Acids (FISH OIL PO)     ONE-A-DAY WOMENS OR TABS     RHOPRESSA 0.02 % ophthalmic solution     VYZULTA 0.024 % SOLN ophthalmic solution     warfarin ANTICOAGULANT (JANTOVEN ANTICOAGULANT) 2 MG tablet     latanoprost  (XALATAN) 0.005 % ophthalmic solution     No current facility-administered medications for this visit.        Physical Exam        General: alert and cooperative  HEENT: Head: Normal, normocephalic, atraumatic.  Eye: Normal external eye, conjunctiva, lids cornea, FEI.  CNS: Alert and oriented x3, neurologic exam grossly normal.      Lab Results    Recent Results (from the past 168 hour(s))   INR   Result Value Ref Range    INR 2.62 (H) 0.85 - 1.15   CBC with platelets and differential   Result Value Ref Range    WBC Count 8.3 4.0 - 11.0 10e3/uL    RBC Count 3.02 (L) 3.80 - 5.20 10e6/uL    Hemoglobin 10.1 (L) 11.7 - 15.7 g/dL    Hematocrit 30.9 (L) 35.0 - 47.0 %     (H) 78 - 100 fL    MCH 33.4 (H) 26.5 - 33.0 pg    MCHC 32.7 31.5 - 36.5 g/dL    RDW 15.6 (H) 10.0 - 15.0 %    Platelet Count 174 150 - 450 10e3/uL    % Neutrophils 72 %    % Lymphocytes 15 %    % Monocytes 8 %    % Eosinophils 3 %    % Basophils 1 %    % Immature Granulocytes 1 %    NRBCs per 100 WBC 0 <1 /100    Absolute Neutrophils 6.0 1.6 - 8.3 10e3/uL    Absolute Lymphocytes 1.2 0.8 - 5.3 10e3/uL    Absolute Monocytes 0.7 0.0 - 1.3 10e3/uL    Absolute Eosinophils 0.3 0.0 - 0.7 10e3/uL    Absolute Basophils 0.1 0.0 - 0.2 10e3/uL    Absolute Immature Granulocytes 0.1 <=0.4 10e3/uL    Absolute NRBCs 0.0 10e3/uL   Reticulocyte count   Result Value Ref Range    % Reticulocyte 1.3 0.5 - 2.0 %    Absolute Reticulocyte 0.040 0.025 - 0.095 10e6/uL         Imaging    No results found.    A total of 45 min were spent today on this visit which included face to face conversation with the patient, EMR review, counseling and co-ordination of care and medical documentation.    Signed by: Pedro Luis Edwards MD      Again, thank you for allowing me to participate in the care of your patient.        Sincerely,        Pedro Luis Edwards MD

## 2023-08-31 NOTE — NURSING NOTE
"Chief Complaint   Patient presents with     ER F/U     fall pt. was seen in the ER 2/8/2017        Initial /70 (BP Location: Right arm, Patient Position: Chair, Cuff Size: Adult Regular)  Pulse 64  Wt 138 lb (62.6 kg)  LMP 07/07/1960  BMI 27.45 kg/m2 Estimated body mass index is 27.45 kg/(m^2) as calculated from the following:    Height as of 1/9/17: 4' 11.45\" (1.51 m).    Weight as of this encounter: 138 lb (62.6 kg).  Medication Reconciliation: complete     Shaila Silverio, CMA      "
symptomatic management at the end of life

## 2023-09-06 NOTE — PROGRESS NOTES
ANTICOAGULATION MANAGEMENT     Pooja Swartz 89 year old female is on warfarin with therapeutic INR result. (Goal INR 2.0-3.0)    Recent labs: (last 7 days)     09/06/23  1421   INR 2.2*       ASSESSMENT     Source(s): Chart Review and Patient/Caregiver Call     Warfarin doses taken: Warfarin taken as instructed  Diet: No new diet changes identified  Medication/supplement changes: None noted  New illness, injury, or hospitalization: No  Signs or symptoms of bleeding or clotting: No  Previous result: Therapeutic last visit; previously outside of goal range  Additional findings: None       PLAN     Recommended plan for no diet, medication or health factor changes affecting INR     Dosing Instructions: Continue your current warfarin dose with next INR in 2-3 weeks.  Moved the 4mg days to Tues and Sat to make the schedule more even.         Summary  As of 9/6/2023      Full warfarin instructions:  4 mg every Tue, Sat; 2 mg all other days   Next INR check:  9/20/2023               Telephone call with Pooja who verbalizes understanding and agrees to plan    Check at provider office visit 9/20    Education provided:   Goal range and lab monitoring: goal range and significance of current result  Contact [phone] with any changes, questions or concerns.     Plan made per [facility] anticoagulation protocol    Ariana Castano RN  Anticoagulation Clinic  9/6/2023    _______________________________________________________________________     Anticoagulation Episode Summary       Current INR goal:  2.0-3.0   TTR:  44.5 % (1 y)   Target end date:  Indefinite   Send INR reminders to:  [facility]    Indications    Atrial fibrillation (H) [I48.91]  Long-term (current) use of anticoagulants [Z79.01] [Z79.01]  Atrial fibrillation  unspecified type (H) [I48.91]  Chronic atrial fibrillation (H) [I48.20]             Comments:               Anticoagulation Care Providers       Provider Role Specialty Phone number     Amanda Renee MD [facility] [phone]

## 2023-09-06 NOTE — PROGRESS NOTES
Infusion Nursing Note:  Pooja Swartz presents today for Somatuline.    Patient seen by provider today: No   present during visit today: Not Applicable.    Note: N/A.      Intravenous Access:  No Intravenous access/labs at this visit.    Treatment Conditions:  Not Applicable.      Post Infusion Assessment:  Patient tolerated injection without incident.       Discharge Plan:   Patient discharged in stable condition accompanied by: self.  Departure Mode: Ambulatory.      Bo Gil RN

## 2023-09-06 NOTE — TELEPHONE ENCOUNTER
Pending Prescriptions:                       Disp   Refills    lisinopril (ZESTRIL) 2.5 MG tablet [Pharma*90 tab*1        Sig: Take 1 tablet (2.5 mg) by mouth every evening    Routing refill request to provider for review/approval because:  BP not in goal range    BP Readings from Last 3 Encounters:   08/09/23 (!) 161/54   07/11/23 (!) 172/61   07/07/23 (!) 142/60     Anne Sarmiento RN  [facility]

## 2023-09-06 NOTE — LETTER
September 8, 2023      Pooja Swartz  [address] [place] [address] [place] [id number]        Dear ,    We are writing to inform you of your test results. All labs were normal/acceptable results.       Resulted Orders   INR point of care   Result Value Ref Range    INR 2.2 (H) 0.9 - 1.1    Narrative    This test is intended for monitoring Coumadin therapy. Results are not accurate in patients with prolonged INR due to factor deficiency.   Lipid panel reflex to direct LDL Non-fasting   Result Value Ref Range    Cholesterol 132 <200 mg/dL    Triglycerides 81 <150 mg/dL    Direct Measure HDL 65 >=50 mg/dL    LDL Cholesterol Calculated 51 <=100 mg/dL    Non HDL Cholesterol 67 <130 mg/dL    Narrative    Cholesterol  Desirable:  <200 mg/dL    Triglycerides  Normal:  Less than 150 mg/dL  Borderline High:  150-199 mg/dL  High:  200-499 mg/dL  Very High:  Greater than or equal to 500 mg/dL    Direct Measure HDL  Female:  Greater than or equal to 50 mg/dL   Male:  Greater than or equal to 40 mg/dL    LDL Cholesterol  Desirable:  <100mg/dL  Above Desirable:  100-129 mg/dL   Borderline High:  130-159 mg/dL   High:  160-189 mg/dL   Very High:  >= 190 mg/dL    Non HDL Cholesterol  Desirable:  130 mg/dL  Above Desirable:  130-159 mg/dL  Borderline High:  160-189 mg/dL  High:  190-219 mg/dL  Very High:  Greater than or equal to 220 mg/dL       If you have any questions or concerns, please call the clinic at the number listed above.       Sincerely,      Amanda Renee MD

## 2023-09-18 NOTE — TELEPHONE ENCOUNTER
"Prescription approved per [facility] Refill Protocol.     Requested Prescriptions   Pending Prescriptions Disp Refills    allopurinol (ZYLOPRIM) 100 MG tablet 90 tablet 2     Sig: Take 1 tablet (100 mg) by mouth daily       Gout Agents Protocol Passed - 9/18/2023  8:35 AM        Passed - CBC on file in past 12 months     Recent Labs   Lab Test 08/24/23  1014   WBC 8.3   RBC 3.02*   HGB 10.1*   HCT 30.9*                    Passed - ALT on file in past 12 months     Recent Labs   Lab Test 08/24/23  1014   ALT 41             Passed - Has Uric Acid on file in past 12 months and value is less than 6     Recent Labs   Lab Test 10/13/22  1414   URIC 4.0     If level is 6mg/dL or greater, ok to refill one time and refer to provider.           Passed - Recent (12 mo) or future (30 days) visit within the authorizing provider's specialty     Patient has had an office visit with the authorizing provider or a provider within the authorizing providers department within the previous 12 mos or has a future within next 30 days. See \"Patient Info\" tab in inbasket, or \"Choose Columns\" in Meds & Orders section of the refill encounter.              Passed - Medication is active on med list        Passed - Patient is age 18 or older        Passed - No active pregnancy on record        Passed - Normal serum creatinine on file in the past 12 months     Recent Labs   Lab Test 08/24/23  1014 01/20/19  2110 12/13/18  1449   CR 0.84   < >  --    CREAT  --   --  0.9    < > = values in this interval not displayed.       Ok to refill medication if creatinine is low          Passed - No positive pregnancy test in past year                 Carlene Parmar RN 09/18/23 4:04 PM   "

## 2023-09-20 PROBLEM — C7A.8 OTHER MALIGNANT NEUROENDOCRINE TUMORS (H): Status: ACTIVE | Noted: 2023-01-01

## 2023-09-20 NOTE — PROGRESS NOTES
"SUBJECTIVE:   Pooja is a 89 year old who presents for Preventive Visit.      9/20/2023     1:25 PM   Additional Questions   Roomed by Cecile ISRAEL CMA   Accompanied by Friend       Are you in the first 12 months of your Medicare coverage?  No    Healthy Habits:     In general, how would you rate your overall health?  Fair    Frequency of exercise:  2-3 days/week    Duration of exercise:  15-30 minutes    Do you usually eat at least 4 servings of fruit and vegetables a day, include whole grains    & fiber and avoid regularly eating high fat or \"junk\" foods?  Yes    Taking medications regularly:  Yes    Medication side effects:  Muscle aches    Ability to successfully perform activities of daily living:  Transportation requires assistance, shopping requires assistance, housework requires assistance and no assistance needed    Home Safety:  No safety concerns identified    Hearing Impairment:  No hearing concerns    In the past 6 months, have you been bothered by leaking of urine? Yes    In general, how would you rate your overall mental or emotional health?  Good    Additional concerns today:  Yes          Have you ever done Advance Care Planning? (For example, a Health Directive, POLST, or a discussion with a medical provider or your loved ones about your wishes): Yes, advance care planning is on file.       Fall risk  Fallen 2 or more times in the past year?: Yes  Any fall with injury in the past year?: Yes    Cognitive Screening   1) Repeat 3 items (Leader, Season, Table)    2) Clock draw: NORMAL  3) 3 item recall: Recalls 2 objects   Results: NORMAL clock, 1-2 items recalled: COGNITIVE IMPAIRMENT LESS LIKELY    Mini-CogTM Copyright SHALA Downey. Licensed by the author for use in [facility]; reprinted with permission (radha@.[facility]). All rights reserved.      Do you have sleep apnea, excessive snoring or daytime drowsiness? : no    Reviewed and updated as needed this visit by clinical staff   Tobacco  Allergies "  Meds              Reviewed and updated as needed this visit by Provider                 Social History     Tobacco Use    Smoking status: Never    Smokeless tobacco: Never   Substance Use Topics    Alcohol use: Yes     Comment: rare             9/20/2023     1:34 PM   Alcohol Use   Prescreen: >3 drinks/day or >7 drinks/week? Not Applicable     Do you have a current opioid prescription? No  Do you use any other controlled substances or medications that are not prescribed by a provider? None      Hypertension Follow-up  Lisinopril 2.5mg every day, amlodipine 10mg qd  Do you check your blood pressure regularly outside of the clinic? Yes   Are you following a low salt diet? Yes  Are your blood pressures ever more than 140 on the top number (systolic) OR more   than 90 on the bottom number (diastolic), for example 140/90? Yes    BP Readings from Last 6 Encounters:   09/20/23 126/66   09/06/23 130/60   08/09/23 (!) 161/54   07/11/23 (!) 172/61   07/07/23 (!) 142/60   06/23/23 (!) 171/59       Current providers sharing in care for this patient include:   Patient Care Team:  Amanda Renee MD as PCP - General (Family Practice)  Kael Rojas MD as MD (Orthopedics)  Myron Sheppard MD as Assigned Surgical Provider  Pedro Luis Edwards MD as MD (Medical Oncology)  Amanda Renee MD as Assigned PCP  Pedro Luis Edwards MD as Assigned Cancer Care Provider  Antolin Bassett MD as Assigned Heart and Vascular Provider    The following health maintenance items are reviewed in Epic and correct as of today:  Health Maintenance   Topic Date Due    DTAP/TDAP/TD IMMUNIZATION (1 - Tdap) 07/17/2008    ZOSTER IMMUNIZATION (1 of 2) 01/26/2016    COVID-19 Vaccine (5 - Pfizer risk series) 12/08/2022    MICROALBUMIN  05/05/2023    ANNUAL REVIEW OF HM ORDERS  03/13/2024    BMP  08/24/2024    HEMOGLOBIN  08/24/2024    LIPID  09/06/2024    MEDICARE ANNUAL WELLNESS VISIT  09/20/2024    FALL RISK ASSESSMENT  09/20/2024     ADVANCE CARE PLANNING  09/20/2028    PHQ-2 (once per calendar year)  Completed    INFLUENZA VACCINE  Completed    Pneumococcal Vaccine: 65+ Years  Completed    URINALYSIS  Completed    IPV IMMUNIZATION  Aged Out    HPV IMMUNIZATION  Aged Out    MENINGITIS IMMUNIZATION  Aged Out    MAMMO SCREENING  Discontinued    HEPATITIS A IMMUNIZATION  Discontinued    HEPATITIS B IMMUNIZATION  Discontinued     Lab work is in process  Labs reviewed in EPIC      Mammogram Screening - Patient over age 75, has elected to discontinue screenings.  Pertinent mammograms are reviewed under the imaging tab.    Review of Systems   Constitutional:  Negative for chills and fever.   HENT:  Negative for congestion, ear pain, hearing loss and sore throat.    Eyes:  Positive for visual disturbance. Negative for pain.   Respiratory:  Positive for shortness of breath. Negative for cough.    Cardiovascular:  Negative for chest pain, palpitations and peripheral edema.   Gastrointestinal:  Positive for abdominal pain and constipation. Negative for diarrhea, heartburn, hematochezia and nausea.   Breasts:  Negative for tenderness, breast mass and discharge.   Genitourinary:  Positive for frequency and urgency. Negative for dysuria, genital sores, hematuria, pelvic pain, vaginal bleeding and vaginal discharge.   Musculoskeletal:  Positive for arthralgias and myalgias. Negative for joint swelling.   Skin:  Negative for rash.   Neurological:  Positive for weakness and paresthesias. Negative for dizziness and headaches.   Psychiatric/Behavioral:  Negative for mood changes. The patient is not nervous/anxious.          OBJECTIVE:   /66   Pulse 64   Temp 98.2  F (36.8  C) (Tympanic)   Resp 18   Ht 1.524 m (5')   Wt 45.9 kg (101 lb 4 oz)   LMP 07/07/1960   SpO2 99%   BMI 19.77 kg/m   Estimated body mass index is 19.77 kg/m  as calculated from the following:    Height as of this encounter: 1.524 m (5').    Weight as of this encounter: 45.9 kg  (101 lb 4 oz).  Physical Exam  GENERAL: healthy, alert and no distress  NECK: no adenopathy, no asymmetry, masses, or scars and thyroid normal to palpation  RESP: lungs clear to auscultation - no rales, rhonchi or wheezes  CV: irregularly irregular rhythm, normal S1 S2, no S3 or S4, grade 3/6 holosystolic murmur heard best over the LUSB/RUSB, peripheral pulses strong, and no peripheral edema  ABDOMEN: soft, nontender, no hepatosplenomegaly, no masses and bowel sounds normal  MS: no gross musculoskeletal defects noted, no edema    Diagnostic Test Results:  Labs reviewed in Epic    ASSESSMENT / PLAN:   (Z00.00) Encounter for Medicare annual wellness exam  (primary encounter diagnosis)  Comment:    Plan:      (I10) Hypertension goal BP (blood pressure) < 140/90  Comment: well controlled  Plan: amLODIPine (NORVASC) 10 MG tablet             (E44.0) Moderate protein-calorie malnutrition (H)  Comment: encouraged boost/ensure once a day  Plan:      (I48.20) Chronic atrial fibrillation (H)  Comment: on warfarin  Plan:      (C7A.8) Other malignant neuroendocrine tumors (H)  Comment: seeing endocrinology, monthly octreotide injections  Plan:      Patient has been advised of split billing requirements and indicates understanding: Yes      COUNSELING:  Reviewed preventive health counseling, as reflected in patient instructions       Regular exercise       Healthy diet/nutrition        She reports that she has never smoked. She has never used smokeless tobacco.      Appropriate preventive services were discussed with this patient, including applicable screening as appropriate for cardiovascular disease, diabetes, osteopenia/osteoporosis, and glaucoma.  As appropriate for age/gender, discussed screening for colorectal cancer, prostate cancer, breast cancer, and cervical cancer. Checklist reviewing preventive services available has been given to the patient.    Reviewed patients plan of care and provided an AVS. The Basic Care Plan  (routine screening as documented in Health Maintenance) for Pooja meets the Care Plan requirement. This Care Plan has been established and reviewed with the Patient and friend .          Amanda Renee MD  [facility]    Identified Health Risks:

## 2023-09-20 NOTE — COMMUNITY RESOURCES LIST (ENGLISH)
09/20/2023   [facility]  N/A  For questions about this resource list or additional care needs, please contact your primary care clinic or care manager.  Phone: [phone]   Email: N/A   Address: [address] [place] [address]   Hours: N/A        Transportation       Free or low-cost transportation  1  [address] Distance: 23.78 miles      In-Person   [address] [place] [address]  Language: English  Hours: Mon - Fri 9:00 AM - 5:00 PM  Fees: Free   Phone: (963) 519-4495 Email: info@[employer] Website: https://www.[employer]/          Important Numbers & Websites       Emergency Services   911  City Services   311  Poison Control   (916) 285-3522  Suicide Prevention Lifeline   (842) 749-3767 (TALK)  Child Abuse Hotline   (678) 527-9953 (4-A-Child)  Sexual Assault Hotline   (228) 306-1520 (HOPE)  National Runaway Safeline   (220) 297-4031 (RUNAWAY)  All-Options Talkline   (574) 822-5611  Substance Abuse Referral   (189) 949-7899 (HELP)

## 2023-09-20 NOTE — PATIENT INSTRUCTIONS
Patient Education   Personalized Prevention Plan  You are due for the preventive services outlined below.  Your care team is available to assist you in scheduling these services.  If you have already completed any of these items, please share that information with your care team to update in your medical record.  Health Maintenance Due   Topic Date Due     Diptheria Tetanus Pertussis (DTAP/TDAP/TD) Vaccine (1 - Tdap) 07/17/2008     Zoster (Shingles) Vaccine (1 of 2) 01/26/2016     COVID-19 Vaccine (5 - Pfizer risk series) 12/08/2022     Kidney Microalbumin Urine Test  05/05/2023     Flu Vaccine (1) 09/01/2023

## 2023-09-20 NOTE — PROGRESS NOTES
ANTICOAGULATION MANAGEMENT     Pooja Swartz 89 year old female is on warfarin with therapeutic INR result. (Goal INR 2.0-3.0)    Recent labs: (last 7 days)     09/20/23  1423   INR 1.9*       ASSESSMENT     Source(s): Chart Review and Patient/Caregiver Call     Warfarin doses taken: Warfarin taken as instructed  Diet: Increased greens/vitamin K in diet; plans to resume previous intake  Medication/supplement changes: None noted  New illness, injury, or hospitalization: No  Signs or symptoms of bleeding or clotting: No  Previous result: Therapeutic last 2(+) visits  Additional findings: None       PLAN     Recommended plan for no diet, medication or health factor changes affecting INR     Dosing Instructions: Continue your current warfarin dose with next INR in 2 weeks       Summary  As of 9/20/2023      Full warfarin instructions:  4 mg every Tue, Sat; 2 mg all other days   Next INR check:  10/4/2023               Telephone call with Pooja who verbalizes understanding and agrees to plan    Lab visit scheduled    Education provided:   Please call back if any changes to your diet, medications or how you've been taking warfarin    Plan made per [facility] anticoagulation protocol    Linsey Abbott RN  Anticoagulation Clinic  9/20/2023    _______________________________________________________________________     Anticoagulation Episode Summary       Current INR goal:  2.0-3.0   TTR:  47.0 % (1 y)   Target end date:  Indefinite   Send INR reminders to:  [facility]    Indications    Atrial fibrillation (H) [I48.91]  Long-term (current) use of anticoagulants [Z79.01] [Z79.01]  Atrial fibrillation  unspecified type (H) [I48.91]  Chronic atrial fibrillation (H) [I48.20]             Comments:               Anticoagulation Care Providers       Provider Role Specialty Phone number    Amanda Renee MD Referring Family Medicine [phone]

## 2023-10-04 NOTE — PROGRESS NOTES
Infusion Nursing Note:  Pooja Swartz presents today for Somatuline.    Patient seen by provider today: No   present during visit today: Not Applicable.    Note: N/A.      Intravenous Access:  No Intravenous access/labs at this visit.    Treatment Conditions:  Not Applicable.      Post Infusion Assessment:  Patient tolerated injection without incident.       Discharge Plan:   Patient discharged in stable condition accompanied by: friend.  Departure Mode: Ambulatory, with wheeled walker.      Ofelia Pak RN

## 2023-10-11 NOTE — TELEPHONE ENCOUNTER
ANTICOAGULATION     Pooja Swartz is overdue for INR check.      Spoke with Pooja who declined to schedule a lab appointment at this time. If calling back please schedule as soon as possible.     Linsey Abbott RN

## 2023-10-17 NOTE — PROGRESS NOTES
ANTICOAGULATION MANAGEMENT     Pooja Swartz 89 year old female is on warfarin with subtherapeutic INR result. (Goal INR 2.0-3.0)    Recent labs: (last 7 days)     10/17/23  0955   INR 1.8*       ASSESSMENT     Source(s): Chart Review and Patient/Caregiver Call     Warfarin doses taken: Warfarin taken as instructed  Diet: No new diet changes identified  Medication/supplement changes: None noted  New illness, injury, or hospitalization: No  Signs or symptoms of bleeding or clotting: No  Previous result: Subtherapeutic  Additional findings: None       PLAN     Recommended plan for no diet, medication or health factor changes affecting INR     Dosing Instructions: Increase your warfarin dose (11.1% change) with next INR in 2 weeks       Summary  As of 10/17/2023      Full warfarin instructions:  4 mg every Tue, Thu, Sat; 2 mg all other days   Next INR check:  11/1/2023               Telephone call with Pooja who verbalizes understanding and agrees to plan    Lab visit scheduled    Education provided:   Please call back if any changes to your diet, medications or how you've been taking warfarin    Plan made per [facility] anticoagulation protocol    Linsey Abbott RN  Anticoagulation Clinic  10/17/2023    _______________________________________________________________________     Anticoagulation Episode Summary       Current INR goal:  2.0-3.0   TTR:  44.1% (1 y)   Target end date:  Indefinite   Send INR reminders to:  [facility]    Indications    Atrial fibrillation (H) [I48.91]  Long-term (current) use of anticoagulants [Z79.01] [Z79.01]  Atrial fibrillation  unspecified type (H) [I48.91]  Chronic atrial fibrillation (H) [I48.20]             Comments:               Anticoagulation Care Providers       Provider Role Specialty Phone number    Amnada Renee MD Referring Family Medicine [phone]

## 2023-11-01 NOTE — PROGRESS NOTES
ANTICOAGULATION CLINIC REFERRAL RENEWAL REQUEST       An annual renewal order is required for all patients referred to [facility].?  Please review and sign the pended referral order for Pooja Swartz.       ANTICOAGULATION SUMMARY      Warfarin indication(s)   Atrial Fibrillation    Mechanical heart valve present?  NO       Current goal range   INR: 2.0-3.0     Goal appropriate for indication? Goal INR 2-3, standard for indication(s) above     Time in Therapeutic Range (TTR)  (Goal > 60%) 40.5%       Office visit with referring provider's group within last year yes on 6/15/23       Linsey Abbott RN  [facility]

## 2023-11-01 NOTE — PROGRESS NOTES
ANTICOAGULATION MANAGEMENT     Pooja Swartz 89 year old female is on warfarin with subtherapeutic INR result. (Goal INR 2.0-3.0)    Recent labs: (last 7 days)     11/01/23  1032   INR 1.9*       ASSESSMENT     Source(s): Chart Review and Patient/Caregiver Call     Warfarin doses taken: Warfarin taken as instructed  Diet: No new diet changes identified  Medication/supplement changes: None noted  New illness, injury, or hospitalization: No  Signs or symptoms of bleeding or clotting: No  Previous result: Subtherapeutic  Additional findings: None       PLAN     Recommended plan for no diet, medication or health factor changes affecting INR     Dosing Instructions: Increase your warfarin dose (10% change) with next INR in 2 weeks       Summary  As of 11/1/2023      Full warfarin instructions:  2 mg every Mon, Wed, Fri; 4 mg all other days   Next INR check:  11/15/2023               Telephone call with Pooja who verbalizes understanding and agrees to plan    Lab visit scheduled    Education provided:   Please call back if any changes to your diet, medications or how you've been taking warfarin    Plan made per [facility] anticoagulation protocol    Linsey Abbott RN  Anticoagulation Clinic  11/1/2023    _______________________________________________________________________     Anticoagulation Episode Summary       Current INR goal:  2.0-3.0   TTR:  40.5% (1 y)   Target end date:  Indefinite   [facility]  [facility]    Indications    Atrial fibrillation (H) [I48.91]  Long-term (current) use of anticoagulants [Z79.01] [Z79.01]  Atrial fibrillation  unspecified type (H) [I48.91]  Chronic atrial fibrillation (H) [I48.20]             Comments:               Anticoagulation Care Providers       Provider Role Specialty Phone number    Amanda Renee MD Referring Family Medicine [phone]

## 2023-11-07 NOTE — PROGRESS NOTES
Oncology Rooming Note    November 7, 2023 8:48 AM   Pooja Swartz is a 89 year old female who presents for:    Chief Complaint   Patient presents with    Oncology Clinic Visit     Other malignant neuroendocrine tumors - Provider and infusion     Initial Vitals: BP (!) 158/61 (BP Location: Right arm, Patient Position: Sitting, Cuff Size: Adult Small)   Pulse 63   Temp 97.6  F (36.4  C) (Tympanic)   Resp 12   Ht 1.524 m (5')   Wt 45.4 kg (100 lb)   LMP 07/07/1960   SpO2 100%   BMI 19.53 kg/m   Estimated body mass index is 19.53 kg/m  as calculated from the following:    Height as of this encounter: 1.524 m (5').    Weight as of this encounter: 45.4 kg (100 lb). Body surface area is 1.39 meters squared.  No Pain (0) Comment: Data Unavailable   Patient's last menstrual period was 07/07/1960.  Allergies reviewed: Yes  Medications reviewed: Yes    Medications: Medication refills not needed today.  Pharmacy name entered into [facility]:    [facility] - [place], [place] - [address] AT [facility] - [place], [place] - [address] [facility]    Clinical concerns:  None      Nelida Plasencia, RACHELLE

## 2023-11-07 NOTE — PROGRESS NOTES
Infusion Nursing Note:  Pooja Swartz presents today for C4D1 Somalutine.    Patient seen by provider today: Yes: Lois Arnett NP   present during visit today: Not Applicable.    Note: Pt denies any new health changes or concerns.      Intravenous Access:  N/A.    Treatment Conditions:  Not Applicable.      Post Infusion Assessment:  Patient tolerated injection without incident.       Discharge Plan:   Discharge instructions reviewed with: Patient.  Patient and/or family verbalized understanding of discharge instructions and all questions answered.  Patient discharged in stable condition accompanied by: self.  Departure Mode: Ambulatory.      Gisella Arreaga RN

## 2023-11-07 NOTE — PROGRESS NOTES
[facility] Hematology and Oncology Progress Note    Patient: Pooja Swartz  MRN: [id number]  11/07/23        Reason for Visit    Chief Complaint   Patient presents with    Oncology Clinic Visit     Other malignant neuroendocrine tumors - Provider and infusion         Problem List Items Addressed This Visit          Other    Neuroendocrine tumor (H28) - Primary    Relevant Orders    Infusion Appointment Request    CBC with platelets and differential    Comprehensive metabolic panel (BMP + Alb, Alk Phos, ALT, AST, Total. 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  Doing well on the injections without any major issues.  She is here with repeat CT scan.  I reviewed the CT scan images personally and interpreted the results independently.  The retroperitoneal mass appears to be fairly stable without any significant growth or reduction in size.  No new lesions noted.  She also has dilated intrahepatic and extrahepatic bile ducts which is slightly increased compared to last time.  But she is asymptomatic.  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  This is expected with lanreotide injections.  I explained to her and family that we typically do not expect reduction in size of the neuroendocrine tumors with octreotide injection since its not cytotoxic.  We will continue monthly injections.  She is planning to winter in [place] and will be traveling there in mid December.  She will get 1 more injection before she travels.  She will have to establish with oncology somewhere there to continue monthly injections.    She did have some elevated alkaline phosphatase which is probably coming from the bile ducts.  I will repeat her labs today.  What is reassuring  is that she had normal bilirubin and other LFTs.  So no concern for any obstruction.    Macrocytic anemia  MGUS  Continues to have slight worsening of microcytic anemia.  Hemoglobin recently was 9.6.  .  SPEP actually showed a small monoclonal peak of 0.2 g/dL.  On immunofixation it appears to be both IgA lambda and IgG kappa monoclonal proteins.  Kappa and lambda light chains are slightly elevated, lambda more than kappa but ratio is normal.  Iron studies are within normal limits.  B12 was normal.  We will recheck her labs today and see where she is at.  We will also recheck labs prior to her next injections.  I will make further recommendations regarding further investigations and anemia based on these lab results.     Cancer Staging   No matching staging information was found for the patient.      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, including hip    Hemorrhage of gastrointestinal tract    Generalized osteoarthrosis, unspecified site    HEPATITIS B in past    Impaired fasting glucose    Disorder of bone and cartilage    Chronic kidney disease, stage III (moderate) (H)    Chronic tophaceous gout    CARDIOVASCULAR SCREENING; LDL GOAL LESS THAN 100    Sensorineural hearing loss, bilateral    Sinus node dysfunction (H)    Atrial fibrillation (H)    Health Care Home    Long-term (current) use of anticoagulants [Z79.01]    IBD (inflammatory bowel disease)    Atrial fibrillation, unspecified type (H)    Sinus arrest    Other specified glaucoma    Junctional bradycardia    Anticoagulated on Coumadin    Glaucoma suspect, bilateral    Impaired gait and mobility    Chronic atrial fibrillation (H)    Protein-calorie malnutrition (H24)    Neuroendocrine tumor (H28)    Other malignant  neuroendocrine tumors (H)          Interval History   Pooja Swartz is a 89 year old female with retroperitoneal well-differentiated neuroendocrine tumor who is currently on monthly lanreotide injections here for follow-up.    She started monthly lanreotide injections on 8/9/2023.  Has tolerated it pretty well.  No significant side effects.  She is here with repeat CT scan.  Continues to have some occasional abdominal bloating discomfort.  No evidence of any bowel obstruction.  No significant nausea or vomiting.  She also has mild macrocytic anemia.  Denies any blood in stools or urine.      Review of Systems  A comprehensive review of systems was negative except for what is noted in the interval history    Current Outpatient Medications   Medication    allopurinol (ZYLOPRIM) 100 MG tablet    amLODIPine (NORVASC) 10 MG tablet    budesonide (ENTOCORT EC) 3 MG EC capsule    Calcium-Vitamin D 600-200 MG-UNIT TABS    dorzolamide (TRUSOPT) 2 % ophthalmic solution    estradiol (ESTRACE) 0.1 MG/GM vaginal cream    Fexofenadine HCl (ALLEGRA PO)    furosemide (LASIX) 20 MG tablet    lisinopril (ZESTRIL) 2.5 MG tablet    mupirocin (BACTROBAN) 2 % external ointment    nystatin (MYCOSTATIN) 029519 UNIT/GM external powder    Omega-3 Fatty Acids (FISH OIL PO)    ONE-A-DAY WOMENS OR TABS    RHOPRESSA 0.02 % ophthalmic solution    VYZULTA 0.024 % SOLN ophthalmic solution    warfarin ANTICOAGULANT (JANTOVEN ANTICOAGULANT) 2 MG tablet    latanoprost (XALATAN) 0.005 % ophthalmic solution     No current facility-administered medications for this visit.     Facility-Administered Medications Ordered in Other Visits   Medication    lanreotide acetate (SOMATULINE) injection 120 mg        Physical Exam        General: alert and cooperative  HEENT: Head: Normal, normocephalic, atraumatic.  Eye: Normal external eye, conjunctiva, lids cornea, FEI.  Lungs: CTA  CVS: RRR, systolic murmur heard  Abdomen: well circumscribed mass noted on the  right side  CNS: Alert and oriented x3, neurologic exam grossly normal.      Lab Results    Recent Results (from the past 168 hour(s))   INR point of care   Result Value Ref Range    INR 1.9 (H) 0.9 - 1.1         Imaging    CT Chest/Abdomen/Pelvis w Contrast    Result Date: 11/1/2023  CT CHEST/ABDOMEN/PELVIS WITH CONTRAST 11/1/2023 10:40 AM CLINICAL HISTORY: Other malignant neuroendocrine tumors (H). TECHNIQUE: CT scan of the chest, abdomen, and pelvis was performed following injection of IV contrast. Multiplanar reformats were obtained. Dose reduction techniques were used. CONTRAST: 53 mL [id number] COMPARISON: 5/17/2023, 4/25/2023, 3/21/2023 FINDINGS: LUNGS AND PLEURA: A 4 mm pulmonary nodule in the lingular segment of the left upper lobe (3-179) is noted and is stable to comparison. A subpleural scarlike nodule in the lateral left upper lobe (3-69) is unchanged as well. No new or enlarging pulmonary nodules. No airspace consolidation or pleural fluid. Similar-appearing bibasilar atelectasis and/or scarring is noted. Trace bilateral pleural fluid versus pleural thickening is seen. MEDIASTINUM/AXILLAE: Heart size is borderline enlarged and similar to comparison. Severe three-vessel coronary artery atherosclerosis is noted. Coarse calcifications in the mitral annulus are seen. Multiple densely calcified mediastinal lymph nodes are similar to comparison. There are a few prominent to borderline enlarged noncalcified mediastinal lymph nodes, which are unchanged. No new or enlarging thoracic lymph nodes. Moderate thoracic aortic atherosclerosis is present. The thyroid gland appears diffusely and mildly enlarged with heterogeneity throughout the gland, raising possibility of small thyroid nodules. If further evaluation is desired, a dedicated thyroid ultrasound is suggested. HEPATOBILIARY: There is moderate to marked intrahepatic and extrahepatic bile duct dilation present, which appears similar to slightly increased to  comparison. Gallbladder is absent. Numerous benign calcified splenic granulomas are present. No suspicious appearing hepatic observation. PANCREAS: There is similar-appearing low-density cystic lesion in the tail of the pancreas measuring 12 x 12 mm, previously measuring 16 x 8 mm. Other small cystic foci in the tail of the pancreas likely representing a combination of side branch ductal dilation and/or IPMNs. The well-seen portions of the pancreatic duct are not dilated within the head, neck, and body. No inflammatory changes of the pancreas. No inflammatory changes of the pancreas. SPLEEN: There are in numerable benign calcified granulomas seen throughout spleen. Spleen is normal in size. ADRENAL GLANDS: There is similar-appearing coarse calcification throughout the region of the right adrenal gland, which may represent chronic remote infection or trauma. There is a fat density lesion in the right upper quadrant measuring 3.2 x 1.7 cm (7-131), which may represent an adenoma versus myelolipoma. Fat necrosis is also a consideration. There is mild thickening of the left adrenal gland without discrete nodule or mass, which appears similar to comparison. No follow-up is necessary. KIDNEYS/BLADDER: The kidneys enhance homogenously and symmetrically. No nephrolithiasis or hydronephrosis. There is a small rounded intermediate to low-density left renal cortical lesion (7-154), which is unchanged and too small to characterize. A cyst is favored, including a possible hemorrhagic or proteinaceous cyst. Attention on surveillance imaging is recommended. No new or enlarging cystic or solid renal masses. Urinary bladder is obscured by streak artifact from orthopedic hardware. BOWEL: As previously noted, there is a large round circumscribed heterogenous mass in the right hemiabdomen/retroperitoneum measuring 8.4 x 8.7 x 9.6 cm in AP, transverse, and craniocaudal dimensions, respectively (series 7, image 166 and series 5, image  37), previously 8.3 x 7.6 x 8.8 cm. Moderate to large volume formed stool is seen throughout the colon. No signs of bowel obstruction or inflammation. No evidence of pneumatosis or pneumoperitoneum. PELVIC ORGANS: Pelvic organs are obscured by streak artifact from orthopedic hardware. ADDITIONAL FINDINGS: Severe atherosclerosis seen throughout the abdominal aorta and branch vessels. No aneurysmal dilation. A couple prominent to borderline enlarged good hepatic lymph nodes are noted measuring up to 11 mm in short axis, which are nonspecific and can be monitored on future surveillance imaging. MUSCULOSKELETAL: Multilevel degenerative changes of the spine are present. Chronic-appearing compression deformities of the L2, L3, and L5 vertebral bodies are similar in appearance to comparison. No acute osseous abnormality is seen. Bilateral hip arthroplasties are present. Streak artifact from orthopedic hardware limits evaluation of adjacent structures, including the pelvic structures. A right shoulder arthroplasty is also partially imaged. Streak artifact from this hardware limits evaluation of adjacent structures as well. Advanced degenerative changes of the left shoulder are noted. Generalized osteopenia.     IMPRESSION: 1.  No significant interval change to comparison. 2.  Similar-appearing large round circumscribed solid mass in the right hemiabdomen/retroperitoneum, in keeping with history of malignant neuroendocrine tumor. 3.  Similar to slight interval increase in intrahepatic and extrahepatic bowel duct dilation. Prior cholecystectomy. 4.  Similar-appearing low density/cystic lesion in the tail of the pancreas, favoring an IPMN. Recommend attention on surveillance imaging. 5.  Similar-appearing coarse calcification and fat density lesion involving the right adrenal gland, favoring a myelolipoma versus adenoma. 6.  Severe atherosclerotic vascular disease. 7.  Additional nonacute findings as above. ANNETTE JOHNSON,  MD   SYSTEM ID:  [id number]     A total of 30 min were spent today on this visit which included face to face conversation with the patient, EMR review, counseling and co-ordination of care and medical documentation.      Signed by: Pedro Luis Edwards MD

## 2023-11-07 NOTE — LETTER
11/7/2023         RE: Pooja Swartz  [address] [place] [address] [place] [id number]        Dear Colleague,    Thank you for referring your patient, Pooja Swartz, to the [facility]. Please see a copy of my visit note below.      [facility] Hematology and Oncology Progress Note    Patient: Pooja Swartz  MRN: [id number]  11/07/23        Reason for Visit    Chief Complaint   Patient presents with     Oncology Clinic Visit     Other malignant neuroendocrine tumors - Provider and infusion         Problem List Items Addressed This Visit          Other    Neuroendocrine tumor (H28) - Primary    Relevant Orders    Infusion Appointment Request    CBC with platelets and differential    Comprehensive metabolic panel (BMP + Alb, Alk Phos, ALT, AST, Total. 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  Doing well on the injections without any major issues.  She is here with repeat CT scan.  I reviewed the CT scan images personally and interpreted the results independently.  The retroperitoneal mass appears to be fairly stable without any significant growth or reduction in size.  No new lesions noted.  She also has dilated intrahepatic and extrahepatic bile ducts which is slightly increased compared to last time.  But she is asymptomatic.  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  This is expected with lanreotide injections.  I explained to her and family that we typically do not expect reduction in size of the neuroendocrine tumors with octreotide injection since its not cytotoxic.  We will continue monthly injections.  She is planning to winter in [place] and will be traveling there in mid December.  She will get 1  more injection before she travels.  She will have to establish with oncology somewhere there to continue monthly injections.    She did have some elevated alkaline phosphatase which is probably coming from the bile ducts.  I will repeat her labs today.  What is reassuring is that she had normal bilirubin and other LFTs.  So no concern for any obstruction.    Macrocytic anemia  MGUS  Continues to have slight worsening of microcytic anemia.  Hemoglobin recently was 9.6.  .  SPEP actually showed a small monoclonal peak of 0.2 g/dL.  On immunofixation it appears to be both IgA lambda and IgG kappa monoclonal proteins.  Kappa and lambda light chains are slightly elevated, lambda more than kappa but ratio is normal.  Iron studies are within normal limits.  B12 was normal.  We will recheck her labs today and see where she is at.  We will also recheck labs prior to her next injections.  I will make further recommendations regarding further investigations and anemia based on these lab results.     Cancer Staging   No matching staging information was found for the patient.      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, including hip     Hemorrhage of gastrointestinal tract     Generalized osteoarthrosis, unspecified site     HEPATITIS B in past     Impaired fasting glucose     Disorder of bone and cartilage     Chronic kidney disease, stage III (moderate) (H)     Chronic tophaceous gout     CARDIOVASCULAR SCREENING; LDL GOAL LESS THAN 100     Sensorineural hearing loss, bilateral     Sinus node dysfunction (H)     Atrial fibrillation (H)     Health Care Home     Long-term (current) use of anticoagulants [Z79.01]     IBD (inflammatory bowel disease)     Atrial fibrillation, unspecified type  (H)     Sinus arrest     Other specified glaucoma     Junctional bradycardia     Anticoagulated on Coumadin     Glaucoma suspect, bilateral     Impaired gait and mobility     Chronic atrial fibrillation (H)     Protein-calorie malnutrition (H24)     Neuroendocrine tumor (H28)     Other malignant neuroendocrine tumors (H)          Interval History   Pooja Swartz is a 89 year old female with retroperitoneal well-differentiated neuroendocrine tumor who is currently on monthly lanreotide injections here for follow-up.    She started monthly lanreotide injections on 8/9/2023.  Has tolerated it pretty well.  No significant side effects.  She is here with repeat CT scan.  Continues to have some occasional abdominal bloating discomfort.  No evidence of any bowel obstruction.  No significant nausea or vomiting.  She also has mild macrocytic anemia.  Denies any blood in stools or urine.      Review of Systems  A comprehensive review of systems was negative except for what is noted in the interval history    Current Outpatient Medications   Medication     allopurinol (ZYLOPRIM) 100 MG tablet     amLODIPine (NORVASC) 10 MG tablet     budesonide (ENTOCORT EC) 3 MG EC capsule     Calcium-Vitamin D 600-200 MG-UNIT TABS     dorzolamide (TRUSOPT) 2 % ophthalmic solution     estradiol (ESTRACE) 0.1 MG/GM vaginal cream     Fexofenadine HCl (ALLEGRA PO)     furosemide (LASIX) 20 MG tablet     lisinopril (ZESTRIL) 2.5 MG tablet     mupirocin (BACTROBAN) 2 % external ointment     nystatin (MYCOSTATIN) 844820 UNIT/GM external powder     Omega-3 Fatty Acids (FISH OIL PO)     ONE-A-DAY WOMENS OR TABS     RHOPRESSA 0.02 % ophthalmic solution     VYZULTA 0.024 % SOLN ophthalmic solution     warfarin ANTICOAGULANT (JANTOVEN ANTICOAGULANT) 2 MG tablet     latanoprost (XALATAN) 0.005 % ophthalmic solution     No current facility-administered medications for this visit.     Facility-Administered Medications Ordered in Other Visits   Medication      lanreotide acetate (SOMATULINE) injection 120 mg        Physical Exam        General: alert and cooperative  HEENT: Head: Normal, normocephalic, atraumatic.  Eye: Normal external eye, conjunctiva, lids cornea, FEI.  Lungs: CTA  CVS: RRR, systolic murmur heard  Abdomen: well circumscribed mass noted on the right side  CNS: Alert and oriented x3, neurologic exam grossly normal.      Lab Results    Recent Results (from the past 168 hour(s))   INR point of care   Result Value Ref Range    INR 1.9 (H) 0.9 - 1.1         Imaging    CT Chest/Abdomen/Pelvis w Contrast    Result Date: 11/1/2023  CT CHEST/ABDOMEN/PELVIS WITH CONTRAST 11/1/2023 10:40 AM CLINICAL HISTORY: Other malignant neuroendocrine tumors (H). TECHNIQUE: CT scan of the chest, abdomen, and pelvis was performed following injection of IV contrast. Multiplanar reformats were obtained. Dose reduction techniques were used. CONTRAST: 53 mL Isovue-370 COMPARISON: 5/17/2023, 4/25/2023, 3/21/2023 FINDINGS: LUNGS AND PLEURA: A 4 mm pulmonary nodule in the lingular segment of the left upper lobe (3-179) is noted and is stable to comparison. A subpleural scarlike nodule in the lateral left upper lobe (3-69) is unchanged as well. No new or enlarging pulmonary nodules. No airspace consolidation or pleural fluid. Similar-appearing bibasilar atelectasis and/or scarring is noted. Trace bilateral pleural fluid versus pleural thickening is seen. MEDIASTINUM/AXILLAE: Heart size is borderline enlarged and similar to comparison. Severe three-vessel coronary artery atherosclerosis is noted. Coarse calcifications in the mitral annulus are seen. Multiple densely calcified mediastinal lymph nodes are similar to comparison. There are a few prominent to borderline enlarged noncalcified mediastinal lymph nodes, which are unchanged. No new or enlarging thoracic lymph nodes. Moderate thoracic aortic atherosclerosis is present. The thyroid gland appears diffusely and mildly enlarged  with heterogeneity throughout the gland, raising possibility of small thyroid nodules. If further evaluation is desired, a dedicated thyroid ultrasound is suggested. HEPATOBILIARY: There is moderate to marked intrahepatic and extrahepatic bile duct dilation present, which appears similar to slightly increased to comparison. Gallbladder is absent. Numerous benign calcified splenic granulomas are present. No suspicious appearing hepatic observation. PANCREAS: There is similar-appearing low-density cystic lesion in the tail of the pancreas measuring 12 x 12 mm, previously measuring 16 x 8 mm. Other small cystic foci in the tail of the pancreas likely representing a combination of side branch ductal dilation and/or IPMNs. The well-seen portions of the pancreatic duct are not dilated within the head, neck, and body. No inflammatory changes of the pancreas. No inflammatory changes of the pancreas. SPLEEN: There are in numerable benign calcified granulomas seen throughout spleen. Spleen is normal in size. ADRENAL GLANDS: There is similar-appearing coarse calcification throughout the region of the right adrenal gland, which may represent chronic remote infection or trauma. There is a fat density lesion in the right upper quadrant measuring 3.2 x 1.7 cm (7-131), which may represent an adenoma versus myelolipoma. Fat necrosis is also a consideration. There is mild thickening of the left adrenal gland without discrete nodule or mass, which appears similar to comparison. No follow-up is necessary. KIDNEYS/BLADDER: The kidneys enhance homogenously and symmetrically. No nephrolithiasis or hydronephrosis. There is a small rounded intermediate to low-density left renal cortical lesion (7-154), which is unchanged and too small to characterize. A cyst is favored, including a possible hemorrhagic or proteinaceous cyst. Attention on surveillance imaging is recommended. No new or enlarging cystic or solid renal masses. Urinary bladder  is obscured by streak artifact from orthopedic hardware. BOWEL: As previously noted, there is a large round circumscribed heterogenous mass in the right hemiabdomen/retroperitoneum measuring 8.4 x 8.7 x 9.6 cm in AP, transverse, and craniocaudal dimensions, respectively (series 7, image 166 and series 5, image 37), previously 8.3 x 7.6 x 8.8 cm. Moderate to large volume formed stool is seen throughout the colon. No signs of bowel obstruction or inflammation. No evidence of pneumatosis or pneumoperitoneum. PELVIC ORGANS: Pelvic organs are obscured by streak artifact from orthopedic hardware. ADDITIONAL FINDINGS: Severe atherosclerosis seen throughout the abdominal aorta and branch vessels. No aneurysmal dilation. A couple prominent to borderline enlarged good hepatic lymph nodes are noted measuring up to 11 mm in short axis, which are nonspecific and can be monitored on future surveillance imaging. MUSCULOSKELETAL: Multilevel degenerative changes of the spine are present. Chronic-appearing compression deformities of the L2, L3, and L5 vertebral bodies are similar in appearance to comparison. No acute osseous abnormality is seen. Bilateral hip arthroplasties are present. Streak artifact from orthopedic hardware limits evaluation of adjacent structures, including the pelvic structures. A right shoulder arthroplasty is also partially imaged. Streak artifact from this hardware limits evaluation of adjacent structures as well. Advanced degenerative changes of the left shoulder are noted. Generalized osteopenia.     IMPRESSION: 1.  No significant interval change to comparison. 2.  Similar-appearing large round circumscribed solid mass in the right hemiabdomen/retroperitoneum, in keeping with history of malignant neuroendocrine tumor. 3.  Similar to slight interval increase in intrahepatic and extrahepatic bowel duct dilation. Prior cholecystectomy. 4.  Similar-appearing low density/cystic lesion in the tail of the  pancreas, favoring an IPMN. Recommend attention on surveillance imaging. 5.  Similar-appearing coarse calcification and fat density lesion involving the right adrenal gland, favoring a myelolipoma versus adenoma. 6.  Severe atherosclerotic vascular disease. 7.  Additional nonacute findings as above. ANNETTE JOHNSON MD   SYSTEM ID:  [id number]     A total of 30 min were spent today on this visit which included face to face conversation with the patient, EMR review, counseling and co-ordination of care and medical documentation.      Signed by: Pedro Luis Edwards MD    Oncology Rooming Note    November 7, 2023 8:48 AM   Pooja Swartz is a 89 year old female who presents for:    Chief Complaint   Patient presents with     Oncology Clinic Visit     Other malignant neuroendocrine tumors - Provider and infusion     Initial Vitals: BP (!) 158/61 (BP Location: Right arm, Patient Position: Sitting, Cuff Size: Adult Small)   Pulse 63   Temp 97.6  F (36.4  C) (Tympanic)   Resp 12   Ht 1.524 m (5')   Wt 45.4 kg (100 lb)   LMP 07/07/1960   SpO2 100%   BMI 19.53 kg/m   Estimated body mass index is 19.53 kg/m  as calculated from the following:    Height as of this encounter: 1.524 m (5').    Weight as of this encounter: 45.4 kg (100 lb). Body surface area is 1.39 meters squared.  No Pain (0) Comment: Data Unavailable   Patient's last menstrual period was 07/07/1960.  Allergies reviewed: Yes  Medications reviewed: Yes    Medications: Medication refills not needed today.  Pharmacy name entered into [facility]:    [facility] - [place], [place] - [address] [facility] AT [facility] [address] [facility]    Clinical concerns:  None      Nelida Plasencia [facility]                Again, thank you for allowing me to participate in the care of your patient.        Sincerely,        Pedro Luis Edwards MD

## 2023-11-15 NOTE — PROGRESS NOTES
ANTICOAGULATION MANAGEMENT     Pooaj Swartz 89 year old female is on warfarin with subtherapeutic INR result. (Goal INR 2.0-3.0)    Recent labs: (last 7 days)     11/15/23  1012   INR 1.8*       ASSESSMENT     Source(s): Chart Review and Patient/Caregiver Call     Warfarin doses taken: Warfarin taken as instructed  Diet: No new diet changes identified  Medication/supplement changes: None noted  New illness, injury, or hospitalization: No  Signs or symptoms of bleeding or clotting: No  Previous result: Subtherapeutic  Additional findings: None       PLAN     Recommended plan for no diet, medication or health factor changes affecting INR     Dosing Instructions: Increase your warfarin dose (9% change) with next INR in 2 weeks       Summary  As of 11/15/2023      Full warfarin instructions:  2 mg every Mon, Fri; 4 mg all other days   Next INR check:  11/29/2023               Telephone call with Pooja who verbalizes understanding and agrees to plan    Lab visit scheduled    Education provided:   Please call back if any changes to your diet, medications or how you've been taking warfarin    Plan made per [facility] anticoagulation protocol    Linsey Abbott RN  Anticoagulation Clinic  11/15/2023    _______________________________________________________________________     Anticoagulation Episode Summary       Current INR goal:  2.0-3.0   TTR:  40.5% (1 y)   Target end date:  Indefinite   Send INR reminders to:  [facility]    Indications    Atrial fibrillation (H) [I48.91]  Long-term (current) use of anticoagulants [Z79.01] [Z79.01]  Atrial fibrillation  unspecified type (H) [I48.91]  Chronic atrial fibrillation (H) [I48.20]             Comments:               Anticoagulation Care Providers       Provider Role Specialty Phone number    Amanda Renee MD Referring Family Medicine [phone]

## 2023-11-15 NOTE — PROGRESS NOTES
Patient presents for wound check LLE. Patients dressing removed. Small skin tear present approximately 2.5 X 2.5 inch. No signs of infection. No redness, pus like drainage, pain or warmth. Redressed with Vaseline gauze with small amount of bacitracin. Covered with 2 2X2's and wrapped with Kerlix and taped. No tape applied to skin as patient has very thin skin on warfarin. Silver dollar sized hematoma on back of LLE patient reports has been there for more than 2 weeks. Reports there was a large bruise present that resolved.   Dr. Renee visualized hematoma and advised to put warm packs on it several times daily. May calcify. Patient wanted weight check today. 97 Lbs.   [place].   Discharged ambulatory with walker and friend Vergie to drive.   Advised to follow up if pus like drainage, fever, pain or worsening.     Camryn Mccray RN on 11/15/2023 at 11:13 AM

## 2023-12-05 PROBLEM — D47.2 MGUS (MONOCLONAL GAMMOPATHY OF UNKNOWN SIGNIFICANCE): Status: ACTIVE | Noted: 2023-01-01

## 2023-12-05 PROBLEM — D53.9 MACROCYTIC ANEMIA: Status: ACTIVE | Noted: 2023-01-01

## 2023-12-05 NOTE — PROGRESS NOTES
Oncology Rooming Note    December 5, 2023 1:36 PM   Pooja Swartz is a 89 year old female who presents for:    Chief Complaint   Patient presents with    Oncology Clinic Visit      malignant neuroendocrine tumors - Labs provider and infusion     Initial Vitals: /41 (BP Location: Right arm, Patient Position: Sitting, Cuff Size: Adult Small)   Pulse 66   Temp 97.7  F (36.5  C) (Tympanic)   Resp 12   Ht 1.524 m (5')   Wt 43.5 kg (96 lb)   LMP 07/07/1960   SpO2 100%   BMI 18.75 kg/m   Estimated body mass index is 18.75 kg/m  as calculated from the following:    Height as of this encounter: 1.524 m (5').    Weight as of this encounter: 43.5 kg (96 lb). Body surface area is 1.36 meters squared.  No Pain (0) Comment: Data Unavailable   Patient's last menstrual period was 07/07/1960.  Allergies reviewed: Yes  Medications reviewed: Yes    Medications: Medication refills not needed today.  Pharmacy name entered into [facility]:    [facility] - [place], [place] - [address] [facility] [address] - [place], [place] - [address] [facility]    Clinical concerns: None       Nelida Plasencia, CMA

## 2023-12-05 NOTE — LETTER
12/5/2023         RE: Pooja Swartz  [address]  [facility] [id number]        Dear Colleague,    Thank you for referring your patient, Pooja Swartz, to the [facility]. Please see a copy of my visit note below.      [facility] Hematology and Oncology Progress Note    Patient: Pooja Swartz  MRN: [id number]  12/05/23        Reason for Visit    Well-differentiated neuroendocrine carcinoma of retroperitoneum    Primary oncologist: Dr. Edwards    Assessment and Plan  #. 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  Tolerating well.     Last month, CT showed stable disease with slight increase in dilated intrahepatic and extrahepatic bile ducts. Asymptomatic.  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      Clinically, stable today.    We expect stable disease on lanreotide injections.      Labwork: LFTs WNL.    Plan:  -Continue with monthly lanreotide today  -She is transitioning to [place] for the Winter (mid-Dec through mid-March). She will need to establish with Oncologist there to continue monthly injections and surveillance; her daughter whom she will be living with is in the process of getting this established and they will let us know so we can ensure that they have appropriate records.  -Monitor LFTs monthly  -Repeat CT upon return to [place] next Spring (late March/early April), if clinically stable in interim    #. Macrocytic anemia  #. MGUS  Some question in macrocytic anemia since early, 2023, but stable over the past 6 months.  Hemoglobin 9.9,  .     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 Iron studies are within normal limits.  B12 and folate was normal.     SPEP  showed a small monoclonal peak of 0.2 g/dL.  On immunofixation it appears to be both IgA lambda and IgG kappa monoclonal proteins.  Kappa and lambda light chains are slightly elevated,  lambda more than kappa but ratio is normal.      Plan:   -Monitor CBC monthly   -Monitor MGUS labs every 6 to 12 months.  Will recheck when she is back in the Spring, 2024    #. IPMN pancratic tail  Stable on CT      ECOG Performance    2 - Ambulatory and independent in all ADLs; cannot work; up > 50% of the time    Interval History   Pooja Swartz is a 89 year old female with retroperitoneal well-differentiated neuroendocrine tumor who is currently on monthly lanreotide injections here for 1-month follow-up.    She started monthly lanreotide injections in August.  Tolerating it pretty well.  No significant side effects.    Mild intermittent abdominal bloating discomfort. Taking metamucil daily, with relief in this symptom.  Bowels are regular.  No evidence of any bowel obstruction.  No significant nausea or vomiting.      Physical Exam    General: alert and cooperative.  Alone today.  HEENT: Head: Normal, normocephalic, atraumatic.  Eye: Normal external eye, conjunctiva, lids cornea, FEI.  Lungs: CTA  CVS: RRR, systolic murmur heard  Abdomen: well circumscribed mass noted on the right side  CNS: Alert and oriented x3, neurologic exam grossly normal.      Lab Results    Recent Results (from the past 168 hour(s))   INR point of care   Result Value Ref Range    INR 3.2 (H) 0.9 - 1.1   Comprehensive metabolic panel   Result Value Ref Range    Sodium 136 135 - 145 mmol/L    Potassium 4.7 3.4 - 5.3 mmol/L    Carbon Dioxide (CO2) 24 22 - 29 mmol/L    Anion Gap 9 7 - 15 mmol/L    Urea Nitrogen 32.2 (H) 8.0 - 23.0 mg/dL    Creatinine 0.86 0.51 - 0.95 mg/dL    GFR Estimate 61 >60 mL/min/1.73m2    Calcium 9.4 8.2 - 9.6 mg/dL    Chloride 103 98 - 107 mmol/L    Glucose 159 (H) 70 - 99 mg/dL    Alkaline Phosphatase 84 40 - 150 U/L    AST 22 0 - 45 U/L    ALT 14 0 - 50 U/L    Protein Total 7.1 6.4 - 8.3 g/dL    Albumin 4.2 3.5 - 5.2 g/dL    Bilirubin Total 0.4 <=1.2 mg/dL   CBC with platelets and differential   Result Value Ref  Range    WBC Count 6.3 4.0 - 11.0 10e3/uL    RBC Count 2.87 (L) 3.80 - 5.20 10e6/uL    Hemoglobin 9.9 (L) 11.7 - 15.7 g/dL    Hematocrit 30.9 (L) 35.0 - 47.0 %     (H) 78 - 100 fL    MCH 34.5 (H) 26.5 - 33.0 pg    MCHC 32.0 31.5 - 36.5 g/dL    RDW 15.7 (H) 10.0 - 15.0 %    Platelet Count 173 150 - 450 10e3/uL    % Neutrophils 80 %    % Lymphocytes 11 %    % Monocytes 6 %    % Eosinophils 2 %    % Basophils 1 %    % Immature Granulocytes 0 %    NRBCs per 100 WBC 0 <1 /100    Absolute Neutrophils 5.0 1.6 - 8.3 10e3/uL    Absolute Lymphocytes 0.7 (L) 0.8 - 5.3 10e3/uL    Absolute Monocytes 0.4 0.0 - 1.3 10e3/uL    Absolute Eosinophils 0.1 0.0 - 0.7 10e3/uL    Absolute Basophils 0.1 0.0 - 0.2 10e3/uL    Absolute Immature Granulocytes 0.0 <=0.4 10e3/uL    Absolute NRBCs 0.0 10e3/uL           Imaging    No results found.    A total of 35 min were spent today on this visit which included face to face conversation with the patient, EMR review, counseling and co-ordination of care and medical documentation.      Signed by: Lois Arnett NP    Oncology Rooming Note    December 5, 2023 1:36 PM   Pooja Swartz is a 89 year old female who presents for:    Chief Complaint   Patient presents with     Oncology Clinic Visit      malignant neuroendocrine tumors - Labs provider and infusion     Initial Vitals: /41 (BP Location: Right arm, Patient Position: Sitting, Cuff Size: Adult Small)   Pulse 66   Temp 97.7  F (36.5  C) (Tympanic)   Resp 12   Ht 1.524 m (5')   Wt 43.5 kg (96 lb)   LMP 07/07/1960   SpO2 100%   BMI 18.75 kg/m   Estimated body mass index is 18.75 kg/m  as calculated from the following:    Height as of this encounter: 1.524 m (5').    Weight as of this encounter: 43.5 kg (96 lb). Body surface area is 1.36 meters squared.  No Pain (0) Comment: Data Unavailable   Patient's last menstrual period was 07/07/1960.  Allergies reviewed: Yes  Medications reviewed: Yes    Medications: Medication refills not  needed today.  Pharmacy name entered into [facility]:    [facility] - [place], [place] - [address] [address] [facility] - [place], [place] - [address] [address] [facility]    Clinical concerns: None       Nelida Plasencia, [facility]                Again, thank you for allowing me to participate in the care of your patient.        Sincerely,        Lois Arnett, NP

## 2023-12-05 NOTE — PROGRESS NOTES
Infusion Nursing Note:  Pooja Swartz presents today for Somatuline.    Patient seen by provider today: Yes: Lois Anrett   present during visit today: Not Applicable.    Note: N/A.    Intravenous Access:  No Intravenous access/labs at this visit.    Treatment Conditions:  Not Applicable.    Post Infusion Assessment:  Patient tolerated injection without incident.     Discharge Plan:   Patient discharged in stable condition accompanied by: self.  Departure Mode: Wheelchair. Pt is going to [place] until March.     Kamaljit Woo RN

## 2023-12-05 NOTE — PROGRESS NOTES
ANTICOAGULATION MANAGEMENT     Pooja Swartz 89 year old female is on warfarin with supratherapeutic INR result. (Goal INR 2.0-3.0)    Recent labs: (last 7 days)     12/05/23  1300   INR 3.2*       ASSESSMENT     Source(s): Chart Review and Patient/Caregiver Call     Warfarin doses taken: Warfarin taken as instructed  Diet: Decreased greens/vitamin K in diet; ongoing change  Medication/supplement changes: None noted  New illness, injury, or hospitalization: No  Signs or symptoms of bleeding or clotting: No  Previous result: Subtherapeutic  Additional findings: None       PLAN     Recommended plan for ongoing change(s) affecting INR     Dosing Instructions: decrease your warfarin dose (8.3% change) with next INR in 2 weeks       Summary  As of 12/5/2023      Full warfarin instructions:  2 mg every Mon, Wed, Fri; 4 mg all other days   Next INR check:  12/19/2023               Telephone call with Pooja who verbalizes understanding and agrees to plan    Lab visit scheduled    Education provided:   Contact [phone] with any changes, questions or concerns.     Plan made per [facility] anticoagulation protocol    Linsey Abbott RN  Anticoagulation Clinic  12/5/2023    _______________________________________________________________________     Anticoagulation Episode Summary       Current INR goal:  2.0-3.0   TTR:  43.8% (1 y)   Target end date:  Indefinite   Send INR reminders to:  [facility]    Indications    Atrial fibrillation (H) [I48.91]  Long-term (current) use of anticoagulants [Z79.01] [Z79.01]  Atrial fibrillation  unspecified type (H) [I48.91]  Chronic atrial fibrillation (H) [I48.20]             Comments:               Anticoagulation Care Providers       Provider Role Specialty Phone number    Amanda Renee MD Referring Family Medicine [phone]

## 2023-12-05 NOTE — PROGRESS NOTES
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 Edwards    Assessment and Plan  #. 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  Tolerating well.     Last month, CT showed stable disease with slight increase in dilated intrahepatic and extrahepatic bile ducts. Asymptomatic.  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      Clinically, stable today.    We expect stable disease on lanreotide injections.      Labwork: LFTs WNL.    Plan:  -Continue with monthly lanreotide today  -She is transitioning to [place] for the Winter (mid-Dec through mid-March). She will need to establish with Oncologist there to continue monthly injections and surveillance; her daughter whom she will be living with is in the process of getting this established and they will let us know so we can ensure that they have appropriate records.  -Monitor LFTs monthly  -Repeat CT upon return to [place] next Spring (late March/early April), if clinically stable in interim    #. Macrocytic anemia  #. MGUS  Some question in macrocytic anemia since early, 2023, but stable over the past 6 months.  Hemoglobin 9.9,  .     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 Iron studies are within normal limits.  B12 and folate was normal.     SPEP  showed a small monoclonal peak of 0.2 g/dL.  On immunofixation it appears to be both IgA lambda and IgG kappa monoclonal proteins.  Kappa and lambda light chains are slightly elevated, lambda more than kappa but ratio is normal.      Plan:   -Monitor CBC monthly   -Monitor MGUS labs every 6 to 12 months.  Will recheck when she is back in the Spring, 2024    #. IPMN pancratic tail  Stable on CT      ECOG Performance    2 - Ambulatory and independent in  all ADLs; cannot work; up > 50% of the time    Interval History   Pooja Swartz is a 89 year old female with retroperitoneal well-differentiated neuroendocrine tumor who is currently on monthly lanreotide injections here for 1-month follow-up.    She started monthly lanreotide injections in August.  Tolerating it pretty well.  No significant side effects.    Mild intermittent abdominal bloating discomfort. Taking metamucil daily, with relief in this symptom.  Bowels are regular.  No evidence of any bowel obstruction.  No significant nausea or vomiting.      Physical Exam    General: alert and cooperative.  Alone today.  HEENT: Head: Normal, normocephalic, atraumatic.  Eye: Normal external eye, conjunctiva, lids cornea, FEI.  Lungs: CTA  CVS: RRR, systolic murmur heard  Abdomen: well circumscribed mass noted on the right side  CNS: Alert and oriented x3, neurologic exam grossly normal.      Lab Results    Recent Results (from the past 168 hour(s))   INR point of care   Result Value Ref Range    INR 3.2 (H) 0.9 - 1.1   Comprehensive metabolic panel   Result Value Ref Range    Sodium 136 135 - 145 mmol/L    Potassium 4.7 3.4 - 5.3 mmol/L    Carbon Dioxide (CO2) 24 22 - 29 mmol/L    Anion Gap 9 7 - 15 mmol/L    Urea Nitrogen 32.2 (H) 8.0 - 23.0 mg/dL    Creatinine 0.86 0.51 - 0.95 mg/dL    GFR Estimate 61 >60 mL/min/1.73m2    Calcium 9.4 8.2 - 9.6 mg/dL    Chloride 103 98 - 107 mmol/L    Glucose 159 (H) 70 - 99 mg/dL    Alkaline Phosphatase 84 40 - 150 U/L    AST 22 0 - 45 U/L    ALT 14 0 - 50 U/L    Protein Total 7.1 6.4 - 8.3 g/dL    Albumin 4.2 3.5 - 5.2 g/dL    Bilirubin Total 0.4 <=1.2 mg/dL   CBC with platelets and differential   Result Value Ref Range    WBC Count 6.3 4.0 - 11.0 10e3/uL    RBC Count 2.87 (L) 3.80 - 5.20 10e6/uL    Hemoglobin 9.9 (L) 11.7 - 15.7 g/dL    Hematocrit 30.9 (L) 35.0 - 47.0 %     (H) 78 - 100 fL    MCH 34.5 (H) 26.5 - 33.0 pg    MCHC 32.0 31.5 - 36.5 g/dL    RDW 15.7 (H) 10.0 -  15.0 %    Platelet Count 173 150 - 450 10e3/uL    % Neutrophils 80 %    % Lymphocytes 11 %    % Monocytes 6 %    % Eosinophils 2 %    % Basophils 1 %    % Immature Granulocytes 0 %    NRBCs per 100 WBC 0 <1 /100    Absolute Neutrophils 5.0 1.6 - 8.3 10e3/uL    Absolute Lymphocytes 0.7 (L) 0.8 - 5.3 10e3/uL    Absolute Monocytes 0.4 0.0 - 1.3 10e3/uL    Absolute Eosinophils 0.1 0.0 - 0.7 10e3/uL    Absolute Basophils 0.1 0.0 - 0.2 10e3/uL    Absolute Immature Granulocytes 0.0 <=0.4 10e3/uL    Absolute NRBCs 0.0 10e3/uL           Imaging    No results found.    A total of 35 min were spent today on this visit which included face to face conversation with the patient, EMR review, counseling and co-ordination of care and medical documentation.      Signed by: Lois Arnett NP

## 2023-12-13 NOTE — PROGRESS NOTES
ANTICOAGULATION MANAGEMENT     Pooja Swartz 89 year old female is on warfarin with supratherapeutic INR result. (Goal INR 2.0-3.0)    Recent labs: (last 7 days)     12/13/23  1330   INR 4.0*       ASSESSMENT     Source(s): Chart Review and Patient/Caregiver Call     Warfarin doses taken: Warfarin taken as instructed  Diet: Decreased greens/vitamin K in diet; plans to resume previous intake  Medication/supplement changes: None noted  New illness, injury, or hospitalization: No  Signs or symptoms of bleeding or clotting: No  Previous result: Supratherapeutic  Additional findings: None heading to [place] on Saturday for 2 months, will do inrs there and report here.       PLAN     Recommended plan for no diet, medication or health factor changes affecting INR     Dosing Instructions: hold 2 doses then continue your current warfarin dose with next INR in 10 days       Summary  As of 12/13/2023      Full warfarin instructions:  12/13: Hold; Otherwise 2 mg every Mon, Wed, Fri; 4 mg all other days   Next INR check:  12/27/2023               Telephone call with Pooja who verbalizes understanding and agrees to plan    Contact [phone] to schedule and with any changes, questions or concerns.     Education provided:   Please call back if any changes to your diet, medications or how you've been taking warfarin    Plan made per [facility] anticoagulation protocol    Linsey Abbott RN  Anticoagulation Clinic  12/13/2023    _______________________________________________________________________     Anticoagulation Episode Summary       Current INR goal:  2.0-3.0   TTR:  42.9% (1 y)   Target end date:  Indefinite   Send [place] reminders [place]:  [facility] [place]    Indications    Atrial fibrillation (H) [I48.91]  Long-term (current) use of anticoagulants [Z79.01] [Z79.01]  Atrial fibrillation  unspecified type (H) [I48.91]  Chronic atrial fibrillation (H) [I48.20]             Comments:               Anticoagulation Care Providers        Provider Role Specialty Phone number    Amanda Renee MD Referring Family Medicine [phone]

## 2024-01-01 ENCOUNTER — PATIENT OUTREACH (OUTPATIENT)
Dept: CARE COORDINATION | Facility: CLINIC | Age: 89
End: 2024-01-01
Payer: COMMERCIAL

## 2024-01-01 ENCOUNTER — TELEPHONE (OUTPATIENT)
Dept: ANTICOAGULATION | Facility: CLINIC | Age: 89
End: 2024-01-01
Payer: COMMERCIAL

## 2024-01-01 ENCOUNTER — TRANSFERRED RECORDS (OUTPATIENT)
Dept: HEALTH INFORMATION MANAGEMENT | Facility: CLINIC | Age: 89
End: 2024-01-01
Payer: COMMERCIAL

## 2024-01-01 DIAGNOSIS — I48.91 ATRIAL FIBRILLATION, UNSPECIFIED TYPE (H): Primary | ICD-10-CM

## 2024-01-01 DIAGNOSIS — Z79.01 LONG TERM CURRENT USE OF ANTICOAGULANT THERAPY: ICD-10-CM

## 2024-01-01 DIAGNOSIS — I48.20 CHRONIC ATRIAL FIBRILLATION (H): ICD-10-CM

## 2024-01-01 RX ORDER — WARFARIN SODIUM 2 MG/1
TABLET ORAL
Qty: 180 TABLET | Refills: 1 | Status: SHIPPED | OUTPATIENT
Start: 2024-01-01

## 2024-01-02 NOTE — TELEPHONE ENCOUNTER
ANTICOAGULATION MANAGEMENT:  Medication Refill    Anticoagulation Summary  As of 12/13/2023      Warfarin maintenance plan:  2 mg (2 mg x 1) every Mon, Wed, Fri; 4 mg (2 mg x 2) all other days   Next INR check:  12/27/2023   Target end date:  Indefinite    Indications    Atrial fibrillation (H) [I48.91]  Long-term (current) use of anticoagulants [Z79.01] [Z79.01]  Atrial fibrillation  unspecified type (H) [I48.91]  Chronic atrial fibrillation (H) [I48.20]                 Anticoagulation Care Providers       Provider Role Specialty Phone number    Amanda Renee MD Referring Family Medicine [phone]            Refill Criteria    Visit with referring provider/group: Meets criteria: office visit within referring provider group in the last 1 year on 9/20/23    [facility] referral signed last signed: 11/01/2023; within last year: Yes    Lab monitoring not exceeding 2 weeks overdue: Yes    Pooja meets all criteria for refill. Rx instructions and quantity supplied updated to match patient's current dosing plan. Warfarin 90 day supply with 1 refill granted per [facility] protocol     Linsey Abbott RN  Anticoagulation Clinic

## 2024-01-02 NOTE — TELEPHONE ENCOUNTER
Pending Prescriptions:                       Disp   Refills    JANTOVEN ANTICOAGULANT 2 MG tablet [Pharma*180 ta*1        Sig: Take 1-2 tablets (2mg -4 mg) daily as directed by the           Anticoagulation Clinic    Routing refill request to provider for review/approval because:  Routing to [facility] for review.    Anne Sarmiento RN  [facility]

## 2024-01-17 NOTE — TELEPHONE ENCOUNTER
ANTICOAGULATION     Pooja Swartz is overdue for an INR check.     Lm for daughter to call back with confirmation that Ellie is currently in tcu in [place] dt hip fx     Linsey Abbott RN

## 2024-01-31 NOTE — TELEPHONE ENCOUNTER
ANTICOAGULATION     Pooja Swartz is overdue for an INR check.     Left message for patient to call and schedule lab appointment as soon as possible. If returning call, please schedule.     Yanelis Peraza RN

## 2024-02-05 NOTE — LETTER
[facility] CARE COORDINATION  Víctor Amanda HAO     February 5, 2024      Pooja Swartz  [address] [address] [place] [id number]          Dear Pooja,    I am a clinical product navigator that works on behalf of [facility]; I wanted to introduce myself and role to you, as I can help you establish care with recommended providers for ongoing care within our health care system.     This role serves as a liaison between [facility]'s clinical network and the health insurance plans to provide guidance on establishing primary and specialty care needs. One of the benefits of having a primary care provider from the network is that your care team will help coordinate your care and guide you through any additional care you may need. Our care team is focused and trained to treat the whole person and can help you with all your physical, emotional, and social concerns.     Also, our Primary Care Clinics are all supported by Clinic Care Coordination:     The clinic care coordination team is made up of a registered nurse, , financial resource worker and community health worker who understand the health care system. The goal of clinic care coordination is to help you manage your health and improve access to the health care system. Our team works alongside your provider to assist you in determining your health and social needs. We can help you obtain health care and community resources, providing you with necessary information and education. We can work with you through any barriers and develop a care plan that helps coordinate and strengthen the communication between you and your care team.  Our services are voluntary and are offered without charge to you personally.    Please feel free to contact me with any questions or concerns regarding care coordination and what we can offer.      We are focused on providing you with the highest-quality healthcare experience possible.    Sincerely,        LOKESH Hinkle Clinical Navigator  [phone]

## 2024-02-05 NOTE — PROGRESS NOTES
Clinical Product Navigator RN reviewed chart; patient on payer product coverage.  Review results:   CPN Initial Information Gathering  Referral Source: Health Plan    Patient identified by her health plan as being recently discharged from [facility] in [place], stay 1/1/24-2/3/24, diagnosis; fall on same level.  Patient was discharged back to her home in [place] with home health care services.  Note upon chart review, patient hawkins in [place], however, her health has been fragile while in [place].    RN Clinical Product Navigator will send a care management introduction letter for patient to review upon her return to [place].    Melissa Behl BSN, RN, PHN, CCM  RN Clinical Product Navigator  [phone]

## 2024-02-07 NOTE — TELEPHONE ENCOUNTER
ANTICOAGULATION     Pooja Swartz is overdue for an INR check.     Spoke with Daughter Ade who said Pooja fell shortly after coming to visit her in [place] and had subsequent hip surgery and tcu stay.  Is home with daughter now with home care. They are establishing with local [place] pcp for the duration of her stay. Ade did want me to share this with Dr Renee.  At present they are unsure of the timeline for her return to [place]    Linsey Abbott RN

## 2024-04-24 ENCOUNTER — DOCUMENTATION ONLY (OUTPATIENT)
Dept: ANTICOAGULATION | Facility: CLINIC | Age: 89
End: 2024-04-24
Payer: COMMERCIAL

## 2024-04-24 DIAGNOSIS — I48.91 ATRIAL FIBRILLATION, UNSPECIFIED TYPE (H): Primary | ICD-10-CM

## 2024-04-24 DIAGNOSIS — Z79.01 LONG TERM CURRENT USE OF ANTICOAGULANT THERAPY: ICD-10-CM

## 2024-04-24 DIAGNOSIS — I48.20 CHRONIC ATRIAL FIBRILLATION (H): ICD-10-CM

## 2024-04-24 NOTE — PROGRESS NOTES
ANTICOAGULATION  MANAGEMENT    Pooja Swartz is being discharged from the [facility] ([facility]).    Reason for discharge:     Anticoagulation episode resolved and Standing order discontinued        Linsey Abbott RN

## 2024-08-13 ENCOUNTER — TELEPHONE (OUTPATIENT)
Dept: CARDIOLOGY | Facility: CLINIC | Age: 89
End: 2024-08-13
Payer: COMMERCIAL

## 2024-08-13 NOTE — TELEPHONE ENCOUNTER
[facility] Call Center    Phone Message    May a detailed message be left on voicemail: yes     Reason for Call: Other: pts daughter called to inform the team that pt has passed as of this past April 2024 and will not be making any future appts as daughter received a letter for missed annual/echo orders.      Action Taken: Other: cardiology     Travel Screening: Not Applicable      Thank you!  [facility]        Date of Service:

## 2024-08-13 NOTE — TELEPHONE ENCOUNTER
All Cardiac orders cancelled.     Looks like oncology and PCP have future orders. Will forward this call to them as well as FYI and to cancel future orders.     Chasidy Cisneros RN
